# Patient Record
Sex: FEMALE | Race: WHITE | NOT HISPANIC OR LATINO | Employment: OTHER | URBAN - NONMETROPOLITAN AREA
[De-identification: names, ages, dates, MRNs, and addresses within clinical notes are randomized per-mention and may not be internally consistent; named-entity substitution may affect disease eponyms.]

---

## 2017-01-05 ENCOUNTER — OFFICE VISIT (OUTPATIENT)
Dept: FAMILY MEDICINE CLINIC | Facility: CLINIC | Age: 72
End: 2017-01-05

## 2017-01-05 VITALS
DIASTOLIC BLOOD PRESSURE: 72 MMHG | HEART RATE: 86 BPM | OXYGEN SATURATION: 97 % | BODY MASS INDEX: 32.02 KG/M2 | HEIGHT: 67 IN | WEIGHT: 204 LBS | SYSTOLIC BLOOD PRESSURE: 118 MMHG

## 2017-01-05 DIAGNOSIS — R11.0 NAUSEA: ICD-10-CM

## 2017-01-05 DIAGNOSIS — K21.00 GASTROESOPHAGEAL REFLUX DISEASE WITH ESOPHAGITIS: Primary | ICD-10-CM

## 2017-01-05 DIAGNOSIS — E11.9 DIABETES MELLITUS, STABLE (HCC): ICD-10-CM

## 2017-01-05 DIAGNOSIS — F41.8 MIXED ANXIETY DEPRESSIVE DISORDER: ICD-10-CM

## 2017-01-05 LAB — GLUCOSE BLDC GLUCOMTR-MCNC: 123 MG/DL (ref 70–130)

## 2017-01-05 PROCEDURE — 99214 OFFICE O/P EST MOD 30 MIN: CPT | Performed by: FAMILY MEDICINE

## 2017-01-05 PROCEDURE — 82962 GLUCOSE BLOOD TEST: CPT | Performed by: FAMILY MEDICINE

## 2017-01-05 RX ORDER — ALPRAZOLAM 0.25 MG/1
0.25 TABLET ORAL 2 TIMES DAILY PRN
Qty: 60 TABLET | Refills: 2 | Status: SHIPPED | OUTPATIENT
Start: 2017-01-05 | End: 2017-03-06 | Stop reason: SDUPTHER

## 2017-01-05 RX ORDER — PROCHLORPERAZINE MALEATE 5 MG/1
TABLET ORAL
Qty: 240 TABLET | Refills: 0 | Status: SHIPPED | OUTPATIENT
Start: 2017-01-05 | End: 2017-02-02

## 2017-01-05 NOTE — PROGRESS NOTES
"Subjective   Annie Mejia is a 71 y.o. female.     History of Present Illness   Ms. Mejia presents today with her  for recheck of chronic nausea and chronic anxiety DO. She has >1 yr h/o intermittent severe nausea with intermittent bouts of intractable vomiting. Assoc'd abd cramping but no chronic abd pain. She has had extensive w/u including EGD, colonoscopy, UGI and SBFT. SHe has seen Dr. Rowe as well as Dr. Martínez most recently. No clear diagnosis made by her understanding other than GERD and hiatal hernia. She has tried multiple medications including PPI, H2 blockers, carafate, Reglan, and antiemetics which have all helped to a varying degree. Last seen in clinic 1 month ago at which time she had been to ER several times. Medication adjustments were made including more regular use of Reglan, only prn use of Compazine.  She cont'd PPI as well. She reports that since her visit last month, she has only had 1 severe bout of n/v prompting her to consider going to ER \"but wait was too long\".  She has found the reglan does not seem to help. The compazine works well. She would like to \"take it more regularly\".  She is staying well hydrated. Down a few pounds over the holidays. No melena or hematochezia.    Her diabetes has been well controlled. No hypoglycemia. She is taking statin and Ace-inh as rx'd.     She has a long-standing h/o severe anxiety. She has been rx'd Lexapro but admits she quit it after 1 week \"as it didn't help\". She takes a low dose xanax at night and occ during day for panic. She assoc's her n/v spells with severe anxiety but states they can occur without her feeling anxious as well (such as middle of night).  She is please with some improvement on Zoloft but would like to \"try a higher dose\".       The following portions of the patient's history were reviewed and updated as appropriate: allergies, current medications, past family history, past medical history, past social history, past " surgical history and problem list.    Review of Systems   Constitutional: Positive for fatigue. Negative for appetite change, fever and unexpected weight change.   HENT: Negative for congestion, ear pain, hearing loss, nosebleeds, rhinorrhea, sneezing, sore throat, tinnitus and trouble swallowing.    Eyes: Negative for pain, discharge, redness and visual disturbance.   Respiratory: Positive for shortness of breath (when lying down). Negative for cough, chest tightness and wheezing.    Cardiovascular: Negative for chest pain, palpitations and leg swelling.   Gastrointestinal: Positive for abdominal pain, diarrhea, nausea and vomiting. Negative for blood in stool and constipation.        See HPI   Endocrine: Positive for cold intolerance. Negative for polydipsia and polyuria.        Hot flashes   Genitourinary: Negative for dysuria, flank pain, frequency, hematuria, pelvic pain, urgency and vaginal bleeding.   Musculoskeletal: Positive for arthralgias, back pain and myalgias (located in shins, chronic for years, even as child). Negative for joint swelling and neck pain.   Skin: Negative for rash and wound.   Neurological: Positive for weakness (generalized). Negative for dizziness, tremors, seizures, syncope, speech difficulty, numbness and headaches.   Hematological: Negative for adenopathy. Does not bruise/bleed easily.   Psychiatric/Behavioral: Positive for sleep disturbance. Negative for confusion, dysphoric mood and suicidal ideas. The patient is nervous/anxious.      Objective    Vitals:    01/05/17 0837   BP: 118/72   Pulse: 86   SpO2: 97%     Body mass index is 31.95 kg/(m^2).  Last 2 weights    01/05/17  0837   Weight: 204 lb (92.5 kg)     Physical Exam   Constitutional: She is oriented to person, place, and time. She appears well-developed and well-nourished. She is cooperative. She does not appear ill. No distress.   HENT:   Mouth/Throat: Mucous membranes are normal. Mucous membranes are not dry.   Eyes:  Conjunctivae are normal.   Pulmonary/Chest: Effort normal. No respiratory distress.   Neurological: She is alert and oriented to person, place, and time. Gait normal.   Skin: Skin is warm and dry.   Psychiatric: She has a normal mood and affect. Her speech is normal and behavior is normal. Judgment and thought content normal. Cognition and memory are normal.   Nursing note and vitals reviewed.    Recent Results (from the past 24 hour(s))   POC Glucose Fingerstick    Collection Time: 01/05/17  9:15 AM   Result Value Ref Range    Glucose 123 70 - 130 mg/dL     Assessment/Plan   Annie was seen today for nausea, follow-up and anxiety.    Diagnoses and all orders for this visit:    Gastroesophageal reflux disease with esophagitis  -     prochlorperazine (COMPAZINE) 5 MG tablet; 1-2 po q 6 hrs as needed for nausea/vomiting    Nausea  -     prochlorperazine (COMPAZINE) 5 MG tablet; 1-2 po q 6 hrs as needed for nausea/vomiting    Mixed anxiety depressive disorder  -     sertraline (ZOLOFT) 50 MG tablet; Take 1 tablet by mouth Daily.  -     ALPRAZolam (XANAX) 0.25 MG tablet; Take 1 tablet by mouth 2 (Two) Times a Day As Needed for anxiety or sleep. Only 1 rx/ 30 days.    Diabetes mellitus, stable  -     POC Glucose Fingerstick    Intractable nausea/vomiting improving. I have reviewed in detail with her and  the risks/benefits and potential side effects. We have particularly discussed increased risk of death in elderly dementia patients tx'd with compazine.  She voices understanding and wishes to proceed.  She is instructed to d/c Reglan, d/c phenergan.  Improving mixed anx/dep disorder. Good tolerance of Zoloft. Increase to 50 gm qd.  Continue xanax prn.  LILIAM report requested.  As part of patient's treatment plan I am prescribing a controlled substance.  The patient has been made aware of the appropriate use of such medications, including potential risk of somnolence, limited ability to drive and/or work safely, and  potential for dependence and/or overdose.  It has also been made clear that these medications are for use by this patient only, without concomitant use of alcohol or other substances, unless prescribed.  Recheck in 2 months, sooner as needed.  Continue diabetic appropriate diet.  Patient was encouraged to keep me informed of any acute changes, lack of improvement, or any new concerning symptoms.  Patient voiced understanding of all instructions and denied further questions.  The patient was counseled regarding diagnostic results, impressions, prognosis, instructions for management, risk factor reductions, education, and importance of treatment compliance.  Total time of encounter was 25 minutes and >15 was spent counseling.

## 2017-01-05 NOTE — MR AVS SNAPSHOT
Annie Mejia   1/5/2017 8:30 AM   Office Visit    Dept Phone:  [phone]   Encounter #:  [id number]    Provider:  Olga Pimentel MD   Department:  [facility] PRIMARY CARE                Your Full Care Plan              Today's Medication Changes          These changes are accurate as of: 1/5/17  9:12 AM.  If you have any questions, ask your nurse or doctor.               Medication(s)that have changed:     prochlorperazine 5 MG tablet   Commonly known as:  COMPAZINE   1-2 po q 6 hrs as needed for nausea/vomiting   What changed:  additional instructions   Changed by:  Olga Pimentel MD       sertraline 50 MG tablet   Commonly known as:  ZOLOFT   Take 1 tablet by mouth Daily.   What changed:    - medication strength  - how much to take   Changed by:  Olga Pimentel MD         Stop taking medication(s)listed here:     metoclopramide 5 MG tablet   Commonly known as:  REGLAN   Stopped by:  Olga Pimentel MD                Where to Get Your Medications      These medications were sent to [facility] [address] [place] [address] [phone] [place] [phone] [address] [address]     Phone:  [phone]     prochlorperazine 5 MG tablet    sertraline 50 MG tablet         You can get these medications from any pharmacy     Bring a paper prescription for each of these medications     ALPRAZolam 0.25 MG tablet                  Your Updated Medication List          This list is accurate as of: 1/5/17  9:12 AM.  Always use your most recent med list.                ALPRAZolam 0.25 MG tablet   Commonly known as:  XANAX   Take 1 tablet by mouth 2 (Two) Times a Day As Needed for anxiety or sleep. Only 1 rx/ 30 days.       aspirin  MG tablet       atorvastatin 40 MG tablet   Commonly known as:  LIPITOR   Take 1 tablet by mouth Every Night.       EPINEPHrine 0.3 MG/0.3ML solution auto-injector injection   Commonly known as:  EPIPEN          HYDROcodone-acetaminophen 7.5-325 MG per tablet   Commonly known as:  NORCO       linagliptin 5 MG tablet tablet   Commonly known as:  TRADJENTA   Take 1 tablet by mouth Daily.       lisinopril-hydrochlorothiazide 20-25 MG per tablet   Commonly known as:  PRINZIDE,ZESTORETIC   Take 1 tablet by mouth Daily.       meclizine 25 MG tablet   Commonly known as:  ANTIVERT       omeprazole 20 MG capsule   Commonly known as:  priLOSEC   Take 2 capsules by mouth Daily. Take 1 capsule twice daily for reflux and use with arthritis medication.       prochlorperazine 5 MG tablet   Commonly known as:  COMPAZINE   1-2 po q 6 hrs as needed for nausea/vomiting       promethazine 25 MG tablet   Commonly known as:  PHENERGAN   Take 1 tablet by mouth Every 6 (Six) Hours As Needed for nausea or vomiting.       sertraline 50 MG tablet   Commonly known as:  ZOLOFT   Take 1 tablet by mouth Daily.       sucralfate 1 G tablet   Commonly known as:  CARAFATE   Take 1 tablet by mouth 4 (Four) Times a Day.               You Were Diagnosed With        Codes Comments    Gastroesophageal reflux disease with esophagitis    -  Primary ICD-10-CM: K21.0  ICD-9-CM: 530.11     Nausea     ICD-10-CM: R11.0  ICD-9-CM: 787.02     Mixed anxiety depressive disorder     ICD-10-CM: F41.8  ICD-9-CM: 300.4       Instructions     None    Patient Instructions History      Upcoming Appointments     Visit Type Date Time Department    FOLLOW UP 1/5/2017  8:30 AM [facility]      MyChart Signup     Our records indicate that you have declined [employer] signup. If you would like to sign up for [employer], please email [email or web address] or call [phone] to obtain an activation code.             Other Info from Your Visit           Allergies     Morphine  Itching    Morphine And Related        Reason for Visit     Nausea compazine works better for her nausea and would like a refill      Vital Signs     Blood Pressure Pulse Height Weight Oxygen  "Saturation Body Mass Index    118/72 86 67\" (170.2 cm) 204 lb (92.5 kg) 97% 31.95 kg/m2    Smoking Status                   Former Smoker           Problems and Diagnoses Noted     Inflammation of the esophagus    Mixed anxiety depressive disorder    Nauseous        "

## 2017-01-16 ENCOUNTER — OFFICE VISIT (OUTPATIENT)
Dept: FAMILY MEDICINE CLINIC | Facility: CLINIC | Age: 72
End: 2017-01-16

## 2017-01-16 VITALS
DIASTOLIC BLOOD PRESSURE: 74 MMHG | SYSTOLIC BLOOD PRESSURE: 100 MMHG | WEIGHT: 206 LBS | OXYGEN SATURATION: 97 % | HEART RATE: 100 BPM | BODY MASS INDEX: 32.26 KG/M2

## 2017-01-16 DIAGNOSIS — R39.9 UTI SYMPTOMS: ICD-10-CM

## 2017-01-16 LAB
BILIRUB BLD-MCNC: NEGATIVE MG/DL
CLARITY, POC: CLEAR
COLOR UR: YELLOW
GLUCOSE UR STRIP-MCNC: NEGATIVE MG/DL
KETONES UR QL: NEGATIVE
LEUKOCYTE EST, POC: ABNORMAL
NITRITE UR-MCNC: NEGATIVE MG/ML
PH UR: 6 [PH] (ref 5–8)
PROT UR STRIP-MCNC: NEGATIVE MG/DL
RBC # UR STRIP: NEGATIVE /UL
SP GR UR: 1.02 (ref 1–1.03)
UROBILINOGEN UR QL: NORMAL

## 2017-01-16 PROCEDURE — 96372 THER/PROPH/DIAG INJ SC/IM: CPT | Performed by: NURSE PRACTITIONER

## 2017-01-16 PROCEDURE — 81003 URINALYSIS AUTO W/O SCOPE: CPT | Performed by: NURSE PRACTITIONER

## 2017-01-16 PROCEDURE — 99213 OFFICE O/P EST LOW 20 MIN: CPT | Performed by: NURSE PRACTITIONER

## 2017-01-16 PROCEDURE — 87086 URINE CULTURE/COLONY COUNT: CPT | Performed by: NURSE PRACTITIONER

## 2017-01-16 RX ORDER — CEFDINIR 300 MG/1
300 CAPSULE ORAL 2 TIMES DAILY
Qty: 14 CAPSULE | Refills: 0 | Status: SHIPPED | OUTPATIENT
Start: 2017-01-16 | End: 2017-01-23

## 2017-01-16 RX ORDER — CEFDINIR 300 MG/1
300 CAPSULE ORAL 2 TIMES DAILY
Qty: 14 CAPSULE | Refills: 0 | Status: SHIPPED | OUTPATIENT
Start: 2017-01-16 | End: 2017-01-16 | Stop reason: SDUPTHER

## 2017-01-16 RX ORDER — GLIPIZIDE 5 MG/1
5 TABLET ORAL
COMMUNITY
End: 2017-02-02 | Stop reason: SDUPTHER

## 2017-01-16 RX ORDER — CEFTRIAXONE 1 G/1
1 INJECTION, POWDER, FOR SOLUTION INTRAMUSCULAR; INTRAVENOUS ONCE
Status: COMPLETED | OUTPATIENT
Start: 2017-01-16 | End: 2017-01-16

## 2017-01-16 RX ADMIN — CEFTRIAXONE 1 G: 1 INJECTION, POWDER, FOR SOLUTION INTRAMUSCULAR; INTRAVENOUS at 17:12

## 2017-01-16 NOTE — MR AVS SNAPSHOT
Annie Mejia   1/16/2017 11:00 AM   Office Visit    Dept Phone:  [phone]   Encounter #:  [id number]    Provider:  ALEXIA Byrd   Department:  [facility] PRIMARY CARE                Your Full Care Plan              Today's Medication Changes          These changes are accurate as of: 1/16/17 11:57 AM.  If you have any questions, ask your nurse or doctor.               New Medication(s)Ordered:     cefdinir 300 MG capsule   Commonly known as:  OMNICEF   Take 1 capsule by mouth 2 (Two) Times a Day for 7 days.   Started by:  ALEXIA Byrd         Stop taking medication(s)listed here:     sucralfate 1 G tablet   Commonly known as:  CARAFATE   Stopped by:  ALEXIA Byrd                Where to Get Your Medications      You can get these medications from any pharmacy     Bring a paper prescription for each of these medications     cefdinir 300 MG capsule                  Your Updated Medication List          This list is accurate as of: 1/16/17 11:57 AM.  Always use your most recent med list.                ALPRAZolam 0.25 MG tablet   Commonly known as:  XANAX   Take 1 tablet by mouth 2 (Two) Times a Day As Needed for anxiety or sleep. Only 1 rx/ 30 days.       aspirin  MG tablet       atorvastatin 40 MG tablet   Commonly known as:  LIPITOR   Take 1 tablet by mouth Every Night.       cefdinir 300 MG capsule   Commonly known as:  OMNICEF   Take 1 capsule by mouth 2 (Two) Times a Day for 7 days.       EPINEPHrine 0.3 MG/0.3ML solution auto-injector injection   Commonly known as:  EPIPEN       glipiZIDE 5 MG tablet   Commonly known as:  GLUCOTROL       HYDROcodone-acetaminophen 7.5-325 MG per tablet   Commonly known as:  NORCO       linagliptin 5 MG tablet tablet   Commonly known as:  TRADJENTA   Take 1 tablet by mouth Daily.       lisinopril-hydrochlorothiazide 20-25 MG per tablet   Commonly known as:  PRINZIDE,ZESTORETIC      Take 1 tablet by mouth Daily.       meclizine 25 MG tablet   Commonly known as:  ANTIVERT       omeprazole 20 MG capsule   Commonly known as:  priLOSEC   Take 2 capsules by mouth Daily. Take 1 capsule twice daily for reflux and use with arthritis medication.       prochlorperazine 5 MG tablet   Commonly known as:  COMPAZINE   1-2 po q 6 hrs as needed for nausea/vomiting       sertraline 50 MG tablet   Commonly known as:  ZOLOFT   Take 1 tablet by mouth Daily.               You Were Diagnosed With        Codes Comments    UTI symptoms     ICD-10-CM: R39.9  ICD-9-CM: 788.99       Instructions     None    Patient Instructions History      Upcoming Appointments     Visit Type Date Time Department    SAME DAY 1/16/2017 11:00 AM [facility]    FOLLOW UP 3/6/2017  8:30 AM [facility]      MyChart Signup     Our records indicate that you have declined [facility] signup. If you would like to sign up for [employer], please email [email or web address] or call [phone] to obtain an activation code.             Other Info from Your Visit           Your Appointments     Mar 06, 2017  8:30 AM EST   Follow Up with Olga Pimentel MD   [facility] (--)    [address] [facility] [place] [id number] [phone]           Arrive 15 minutes prior to appointment.              Allergies     Morphine  Itching    Morphine And Related        Reason for Visit     Difficulty Urinating Pt c/o frequent urination, pain with urination and pressure x 2 days      Vital Signs     Blood Pressure Pulse Weight Oxygen Saturation Body Mass Index Smoking Status    100/74 (BP Location: Right arm, Patient Position: Sitting) 100 206 lb (93.4 kg) 97% 32.26 kg/m2 Former Smoker      Problems and Diagnoses Noted     UTI symptoms

## 2017-01-16 NOTE — PROGRESS NOTES
Subjective   Annie Mejia is a 71 y.o. female.     Difficulty Urinating    This is a new problem. The current episode started today. The problem occurs every urination. The problem has been gradually worsening. The quality of the pain is described as burning and aching. The pain is at a severity of 7/10. The pain is moderate. There has been no fever. She is not sexually active. There is no history of pyelonephritis. Associated symptoms include frequency and urgency. Pertinent negatives include no chills, discharge, flank pain, hematuria, nausea, sweats or vomiting. She has tried nothing for the symptoms.       The following portions of the patient's history were reviewed and updated as appropriate: allergies, current medications, past family history, past medical history, past social history, past surgical history and problem list.    Review of Systems   Constitutional: Negative for activity change, chills, fatigue and fever.   Respiratory: Negative for chest tightness and shortness of breath.    Cardiovascular: Negative for chest pain, palpitations and leg swelling.   Gastrointestinal: Negative for abdominal distention, abdominal pain, constipation, diarrhea, nausea and vomiting.   Genitourinary: Positive for dysuria, frequency, urgency and vaginal pain. Negative for difficulty urinating, flank pain, hematuria, pelvic pain and vaginal discharge.   Musculoskeletal: Negative.    Skin: Negative.    Neurological: Negative for dizziness, weakness and headaches.   Hematological: Negative for adenopathy.   Psychiatric/Behavioral: Negative for confusion.       Objective   Physical Exam   Constitutional: She is oriented to person, place, and time. She appears well-developed and well-nourished.   HENT:   Head: Normocephalic.   Right Ear: External ear normal.   Left Ear: External ear normal.   Nose: Nose normal.   Eyes: Conjunctivae are normal.   Neck: Normal range of motion.   Cardiovascular: Normal rate.     Pulmonary/Chest: Effort normal. No respiratory distress.   Abdominal: Soft. Bowel sounds are normal. She exhibits no distension. There is no tenderness.   Neurological: She is alert and oriented to person, place, and time.   Skin: Skin is warm and dry.   Psychiatric: She has a normal mood and affect. Her behavior is normal. Judgment and thought content normal.   Nursing note and vitals reviewed.      Assessment/Plan   Annie was seen today for difficulty urinating.    Diagnoses and all orders for this visit:    UTI symptoms  -     cefdinir (OMNICEF) 300 MG capsule; Take 1 capsule by mouth 2 (Two) Times a Day for 7 days.       UA was normal in clinic today, but urine sent for culture. Rocephin injection given in the office today, and patient given po prescription of Cefdinir and advised to wait until she hears from us in the next 2-3 days with the culture results, before she starts this. Patient advised to drink plenty of water and not drinks with caffeine.     Patient to RTC on Friday, if symptoms do not improve, or sooner if they worsen.

## 2017-01-18 LAB — BACTERIA SPEC AEROBE CULT: NORMAL

## 2017-02-02 ENCOUNTER — TELEPHONE (OUTPATIENT)
Dept: FAMILY MEDICINE CLINIC | Facility: CLINIC | Age: 72
End: 2017-02-02

## 2017-02-02 ENCOUNTER — OFFICE VISIT (OUTPATIENT)
Dept: FAMILY MEDICINE CLINIC | Facility: CLINIC | Age: 72
End: 2017-02-02

## 2017-02-02 VITALS
DIASTOLIC BLOOD PRESSURE: 80 MMHG | SYSTOLIC BLOOD PRESSURE: 122 MMHG | HEIGHT: 67 IN | OXYGEN SATURATION: 97 % | HEART RATE: 76 BPM | WEIGHT: 201 LBS | BODY MASS INDEX: 31.55 KG/M2

## 2017-02-02 DIAGNOSIS — F41.8 MIXED ANXIETY DEPRESSIVE DISORDER: ICD-10-CM

## 2017-02-02 DIAGNOSIS — K21.00 GASTROESOPHAGEAL REFLUX DISEASE WITH ESOPHAGITIS: Primary | ICD-10-CM

## 2017-02-02 DIAGNOSIS — E11.9 WELL CONTROLLED DIABETES MELLITUS (HCC): ICD-10-CM

## 2017-02-02 PROCEDURE — 99214 OFFICE O/P EST MOD 30 MIN: CPT | Performed by: FAMILY MEDICINE

## 2017-02-02 RX ORDER — PROMETHAZINE HYDROCHLORIDE 25 MG/1
25 TABLET ORAL EVERY 8 HOURS PRN
Qty: 90 TABLET | Refills: 0 | Status: SHIPPED | OUTPATIENT
Start: 2017-02-02 | End: 2017-03-06 | Stop reason: SDUPTHER

## 2017-02-02 RX ORDER — PROMETHAZINE HYDROCHLORIDE 25 MG/1
25 TABLET ORAL EVERY 6 HOURS PRN
COMMUNITY
End: 2017-02-02 | Stop reason: SDUPTHER

## 2017-02-02 RX ORDER — GLIPIZIDE 5 MG/1
TABLET ORAL
Qty: 30 TABLET | Refills: 5 | Status: SHIPPED | OUTPATIENT
Start: 2017-02-02 | End: 2017-03-06 | Stop reason: SINTOL

## 2017-02-03 NOTE — PROGRESS NOTES
"Subjective   Annie Mejia is a 71 y.o. female.     History of Present Illness   Ms. Mejia presents today with her  again complaining of n/v/reflux symptoms.  She has had another ER visit for the problem since her last office visit 1 month ago.    She is here for recheck of chronic nausea and chronic anxiety DO. She has >1 yr h/o intermittent severe nausea with intermittent bouts of intractable vomiting. Assoc'd abd cramping but no chronic abd pain. She has had extensive w/u including EGD, colonoscopy, UGI and SBFT. SHe has seen Dr. Rowe as well as Dr. Martínez most recently. No clear diagnosis made by her understanding other than GERD and hiatal hernia. She has tried multiple medications including PPI, H2 blockers, carafate, Reglan, and antiemetics which have all helped to a varying degree. Last seen in clinic 1 month ago. Medication adjustments were made including d/c reglan, only prn use of Compazine. She cont'd PPI as well. She now feels that \"phenergan worked better\" and she would like to go back to that. She is staying well hydrated. No melena or hematochezia.     Her diabetes has been well controlled. No hypoglycemia. She is taking statin and Ace-inh as rx'd.  She feels her diabetes medicine \"is making her sick\". She was not able to tolerate metformin due to GI side effects. Then she stopped her glipizide as she thought this caused nausea (even though she was able to take it for several years without a problem). She stopped glipizide approx 1 month ago. Now she feel her tradjenta is causing her nausea. Her last A1c was 7.8.      She has a long-standing h/o severe anxiety. Currently on zoloft which she feels is working well. She takes a low dose xanax at night and occ during day for panic. She assoc's her n/v spells with severe anxiety but states they can occur without her feeling anxious as well (such as middle of night).       The following portions of the patient's history were reviewed and " updated as appropriate: allergies, current medications, past family history, past medical history, past social history, past surgical history and problem list.    Review of Systems   Constitutional: Positive for fatigue. Negative for appetite change, fever and unexpected weight change.   HENT: Negative for congestion, ear pain, hearing loss, nosebleeds, rhinorrhea, sneezing, sore throat, tinnitus and trouble swallowing.    Eyes: Negative for pain, discharge, redness and visual disturbance.   Respiratory: Positive for shortness of breath (when lying down). Negative for cough, chest tightness and wheezing.    Cardiovascular: Negative for chest pain, palpitations and leg swelling.   Gastrointestinal: Positive for abdominal pain, diarrhea, nausea and vomiting. Negative for blood in stool and constipation.        See HPI   Endocrine: Positive for cold intolerance. Negative for polydipsia and polyuria.        Hot flashes   Genitourinary: Negative for dysuria, flank pain, frequency, hematuria, pelvic pain, urgency and vaginal bleeding.   Musculoskeletal: Positive for arthralgias, back pain and myalgias (located in shins, chronic for years, even as child). Negative for joint swelling and neck pain.   Skin: Negative for rash and wound.   Neurological: Positive for weakness (generalized). Negative for dizziness, tremors, seizures, syncope, speech difficulty, numbness and headaches.   Hematological: Negative for adenopathy. Does not bruise/bleed easily.   Psychiatric/Behavioral: Positive for sleep disturbance. Negative for confusion, dysphoric mood and suicidal ideas. The patient is nervous/anxious.      Objective    Vitals:    02/02/17 0841   BP: 122/80   Pulse: 76   SpO2: 97%     Body mass index is 31.48 kg/(m^2).  Last 2 weights    02/02/17  0841   Weight: 201 lb (91.2 kg)     Physical Exam   Constitutional: She is oriented to person, place, and time. She appears well-developed and well-nourished. She is cooperative. She  does not appear ill. No distress.   HENT:   Head: Normocephalic and atraumatic.   Mouth/Throat: Mucous membranes are normal. Mucous membranes are not dry.   Eyes: Conjunctivae and lids are normal.   Cardiovascular: Normal rate, regular rhythm and intact distal pulses.    Pulmonary/Chest: Effort normal and breath sounds normal.       Vascular Status -  Her exam exhibits no right foot edema. Her exam exhibits no left foot edema.  Neurological: She is alert and oriented to person, place, and time. She has normal strength. She displays no tremor. Gait normal.   Skin: Skin is warm and dry. No bruising and no rash noted.   Psychiatric: She has a normal mood and affect. Her behavior is normal. Cognition and memory are normal.   Nursing note and vitals reviewed.    Assessment/Plan   Annie was seen today for medication problem and med refill.    Diagnoses and all orders for this visit:    Gastroesophageal reflux disease with esophagitis  -     aluminum-magnesium hydroxide-simethicone 200-200-20 MG/5ML suspension 30 mL, lidocaine viscous 2 % solution 10 mL, PB-Hyoscy-Atropine-Scopolamine 16.2 MG/5ML elixir 10 mL; 10 ml daily as needed for stomach upset  -     promethazine (PHENERGAN) 25 MG tablet; Take 1 tablet by mouth Every 8 (Eight) Hours As Needed for nausea or vomiting.    Well controlled diabetes mellitus  -     glipiZIDE (GLUCOTROL) 5 MG tablet; 1/2 po bid with meals    Mixed anxiety depressive disorder    Stop compazine and restart phenergan as needed, along with continued PPI. Appropriate diet reviewed.  Moods stable.  As she tolerated glipizide alone for several years I have rec'd we restart that alone but at a lower dose. She is amenable to that.  If symptoms persist I advise she f/u with Dr. Martínez.  Risks, benefits, and potential side effects of new medications reviewed with patient.  Patient voiced understanding and wished to proceed with treatment.  F/U in 1 month, sooner as needed.  Patient was encouraged to  keep me informed of any acute changes, lack of improvement, or any new concerning symptoms.  Pt is aware of reasons to seek emergent care.  Patient voiced understanding of all instructions and denied further questions.

## 2017-02-06 NOTE — TELEPHONE ENCOUNTER
I spoke with pharmacist she is going to substitute it with 10 ml of benadryl and use 30 ml mylox, and 10 ml lidocaine. Patient to take 10 ml daily.

## 2017-02-14 ENCOUNTER — TELEPHONE (OUTPATIENT)
Dept: FAMILY MEDICINE CLINIC | Facility: CLINIC | Age: 72
End: 2017-02-14

## 2017-03-06 ENCOUNTER — OFFICE VISIT (OUTPATIENT)
Dept: FAMILY MEDICINE CLINIC | Facility: CLINIC | Age: 72
End: 2017-03-06

## 2017-03-06 VITALS
SYSTOLIC BLOOD PRESSURE: 114 MMHG | HEART RATE: 68 BPM | BODY MASS INDEX: 31.71 KG/M2 | DIASTOLIC BLOOD PRESSURE: 74 MMHG | OXYGEN SATURATION: 98 % | HEIGHT: 67 IN | WEIGHT: 202 LBS

## 2017-03-06 DIAGNOSIS — E78.2 MIXED HYPERLIPIDEMIA: ICD-10-CM

## 2017-03-06 DIAGNOSIS — N28.9 RENAL INSUFFICIENCY: ICD-10-CM

## 2017-03-06 DIAGNOSIS — F41.8 MIXED ANXIETY DEPRESSIVE DISORDER: ICD-10-CM

## 2017-03-06 DIAGNOSIS — E11.9 DIABETES MELLITUS, STABLE (HCC): Primary | ICD-10-CM

## 2017-03-06 DIAGNOSIS — I10 ESSENTIAL HYPERTENSION: ICD-10-CM

## 2017-03-06 DIAGNOSIS — R11.2 NON-INTRACTABLE VOMITING WITH NAUSEA, UNSPECIFIED VOMITING TYPE: ICD-10-CM

## 2017-03-06 DIAGNOSIS — K21.00 GASTROESOPHAGEAL REFLUX DISEASE WITH ESOPHAGITIS: ICD-10-CM

## 2017-03-06 DIAGNOSIS — M25.562 ACUTE PAIN OF LEFT KNEE: ICD-10-CM

## 2017-03-06 DIAGNOSIS — R74.8 ABNORMAL LIVER ENZYMES: ICD-10-CM

## 2017-03-06 LAB — GLUCOSE BLDC GLUCOMTR-MCNC: 137 MG/DL (ref 70–130)

## 2017-03-06 PROCEDURE — 99214 OFFICE O/P EST MOD 30 MIN: CPT | Performed by: FAMILY MEDICINE

## 2017-03-06 PROCEDURE — 82962 GLUCOSE BLOOD TEST: CPT | Performed by: FAMILY MEDICINE

## 2017-03-06 RX ORDER — LANCETS 30 GAUGE
EACH MISCELLANEOUS
Qty: 100 EACH | Refills: 5 | Status: SHIPPED | OUTPATIENT
Start: 2017-03-06 | End: 2018-05-09 | Stop reason: SDUPTHER

## 2017-03-06 RX ORDER — PROMETHAZINE HYDROCHLORIDE 25 MG/1
25 TABLET ORAL EVERY 8 HOURS PRN
Qty: 90 TABLET | Refills: 0 | Status: SHIPPED | OUTPATIENT
Start: 2017-03-06 | End: 2017-06-06 | Stop reason: SDUPTHER

## 2017-03-06 RX ORDER — ALPRAZOLAM 0.25 MG/1
0.25 TABLET ORAL 2 TIMES DAILY PRN
Qty: 60 TABLET | Refills: 2 | Status: SHIPPED | OUTPATIENT
Start: 2017-03-06 | End: 2017-03-24 | Stop reason: SDUPTHER

## 2017-03-06 RX ORDER — LINAGLIPTIN 5 MG/1
TABLET, FILM COATED ORAL
COMMUNITY
Start: 2017-02-06 | End: 2017-03-06 | Stop reason: SINTOL

## 2017-03-06 RX ORDER — BLOOD-GLUCOSE METER
KIT MISCELLANEOUS
Qty: 1 EACH | Refills: 0 | Status: SHIPPED | OUTPATIENT
Start: 2017-03-06 | End: 2017-11-21

## 2017-03-06 RX ORDER — INSULIN GLARGINE 100 [IU]/ML
10 INJECTION, SOLUTION SUBCUTANEOUS NIGHTLY
Qty: 10 ML | Refills: 5 | Status: SHIPPED | OUTPATIENT
Start: 2017-03-06 | End: 2017-08-17 | Stop reason: SDUPTHER

## 2017-03-06 NOTE — PROGRESS NOTES
Subjective   Annie Mejia is a 71 y.o. female.     Mrs. Mejia presents today for f/u on several chronic issues. Also having knee pain.  Knee Pain    The incident occurred more than 1 week ago. The incident occurred at home. The injury mechanism was a twisting injury (turned on planted foot). The pain is present in the left knee. The pain is moderate. The pain has been improving (slowly) since onset. Pertinent negatives include no inability to bear weight, loss of motion, loss of sensation, muscle weakness or numbness. Exacerbated by: increased activity. She has tried rest and elevation for the symptoms. The treatment provided mild relief.     Ms. Mejia presents today with her  for f/u of n/v/reflux symptoms. She has had no ER visits since last apt. She has >1 yr h/o intermittent severe nausea with intermittent bouts of intractable vomiting. Assoc'd abd cramping but no chronic abd pain. She has had extensive w/u including EGD, colonoscopy, UGI and SBFT. SHe has seen Dr. Rowe as well as Dr. Martínez. No clear diagnosis made by her understanding other than GERD and hiatal hernia. She has tried multiple medications including PPI, H2 blockers, carafate, Reglan, and antiemetics which have all helped to a varying degree. Multiple medication adjustments have been made including d/c reglan, only prn use of Compazine. Switching to back to phenergan. She has cont'd PPI as well. She is staying well hydrated. No melena or hematochezia.  She has scheduled apt with Dr. Martínez as previously recommended (later this month).      She feels her diabetic meds are contributing to nausea. She has tried one at a time, taking 1/2 only, etc and feels they both increase nausea and diarrhea. She does not check her blood glucose at home. No hypoglycemia sympotms. She is taking statin and Ace-inh as rx'd. Her last A1c was 7.8. She is due for recheck A1c. She has comorbid HTN and HLP. BP has been well controlled. Currently on statin.  Tolerating well.      She has a long-standing h/o severe anxiety. Currently on zoloft which she feels is working well. She takes a low dose xanax at night and occ during day for panic. She assoc's her n/v spells with severe anxiety but states they can occur without her feeling anxious as well (such as middle of night).      The following portions of the patient's history were reviewed and updated as appropriate: allergies, current medications, past family history, past medical history, past social history, past surgical history and problem list.    Review of Systems   Constitutional: Positive for fatigue. Negative for appetite change, fever and unexpected weight change.   HENT: Negative for congestion, ear pain, hearing loss, nosebleeds, rhinorrhea, sneezing, sore throat, tinnitus and trouble swallowing.    Eyes: Negative for pain, discharge, redness and visual disturbance.   Respiratory: Positive for shortness of breath (when lying down). Negative for cough, chest tightness and wheezing.    Cardiovascular: Negative for chest pain, palpitations and leg swelling.   Gastrointestinal: Positive for abdominal pain, diarrhea, nausea and vomiting. Negative for blood in stool and constipation.        See HPI   Endocrine: Positive for cold intolerance. Negative for polydipsia and polyuria.        Hot flashes   Genitourinary: Negative for dysuria, flank pain, frequency, hematuria, pelvic pain, urgency and vaginal bleeding.   Musculoskeletal: Positive for arthralgias, back pain and myalgias (located in shins, chronic for years, even as child). Negative for joint swelling and neck pain.   Skin: Negative for rash and wound.   Neurological: Positive for weakness (generalized). Negative for dizziness, tremors, seizures, syncope, speech difficulty, numbness and headaches.   Hematological: Negative for adenopathy. Does not bruise/bleed easily.   Psychiatric/Behavioral: Positive for sleep disturbance. Negative for confusion,  dysphoric mood and suicidal ideas. The patient is nervous/anxious.      Objective    Vitals:    03/06/17 0819   BP: 114/74   Pulse: 68   SpO2: 98%     Body mass index is 31.64 kg/(m^2).  Last 2 weights    03/06/17 0819   Weight: 202 lb (91.6 kg)     Physical Exam   Constitutional: She is oriented to person, place, and time. She appears well-developed and well-nourished. She is cooperative. She does not appear ill. No distress.   HENT:   Head: Normocephalic and atraumatic.   Mouth/Throat: Mucous membranes are normal. Mucous membranes are not dry.   Eyes: Conjunctivae and lids are normal.   Neck: Phonation normal. Neck supple. Normal carotid pulses present. No thyroid mass and no thyromegaly present.   Cardiovascular: Normal rate, regular rhythm and intact distal pulses.    Pulmonary/Chest: Effort normal and breath sounds normal.   Abdominal: Soft. She exhibits no distension and no mass. Bowel sounds are decreased. There is generalized tenderness (mild). There is no rigidity, no rebound and no guarding.   Musculoskeletal:        Left knee: She exhibits abnormal patellar mobility. She exhibits normal range of motion, no swelling, no deformity and no erythema. Tenderness found. Medial joint line tenderness noted.       Vascular Status -  Her exam exhibits no right foot edema. Her exam exhibits no left foot edema.  Lymphadenopathy:     She has no cervical adenopathy.   Neurological: She is alert and oriented to person, place, and time. She has normal strength. She displays no tremor. Gait normal.   Skin: Skin is warm and dry. No bruising and no rash noted.   Psychiatric: She has a normal mood and affect. Her behavior is normal. Cognition and memory are normal.   Nursing note and vitals reviewed.    Assessment/Plan   Annie was seen today for med refill, diabetes and knee pain.    Diagnoses and all orders for this visit:    Diabetes mellitus, stable  -     POC Glucose Fingerstick  -     CBC (No Diff)  -     Comprehensive  Metabolic Panel  -     TSH  -     Hemoglobin A1c  -     insulin glargine (LANTUS) 100 UNIT/ML injection; Inject 10 Units under the skin Every Night.  -     glucose monitor monitoring kit; Check BG fasting in AM and record  -     Lancets misc; Check blood glucose once daily.  -     Ambulatory Referral to Diabetic Education    Mixed hyperlipidemia  -     Comprehensive Metabolic Panel  -     Lipid Panel  -     TSH    Essential hypertension  -     CBC (No Diff)  -     Comprehensive Metabolic Panel  -     TSH    Gastroesophageal reflux disease with esophagitis  -     CBC (No Diff)  -     promethazine (PHENERGAN) 25 MG tablet; Take 1 tablet by mouth Every 8 (Eight) Hours As Needed for nausea or vomiting.    Renal insufficiency  -     CBC (No Diff)  -     Comprehensive Metabolic Panel    Abnormal liver enzymes  -     CBC (No Diff)  -     Comprehensive Metabolic Panel    Mixed anxiety depressive disorder  -     TSH  -     ALPRAZolam (XANAX) 0.25 MG tablet; Take 1 tablet by mouth 2 (Two) Times a Day As Needed for anxiety or sleep. Only 1 rx/ 30 days.    Non-intractable vomiting with nausea, unspecified vomiting type  -     Lipase    Acute pain of left knee    Chronic nausea with h/o GERD and HH. Possible contribution by gastroparesis. She feels symptoms are aggravated by oral diabetic meds. I have reviewed risks/benefits and potential side effects insulin therapy. She voices understanding and wishes to proceed. She is instructed to check blood glucose at least once fasting in AM and record. Start with lantus 10 units qhs. Refer to diabetic ed.   She is encouraged to keep f/u apt with Dr. Martínez as scheduled.  BP controlled.  Anxiety stable.  Suspect knee sprain with possible meniscal injury. Slowly improving. Advised rest, elevation, ice application, knee sleeve. May need PT if minimal improvement.  I will contact patient regarding test results and provide instructions regarding any necessary changes in plan of care.  F/U in  3 months, sooner as needed/instructed.  Patient was encouraged to keep me informed of any acute changes, lack of improvement, or any new concerning symptoms.  She is encouraged to schedule Medicare Wellness Visit.  LILIAM report reviewed and scanned into chart.  Last LILIAM date 3/6/17.  As part of patient's treatment plan I am prescribing a controlled substance.  The patient has been made aware of the appropriate use of such medications, including potential risk of somnolence, limited ability to drive and/or work safely, and potential for dependence and/or overdose.  It has also been made clear that these medications are for use by this patient only, without concomitant use of alcohol or other substances, unless prescribed.  Patient voiced understanding of all instructions and denied further questions.

## 2017-03-07 DIAGNOSIS — E11.9 DIABETES MELLITUS, STABLE (HCC): Primary | ICD-10-CM

## 2017-03-07 LAB
ALBUMIN SERPL-MCNC: 4.1 G/DL (ref 3.5–5)
ALBUMIN/GLOB SERPL: 1.4 G/DL (ref 1–2)
ALP SERPL-CCNC: 92 U/L (ref 38–126)
ALT SERPL-CCNC: 30 U/L (ref 13–69)
AST SERPL-CCNC: 31 U/L (ref 15–46)
BILIRUB SERPL-MCNC: 0.5 MG/DL (ref 0.2–1.3)
BUN SERPL-MCNC: 16 MG/DL (ref 7–20)
BUN/CREAT SERPL: 13.3 (ref 7.1–23.5)
CALCIUM SERPL-MCNC: 10.1 MG/DL (ref 8.4–10.2)
CHLORIDE SERPL-SCNC: 100 MMOL/L (ref 98–107)
CHOLEST SERPL-MCNC: 179 MG/DL (ref 0–199)
CO2 SERPL-SCNC: 29 MMOL/L (ref 26–30)
CREAT SERPL-MCNC: 1.2 MG/DL (ref 0.6–1.3)
ERYTHROCYTE [DISTWIDTH] IN BLOOD BY AUTOMATED COUNT: 15.8 % (ref 11.5–14.5)
GLOBULIN SER CALC-MCNC: 3 GM/DL
GLUCOSE SERPL-MCNC: 132 MG/DL (ref 74–98)
HBA1C MFR BLD: 6.9 %
HCT VFR BLD AUTO: 41.5 % (ref 37–47)
HDLC SERPL-MCNC: 60 MG/DL (ref 40–60)
HGB BLD-MCNC: 13.1 G/DL (ref 12–16)
LDLC SERPL CALC-MCNC: 78 MG/DL (ref 0–99)
LIPASE SERPL-CCNC: 232 U/L (ref 23–300)
MCH RBC QN AUTO: 29.1 PG (ref 27–31)
MCHC RBC AUTO-ENTMCNC: 31.6 G/DL (ref 30–37)
MCV RBC AUTO: 92.2 FL (ref 81–99)
PLATELET # BLD AUTO: 197 10*3/MM3 (ref 130–400)
POTASSIUM SERPL-SCNC: 5 MMOL/L (ref 3.5–5.1)
PROT SERPL-MCNC: 7.1 G/DL (ref 6.3–8.2)
RBC # BLD AUTO: 4.5 10*6/MM3 (ref 4.2–5.4)
SODIUM SERPL-SCNC: 139 MMOL/L (ref 137–145)
TRIGL SERPL-MCNC: 207 MG/DL
TSH SERPL DL<=0.005 MIU/L-ACNC: 2.21 MIU/ML (ref 0.47–4.68)
VLDLC SERPL CALC-MCNC: 41.4 MG/DL
WBC # BLD AUTO: 5.44 10*3/MM3 (ref 4.8–10.8)

## 2017-03-13 ENCOUNTER — OFFICE VISIT (OUTPATIENT)
Dept: FAMILY MEDICINE CLINIC | Facility: CLINIC | Age: 72
End: 2017-03-13

## 2017-03-13 VITALS
HEART RATE: 70 BPM | WEIGHT: 203 LBS | OXYGEN SATURATION: 98 % | SYSTOLIC BLOOD PRESSURE: 104 MMHG | DIASTOLIC BLOOD PRESSURE: 68 MMHG | BODY MASS INDEX: 31.86 KG/M2 | HEIGHT: 67 IN

## 2017-03-13 DIAGNOSIS — M25.462 SWELLING OF LEFT KNEE JOINT: ICD-10-CM

## 2017-03-13 DIAGNOSIS — M25.562 ACUTE PAIN OF LEFT KNEE: Primary | ICD-10-CM

## 2017-03-13 PROCEDURE — 99213 OFFICE O/P EST LOW 20 MIN: CPT | Performed by: NURSE PRACTITIONER

## 2017-03-14 DIAGNOSIS — M25.462 PAIN AND SWELLING OF LEFT KNEE: ICD-10-CM

## 2017-03-14 DIAGNOSIS — M25.562 LEFT ANTERIOR KNEE PAIN: Primary | ICD-10-CM

## 2017-03-14 DIAGNOSIS — M25.562 PAIN AND SWELLING OF LEFT KNEE: ICD-10-CM

## 2017-03-14 DIAGNOSIS — Z96.652 HISTORY OF KNEE REPLACEMENT PROCEDURE OF LEFT KNEE: ICD-10-CM

## 2017-03-17 ENCOUNTER — TELEPHONE (OUTPATIENT)
Dept: FAMILY MEDICINE CLINIC | Facility: CLINIC | Age: 72
End: 2017-03-17

## 2017-03-17 NOTE — TELEPHONE ENCOUNTER
----- Message from Olga Pimentel MD sent at 3/8/2017  9:27 AM EST -----  Please inform pt her recent labs show:  1) stable kidney function  2) no anemia  3) lipase normal  4) diabetes well controlled with A1c decreased to 6.9. She can start Lantus at 5 units per night if she wishes

## 2017-03-22 DIAGNOSIS — M25.562 LEFT KNEE PAIN, UNSPECIFIED CHRONICITY: Primary | ICD-10-CM

## 2017-03-23 ENCOUNTER — OFFICE VISIT (OUTPATIENT)
Dept: ORTHOPEDIC SURGERY | Facility: CLINIC | Age: 72
End: 2017-03-23

## 2017-03-23 VITALS — BODY MASS INDEX: 32.3 KG/M2 | HEIGHT: 66 IN | WEIGHT: 201 LBS | RESPIRATION RATE: 19 BRPM

## 2017-03-23 DIAGNOSIS — M17.12 OSTEOARTHROSIS, LOCALIZED, PRIMARY, KNEE, LEFT: Primary | ICD-10-CM

## 2017-03-23 DIAGNOSIS — M76.892 TENDONITIS OF KNEE, LEFT: ICD-10-CM

## 2017-03-23 PROCEDURE — 20610 DRAIN/INJ JOINT/BURSA W/O US: CPT | Performed by: ORTHOPAEDIC SURGERY

## 2017-03-23 PROCEDURE — 99203 OFFICE O/P NEW LOW 30 MIN: CPT | Performed by: ORTHOPAEDIC SURGERY

## 2017-03-23 RX ORDER — METHYLPREDNISOLONE ACETATE 40 MG/ML
40 INJECTION, SUSPENSION INTRA-ARTICULAR; INTRALESIONAL; INTRAMUSCULAR; SOFT TISSUE
Status: COMPLETED | OUTPATIENT
Start: 2017-03-23 | End: 2017-03-23

## 2017-03-23 RX ORDER — LIDOCAINE HYDROCHLORIDE 10 MG/ML
2 INJECTION, SOLUTION INFILTRATION; PERINEURAL
Status: COMPLETED | OUTPATIENT
Start: 2017-03-23 | End: 2017-03-23

## 2017-03-23 RX ORDER — HYDROCHLOROTHIAZIDE 25 MG/1
12.5 TABLET ORAL
COMMUNITY
End: 2017-03-24

## 2017-03-23 RX ORDER — GLIPIZIDE 5 MG/1
5 TABLET ORAL
COMMUNITY
End: 2017-03-24

## 2017-03-23 RX ORDER — ATORVASTATIN CALCIUM 10 MG/1
10 TABLET, FILM COATED ORAL
COMMUNITY
End: 2017-03-26

## 2017-03-23 RX ADMIN — METHYLPREDNISOLONE ACETATE 40 MG: 40 INJECTION, SUSPENSION INTRA-ARTICULAR; INTRALESIONAL; INTRAMUSCULAR; SOFT TISSUE at 10:50

## 2017-03-23 RX ADMIN — LIDOCAINE HYDROCHLORIDE 2 ML: 10 INJECTION, SOLUTION INFILTRATION; PERINEURAL at 10:50
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
## 2017-03-24 ENCOUNTER — OFFICE VISIT (OUTPATIENT)
Dept: FAMILY MEDICINE CLINIC | Facility: CLINIC | Age: 72
End: 2017-03-24

## 2017-03-24 VITALS
OXYGEN SATURATION: 98 % | DIASTOLIC BLOOD PRESSURE: 70 MMHG | HEIGHT: 65 IN | HEART RATE: 86 BPM | WEIGHT: 205 LBS | BODY MASS INDEX: 34.16 KG/M2 | SYSTOLIC BLOOD PRESSURE: 108 MMHG

## 2017-03-24 DIAGNOSIS — E11.9 WELL CONTROLLED DIABETES MELLITUS (HCC): ICD-10-CM

## 2017-03-24 DIAGNOSIS — Z00.00 INITIAL MEDICARE ANNUAL WELLNESS VISIT: Primary | ICD-10-CM

## 2017-03-24 DIAGNOSIS — F41.8 MIXED ANXIETY DEPRESSIVE DISORDER: ICD-10-CM

## 2017-03-24 DIAGNOSIS — K21.00 GASTROESOPHAGEAL REFLUX DISEASE WITH ESOPHAGITIS: ICD-10-CM

## 2017-03-24 DIAGNOSIS — Z13.820 SCREENING FOR OSTEOPOROSIS: ICD-10-CM

## 2017-03-24 PROCEDURE — G0438 PPPS, INITIAL VISIT: HCPCS | Performed by: FAMILY MEDICINE

## 2017-03-24 PROCEDURE — 99213 OFFICE O/P EST LOW 20 MIN: CPT | Performed by: FAMILY MEDICINE

## 2017-03-24 RX ORDER — DEXLANSOPRAZOLE 60 MG/1
60 CAPSULE, DELAYED RELEASE ORAL DAILY
COMMUNITY
End: 2018-02-02

## 2017-03-24 RX ORDER — ALPRAZOLAM 0.5 MG/1
0.5 TABLET ORAL 2 TIMES DAILY PRN
Qty: 60 TABLET | Refills: 0 | Status: SHIPPED | OUTPATIENT
Start: 2017-03-24 | End: 2017-05-08

## 2017-03-24 RX ORDER — SERTRALINE HYDROCHLORIDE 100 MG/1
100 TABLET, FILM COATED ORAL DAILY
Qty: 30 TABLET | Refills: 5 | Status: SHIPPED | OUTPATIENT
Start: 2017-03-24 | End: 2017-11-21

## 2017-03-24 NOTE — PROGRESS NOTES
QUICK REFERENCE INFORMATION:  The ABCs of the Annual Wellness Visit    Initial Medicare Wellness Visit    HEALTH RISK ASSESSMENT    1945    Recent Hospitalizations:  No recent hospitalization(s)..        Current Medical Providers:  Patient Care Team:  Olga Pimentel MD as PCP - General (Family Medicine)  Olga Pimentel MD as PCP - Family Medicine  Tj Rowe MD as Consulting Physician (General Surgery)  Derrick Martínez MD as Consulting Physician (Gastroenterology)  Maxx Dior MD as Consulting Physician (Orthopedic Surgery)  Lavelle Méndez OD (Optometry)  Shaheen Ibrahim MD as Surgeon (Orthopedic Surgery)  Glendy Hubbard MD as Consulting Physician (Obstetrics and Gynecology)        Smoking Status:  History   Smoking Status   • Former Smoker   • Quit date: 4/1/2012   Smokeless Tobacco   • Never Used       Alcohol Consumption:  History   Alcohol Use No       Depression Screen:   PHQ-9 Depression Screening 3/24/2017   Little interest or pleasure in doing things 1   Feeling down, depressed, or hopeless 1   Trouble falling or staying asleep, or sleeping too much 2   Feeling tired or having little energy 2   Poor appetite or overeating 1   Feeling bad about yourself - or that you are a failure or have let yourself or your family down 1   Trouble concentrating on things, such as reading the newspaper or watching television 0   Moving or speaking so slowly that other people could have noticed. Or the opposite - being so fidgety or restless that you have been moving around a lot more than usual 0   Thoughts that you would be better off dead, or of hurting yourself in some way 0   PHQ-9 Total Score 8   If you checked off any problems, how difficult have these problems made it for you to do your work, take care of things at home, or get along with other people? Not difficult at all       Health Habits and Functional and Cognitive Screening:  Functional & Cognitive Status 3/24/2017   Do  you have difficulty preparing food and eating? No   Do you have difficulty bathing yourself? No   Do you have difficulty getting dressed? No   Do you have difficulty using the toilet? No   Do you have difficulty moving around from place to place? No   In the past year have you fallen or experienced a near fall? Yes   Do you need help using the phone?  No   Are you deaf or do you have serious difficulty hearing?  No   Do you need help with transportation? No   Do you need help shopping? No   Do you need help preparing meals?  No   Do you need help with housework?  Yes   Do you need help with laundry? No   Do you need help taking your medications? No   Do you need help managing money? No   Do you have difficulty concentrating, remembering or making decisions? No       Health Habits  Current Diet: Well Balanced Diet  Dental Exam: Up to date  Eye Exam: Up to date  Exercise (times per week): 0 times per week  Current Exercise Activities Include: None          Does the patient have evidence of cognitive impairment? No    Asprin use counseling:yes      Recent Lab Results: reviewed on chart    Visual Acuity:  No exam data present.  Pt declines. Eye exam up to date.    Age-appropriate Screening Schedule:  Refer to the list below for future screening recommendations based on patient's age, sex and/or medical conditions. Orders for these recommended tests are listed in the plan section. The patient has been provided with a written plan.    Health Maintenance   Topic Date Due   • DXA SCAN  08/08/2016   • PNEUMOCOCCAL VACCINES (65+ LOW/MEDIUM RISK) (2 of 2 - PPSV23) 12/03/2016   • URINE MICROALBUMIN  05/09/2017   • DIABETIC FOOT EXAM  08/08/2017   • DIABETIC EYE EXAM  01/10/2018   • LIPID PANEL  03/06/2018   • HEMOGLOBIN A1C  03/06/2018   • MAMMOGRAM  11/10/2018   • TDAP/TD VACCINES (2 - Td) 11/01/2022   • COLONOSCOPY  01/18/2026   • INFLUENZA VACCINE  Completed   • ZOSTER VACCINE  Addressed        Subjective   History of  Present Illness    Annie Mejia is a 71 y.o. female who presents for an Annual Wellness Visit. Her risk assessment form is reviewed and scanned to chart.  Other issues reviewed today incude:  1) depression with anxiety- she feels the zoloft has improved her symptoms but would like to try an increased dose. She has been using very low dose Xanax for panic symptoms which are assoc'd with GI upset often leading to ER visit. Since her last apt she has had only one episode and no ER visits. She would like to try higher dose of Xanax as she generally has to take 2 of the 0.25 for efficacy. She denies side effects from either medication. In addition she has had f/u with Dr. lemus since her last apt. He changed her PPI to Dexilant and she feels this has helped signifciantly with GERD.  2) She has h/o well controlled DM. At last visit was switched to basal insulin due to GI side effects from all orals tried. She has brought in BG log. No lows less than 80 and very rare number >180. She denies side effects. Is taking 5 units per day. She did not go to diabetic ed as referred but has family member well educated in DM who is helping her.     The following portions of the patient's history were reviewed and updated as appropriate: allergies, current medications, past family history, past medical history, past social history, past surgical history and problem list.    Outpatient Medications Prior to Visit   Medication Sig Dispense Refill   • aspirin  MG EC tablet Take  by mouth.     • atorvastatin (LIPITOR) 40 MG tablet Take 1 tablet by mouth Every Night. 90 tablet 1   • EPINEPHrine (EPIPEN) 0.3 MG/0.3ML solution auto-injector injection EpiPen 2-Lev 0.3 MG/0.3ML Injection Solution Auto-injector; Patient Sig: EpiPen 2-Lev 0.3 MG/0.3ML Injection Solution Auto-injector INJECT 0.3ML INTRAMUSCULARLY AS DIRECTED.; 1; 2; 06-May-2015; Active     • glucose blood test strip Check BS Fasting in the AM and record. 50 each 5   •  "glucose monitor monitoring kit Check BG fasting in AM and record 1 each 0   • HYDROcodone-acetaminophen (NORCO) 7.5-325 MG per tablet Take 1 tablet by mouth 3 (three) times a day as needed.     • insulin glargine (LANTUS) 100 UNIT/ML injection Inject 10 Units under the skin Every Night. 10 mL 5   • Insulin Syringe-Needle U-100 30G X 5/16\" 1 ML misc 10 Units Every Night. 50 each 5   • Lancets misc Check blood glucose once daily. 100 each 5   • lisinopril-hydrochlorothiazide (PRINZIDE,ZESTORETIC) 20-25 MG per tablet Take 1 tablet by mouth Daily. 90 tablet 1   • meclizine (ANTIVERT) 25 MG tablet Take 1-2 tablets by mouth every 6 (six) to 8 (eight) hours as needed for dizziness.     • Needle, Disp, 30G X 5/16\" misc 10 Units Every Night. 50 each 5   • promethazine (PHENERGAN) 25 MG tablet Take 1 tablet by mouth Every 8 (Eight) Hours As Needed for nausea or vomiting. 90 tablet 0   • ALPRAZolam (XANAX) 0.25 MG tablet Take 1 tablet by mouth 2 (Two) Times a Day As Needed for anxiety or sleep. Only 1 rx/ 30 days. 60 tablet 2   • atorvastatin (LIPITOR) 10 MG tablet Take 10 mg by mouth.     • glipiZIDE (GLUCOTROL) 5 MG tablet Take 5 mg by mouth.     • omeprazole (PriLOSEC) 20 MG capsule Take 2 capsules by mouth Daily. Take 1 capsule twice daily for reflux and use with arthritis medication. 180 capsule 1   • sertraline (ZOLOFT) 50 MG tablet Take 1 tablet by mouth Daily. 30 tablet 5   • hydrochlorothiazide (HYDRODIURIL) 25 MG tablet Take 12.5 mg by mouth.       No facility-administered medications prior to visit.        Patient Active Problem List   Diagnosis   • Abnormal liver enzymes   • Arthritis   • Nausea   • Non-specific colitis   • Well controlled diabetes mellitus   • Mixed anxiety depressive disorder   • Gastroesophageal reflux disease with esophagitis   • First degree atrioventricular block   • Hyperlipidemia   • Hypertension   • Insomnia   • Cyst of ovary   • Adiposity   • Osteoporosis   • Prolonged QT interval   • " Peripheral vascular disease   • Renal insufficiency   • Hiatal hernia       Advanced Care Planning:  has NO advanced directive - information provided to the patient today    Identification of Risk Factors:  Risk factors include: weight , cardiovascular risk, depression and polypharmacy.    Review of Systems   Constitutional: Positive for fatigue. Negative for appetite change, fever and unexpected weight change.   HENT: Negative for congestion, ear pain, hearing loss, nosebleeds, rhinorrhea, sneezing, sore throat, tinnitus and trouble swallowing.    Eyes: Negative for pain, discharge, redness and visual disturbance.   Respiratory: Positive for shortness of breath (when lying down). Negative for cough, chest tightness and wheezing.    Cardiovascular: Negative for chest pain, palpitations and leg swelling.   Gastrointestinal: Positive for abdominal pain, diarrhea, nausea and vomiting. Negative for blood in stool and constipation.        See HPI   Endocrine: Positive for cold intolerance. Negative for polydipsia and polyuria.        Hot flashes   Genitourinary: Negative for dysuria, flank pain, frequency, hematuria, pelvic pain, urgency and vaginal bleeding.   Musculoskeletal: Positive for arthralgias, back pain and myalgias (located in shins, chronic for years, even as child). Negative for joint swelling and neck pain.   Skin: Negative for rash and wound.   Neurological: Positive for weakness (generalized). Negative for dizziness, tremors, seizures, syncope, speech difficulty, numbness and headaches.   Hematological: Negative for adenopathy. Does not bruise/bleed easily.   Psychiatric/Behavioral: Positive for sleep disturbance. Negative for confusion, dysphoric mood and suicidal ideas. The patient is nervous/anxious.        Compared to one year ago, the patient feels her physical health is worse.  Compared to one year ago, the patient feels her mental health is the same.    Objective     Physical Exam   Constitutional:  "She is oriented to person, place, and time. She appears well-developed and well-nourished. She is cooperative. She does not appear ill. No distress.   HENT:   Head: Normocephalic and atraumatic.   Mouth/Throat: Mucous membranes are normal. Mucous membranes are not dry.   Eyes: Conjunctivae and lids are normal.   Neck: Phonation normal. Neck supple. Normal carotid pulses present. No thyroid mass and no thyromegaly present.   Cardiovascular: Normal rate, regular rhythm and intact distal pulses.    Pulmonary/Chest: Effort normal and breath sounds normal.   Abdominal: Soft. She exhibits no distension and no mass. Bowel sounds are decreased. There is generalized tenderness (mild). There is no rigidity, no rebound and no guarding.       Vascular Status -  Her exam exhibits no right foot edema. Her exam exhibits no left foot edema.  Lymphadenopathy:     She has no cervical adenopathy.   Neurological: She is alert and oriented to person, place, and time. She has normal strength. She displays no tremor. Gait normal.   Skin: Skin is warm and dry. No bruising and no rash noted.   Psychiatric: She has a normal mood and affect. Her behavior is normal. Cognition and memory are normal.   Nursing note and vitals reviewed.  Pt declines breast exam.    Vitals:    03/24/17 0833   BP: 108/70   Pulse: 86   SpO2: 98%   Weight: 205 lb (93 kg)   Height: 64.5\" (163.8 cm)       Body mass index is 34.64 kg/(m^2).  Discussed the patient's BMI with her. The BMI is above average; BMI management plan is completed.    Assessment/Plan   Patient Self-Management and Personalized Health Advice  The patient has been provided with information about: diet, exercise, weight management, prevention of cardiac or vascular disease, the relationship between weight and GERD, fall prevention, designing advance directives and mental health concerns and preventive services including:   · Advanced directives: has NO advanced directive - information provided to the " "patient today, Bone densitometry screening, Exercise counseling provided, Fall Risk assessment done, Fall Risk plan of care done, Nutrition counseling provided, Pneumococcal vaccine .    Visit Diagnoses:    ICD-10-CM ICD-9-CM   1. Initial Medicare annual wellness visit Z00.00 V70.0   2. Mixed anxiety depressive disorder F41.8 300.4   3. Screening for osteoporosis Z13.820 V82.81   4. Gastroesophageal reflux disease with esophagitis K21.0 530.11   5. Well controlled diabetes mellitus E11.9 250.00       Orders Placed This Encounter   Procedures   • DEXA Bone Density Axial     Standing Status:   Future     Standing Expiration Date:   3/24/2018     Order Specific Question:   Reason for Exam:     Answer:   screening for osteoporosis       Outpatient Encounter Prescriptions as of 3/24/2017   Medication Sig Dispense Refill   • ALPRAZolam (XANAX) 0.5 MG tablet Take 1 tablet by mouth 2 (Two) Times a Day As Needed for Anxiety or Sleep. 60 tablet 0   • aspirin  MG EC tablet Take  by mouth.     • atorvastatin (LIPITOR) 40 MG tablet Take 1 tablet by mouth Every Night. 90 tablet 1   • dexlansoprazole (DEXILANT) 60 MG capsule Take 60 mg by mouth Daily.     • EPINEPHrine (EPIPEN) 0.3 MG/0.3ML solution auto-injector injection EpiPen 2-Lev 0.3 MG/0.3ML Injection Solution Auto-injector; Patient Sig: EpiPen 2-Lev 0.3 MG/0.3ML Injection Solution Auto-injector INJECT 0.3ML INTRAMUSCULARLY AS DIRECTED.; 1; 2; 06-May-2015; Active     • glucose blood test strip Check BS Fasting in the AM and record. 50 each 5   • glucose monitor monitoring kit Check BG fasting in AM and record 1 each 0   • HYDROcodone-acetaminophen (NORCO) 7.5-325 MG per tablet Take 1 tablet by mouth 3 (three) times a day as needed.     • insulin glargine (LANTUS) 100 UNIT/ML injection Inject 10 Units under the skin Every Night. 10 mL 5   • Insulin Syringe-Needle U-100 30G X 5/16\" 1 ML misc 10 Units Every Night. 50 each 5   • Lancets misc Check blood glucose once " "daily. 100 each 5   • lisinopril-hydrochlorothiazide (PRINZIDE,ZESTORETIC) 20-25 MG per tablet Take 1 tablet by mouth Daily. 90 tablet 1   • meclizine (ANTIVERT) 25 MG tablet Take 1-2 tablets by mouth every 6 (six) to 8 (eight) hours as needed for dizziness.     • Needle, Disp, 30G X 5/16\" misc 10 Units Every Night. 50 each 5   • promethazine (PHENERGAN) 25 MG tablet Take 1 tablet by mouth Every 8 (Eight) Hours As Needed for nausea or vomiting. 90 tablet 0   • sertraline (ZOLOFT) 100 MG tablet Take 1 tablet by mouth Daily. 30 tablet 5   • [DISCONTINUED] ALPRAZolam (XANAX) 0.25 MG tablet Take 1 tablet by mouth 2 (Two) Times a Day As Needed for anxiety or sleep. Only 1 rx/ 30 days. 60 tablet 2   • [DISCONTINUED] atorvastatin (LIPITOR) 10 MG tablet Take 10 mg by mouth.     • [DISCONTINUED] glipiZIDE (GLUCOTROL) 5 MG tablet Take 5 mg by mouth.     • [DISCONTINUED] omeprazole (PriLOSEC) 20 MG capsule Take 2 capsules by mouth Daily. Take 1 capsule twice daily for reflux and use with arthritis medication. 180 capsule 1   • [DISCONTINUED] sertraline (ZOLOFT) 50 MG tablet Take 1 tablet by mouth Daily. 30 tablet 5   • [DISCONTINUED] hydrochlorothiazide (HYDRODIURIL) 25 MG tablet Take 12.5 mg by mouth.     • [] lidocaine (XYLOCAINE) 1 % injection 2 mL      • [] methylPREDNISolone acetate (DEPO-medrol) injection 40 mg        No facility-administered encounter medications on file as of 3/24/2017.        Reviewed use of high risk medication in the elderly: yes  Reviewed for potential of harmful drug interactions in the elderly: yes    Follow Up:  Return for Next scheduled follow up.   Continue Dexilant as rx'd per Dr. Martínez.  Zoloft dose increased to 100 mg with xanax at 0.5 mg as needed. She understands xanax is for panic symptoms only.   LILIAM report reviewed and scanned into chart.  Last LILIAM date 3/9/17.  As part of patient's treatment plan I am prescribing a controlled substance.  The patient has been made " aware of the appropriate use of such medications, including potential risk of somnolence, limited ability to drive and/or work safely, and potential for dependence and/or overdose.  It has also been made clear that these medications are for use by this patient only, without concomitant use of alcohol or other substances, unless prescribed.  Patient was encouraged to keep me informed of any acute changes, lack of improvement, or any new concerning symptoms.  Pt is aware of reasons to seek emergent care.  She believes she has had both Prevnar 13 and Pneumovax. We will check with her pharmacy.  Patient voiced understanding of all instructions and denied further questions.    An After Visit Summary and PPPS with all of these plans were given to the patient.

## 2017-03-24 NOTE — PATIENT INSTRUCTIONS
Health Maintenance, Female  Adopting a healthy lifestyle and getting preventive care can go a long way to promote health and wellness. Talk with your health care provider about what schedule of regular examinations is right for you. This is a good chance for you to check in with your provider about disease prevention and staying healthy.  In between checkups, there are plenty of things you can do on your own. Experts have done a lot of research about which lifestyle changes and preventive measures are most likely to keep you healthy. Ask your health care provider for more information.  WEIGHT AND DIET   Eat a healthy diet  · Be sure to include plenty of vegetables, fruits, low-fat dairy products, and lean protein.  · Do not eat a lot of foods high in solid fats, added sugars, or salt.  · Get regular exercise. This is one of the most important things you can do for your health.  ¨ Most adults should exercise for at least 150 minutes each week. The exercise should increase your heart rate and make you sweat (moderate-intensity exercise).  ¨ Most adults should also do strengthening exercises at least twice a week. This is in addition to the moderate-intensity exercise.    Maintain a healthy weight  · Body mass index (BMI) is a measurement that can be used to identify possible weight problems. It estimates body fat based on height and weight. Your health care provider can help determine your BMI and help you achieve or maintain a healthy weight.  · For females 20 years of age and older:      A BMI below 18.5 is considered underweight.    A BMI of 18.5 to 24.9 is normal.    A BMI of 25 to 29.9 is considered overweight.    A BMI of 30 and above is considered obese.    Watch levels of cholesterol and blood lipids  · You should start having your blood tested for lipids and cholesterol at 20 years of age, then have this test every 5 years.  · You may need to have your cholesterol levels checked more often if:  ¨ Your lipid  or cholesterol levels are high.  ¨ You are older than 50 years of age.  ¨ You are at high risk for heart disease.    CANCER SCREENING      Lung Cancer  · Lung cancer screening is recommended for adults 55-80 years old who are at high risk for lung cancer because of a history of smoking.  · A yearly low-dose CT scan of the lungs is recommended for people who:  ¨ Currently smoke.  ¨ Have quit within the past 15 years.  ¨ Have at least a 30-pack-year history of smoking. A pack year is smoking an average of one pack of cigarettes a day for 1 year.  · Yearly screening should continue until it has been 15 years since you quit.  · Yearly screening should stop if you develop a health problem that would prevent you from having lung cancer treatment.    Breast Cancer  · Practice breast self-awareness. This means understanding how your breasts normally appear and feel.  · It also means doing regular breast self-exams. Let your health care provider know about any changes, no matter how small.  · If you are in your 20s or 30s, you should have a clinical breast exam (CBE) by a health care provider every 1-3 years as part of a regular health exam.  · If you are 40 or older, have a CBE every year. Also consider having a breast X-ray (mammogram) every year.  · If you have a family history of breast cancer, talk to your health care provider about genetic screening.  · If you are at high risk for breast cancer, talk to your health care provider about having an MRI and a mammogram every year.  · Breast cancer gene (BRCA) assessment is recommended for women who have family members with BRCA-related cancers. BRCA-related cancers include:  ¨ Breast.  ¨ Ovarian.  ¨ Tubal.  ¨ Peritoneal cancers.  · Results of the assessment will determine the need for genetic counseling and BRCA1 and BRCA2 testing.  Cervical Cancer  Your health care provider may recommend that you be screened regularly for cancer of the pelvic organs (ovaries, uterus, and  vagina). This screening involves a pelvic examination, including checking for microscopic changes to the surface of your cervix (Pap test). You may be encouraged to have this screening done every 3 years, beginning at age 21.  · For women ages 30-65, health care providers may recommend pelvic exams and Pap testing every 3 years, or they may recommend the Pap and pelvic exam, combined with testing for human papilloma virus (HPV), every 5 years. Some types of HPV increase your risk of cervical cancer. Testing for HPV may also be done on women of any age with unclear Pap test results.  · Other health care providers may not recommend any screening for nonpregnant women who are considered low risk for pelvic cancer and who do not have symptoms. Ask your health care provider if a screening pelvic exam is right for you.  · If you have had past treatment for cervical cancer or a condition that could lead to cancer, you need Pap tests and screening for cancer for at least 20 years after your treatment. If Pap tests have been discontinued, your risk factors (such as having a new sexual partner) need to be reassessed to determine if screening should resume. Some women have medical problems that increase the chance of getting cervical cancer. In these cases, your health care provider may recommend more frequent screening and Pap tests.  Colorectal Cancer  · This type of cancer can be detected and often prevented.  · Routine colorectal cancer screening usually begins at 50 years of age and continues through 75 years of age.  · Your health care provider may recommend screening at an earlier age if you have risk factors for colon cancer.  · Your health care provider may also recommend using home test kits to check for hidden blood in the stool.  · A small camera at the end of a tube can be used to examine your colon directly (sigmoidoscopy or colonoscopy). This is done to check for the earliest forms of colorectal  cancer.  · Routine screening usually begins at age 50.  · Direct examination of the colon should be repeated every 5-10 years through 75 years of age. However, you may need to be screened more often if early forms of precancerous polyps or small growths are found.  Skin Cancer  · Check your skin from head to toe regularly.  · Tell your health care provider about any new moles or changes in moles, especially if there is a change in a mole's shape or color.  · Also tell your health care provider if you have a mole that is larger than the size of a pencil eraser.  · Always use sunscreen. Apply sunscreen liberally and repeatedly throughout the day.  · Protect yourself by wearing long sleeves, pants, a wide-brimmed hat, and sunglasses whenever you are outside.  HEART DISEASE, DIABETES, AND HIGH BLOOD PRESSURE   · High blood pressure causes heart disease and increases the risk of stroke. High blood pressure is more likely to develop in:    People who have blood pressure in the high end of the normal range (130-139/85-89 mm Hg).    People who are overweight or obese.    People who are .  · If you are 18-39 years of age, have your blood pressure checked every 3-5 years. If you are 40 years of age or older, have your blood pressure checked every year. You should have your blood pressure measured twice--once when you are at a hospital or clinic, and once when you are not at a hospital or clinic. Record the average of the two measurements. To check your blood pressure when you are not at a hospital or clinic, you can use:    An automated blood pressure machine at a pharmacy.    A home blood pressure monitor.  · If you are between 55 years and 79 years old, ask your health care provider if you should take aspirin to prevent strokes.  · Have regular diabetes screenings. This involves taking a blood sample to check your fasting blood sugar level.    If you are at a normal weight and have a low risk for diabetes,  have this test once every three years after 45 years of age.    If you are overweight and have a high risk for diabetes, consider being tested at a younger age or more often.  PREVENTING INFECTION   Hepatitis B  · If you have a higher risk for hepatitis B, you should be screened for this virus. You are considered at high risk for hepatitis B if:    You were born in a country where hepatitis B is common. Ask your health care provider which countries are considered high risk.    Your parents were born in a high-risk country, and you have not been immunized against hepatitis B (hepatitis B vaccine).    You have HIV or AIDS.    You use needles to inject street drugs.    You live with someone who has hepatitis B.    You have had sex with someone who has hepatitis B.    You get hemodialysis treatment.    You take certain medicines for conditions, including cancer, organ transplantation, and autoimmune conditions.  Hepatitis C  · Blood testing is recommended for:  ¨ Everyone born from 1945 through 1965.  ¨ Anyone with known risk factors for hepatitis C.  Sexually transmitted infections (STIs)  · You should be screened for sexually transmitted infections (STIs) including gonorrhea and chlamydia if:  ¨ You are sexually active and are younger than 24 years of age.  ¨ You are older than 24 years of age and your health care provider tells you that you are at risk for this type of infection.  ¨ Your sexual activity has changed since you were last screened and you are at an increased risk for chlamydia or gonorrhea. Ask your health care provider if you are at risk.    If you do not have HIV, but are at risk, it may be recommended that you take a prescription medicine daily to prevent HIV infection. This is called pre-exposure prophylaxis (PrEP). You are considered at risk if:    You are sexually active and do not regularly use condoms or know the HIV status of your partner(s).    You take drugs by injection.    You are sexually  active with a partner who has HIV.  Talk with your health care provider about whether you are at high risk of being infected with HIV. If you choose to begin PrEP, you should first be tested for HIV. You should then be tested every 3 months for as long as you are taking PrEP.   PREGNANCY   · If you are premenopausal and you may become pregnant, ask your health care provider about preconception counseling.  · If you may become pregnant, take 400 to 800 micrograms (mcg) of folic acid every day.  · If you want to prevent pregnancy, talk to your health care provider about birth control (contraception).  OSTEOPOROSIS AND MENOPAUSE   · Osteoporosis is a disease in which the bones lose minerals and strength with aging. This can result in serious bone fractures. Your risk for osteoporosis can be identified using a bone density scan.  · If you are 65 years of age or older, or if you are at risk for osteoporosis and fractures, ask your health care provider if you should be screened.  · Ask your health care provider whether you should take a calcium or vitamin D supplement to lower your risk for osteoporosis.  · Menopause may have certain physical symptoms and risks.  · Hormone replacement therapy may reduce some of these symptoms and risks.  Talk to your health care provider about whether hormone replacement therapy is right for you.   HOME CARE INSTRUCTIONS   · Schedule regular health, dental, and eye exams.  · Stay current with your immunizations.    · Do not use any tobacco products including cigarettes, chewing tobacco, or electronic cigarettes.  · If you are pregnant, do not drink alcohol.  · If you are breastfeeding, limit how much and how often you drink alcohol.  · Limit alcohol intake to no more than 1 drink per day for nonpregnant women. One drink equals 12 ounces of beer, 5 ounces of wine, or 1½ ounces of hard liquor.  · Do not use street drugs.  · Do not share needles.  · Ask your health care provider for help if  you need support or information about quitting drugs.  · Tell your health care provider if you often feel depressed.  · Tell your health care provider if you have ever been abused or do not feel safe at home.     This information is not intended to replace advice given to you by your health care provider. Make sure you discuss any questions you have with your health care provider.     Document Released: 2012 Document Revised: 2016 Document Reviewed: 2014  [employer] Interactive Patient Education © [employer]        Medicare Wellness  Personal Prevention Plan of Service     Date of Office Visit:  2017  Encounter Provider:  Olga Pimentel MD  Place of Service:  [facility]  Patient Name: Annie Mejia  :  1945    As part of the Medicare Wellness portion of your visit today, we are providing you with this personalized preventive plan of services (PPPS). This plan is based upon recommendations of the [place] Preventive Services Task Force (USPSTF) and the Advisory Committee on Immunization Practices (ACIP).    This lists the preventive care services that should be considered, and provides dates of when you are due. Items listed as completed are up-to-date and do not require any further intervention.    Health Maintenance   Topic Date Due   • DXA SCAN  2016   • PNEUMOCOCCAL VACCINES (65+ LOW/MEDIUM RISK) (2 of 2 - PPSV23) 2016   • URINE MICROALBUMIN  2017   • DIABETIC FOOT EXAM  2017   • DIABETIC EYE EXAM  01/10/2018   • LIPID PANEL  2018   • HEMOGLOBIN A1C  2018   • MEDICARE ANNUAL WELLNESS  2018   • MAMMOGRAM  11/10/2018   • TDAP/TD VACCINES (2 - Td) 2022   • COLONOSCOPY  2026   • INFLUENZA VACCINE  Completed   • ZOSTER VACCINE  Addressed          Patient Self-Management and Personalized Health Advice  The patient has been provided with information about: diet, exercise, weight management,  prevention of cardiac or vascular disease, the relationship between weight and GERD, fall prevention, designing advance directives and mental health concerns and preventive services including:   · Advanced directives: has NO advanced directive - information provided to the patient today, Bone densitometry screening, Exercise counseling provided, Fall Risk assessment done, Fall Risk plan of care done, Nutrition counseling provided, Pneumococcal vaccine .    Visit Diagnoses:    ICD-10-CM ICD-9-CM   1. Initial Medicare annual wellness visit Z00.00 V70.0   2. Mixed anxiety depressive disorder F41.8 300.4   3. Screening for osteoporosis Z13.820 V82.81   4. Gastroesophageal reflux disease with esophagitis K21.0 530.11   5. Well controlled diabetes mellitus E11.9 250.00       Orders Placed This Encounter   Procedures   • DEXA Bone Density Axial     Standing Status:   Future     Standing Expiration Date:   3/24/2018     Order Specific Question:   Reason for Exam:     Answer:   screening for osteoporosis       Outpatient Encounter Prescriptions as of 3/24/2017   Medication Sig Dispense Refill   • ALPRAZolam (XANAX) 0.5 MG tablet Take 1 tablet by mouth 2 (Two) Times a Day As Needed for Anxiety or Sleep. 60 tablet 0   • aspirin  MG EC tablet Take  by mouth.     • atorvastatin (LIPITOR) 40 MG tablet Take 1 tablet by mouth Every Night. 90 tablet 1   • dexlansoprazole (DEXILANT) 60 MG capsule Take 60 mg by mouth Daily.     • EPINEPHrine (EPIPEN) 0.3 MG/0.3ML solution auto-injector injection EpiPen 2-Lev 0.3 MG/0.3ML Injection Solution Auto-injector; Patient Sig: EpiPen 2-Lev 0.3 MG/0.3ML Injection Solution Auto-injector INJECT 0.3ML INTRAMUSCULARLY AS DIRECTED.; 1; 2; 06-May-2015; Active     • glucose blood test strip Check BS Fasting in the AM and record. 50 each 5   • glucose monitor monitoring kit Check BG fasting in AM and record 1 each 0   • HYDROcodone-acetaminophen (NORCO) 7.5-325 MG per tablet Take 1 tablet by mouth  "3 (three) times a day as needed.     • insulin glargine (LANTUS) 100 UNIT/ML injection Inject 10 Units under the skin Every Night. 10 mL 5   • Insulin Syringe-Needle U-100 30G X 5/16\" 1 ML misc 10 Units Every Night. 50 each 5   • Lancets misc Check blood glucose once daily. 100 each 5   • lisinopril-hydrochlorothiazide (PRINZIDE,ZESTORETIC) 20-25 MG per tablet Take 1 tablet by mouth Daily. 90 tablet 1   • meclizine (ANTIVERT) 25 MG tablet Take 1-2 tablets by mouth every 6 (six) to 8 (eight) hours as needed for dizziness.     • Needle, Disp, 30G X 5/16\" misc 10 Units Every Night. 50 each 5   • promethazine (PHENERGAN) 25 MG tablet Take 1 tablet by mouth Every 8 (Eight) Hours As Needed for nausea or vomiting. 90 tablet 0   • sertraline (ZOLOFT) 100 MG tablet Take 1 tablet by mouth Daily. 30 tablet 5   • [DISCONTINUED] ALPRAZolam (XANAX) 0.25 MG tablet Take 1 tablet by mouth 2 (Two) Times a Day As Needed for anxiety or sleep. Only 1 rx/ 30 days. 60 tablet 2   • [DISCONTINUED] atorvastatin (LIPITOR) 10 MG tablet Take 10 mg by mouth.     • [DISCONTINUED] glipiZIDE (GLUCOTROL) 5 MG tablet Take 5 mg by mouth.     • [DISCONTINUED] omeprazole (PriLOSEC) 20 MG capsule Take 2 capsules by mouth Daily. Take 1 capsule twice daily for reflux and use with arthritis medication. 180 capsule 1   • [DISCONTINUED] sertraline (ZOLOFT) 50 MG tablet Take 1 tablet by mouth Daily. 30 tablet 5   • [DISCONTINUED] hydrochlorothiazide (HYDRODIURIL) 25 MG tablet Take 12.5 mg by mouth.     • [] lidocaine (XYLOCAINE) 1 % injection 2 mL      • [] methylPREDNISolone acetate (DEPO-medrol) injection 40 mg        No facility-administered encounter medications on file as of 3/24/2017.        Reviewed use of high risk medication in the elderly: yes  Reviewed for potential of harmful drug interactions in the elderly: yes    Follow Up:  Return for Next scheduled follow up.   Continue Dexilant as rx'd per Dr. Martínez.  Zoloft dose increased to " 100 mg with xanax at 0.5 mg as needed. She understands xanax is for panic symptoms only.   LILIAM report reviewed and scanned into chart.  Last LILIAM date 3/9/17.  As part of patient's treatment plan I am prescribing a controlled substance.  The patient has been made aware of the appropriate use of such medications, including potential risk of somnolence, limited ability to drive and/or work safely, and potential for dependence and/or overdose.  It has also been made clear that these medications are for use by this patient only, without concomitant use of alcohol or other substances, unless prescribed.  Patient was encouraged to keep me informed of any acute changes, lack of improvement, or any new concerning symptoms.  Pt is aware of reasons to seek emergent care.  She believes she has had both Prevnar 13 and Pneumovax. We will check with her pharmacy.  Patient voiced understanding of all instructions and denied further questions.    An After Visit Summary and PPPS with all of these plans were given to the patient.

## 2017-03-28 DIAGNOSIS — Z78.0 POSTMENOPAUSAL: Primary | ICD-10-CM

## 2017-03-28 DIAGNOSIS — Z13.820 SCREENING FOR OSTEOPOROSIS: ICD-10-CM

## 2017-03-28 DIAGNOSIS — M81.0 OSTEOPOROSIS: ICD-10-CM

## 2017-03-30 ENCOUNTER — TREATMENT (OUTPATIENT)
Dept: PHYSICAL THERAPY | Facility: CLINIC | Age: 72
End: 2017-03-30

## 2017-03-30 DIAGNOSIS — M17.12 OSTEOARTHRITIS OF LEFT KNEE, UNSPECIFIED OSTEOARTHRITIS TYPE: Primary | ICD-10-CM

## 2017-03-30 PROCEDURE — G8979 MOBILITY GOAL STATUS: HCPCS | Performed by: PHYSICAL THERAPIST

## 2017-03-30 PROCEDURE — 97530 THERAPEUTIC ACTIVITIES: CPT | Performed by: PHYSICAL THERAPIST

## 2017-03-30 PROCEDURE — G8978 MOBILITY CURRENT STATUS: HCPCS | Performed by: PHYSICAL THERAPIST

## 2017-03-30 PROCEDURE — 97161 PT EVAL LOW COMPLEX 20 MIN: CPT | Performed by: PHYSICAL THERAPIST
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
## 2017-04-03 ENCOUNTER — APPOINTMENT (OUTPATIENT)
Dept: BONE DENSITY | Facility: HOSPITAL | Age: 72
End: 2017-04-03

## 2017-04-07 ENCOUNTER — TELEPHONE (OUTPATIENT)
Dept: FAMILY MEDICINE CLINIC | Facility: CLINIC | Age: 72
End: 2017-04-07

## 2017-04-07 ENCOUNTER — TREATMENT (OUTPATIENT)
Dept: PHYSICAL THERAPY | Facility: CLINIC | Age: 72
End: 2017-04-07

## 2017-04-07 DIAGNOSIS — M17.12 OSTEOARTHRITIS OF LEFT KNEE, UNSPECIFIED OSTEOARTHRITIS TYPE: Primary | ICD-10-CM

## 2017-04-07 DIAGNOSIS — E11.9 DIABETES MELLITUS, STABLE (HCC): ICD-10-CM

## 2017-04-07 PROCEDURE — 97110 THERAPEUTIC EXERCISES: CPT | Performed by: PHYSICAL THERAPIST

## 2017-04-07 PROCEDURE — 97140 MANUAL THERAPY 1/> REGIONS: CPT | Performed by: PHYSICAL THERAPIST

## 2017-04-07 RX ORDER — BLOOD SUGAR DIAGNOSTIC
STRIP MISCELLANEOUS
Qty: 100 EACH | Refills: 12 | Status: SHIPPED | OUTPATIENT
Start: 2017-04-07 | End: 2018-05-09 | Stop reason: SDUPTHER

## 2017-04-07 NOTE — PROGRESS NOTES
Physical Therapy Daily Progress Note      Visit # : 2    Annie Mejia reports 0/10 pain today at rest.  Pt reports no pain for 6 days.          Objective Pt present to PT today with no distress or noted pain in the knee at rest.     No antalgia noted from L Knee.  Lumbar spine is slightly limiting ambulation      See Exercise, Manual, and Modality Logs for complete treatment.     Assessment/Plan  Pt with L knee pain free today.  She was given a HEP to start strengthening and caused some LBP.        Progress per Plan of Care  Check response to new exercises and treatment.            Manual Therapy:    10     mins  79200;  Therapeutic Exercise:    29     mins  23897;     Neuromuscular Jesus:        mins  62737;    Therapeutic Activity:          mins  70008;     Gait Training:        ___  mins  77301;     Ultrasound:          mins  55438;    Electrical Stimulation:         mins  14767 ( );  Dry Needling          mins self-pay    Timed Treatment:   39   mins   Total Treatment:     40   mins    Anjel Marsh, PT  Physical Therapist

## 2017-04-07 NOTE — TELEPHONE ENCOUNTER
----- Message from Pippa James sent at 4/7/2017 11:31 AM EDT -----  Regarding: REFILL REQUEST   Pt called requesting refills on her test strips. States that she needs her rx changed to twice a day testing rather than the once that she's been doing.      [facility]

## 2017-04-11 ENCOUNTER — APPOINTMENT (OUTPATIENT)
Dept: BONE DENSITY | Facility: HOSPITAL | Age: 72
End: 2017-04-11

## 2017-04-11 DIAGNOSIS — M81.0 OSTEOPOROSIS: ICD-10-CM

## 2017-04-11 DIAGNOSIS — Z78.0 POSTMENOPAUSAL: ICD-10-CM

## 2017-04-11 PROCEDURE — 77080 DXA BONE DENSITY AXIAL: CPT

## 2017-04-12 ENCOUNTER — TREATMENT (OUTPATIENT)
Dept: PHYSICAL THERAPY | Facility: CLINIC | Age: 72
End: 2017-04-12

## 2017-04-12 DIAGNOSIS — M17.12 OSTEOARTHRITIS OF LEFT KNEE, UNSPECIFIED OSTEOARTHRITIS TYPE: Primary | ICD-10-CM

## 2017-04-12 PROCEDURE — 97110 THERAPEUTIC EXERCISES: CPT | Performed by: PHYSICAL THERAPIST

## 2017-04-12 PROCEDURE — 97140 MANUAL THERAPY 1/> REGIONS: CPT | Performed by: PHYSICAL THERAPIST

## 2017-04-12 PROCEDURE — 97530 THERAPEUTIC ACTIVITIES: CPT | Performed by: PHYSICAL THERAPIST

## 2017-04-12 NOTE — PROGRESS NOTES
Physical Therapy Daily Progress Note      Visit # : 3    Annie Mejia reports 0/10 pain today at rest in the knee.  L thigh painful when she tries to do the SLR.          Objective Pt present to PT today with no distress at rest.  Ambulation is limited more by her spine issues than her knee.     DKTC + relief of lumbar spine and thigh pain.  LTR pain free.    Active SLR was painful in the anterior thigh and adductor MM.       See Exercise, Manual, and Modality Logs for complete treatment.     Assessment/Plan  Pt having pain with SLR exercises.  Pt getting relief with DKTC exercises.       Progress per Plan of Care  Check response to DKTC exercises.           Manual Therapy:    12     mins  39445;  Therapeutic Exercise:    15     mins  05344;     Neuromuscular Jesus:    13    mins  92406;    Therapeutic Activity:          mins  73148;     Gait Training:        ___  mins  42079;     Ultrasound:          mins  51171;    Electrical Stimulation:         mins  88323 ( );  Dry Needling          mins self-pay    Timed Treatment:   40   mins   Total Treatment:     43   mins    Anjel Marsh, PT  Physical Therapist

## 2017-04-18 ENCOUNTER — TREATMENT (OUTPATIENT)
Dept: PHYSICAL THERAPY | Facility: CLINIC | Age: 72
End: 2017-04-18

## 2017-04-18 DIAGNOSIS — M17.12 OSTEOARTHRITIS OF LEFT KNEE, UNSPECIFIED OSTEOARTHRITIS TYPE: Primary | ICD-10-CM

## 2017-04-18 NOTE — PROGRESS NOTES
Physical Therapy Daily Progress Note      Visit # : 4    Annie Mejia reports 0/10 pain today at rest in the knee.  She reports being pain free in the knee for several weeks now.  Pt reports her spine is the major issue now.         Objective Pt present to PT today with no distress from the knee.  She is noted to have some guarding and antalgia from her chronic spine issue and her hips.    Pt has full knee ROM in the L LE. Pt has no pain with resisted motion in the L knee.       See Exercise, Manual, and Modality Logs for complete treatment.     Assessment/Plan  Pt has met all goals for the L knee.  She is ready for D/C to HEP.       Progress per Plan of Care             Manual Therapy:         mins  42329;  Therapeutic Exercise:    12     mins  80437;     Neuromuscular Jesus:        mins  13214;    Therapeutic Activity:     13     mins  49592;     Gait Training:        ___  mins  97406;     Ultrasound:          mins  12191;    Electrical Stimulation:         mins  78385 ( );  Dry Needling          mins self-pay    Timed Treatment:   25   mins   Total Treatment:     28   mins    Anjel Marsh, PT  Physical Therapist

## 2017-04-24 ENCOUNTER — OFFICE VISIT (OUTPATIENT)
Dept: ORTHOPEDIC SURGERY | Facility: CLINIC | Age: 72
End: 2017-04-24

## 2017-04-24 VITALS — WEIGHT: 205.5 LBS | HEIGHT: 65 IN | BODY MASS INDEX: 34.24 KG/M2 | RESPIRATION RATE: 18 BRPM

## 2017-04-24 DIAGNOSIS — M17.12 OSTEOARTHROSIS, LOCALIZED, PRIMARY, KNEE, LEFT: Primary | ICD-10-CM

## 2017-04-24 PROCEDURE — 99213 OFFICE O/P EST LOW 20 MIN: CPT | Performed by: ORTHOPAEDIC SURGERY

## 2017-04-24 RX ORDER — ALPRAZOLAM 0.25 MG/1
TABLET ORAL
COMMUNITY
Start: 2017-04-20 | End: 2017-06-06 | Stop reason: SDUPTHER
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
## 2017-05-08 ENCOUNTER — PROCEDURE VISIT (OUTPATIENT)
Dept: FAMILY MEDICINE CLINIC | Facility: CLINIC | Age: 72
End: 2017-05-08

## 2017-05-08 VITALS
HEIGHT: 65 IN | OXYGEN SATURATION: 97 % | DIASTOLIC BLOOD PRESSURE: 80 MMHG | BODY MASS INDEX: 33.99 KG/M2 | HEART RATE: 86 BPM | SYSTOLIC BLOOD PRESSURE: 110 MMHG | WEIGHT: 204 LBS

## 2017-05-08 DIAGNOSIS — F41.8 MIXED ANXIETY DEPRESSIVE DISORDER: ICD-10-CM

## 2017-05-08 DIAGNOSIS — L91.8 SKIN TAGS, MULTIPLE ACQUIRED: ICD-10-CM

## 2017-05-08 DIAGNOSIS — L98.9 SKIN LESIONS: Primary | ICD-10-CM

## 2017-05-08 DIAGNOSIS — D23.61 OTHER BENIGN NEOPLASM OF SKIN OF RIGHT UPPER LIMB, INCLUDING SHOULDER: ICD-10-CM

## 2017-05-08 DIAGNOSIS — L81.9 ATYPICAL PIGMENTED SKIN LESION: ICD-10-CM

## 2017-05-08 PROCEDURE — 99213 OFFICE O/P EST LOW 20 MIN: CPT | Performed by: FAMILY MEDICINE

## 2017-05-08 PROCEDURE — 11200 RMVL SKIN TAGS UP TO&INC 15: CPT | Performed by: FAMILY MEDICINE

## 2017-05-08 PROCEDURE — 11100 PR BIOPSY OF SKIN LESION: CPT | Performed by: FAMILY MEDICINE

## 2017-05-08 PROCEDURE — 17110 DESTRUCTION B9 LES UP TO 14: CPT | Performed by: FAMILY MEDICINE

## 2017-05-11 ENCOUNTER — TELEPHONE (OUTPATIENT)
Dept: FAMILY MEDICINE CLINIC | Facility: CLINIC | Age: 72
End: 2017-05-11

## 2017-05-17 ENCOUNTER — OFFICE VISIT (OUTPATIENT)
Dept: FAMILY MEDICINE CLINIC | Facility: CLINIC | Age: 72
End: 2017-05-17

## 2017-05-17 VITALS
OXYGEN SATURATION: 95 % | TEMPERATURE: 98.2 F | DIASTOLIC BLOOD PRESSURE: 70 MMHG | HEIGHT: 64 IN | BODY MASS INDEX: 34.66 KG/M2 | WEIGHT: 203 LBS | HEART RATE: 102 BPM | SYSTOLIC BLOOD PRESSURE: 112 MMHG

## 2017-05-17 DIAGNOSIS — R05.9 COUGH: ICD-10-CM

## 2017-05-17 DIAGNOSIS — R06.02 SOB (SHORTNESS OF BREATH): Primary | ICD-10-CM

## 2017-05-17 DIAGNOSIS — R09.02 HYPOXIA: ICD-10-CM

## 2017-05-17 DIAGNOSIS — R00.2 HEART PALPITATIONS: ICD-10-CM

## 2017-05-17 DIAGNOSIS — J06.9 ACUTE URI: ICD-10-CM

## 2017-05-17 PROCEDURE — 99214 OFFICE O/P EST MOD 30 MIN: CPT | Performed by: FAMILY MEDICINE

## 2017-05-17 PROCEDURE — 94640 AIRWAY INHALATION TREATMENT: CPT | Performed by: FAMILY MEDICINE

## 2017-05-17 PROCEDURE — 93000 ELECTROCARDIOGRAM COMPLETE: CPT | Performed by: FAMILY MEDICINE

## 2017-05-17 RX ORDER — PREDNISONE 20 MG/1
20 TABLET ORAL DAILY
Qty: 5 TABLET | Refills: 0 | Status: SHIPPED | OUTPATIENT
Start: 2017-05-17 | End: 2017-06-06

## 2017-05-17 RX ORDER — DOXYCYCLINE 100 MG/1
100 TABLET ORAL 2 TIMES DAILY
Qty: 20 TABLET | Refills: 0 | Status: SHIPPED | OUTPATIENT
Start: 2017-05-17 | End: 2017-05-27

## 2017-05-17 RX ADMIN — ALBUTEROL SULFATE 2.5 MG: 2.5 SOLUTION RESPIRATORY (INHALATION) at 08:05

## 2017-05-18 ENCOUNTER — TELEPHONE (OUTPATIENT)
Dept: FAMILY MEDICINE CLINIC | Facility: CLINIC | Age: 72
End: 2017-05-18

## 2017-05-18 DIAGNOSIS — J20.9 BRONCHOSPASM WITH BRONCHITIS, ACUTE: Primary | ICD-10-CM

## 2017-05-18 RX ORDER — ALBUTEROL SULFATE 90 UG/1
2 AEROSOL, METERED RESPIRATORY (INHALATION) EVERY 4 HOURS PRN
Qty: 18 G | Refills: 1 | Status: SHIPPED | OUTPATIENT
Start: 2017-05-18 | End: 2017-07-06

## 2017-05-22 RX ORDER — ALBUTEROL SULFATE 2.5 MG/3ML
2.5 SOLUTION RESPIRATORY (INHALATION) ONCE
Status: COMPLETED | OUTPATIENT
Start: 2017-05-22 | End: 2017-05-17

## 2017-06-06 ENCOUNTER — OFFICE VISIT (OUTPATIENT)
Dept: FAMILY MEDICINE CLINIC | Facility: CLINIC | Age: 72
End: 2017-06-06

## 2017-06-06 VITALS
SYSTOLIC BLOOD PRESSURE: 112 MMHG | BODY MASS INDEX: 35.34 KG/M2 | HEART RATE: 88 BPM | WEIGHT: 207 LBS | OXYGEN SATURATION: 97 % | HEIGHT: 64 IN | DIASTOLIC BLOOD PRESSURE: 74 MMHG

## 2017-06-06 DIAGNOSIS — I10 ESSENTIAL HYPERTENSION: ICD-10-CM

## 2017-06-06 DIAGNOSIS — K21.00 GASTROESOPHAGEAL REFLUX DISEASE WITH ESOPHAGITIS: ICD-10-CM

## 2017-06-06 DIAGNOSIS — E78.2 MIXED HYPERLIPIDEMIA: ICD-10-CM

## 2017-06-06 DIAGNOSIS — N28.9 RENAL INSUFFICIENCY: ICD-10-CM

## 2017-06-06 DIAGNOSIS — E11.9 WELL CONTROLLED DIABETES MELLITUS (HCC): Primary | ICD-10-CM

## 2017-06-06 LAB — GLUCOSE BLDC GLUCOMTR-MCNC: 146 MG/DL (ref 70–130)

## 2017-06-06 PROCEDURE — 82962 GLUCOSE BLOOD TEST: CPT | Performed by: FAMILY MEDICINE

## 2017-06-06 PROCEDURE — 99214 OFFICE O/P EST MOD 30 MIN: CPT | Performed by: FAMILY MEDICINE

## 2017-06-06 RX ORDER — ATORVASTATIN CALCIUM 40 MG/1
40 TABLET, FILM COATED ORAL NIGHTLY
Qty: 90 TABLET | Refills: 1 | Status: SHIPPED | OUTPATIENT
Start: 2017-06-06 | End: 2017-09-06 | Stop reason: SDUPTHER

## 2017-06-06 RX ORDER — RANITIDINE 150 MG/1
TABLET ORAL
Qty: 180 TABLET | Refills: 0 | Status: SHIPPED | OUTPATIENT
Start: 2017-06-06 | End: 2017-08-09 | Stop reason: SDUPTHER

## 2017-06-06 RX ORDER — ALPRAZOLAM 0.25 MG/1
0.25 TABLET ORAL CONTINUOUS PRN
Qty: 30 TABLET | Refills: 2 | Status: SHIPPED | OUTPATIENT
Start: 2017-06-06 | End: 2017-09-06 | Stop reason: SDUPTHER

## 2017-06-06 RX ORDER — LISINOPRIL AND HYDROCHLOROTHIAZIDE 25; 20 MG/1; MG/1
1 TABLET ORAL
Qty: 90 TABLET | Refills: 1 | Status: SHIPPED | OUTPATIENT
Start: 2017-06-06 | End: 2017-09-06 | Stop reason: SDUPTHER

## 2017-06-06 RX ORDER — PROMETHAZINE HYDROCHLORIDE 25 MG/1
25 TABLET ORAL EVERY 8 HOURS PRN
Qty: 90 TABLET | Refills: 0 | Status: SHIPPED | OUTPATIENT
Start: 2017-06-06 | End: 2017-09-06 | Stop reason: SDUPTHER

## 2017-06-06 NOTE — PROGRESS NOTES
"Subjective   Annie Mejia is a 71 y.o. female.     History of Present Illness  Ms. Mejia presents today with her  for f/u on several chronic medical problems.     1) She has h/o severe n/v/reflux symptoms. She has had no ER visits since last apt. She has >1 yr h/o intermittent severe nausea with intermittent bouts of intractable vomiting. Assoc'd abd cramping but no chronic abd pain. She has had extensive w/u including EGD, colonoscopy, UGI and SBFT. SHe has seen Dr. Rowe as well as Dr. Martínez. No clear diagnosis made by her understanding other than GERD and hiatal hernia. She has tried multiple medications including PPI, H2 blockers, carafate, Reglan, and antiemetics which have all helped to a varying degree. Multiple medication adjustments have been made including d/c reglan, only prn use of Compazine. Switching to back to phenergan. She has cont'd PPI as well. She is staying well hydrated. No melena or hematochezia. Since last visit she has had f/u with Dr. Martínez who changed her PPI to Dexilant. She wants to know what she can take for \"breakthrough\" symptoms. She generally does well taking phenergan and low dose xanax for acute, severe symptoms.     2) She has had generally well controlled DM. At last visit she was taken off all her oral meds and switched to basal insulin only. She is happy to report her nausea is improved. Blood glucose is well controlled (log reviewed). She denies hypoglycemic spells. She is taking statin and Ace-inh as rx'd. She is due for recheck A1c. She has comorbid HTN, CRI, PVD and HLP. BP has been well controlled.     3) She has a long-standing h/o severe anxiety. Currently on zoloft which she feels is working well. She takes a low dose xanax at night and occ during day for panic. She assoc's her n/v spells with severe anxiety as above.        The following portions of the patient's history were reviewed and updated as appropriate: allergies, current medications, past family " history, past medical history, past social history, past surgical history and problem list.    Review of Systems   Constitutional: Positive for fatigue. Negative for appetite change, fever and unexpected weight change.   HENT: Negative for congestion, ear pain, hearing loss, nosebleeds, rhinorrhea, sneezing, sore throat, tinnitus and trouble swallowing.    Eyes: Negative for pain, discharge, redness and visual disturbance.   Respiratory: Negative for cough, chest tightness, shortness of breath and wheezing.    Cardiovascular: Negative for chest pain, palpitations and leg swelling.   Gastrointestinal: Positive for abdominal pain, diarrhea, nausea and vomiting. Negative for blood in stool and constipation.        See HPI, overall feels improved   Endocrine: Negative for polydipsia and polyuria.        Hot flashes   Genitourinary: Negative for dysuria, flank pain, frequency, hematuria, pelvic pain, urgency and vaginal bleeding.   Musculoskeletal: Positive for arthralgias, back pain and myalgias (located in shins, chronic for years, even as child). Negative for joint swelling and neck pain.   Skin: Negative for rash and wound.   Neurological: Positive for weakness (generalized). Negative for dizziness, tremors, seizures, syncope, speech difficulty, numbness and headaches.   Hematological: Negative for adenopathy. Does not bruise/bleed easily.   Psychiatric/Behavioral: Positive for sleep disturbance. Negative for confusion, dysphoric mood and suicidal ideas. The patient is nervous/anxious.         As per HPI       Objective    Vitals:    06/06/17 0803   BP: 112/74   Pulse: 88   SpO2: 97%     Body mass index is 35.53 kg/(m^2).  Last 2 weights    06/06/17  0803   Weight: 207 lb (93.9 kg)       Physical Exam   Constitutional: She is oriented to person, place, and time. She appears well-developed and well-nourished. She is cooperative. She does not appear ill. No distress.   HENT:   Head: Normocephalic and atraumatic.    Mouth/Throat: Mucous membranes are normal. Mucous membranes are not dry.   Eyes: Conjunctivae and lids are normal.   Neck: Neck supple. Normal carotid pulses present. No thyromegaly present.   Cardiovascular: Normal rate, regular rhythm and intact distal pulses.    Pulmonary/Chest: Effort normal and breath sounds normal.   Abdominal: Soft. Bowel sounds are normal. She exhibits no distension and no mass. There is no tenderness.       Vascular Status -  Her exam exhibits no right foot edema. Her exam exhibits no left foot edema.  Lymphadenopathy:     She has no cervical adenopathy.   Neurological: She is alert and oriented to person, place, and time. She has normal strength. She displays no tremor. No cranial nerve deficit. Gait normal.   Skin: Skin is warm and dry. No bruising and no rash noted.   Psychiatric: She has a normal mood and affect. Her behavior is normal. Cognition and memory are normal.   Nursing note and vitals reviewed.    Recent Results (from the past 24 hour(s))   POC Glucose Fingerstick    Collection Time: 06/06/17  9:37 AM   Result Value Ref Range    Glucose 146 (A) 70 - 130 mg/dL     Assessment/Plan   Annie was seen today for med refill, hypertension, hyperlipidemia, diabetes and heartburn.    Diagnoses and all orders for this visit:    Well controlled diabetes mellitus  -     Hemoglobin A1c  -     Basic Metabolic Panel  -     POC Glucose Fingerstick    Essential hypertension  -     lisinopril-hydrochlorothiazide (PRINZIDE,ZESTORETIC) 20-25 MG per tablet; Take 1 tablet by mouth Daily.  -     Basic Metabolic Panel    Gastroesophageal reflux disease with esophagitis  -     promethazine (PHENERGAN) 25 MG tablet; Take 1 tablet by mouth Every 8 (Eight) Hours As Needed for Nausea or Vomiting.  -     raNITIdine (ZANTAC) 150 MG tablet; 1 po bid as needed for breakthrough heartburn    Mixed hyperlipidemia    Renal insufficiency  -     Basic Metabolic Panel    Other orders  -     atorvastatin (LIPITOR) 40  "MG tablet; Take 1 tablet by mouth Every Night.  -     ALPRAZolam (XANAX) 0.25 MG tablet; Take 1 tablet by mouth Continuous As Needed for Anxiety.    BP controlled.  Tolerating statin well.  Good control of GERD symptoms overall. Add zantac prn for \"break-through\".  Anxiety stable.  I will contact patient regarding test results and provide instructions regarding any necessary changes in plan of care.  Pt advised to eat a heart healthy diet and get regular aerobic exercise.  Continue current meds.  F/U with Dr. Martínez as scheduled.  Routine f/u with me in 3 months, sooner as needed/instructed.  Patient was encouraged to keep me informed of any acute changes, or any new concerning symptoms.  Pt is aware of reasons to seek emergent care.  Patient voiced understanding of all instructions and denied further questions.                 "

## 2017-06-07 LAB
BUN SERPL-MCNC: 22 MG/DL (ref 7–20)
BUN/CREAT SERPL: 20 (ref 7.1–23.5)
CALCIUM SERPL-MCNC: 9.7 MG/DL (ref 8.4–10.2)
CHLORIDE SERPL-SCNC: 101 MMOL/L (ref 98–107)
CO2 SERPL-SCNC: 29 MMOL/L (ref 26–30)
CREAT SERPL-MCNC: 1.1 MG/DL (ref 0.6–1.3)
GLUCOSE SERPL-MCNC: 135 MG/DL (ref 74–98)
HBA1C MFR BLD: 7.3 %
POTASSIUM SERPL-SCNC: 4.6 MMOL/L (ref 3.5–5.1)
SODIUM SERPL-SCNC: 140 MMOL/L (ref 137–145)

## 2017-06-26 ENCOUNTER — OFFICE VISIT (OUTPATIENT)
Dept: FAMILY MEDICINE CLINIC | Facility: CLINIC | Age: 72
End: 2017-06-26

## 2017-06-26 ENCOUNTER — TELEPHONE (OUTPATIENT)
Dept: FAMILY MEDICINE CLINIC | Facility: CLINIC | Age: 72
End: 2017-06-26

## 2017-06-26 VITALS
HEART RATE: 90 BPM | WEIGHT: 205 LBS | BODY MASS INDEX: 35 KG/M2 | HEIGHT: 64 IN | SYSTOLIC BLOOD PRESSURE: 118 MMHG | OXYGEN SATURATION: 98 % | DIASTOLIC BLOOD PRESSURE: 78 MMHG | TEMPERATURE: 98.9 F

## 2017-06-26 DIAGNOSIS — R30.0 DYSURIA: ICD-10-CM

## 2017-06-26 DIAGNOSIS — R31.9 HEMATURIA: ICD-10-CM

## 2017-06-26 DIAGNOSIS — R35.0 URINARY FREQUENCY: ICD-10-CM

## 2017-06-26 DIAGNOSIS — N30.01 ACUTE CYSTITIS WITH HEMATURIA: ICD-10-CM

## 2017-06-26 DIAGNOSIS — N83.202 CYST OF LEFT OVARY: ICD-10-CM

## 2017-06-26 LAB
BILIRUB BLD-MCNC: NEGATIVE MG/DL
CLARITY, POC: ABNORMAL
COLOR UR: YELLOW
GLUCOSE UR STRIP-MCNC: NEGATIVE MG/DL
KETONES UR QL: NEGATIVE
LEUKOCYTE EST, POC: ABNORMAL
NITRITE UR-MCNC: NEGATIVE MG/ML
PH UR: 7 [PH] (ref 5–8)
PROT UR STRIP-MCNC: NEGATIVE MG/DL
RBC # UR STRIP: ABNORMAL /UL
SP GR UR: 1.01 (ref 1–1.03)
UROBILINOGEN UR QL: NORMAL

## 2017-06-26 PROCEDURE — 99213 OFFICE O/P EST LOW 20 MIN: CPT | Performed by: NURSE PRACTITIONER

## 2017-06-26 PROCEDURE — 81003 URINALYSIS AUTO W/O SCOPE: CPT | Performed by: NURSE PRACTITIONER

## 2017-06-26 RX ORDER — SYRINGE AND NEEDLE,INSULIN,1ML 31 GX5/16"
SYRINGE, EMPTY DISPOSABLE MISCELLANEOUS
COMMUNITY
Start: 2017-06-18 | End: 2018-05-25

## 2017-06-26 RX ORDER — PHENAZOPYRIDINE HYDROCHLORIDE 200 MG/1
200 TABLET, FILM COATED ORAL 3 TIMES DAILY PRN
Qty: 6 TABLET | Refills: 0 | Status: SHIPPED | OUTPATIENT
Start: 2017-06-26 | End: 2017-06-28 | Stop reason: SDUPTHER

## 2017-06-26 RX ORDER — AMOXICILLIN AND CLAVULANATE POTASSIUM 875; 125 MG/1; MG/1
1 TABLET, FILM COATED ORAL 2 TIMES DAILY
Qty: 14 TABLET | Refills: 0 | Status: SHIPPED | OUTPATIENT
Start: 2017-06-26 | End: 2017-07-03

## 2017-06-26 NOTE — PROGRESS NOTES
Subjective   Annie Mejia is a 71 y.o. female.     HPI Comments: Patient is a 71 year old female here today for complaints of  frequency and burning with urination, for the past 2 days. Patient also stated she is experiencing a lot of bladder pressure. She states she had UTI several years ago, and this is what it felt like.     Patient is also here today, requesting a trans-vaginal  US. She has a history of a left ovarian cyst and was instructed to have an US, and follow up with her gynecologist annually. It has been almost a year since her last visit.       The following portions of the patient's history were reviewed and updated as appropriate: allergies, current medications, past family history, past medical history, past social history, past surgical history and problem list.    Review of Systems   Constitutional: Negative.    HENT: Negative.    Eyes: Negative.    Respiratory: Negative.    Cardiovascular: Negative.  Negative for chest pain, palpitations and leg swelling.   Gastrointestinal: Negative.    Endocrine: Negative.    Genitourinary: Positive for dysuria, frequency and pelvic pain. Negative for decreased urine volume, difficulty urinating, enuresis, flank pain, genital sores, hematuria, menstrual problem, urgency, vaginal bleeding, vaginal discharge and vaginal pain.   Musculoskeletal: Negative.    Skin: Negative.    Allergic/Immunologic: Negative.    Neurological: Negative.    Hematological: Negative.    Psychiatric/Behavioral: Negative.        Objective    Vitals:    06/26/17 1027   BP: 118/78   Pulse: 90   Temp: 98.9 °F (37.2 °C)   SpO2: 98%     Physical Exam   Constitutional: She is oriented to person, place, and time. Vital signs are normal. She appears well-developed and well-nourished.   HENT:   Head: Normocephalic.   Eyes: Conjunctivae are normal.   Neck: Normal range of motion and full passive range of motion without pain.   Cardiovascular: Normal rate.    Pulmonary/Chest: Effort normal and  breath sounds normal. No respiratory distress. She has no wheezes. She has no rales. She exhibits no tenderness.   Abdominal: Normal appearance. She exhibits no distension. There is no hepatosplenomegaly, splenomegaly or hepatomegaly. There is tenderness in the suprapubic area. There is no rigidity, no rebound, no guarding and no CVA tenderness.   Musculoskeletal: Normal range of motion.   ROM WNL in all major joints    Neurological: She is alert and oriented to person, place, and time.   Skin: Skin is warm and dry.   Psychiatric: She has a normal mood and affect. Her speech is normal and behavior is normal. Judgment and thought content normal. Cognition and memory are normal.   Nursing note and vitals reviewed.      Assessment/Plan   Annie was seen today for urinary frequency.    Diagnoses and all orders for this visit:    Acute cystitis with hematuria  -     amoxicillin-clavulanate (AUGMENTIN) 875-125 MG per tablet; Take 1 tablet by mouth 2 (Two) Times a Day for 7 days.  -     Urinalysis With / Culture If Indicated    Dysuria  -     phenazopyridine (PYRIDIUM) 200 MG tablet; Take 1 tablet by mouth 3 (Three) Times a Day As Needed for bladder spasms for up to 2 days.  -     Urinalysis With / Culture If Indicated    Urinary frequency    Hematuria  -     Urinalysis With / Culture If Indicated    Cyst of left ovary  -     US Non-ob Transvaginal; Future       UA revealed hematuria and leukocytes in the clinic today. Patient prescribed Augmentin and Pyridium. Urine sent for culture. Patient also advised to increase her water intake.     Patient was schedule a transvaginal ultrasound for history of left ovarian cyst. Patient to make an appointment with Dr Hubbard to discuss the results of this.     Patient was encouraged to keep me informed of any acute changes, lack of improvement, or any new concerning symptoms. Patient voiced understanding of all instructions and denied further questions. I will contact patient  regarding test results and provide instructions regarding any necessary changes in plan of care.    Patient will RTC in 10 days for a follow up

## 2017-06-28 ENCOUNTER — HOSPITAL ENCOUNTER (OUTPATIENT)
Dept: ULTRASOUND IMAGING | Facility: HOSPITAL | Age: 72
Discharge: HOME | End: 2017-06-28
Admitting: NURSE PRACTITIONER

## 2017-06-28 ENCOUNTER — TELEPHONE (OUTPATIENT)
Dept: FAMILY MEDICINE CLINIC | Facility: CLINIC | Age: 72
End: 2017-06-28

## 2017-06-28 DIAGNOSIS — R30.0 DYSURIA: ICD-10-CM

## 2017-06-28 DIAGNOSIS — N83.202 CYST OF LEFT OVARY: ICD-10-CM

## 2017-06-28 PROCEDURE — 76830 TRANSVAGINAL US NON-OB: CPT

## 2017-06-28 RX ORDER — PHENAZOPYRIDINE HYDROCHLORIDE 200 MG/1
200 TABLET, FILM COATED ORAL 3 TIMES DAILY PRN
Qty: 6 TABLET | Refills: 0 | Status: SHIPPED | OUTPATIENT
Start: 2017-06-28 | End: 2017-06-30

## 2017-06-29 ENCOUNTER — TELEPHONE (OUTPATIENT)
Dept: FAMILY MEDICINE CLINIC | Facility: CLINIC | Age: 72
End: 2017-06-29

## 2017-06-29 LAB
APPEARANCE UR: CLEAR
BACTERIA #/AREA URNS HPF: ABNORMAL /HPF
BACTERIA UR CULT: ABNORMAL
BACTERIA UR CULT: ABNORMAL
BILIRUB UR QL STRIP: NEGATIVE
CASTS URNS MICRO: ABNORMAL
CASTS URNS QL MICRO: PRESENT /LPF
COLOR UR: YELLOW
EPI CELLS #/AREA URNS HPF: ABNORMAL /HPF
GLUCOSE UR QL: NEGATIVE
HGB UR QL STRIP: ABNORMAL
KETONES UR QL STRIP: NEGATIVE
LEUKOCYTE ESTERASE UR QL STRIP: ABNORMAL
MICRO URNS: ABNORMAL
MUCOUS THREADS URNS QL MICRO: PRESENT /HPF
NITRITE UR QL STRIP: POSITIVE
OTHER ANTIBIOTIC SUSC ISLT: ABNORMAL
PH UR STRIP: 7 [PH] (ref 5–7.5)
PROT UR QL STRIP: ABNORMAL
RBC #/AREA URNS HPF: ABNORMAL /HPF
SP GR UR: 1.01 (ref 1–1.03)
URINALYSIS REFLEX: ABNORMAL
UROBILINOGEN UR STRIP-MCNC: 0.2 MG/DL (ref 0.2–1)
WBC #/AREA URNS HPF: >30 /HPF

## 2017-06-29 NOTE — TELEPHONE ENCOUNTER
----- Message from Loida Bishop MA sent at 6/29/2017  3:08 PM EDT -----  Pt notified of results    ----- Message -----     From: ALEXIA Blackwood     Sent: 6/29/2017   1:21 PM       To: Loida Bishop MA    Let her know her US shows the cyst on her ovary is a little larger than the last time, so make sure she follows up with her GYN.

## 2017-06-29 NOTE — TELEPHONE ENCOUNTER
----- Message from oLida Bishop MA sent at 6/29/2017  3:07 PM EDT -----  Pt notified of results    ----- Message -----     From: ALEXIA Blackwood     Sent: 6/29/2017   1:17 PM       To: Loida Bishop MA    Let her know her urine did culture bacteria, but it is susceptible to Augmentin, so it should take care of it.

## 2017-07-06 ENCOUNTER — OFFICE VISIT (OUTPATIENT)
Dept: FAMILY MEDICINE CLINIC | Facility: CLINIC | Age: 72
End: 2017-07-06

## 2017-07-06 VITALS
OXYGEN SATURATION: 98 % | HEART RATE: 68 BPM | BODY MASS INDEX: 35 KG/M2 | WEIGHT: 205 LBS | SYSTOLIC BLOOD PRESSURE: 122 MMHG | HEIGHT: 64 IN | DIASTOLIC BLOOD PRESSURE: 80 MMHG

## 2017-07-06 DIAGNOSIS — F41.8 MIXED ANXIETY DEPRESSIVE DISORDER: Primary | ICD-10-CM

## 2017-07-06 DIAGNOSIS — M19.90 ARTHRITIS: ICD-10-CM

## 2017-07-06 DIAGNOSIS — R25.1 TREMOR: ICD-10-CM

## 2017-07-06 DIAGNOSIS — I10 ESSENTIAL HYPERTENSION: ICD-10-CM

## 2017-07-06 DIAGNOSIS — R11.0 NAUSEA: ICD-10-CM

## 2017-07-06 DIAGNOSIS — G47.09 OTHER INSOMNIA: ICD-10-CM

## 2017-07-06 DIAGNOSIS — R00.2 PALPITATIONS: ICD-10-CM

## 2017-07-06 PROCEDURE — 99214 OFFICE O/P EST MOD 30 MIN: CPT | Performed by: FAMILY MEDICINE

## 2017-07-06 RX ORDER — HYDROCODONE BITARTRATE AND ACETAMINOPHEN 7.5; 325 MG/1; MG/1
1 TABLET ORAL 3 TIMES DAILY PRN
Qty: 30 TABLET | Refills: 0 | Status: SHIPPED | OUTPATIENT
Start: 2017-07-06 | End: 2017-12-06 | Stop reason: SDUPTHER

## 2017-07-06 NOTE — PROGRESS NOTES
"Subjective   Annie Mejia is a 71 y.o. female.     History of Present Illness  Ms. Mejia presents today for recheck after recent tx for UTI per Kristan. She reports complete resolution of dysuria, frequency, lower abd discomfort. No fever or hematuria. She tolerated abx well.    In addition she would like to be tested for \"carcinoid tumor\" as she feels this might explain many of her symptoms (nausea, tremor, palpitations, etc). She continues to have intermittent spells of severe nausea sometimes with vomiting. Generally does well with PPI, phenergan and low dose xanax for GERD and esophageal spasm.    Since last visit she has had transvaginal US and has f/u with Dr. Hubbard scheduled due to persistent ovarian cyst.    She would like refill of [place] which she has used very sparingly in the past for severe arthritis pain. Denies side effects from medication.    The following portions of the patient's history were reviewed and updated as appropriate: allergies, current medications, past family history, past medical history, past social history, past surgical history and problem list.    Review of Systems   Constitutional: Positive for fatigue. Negative for appetite change, fever and unexpected weight change.   HENT: Negative for congestion, ear pain, hearing loss, nosebleeds, rhinorrhea, sneezing, sore throat, tinnitus and trouble swallowing.    Eyes: Negative for pain, discharge, redness and visual disturbance.   Respiratory: Negative for cough, chest tightness, shortness of breath and wheezing.    Cardiovascular: Positive for palpitations. Negative for chest pain and leg swelling.   Gastrointestinal: Positive for abdominal pain, diarrhea, nausea and vomiting. Negative for blood in stool and constipation.   Endocrine: Positive for heat intolerance. Negative for polydipsia and polyuria.        Hot flashes   Genitourinary: Negative for dysuria, flank pain, frequency, hematuria, pelvic pain, urgency and vaginal " bleeding.   Musculoskeletal: Positive for arthralgias, back pain and myalgias (located in shins, chronic for years, even as child). Negative for joint swelling and neck pain.   Skin: Negative for rash and wound.   Neurological: Positive for tremors and weakness (generalized). Negative for dizziness, seizures, syncope, speech difficulty, numbness and headaches.   Hematological: Negative for adenopathy. Does not bruise/bleed easily.   Psychiatric/Behavioral: Positive for sleep disturbance. Negative for confusion, dysphoric mood and suicidal ideas. The patient is nervous/anxious.        Objective    Vitals:    07/06/17 0815   BP: 122/80   Pulse: 68   SpO2: 98%     Body mass index is 35.19 kg/(m^2).  Last 2 weights    07/06/17 0815   Weight: 205 lb (93 kg)       Physical Exam   Constitutional: She is oriented to person, place, and time. She appears well-developed and well-nourished. She is cooperative. She does not appear ill. No distress.   HENT:   Head: Normocephalic and atraumatic.   Mouth/Throat: Mucous membranes are normal. Mucous membranes are not dry.   Eyes: Conjunctivae and lids are normal.   Neck: Neck supple. Normal carotid pulses present. No thyromegaly present.   Cardiovascular: Normal rate, regular rhythm and intact distal pulses.    Pulmonary/Chest: Effort normal and breath sounds normal.   Abdominal: Soft. Bowel sounds are normal. She exhibits no distension and no mass. There is no tenderness.       Vascular Status -  Her exam exhibits no right foot edema. Her exam exhibits no left foot edema.  Lymphadenopathy:     She has no cervical adenopathy.   Neurological: She is alert and oriented to person, place, and time. She has normal strength. She displays tremor (mild, bilateral hands). No cranial nerve deficit. Gait normal.   Skin: Skin is warm and dry. No bruising and no rash noted.   Psychiatric: She has a normal mood and affect. Her behavior is normal. Cognition and memory are normal.   Nursing note  and vitals reviewed.      Assessment/Plan   Annie was seen today for med refill and follow-up.    Diagnoses and all orders for this visit:    Mixed anxiety depressive disorder    Essential hypertension  -     5 HIAA, Urine, Quantitative, Random  -     Creatinine, Urine, Random    Nausea  -     5 HIAA, Urine, Quantitative, Random  -     Creatinine, Urine, Random    Other insomnia  -     5 HIAA, Urine, Quantitative, Random  -     Creatinine, Urine, Random    Palpitations  -     5 HIAA, Urine, Quantitative, Random  -     Creatinine, Urine, Random    Tremor  -     5 HIAA, Urine, Quantitative, Random  -     Creatinine, Urine, Random    Arthritis  -     HYDROcodone-acetaminophen (NORCO) 7.5-325 MG per tablet; Take 1 tablet by mouth 3 (Three) Times a Day As Needed for Severe Pain (7-10).    UTI resolved.  BP controlled.  Anxiety appears stable.  I will contact patient regarding test results and provide instructions regarding any necessary changes in plan of care.  Patient was encouraged to keep me informed of any acute changes, lack of improvement, or any new concerning symptoms.  Pt is aware of reasons to seek emergent care.  Otherwise continue current medications.  F/U with Dr. Hubbard as scheduled.  As part of patient's treatment plan I am prescribing a controlled substance.  The patient has been made aware of the appropriate use of such medications, including potential risk of somnolence, limited ability to drive and/or work safely, and potential for dependence and/or overdose.  It has also been made clear that these medications are for use by this patient only, without concomitant use of alcohol or other substances, unless prescribed.  LILIAM report reviewed and scanned into chart.  Last LILIAM date 7/5/17.  Patient voiced understanding of all instructions and denied further questions.

## 2017-07-10 LAB
5OH-INDOLEACETATE UR-MCNC: 2.7 MG/L
5OH-INDOLEACETATE/CREAT UR: 4.4 MG/G CREAT (ref 0–6.9)
CREAT UR-MCNC: 60.2 MG/DL
CREAT UR-MCNC: 61.3 MG/DL

## 2017-07-11 ENCOUNTER — TELEPHONE (OUTPATIENT)
Dept: FAMILY MEDICINE CLINIC | Facility: CLINIC | Age: 72
End: 2017-07-11

## 2017-07-11 NOTE — TELEPHONE ENCOUNTER
----- Message from Loida Mendoza sent at 7/11/2017  4:09 PM EDT -----  Regarding: Dr Hubbard OV today  Pt called to let you know that she saw Dr Hubbard today and she told her that she felt comfortable with the size of the cyst, and that she did not feel that it needed any tx at this time.  She told her that if she had any additional issues, she could come back - but otherwise did not want to see her for a year.

## 2017-07-14 DIAGNOSIS — M25.551 RIGHT HIP PAIN: Primary | ICD-10-CM

## 2017-08-09 DIAGNOSIS — K21.00 GASTROESOPHAGEAL REFLUX DISEASE WITH ESOPHAGITIS: ICD-10-CM

## 2017-08-09 RX ORDER — RANITIDINE 150 MG/1
TABLET ORAL
Qty: 180 TABLET | Refills: 0 | Status: SHIPPED | OUTPATIENT
Start: 2017-08-09 | End: 2017-11-21

## 2017-08-17 DIAGNOSIS — E11.9 DIABETES MELLITUS, STABLE (HCC): ICD-10-CM

## 2017-08-17 RX ORDER — INSULIN GLARGINE 100 [IU]/ML
INJECTION, SOLUTION SUBCUTANEOUS
Qty: 1 EACH | Refills: 5 | Status: SHIPPED | OUTPATIENT
Start: 2017-08-17 | End: 2018-01-31 | Stop reason: SDUPTHER

## 2017-09-06 ENCOUNTER — OFFICE VISIT (OUTPATIENT)
Dept: FAMILY MEDICINE CLINIC | Facility: CLINIC | Age: 72
End: 2017-09-06

## 2017-09-06 VITALS
BODY MASS INDEX: 35.32 KG/M2 | SYSTOLIC BLOOD PRESSURE: 118 MMHG | HEIGHT: 65 IN | OXYGEN SATURATION: 98 % | HEART RATE: 74 BPM | DIASTOLIC BLOOD PRESSURE: 76 MMHG | WEIGHT: 212 LBS

## 2017-09-06 DIAGNOSIS — N28.9 RENAL INSUFFICIENCY: ICD-10-CM

## 2017-09-06 DIAGNOSIS — I10 ESSENTIAL HYPERTENSION: ICD-10-CM

## 2017-09-06 DIAGNOSIS — K21.00 GASTROESOPHAGEAL REFLUX DISEASE WITH ESOPHAGITIS: ICD-10-CM

## 2017-09-06 DIAGNOSIS — F41.8 MIXED ANXIETY DEPRESSIVE DISORDER: ICD-10-CM

## 2017-09-06 DIAGNOSIS — Z23 NEED FOR INFLUENZA VACCINATION: ICD-10-CM

## 2017-09-06 DIAGNOSIS — R74.8 ABNORMAL LIVER ENZYMES: ICD-10-CM

## 2017-09-06 DIAGNOSIS — E11.9 WELL CONTROLLED DIABETES MELLITUS (HCC): Primary | ICD-10-CM

## 2017-09-06 DIAGNOSIS — E78.2 MIXED HYPERLIPIDEMIA: ICD-10-CM

## 2017-09-06 LAB — GLUCOSE BLDC GLUCOMTR-MCNC: 145 MG/DL (ref 70–130)

## 2017-09-06 PROCEDURE — G0008 ADMIN INFLUENZA VIRUS VAC: HCPCS | Performed by: FAMILY MEDICINE

## 2017-09-06 PROCEDURE — 99214 OFFICE O/P EST MOD 30 MIN: CPT | Performed by: FAMILY MEDICINE

## 2017-09-06 PROCEDURE — 90662 IIV NO PRSV INCREASED AG IM: CPT | Performed by: FAMILY MEDICINE

## 2017-09-06 PROCEDURE — 82962 GLUCOSE BLOOD TEST: CPT | Performed by: FAMILY MEDICINE

## 2017-09-06 RX ORDER — LISINOPRIL AND HYDROCHLOROTHIAZIDE 25; 20 MG/1; MG/1
1 TABLET ORAL
Qty: 90 TABLET | Refills: 1 | Status: SHIPPED | OUTPATIENT
Start: 2017-09-06 | End: 2018-03-03 | Stop reason: SDUPTHER

## 2017-09-06 RX ORDER — ALPRAZOLAM 0.25 MG/1
0.25 TABLET ORAL CONTINUOUS PRN
Qty: 30 TABLET | Refills: 2 | Status: SHIPPED | OUTPATIENT
Start: 2017-09-06 | End: 2017-12-06 | Stop reason: SDUPTHER

## 2017-09-06 RX ORDER — PROMETHAZINE HYDROCHLORIDE 25 MG/1
25 TABLET ORAL EVERY 8 HOURS PRN
Qty: 90 TABLET | Refills: 0 | Status: SHIPPED | OUTPATIENT
Start: 2017-09-06 | End: 2017-12-06 | Stop reason: SDUPTHER

## 2017-09-06 RX ORDER — ATORVASTATIN CALCIUM 40 MG/1
40 TABLET, FILM COATED ORAL NIGHTLY
Qty: 90 TABLET | Refills: 1 | Status: SHIPPED | OUTPATIENT
Start: 2017-09-06 | End: 2018-04-04 | Stop reason: SDUPTHER

## 2017-09-06 NOTE — PROGRESS NOTES
"Subjective   nAnie Mejia is a 71 y.o. female.     History of Present Illness  Ms. Mejia presents today with her  for f/u on several chronic medical problems.   1) She has h/o severe n/v/reflux symptoms. She has had no ER visits since last apt. She has >1 yr h/o intermittent severe nausea with intermittent bouts of intractable vomiting. Assoc'd abd cramping but no chronic abd pain. She has had extensive w/u including EGD, colonoscopy, UGI and SBFT. SHe has seen Dr. Rowe as well as Dr. Martínez. No clear diagnosis made by her understanding other than GERD and hiatal hernia. She has tried multiple medications including PPI, H2 blockers, carafate, Reglan, and antiemetics which have all helped to a varying degree. Multiple medication adjustments have been made including d/c reglan, only prn use of Compazine. Switching to back to phenergan. She has cont'd PPI as well. She is staying well hydrated. No melena or hematochezia. She generally does well taking phenergan and low dose xanax for acute, severe symptoms.      2) She has had generally well controlled DM. She was taken off all her oral meds and switched to basal insulin only due to persisent GI symptoms on roal meds.. She is happy to report her nausea is improved. Blood glucose is well controlled (log reviewed). She denies hypoglycemic spells. She is taking statin and Ace-inh as rx'd. She is due for recheck A1c. She has comorbid HTN, CRI, PVD and HLP. BP has been well controlled. Lantus has recently become quite expensive. She wants to know if she can change to \"Novolog or Humalog\". She was not aware these would require multiple injections per day. She has somewhat irregular eating habits. Her fasting BG averages <130.     3) She has a long-standing h/o severe anxiety. Currently on zoloft which she feels is working well. She takes a low dose xanax at night and occ during day for panic. She assoc's her n/v spells with severe anxiety as above.       4) She " is followed by pain mgnt for L-DDD/DJD and has received several injections. She will also be seeing Dr. Hopper later this month for her bilateral hip pain. She has already had prelim xrays.    The following portions of the patient's history were reviewed and updated as appropriate: allergies, current medications, past family history, past medical history, past social history, past surgical history and problem list.    Review of Systems   Constitutional: Positive for fatigue. Negative for appetite change, fever and unexpected weight change.   HENT: Negative for congestion, ear pain, hearing loss, nosebleeds, rhinorrhea, sneezing, sore throat, tinnitus and trouble swallowing.    Eyes: Negative for pain, discharge, redness and visual disturbance.   Respiratory: Negative for cough, chest tightness, shortness of breath and wheezing.    Cardiovascular: Positive for palpitations. Negative for chest pain and leg swelling.   Gastrointestinal: Positive for diarrhea and nausea. Negative for abdominal pain, blood in stool, constipation and vomiting.   Endocrine: Positive for heat intolerance. Negative for polydipsia and polyuria.        Hot flashes   Genitourinary: Negative for dysuria, flank pain, frequency, hematuria, pelvic pain, urgency and vaginal bleeding.   Musculoskeletal: Positive for arthralgias, back pain and myalgias (located in shins, chronic for years, even as child). Negative for joint swelling and neck pain.   Skin: Negative for rash and wound.   Neurological: Positive for tremors and weakness (generalized). Negative for dizziness, seizures, syncope, speech difficulty, numbness and headaches.   Hematological: Negative for adenopathy. Does not bruise/bleed easily.   Psychiatric/Behavioral: Positive for sleep disturbance. Negative for confusion, dysphoric mood and suicidal ideas. The patient is nervous/anxious.        Objective    Vitals:    09/06/17 0850   BP: 118/76   Pulse: 74   SpO2: 98%     Body mass index  is 35.28 kg/(m^2).  Last 2 weights    09/06/17  0850   Weight: 212 lb (96.2 kg)       Physical Exam   Constitutional: She is oriented to person, place, and time. She appears well-developed and well-nourished. She is cooperative. She does not appear ill. No distress.   HENT:   Head: Normocephalic and atraumatic.   Mouth/Throat: Mucous membranes are normal. Mucous membranes are not dry.   Eyes: Conjunctivae and lids are normal.   Neck: Neck supple. Normal carotid pulses present. No thyromegaly present.   Cardiovascular: Normal rate, regular rhythm and intact distal pulses.    Pulmonary/Chest: Effort normal and breath sounds normal.   Abdominal: Soft. Bowel sounds are normal. She exhibits no distension and no mass. There is no tenderness.       Vascular Status -  Her exam exhibits no right foot edema. Her exam exhibits no left foot edema.  Lymphadenopathy:     She has no cervical adenopathy.   Neurological: She is alert and oriented to person, place, and time. She has normal strength. She displays tremor (mild, bilateral hands). No cranial nerve deficit. Gait normal.   Skin: Skin is warm and dry. No bruising and no rash noted.   Psychiatric: She has a normal mood and affect. Her behavior is normal. Cognition and memory are normal.   Nursing note and vitals reviewed.      Assessment/Plan   Annie was seen today for med refill, hypertension, anxiety and gi problem.    Diagnoses and all orders for this visit:    Well controlled diabetes mellitus  -     Comprehensive Metabolic Panel  -     Hemoglobin A1c  -     Microalbumin / Creatinine Urine Ratio  -     POC Glucose Fingerstick    Essential hypertension  -     lisinopril-hydrochlorothiazide (PRINZIDE,ZESTORETIC) 20-25 MG per tablet; Take 1 tablet by mouth Daily.  -     CBC (No Diff)  -     Comprehensive Metabolic Panel  -     Microalbumin / Creatinine Urine Ratio    Gastroesophageal reflux disease with esophagitis  -     promethazine (PHENERGAN) 25 MG tablet; Take 1 tablet  by mouth Every 8 (Eight) Hours As Needed for Nausea or Vomiting.  -     CBC (No Diff)    Mixed hyperlipidemia  -     Comprehensive Metabolic Panel  -     Lipid Panel    Renal insufficiency  -     CBC (No Diff)  -     Comprehensive Metabolic Panel    Abnormal liver enzymes  -     Comprehensive Metabolic Panel    Mixed anxiety depressive disorder    Other orders  -     atorvastatin (LIPITOR) 40 MG tablet; Take 1 tablet by mouth Every Night.  -     ALPRAZolam (XANAX) 0.25 MG tablet; Take 1 tablet by mouth Continuous As Needed for Anxiety.    DM with decent control. Recheck A1c, adjust tx as indicated. SHe will check on prices of various insulins and let me know if she wants to change.  BP controlled.  GERD controlled.  Anxiety stable.  Reassess CRI; adjust tx as indicated.  I will contact patient regarding test results and provide instructions regarding any necessary changes in plan of care.  Routine f/u in 3 months, sooner as needed/instructed.  Patient was encouraged to keep me informed of any acute changes, or any new concerning symptoms.  Pt advised to eat a heart healthy diet and get regular aerobic exercise.  Pt is aware of reasons to seek emergent care.  Patient voiced understanding of all instructions and denied further questions.  As part of patient's treatment plan I am prescribing a controlled substance.  The patient has been made aware of the appropriate use of such medications, including potential risk of somnolence, limited ability to drive and/or work safely, and potential for dependence and/or overdose.  It has also been made clear that these medications are for use by this patient only, without concomitant use of alcohol or other substances, unless prescribed.  LILIAM report reviewed and scanned into chart.  Last LILIAM date 7/12/17.

## 2017-09-07 LAB
ALBUMIN SERPL-MCNC: 4.2 G/DL (ref 3.5–5)
ALBUMIN/CREAT UR: <3.7 MG/G CREAT (ref 0–30)
ALBUMIN/GLOB SERPL: 1.6 G/DL (ref 1–2)
ALP SERPL-CCNC: 85 U/L (ref 38–126)
ALT SERPL-CCNC: 33 U/L (ref 13–69)
AST SERPL-CCNC: 25 U/L (ref 15–46)
BILIRUB SERPL-MCNC: 0.4 MG/DL (ref 0.2–1.3)
BUN SERPL-MCNC: 18 MG/DL (ref 7–20)
BUN/CREAT SERPL: 15 (ref 7.1–23.5)
CALCIUM SERPL-MCNC: 10.1 MG/DL (ref 8.4–10.2)
CHLORIDE SERPL-SCNC: 101 MMOL/L (ref 98–107)
CHOLEST SERPL-MCNC: 145 MG/DL (ref 0–199)
CO2 SERPL-SCNC: 29 MMOL/L (ref 26–30)
CREAT SERPL-MCNC: 1.2 MG/DL (ref 0.6–1.3)
CREAT UR-MCNC: 80.3 MG/DL
ERYTHROCYTE [DISTWIDTH] IN BLOOD BY AUTOMATED COUNT: 15.4 % (ref 11.5–14.5)
GLOBULIN SER CALC-MCNC: 2.7 GM/DL
GLUCOSE SERPL-MCNC: 133 MG/DL (ref 74–98)
HBA1C MFR BLD: 7.5 %
HCT VFR BLD AUTO: 42 % (ref 37–47)
HDLC SERPL-MCNC: 48 MG/DL (ref 40–60)
HGB BLD-MCNC: 12.6 G/DL (ref 12–16)
LDLC SERPL CALC-MCNC: 63 MG/DL (ref 0–99)
MCH RBC QN AUTO: 28.1 PG (ref 27–31)
MCHC RBC AUTO-ENTMCNC: 30 G/DL (ref 30–37)
MCV RBC AUTO: 93.8 FL (ref 81–99)
MICROALBUMIN UR-MCNC: <3 UG/ML
PLATELET # BLD AUTO: 229 10*3/MM3 (ref 130–400)
POTASSIUM SERPL-SCNC: 4.6 MMOL/L (ref 3.5–5.1)
PROT SERPL-MCNC: 6.9 G/DL (ref 6.3–8.2)
RBC # BLD AUTO: 4.48 10*6/MM3 (ref 4.2–5.4)
SODIUM SERPL-SCNC: 139 MMOL/L (ref 137–145)
TRIGL SERPL-MCNC: 169 MG/DL
VLDLC SERPL CALC-MCNC: 33.8 MG/DL
WBC # BLD AUTO: 4.67 10*3/MM3 (ref 4.8–10.8)

## 2017-09-20 ENCOUNTER — OFFICE VISIT (OUTPATIENT)
Dept: ORTHOPEDIC SURGERY | Facility: CLINIC | Age: 72
End: 2017-09-20

## 2017-09-20 VITALS — HEIGHT: 65 IN | RESPIRATION RATE: 16 BRPM | BODY MASS INDEX: 35.32 KG/M2 | WEIGHT: 212 LBS

## 2017-09-20 DIAGNOSIS — M53.3 PAIN OF RIGHT SACROILIAC JOINT: Primary | ICD-10-CM

## 2017-09-20 DIAGNOSIS — M47.818 ARTHRITIS OF SACROILIAC JOINT: ICD-10-CM

## 2017-09-20 PROCEDURE — 99213 OFFICE O/P EST LOW 20 MIN: CPT | Performed by: ORTHOPAEDIC SURGERY
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
## 2017-10-18 ENCOUNTER — OFFICE VISIT (OUTPATIENT)
Dept: FAMILY MEDICINE CLINIC | Facility: CLINIC | Age: 72
End: 2017-10-18

## 2017-10-18 VITALS
BODY MASS INDEX: 35.99 KG/M2 | WEIGHT: 216 LBS | HEIGHT: 65 IN | SYSTOLIC BLOOD PRESSURE: 115 MMHG | HEART RATE: 104 BPM | OXYGEN SATURATION: 96 % | DIASTOLIC BLOOD PRESSURE: 70 MMHG

## 2017-10-18 DIAGNOSIS — N30.00 ACUTE CYSTITIS WITHOUT HEMATURIA: Primary | ICD-10-CM

## 2017-10-18 PROCEDURE — 99213 OFFICE O/P EST LOW 20 MIN: CPT | Performed by: FAMILY MEDICINE

## 2017-10-18 PROCEDURE — 81003 URINALYSIS AUTO W/O SCOPE: CPT | Performed by: FAMILY MEDICINE

## 2017-10-18 RX ORDER — CEPHALEXIN 500 MG/1
500 CAPSULE ORAL 2 TIMES DAILY
Qty: 14 CAPSULE | Refills: 0 | Status: SHIPPED | OUTPATIENT
Start: 2017-10-18 | End: 2017-11-21

## 2017-10-18 RX ORDER — PHENAZOPYRIDINE HYDROCHLORIDE 200 MG/1
200 TABLET, FILM COATED ORAL 3 TIMES DAILY PRN
Qty: 6 TABLET | Refills: 0 | Status: SHIPPED | OUTPATIENT
Start: 2017-10-18 | End: 2017-10-20 | Stop reason: SDUPTHER

## 2017-10-18 NOTE — PROGRESS NOTES
Subjective   Annie Mejia is a 71 y.o. female.     Urinary Tract Infection    This is a new problem. The current episode started in the past 7 days. The problem occurs every urination. The problem has been rapidly worsening. The quality of the pain is described as burning. The pain is moderate. There has been no fever. Associated symptoms include frequency, hesitancy, nausea and urgency. Pertinent negatives include no chills, discharge, flank pain, hematuria, possible pregnancy, sweats or vomiting. She has tried increased fluids (pyridium) for the symptoms. The treatment provided mild relief. Her past medical history is significant for recurrent UTIs.     The following portions of the patient's history were reviewed and updated as appropriate: allergies, current medications, past family history, past medical history, past social history, past surgical history and problem list.    Review of Systems   Constitutional: Positive for fatigue and unexpected weight change. Negative for chills and fever.   HENT: Negative.    Eyes: Negative.    Respiratory: Positive for shortness of breath. Negative for cough and wheezing.    Cardiovascular: Negative for chest pain, palpitations and leg swelling.   Gastrointestinal: Positive for abdominal pain and nausea. Negative for blood in stool and vomiting.   Genitourinary: Positive for dysuria, frequency, hesitancy and urgency. Negative for flank pain, hematuria, vaginal bleeding and vaginal discharge.   Skin: Negative for rash.   Psychiatric/Behavioral: Positive for sleep disturbance. Negative for confusion.       Objective    Vitals:    10/18/17 1609   BP: 115/70   Pulse: 104   SpO2: 96%     Body mass index is 35.94 kg/(m^2).  Last 2 weights    10/18/17  1609   Weight: 216 lb (98 kg)       Physical Exam   Constitutional: She is oriented to person, place, and time. She appears well-developed and well-nourished. She is cooperative. She appears ill (mildly). No distress.   HENT:    Mouth/Throat: Mucous membranes are normal. Mucous membranes are not dry.   Cardiovascular: Normal rate, regular rhythm and intact distal pulses.    Pulmonary/Chest: Effort normal and breath sounds normal.   Abdominal: Soft. Bowel sounds are normal. She exhibits no distension and no mass. There is tenderness (mild) in the suprapubic area. There is no rigidity, no rebound, no guarding and no CVA tenderness.       Vascular Status -  Her exam exhibits no right foot edema. Her exam exhibits no left foot edema.  Neurological: She is alert and oriented to person, place, and time.   Skin: Skin is warm and dry. No rash noted.   Psychiatric: Her behavior is normal. Her mood appears anxious. Cognition and memory are normal.   Nursing note and vitals reviewed.    U/A performed but unreliable due to use of pyridium.    Assessment/Plan   Annie was seen today for urinary frequency.    Diagnoses and all orders for this visit:    Acute cystitis without hematuria  -     Urine Culture - Urine, Urine, Clean Catch    Other orders  -     cephalexin (KEFLEX) 500 MG capsule; Take 1 capsule by mouth 2 (Two) Times a Day.  -     phenazopyridine (PYRIDIUM) 200 MG tablet; Take 1 tablet by mouth 3 (Three) Times a Day As Needed for bladder spasms.    I will contact patient regarding test results and provide instructions regarding any necessary changes in plan of care.  Patient was encouraged to keep me informed of any acute changes, lack of improvement, or any new concerning symptoms.  Pt is aware of reasons to seek emergent care.  Keep routine f/u as schedule, f/u sooner as needed.  Patient voiced understanding of all instructions and denied further questions.

## 2017-10-19 LAB
BILIRUB BLD-MCNC: ABNORMAL MG/DL
CLARITY, POC: ABNORMAL
COLOR UR: ABNORMAL
GLUCOSE UR STRIP-MCNC: NEGATIVE MG/DL
KETONES UR QL: NEGATIVE
LEUKOCYTE EST, POC: ABNORMAL
NITRITE UR-MCNC: POSITIVE MG/ML
PH UR: 5 [PH] (ref 5–8)
PROT UR STRIP-MCNC: ABNORMAL MG/DL
RBC # UR STRIP: ABNORMAL /UL
SP GR UR: 1.01 (ref 1–1.03)
UROBILINOGEN UR QL: ABNORMAL

## 2017-10-20 ENCOUNTER — TELEPHONE (OUTPATIENT)
Dept: FAMILY MEDICINE CLINIC | Facility: CLINIC | Age: 72
End: 2017-10-20

## 2017-10-20 LAB
BACTERIA UR CULT: ABNORMAL
BACTERIA UR CULT: ABNORMAL
OTHER ANTIBIOTIC SUSC ISLT: ABNORMAL

## 2017-10-20 RX ORDER — PHENAZOPYRIDINE HYDROCHLORIDE 100 MG/1
TABLET, FILM COATED ORAL
Qty: 6 TABLET | Refills: 0 | Status: SHIPPED | OUTPATIENT
Start: 2017-10-20 | End: 2017-11-21

## 2017-10-20 NOTE — TELEPHONE ENCOUNTER
Patient called and stated she is still having a little bit of pain and needs a refill on the pyridium

## 2017-11-21 ENCOUNTER — OFFICE VISIT (OUTPATIENT)
Dept: FAMILY MEDICINE CLINIC | Facility: CLINIC | Age: 72
End: 2017-11-21

## 2017-11-21 VITALS
HEART RATE: 100 BPM | SYSTOLIC BLOOD PRESSURE: 104 MMHG | TEMPERATURE: 97.9 F | HEIGHT: 65 IN | BODY MASS INDEX: 35.65 KG/M2 | WEIGHT: 214 LBS | DIASTOLIC BLOOD PRESSURE: 74 MMHG | OXYGEN SATURATION: 98 %

## 2017-11-21 DIAGNOSIS — R35.0 URINARY FREQUENCY: ICD-10-CM

## 2017-11-21 DIAGNOSIS — R30.0 DYSURIA: ICD-10-CM

## 2017-11-21 LAB
BILIRUB BLD-MCNC: NEGATIVE MG/DL
CLARITY, POC: CLEAR
COLOR UR: YELLOW
GLUCOSE UR STRIP-MCNC: ABNORMAL MG/DL
KETONES UR QL: NEGATIVE
LEUKOCYTE EST, POC: NEGATIVE
NITRITE UR-MCNC: NEGATIVE MG/ML
PH UR: 7 [PH] (ref 5–8)
PROT UR STRIP-MCNC: NEGATIVE MG/DL
RBC # UR STRIP: NEGATIVE /UL
SP GR UR: 1.01 (ref 1–1.03)
UROBILINOGEN UR QL: NORMAL

## 2017-11-21 PROCEDURE — 81003 URINALYSIS AUTO W/O SCOPE: CPT | Performed by: NURSE PRACTITIONER

## 2017-11-21 PROCEDURE — 99213 OFFICE O/P EST LOW 20 MIN: CPT | Performed by: NURSE PRACTITIONER

## 2017-11-21 RX ORDER — CEPHALEXIN 500 MG/1
500 CAPSULE ORAL 2 TIMES DAILY
Qty: 14 CAPSULE | Refills: 0 | Status: SHIPPED | OUTPATIENT
Start: 2017-11-21 | End: 2017-11-28

## 2017-11-21 NOTE — PROGRESS NOTES
Subjective   Annie Mejia is a 71 y.o. female.     Difficulty Urinating   This is a new problem. The current episode started 1 to 4 weeks ago (2 weeks). The problem occurs constantly. The problem has been gradually worsening. Associated symptoms include chills, fatigue, nausea and urinary symptoms (urinary frequency  and dysuia since this am). Pertinent negatives include no abdominal pain, fever or vomiting. Exacerbated by: urinating. She has tried nothing for the symptoms.       The following portions of the patient's history were reviewed and updated as appropriate: allergies, current medications, past family history, past medical history, past social history, past surgical history and problem list.    Review of Systems   Constitutional: Positive for chills and fatigue. Negative for fever.   Respiratory: Negative.    Cardiovascular: Negative.    Gastrointestinal: Positive for nausea. Negative for abdominal distention, abdominal pain, constipation, diarrhea and vomiting.   Genitourinary: Positive for decreased urine volume, difficulty urinating, dysuria, frequency and urgency. Negative for hematuria.   Musculoskeletal: Negative.    Skin: Negative.    Allergic/Immunologic: Negative for immunocompromised state.   Neurological: Negative.    Hematological: Negative.        Objective   Physical Exam   Constitutional: She is oriented to person, place, and time. She appears well-developed and well-nourished. No distress.   HENT:   Head: Normocephalic.   Right Ear: External ear normal.   Left Ear: External ear normal.   Nose: Nose normal.   Eyes: Conjunctivae are normal.   Neck: Normal range of motion. Neck supple. No tracheal deviation present. No thyromegaly present.   Cardiovascular: Normal rate, regular rhythm and normal heart sounds.    No murmur heard.  Pulmonary/Chest: Effort normal and breath sounds normal. No respiratory distress. She has no wheezes. She has no rales. She exhibits no tenderness.   Abdominal:  "Soft. Bowel sounds are normal. She exhibits no distension and no mass. There is no hepatosplenomegaly or splenomegaly. There is no tenderness. There is no rebound, no guarding and no CVA tenderness. No hernia.   Musculoskeletal: Normal range of motion. She exhibits no edema or tenderness.   Neurological: She is alert and oriented to person, place, and time. Coordination and gait normal.   Skin: Skin is warm and dry. No rash noted.   Psychiatric: She has a normal mood and affect. Her behavior is normal. Judgment and thought content normal.       Assessment/Plan   Annie was seen today for difficulty urinating and urinary frequency.    Diagnoses and all orders for this visit:    Dysuria  -     cephalexin (KEFLEX) 500 MG capsule; Take 1 capsule by mouth 2 (Two) Times a Day for 7 days.    Urinary frequency  -     cephalexin (KEFLEX) 500 MG capsule; Take 1 capsule by mouth 2 (Two) Times a Day for 7 days.         UA normal in the clinic today, but treated due to her symptoms and history of UTI's. Keflex started today for treatment of her UTI symptoms and patient advised to get AZO OTC for her dysuria, since she is in the \"doughnut hole\", with her insurance and can not afford prescriptions. Patient did not want a Bicillin injection in the clinic today but will RTC tomorrow if symptoms worsen. Patient advised to increase her water intake.     Patient was encouraged to keep me informed of any acute changes, lack of improvement, or any new concerning symptoms.Patient voiced understanding of all instructions and denied further questions.    Patient to RTC Monday if symptoms not improved.            "

## 2017-12-06 ENCOUNTER — OFFICE VISIT (OUTPATIENT)
Dept: FAMILY MEDICINE CLINIC | Facility: CLINIC | Age: 72
End: 2017-12-06

## 2017-12-06 VITALS
DIASTOLIC BLOOD PRESSURE: 82 MMHG | SYSTOLIC BLOOD PRESSURE: 128 MMHG | BODY MASS INDEX: 35.82 KG/M2 | HEART RATE: 90 BPM | OXYGEN SATURATION: 97 % | WEIGHT: 215 LBS | HEIGHT: 65 IN

## 2017-12-06 DIAGNOSIS — E11.9 WELL CONTROLLED DIABETES MELLITUS (HCC): Primary | ICD-10-CM

## 2017-12-06 DIAGNOSIS — K21.00 GASTROESOPHAGEAL REFLUX DISEASE WITH ESOPHAGITIS: ICD-10-CM

## 2017-12-06 DIAGNOSIS — N28.9 RENAL INSUFFICIENCY: ICD-10-CM

## 2017-12-06 DIAGNOSIS — M19.90 ARTHRITIS: ICD-10-CM

## 2017-12-06 DIAGNOSIS — F41.8 MIXED ANXIETY DEPRESSIVE DISORDER: ICD-10-CM

## 2017-12-06 DIAGNOSIS — G47.09 OTHER INSOMNIA: ICD-10-CM

## 2017-12-06 DIAGNOSIS — R42 VERTIGO: ICD-10-CM

## 2017-12-06 PROCEDURE — 99214 OFFICE O/P EST MOD 30 MIN: CPT | Performed by: FAMILY MEDICINE

## 2017-12-06 RX ORDER — MECLIZINE HYDROCHLORIDE 25 MG/1
TABLET ORAL
Qty: 30 TABLET | Refills: 0 | Status: SHIPPED | OUTPATIENT
Start: 2017-12-06 | End: 2019-05-20 | Stop reason: SDUPTHER

## 2017-12-06 RX ORDER — ALPRAZOLAM 0.25 MG/1
TABLET ORAL
Qty: 90 TABLET | Refills: 1 | Status: SHIPPED | OUTPATIENT
Start: 2017-12-06 | End: 2018-03-02 | Stop reason: SDUPTHER

## 2017-12-06 RX ORDER — HYDROCODONE BITARTRATE AND ACETAMINOPHEN 7.5; 325 MG/1; MG/1
1 TABLET ORAL 3 TIMES DAILY PRN
Qty: 30 TABLET | Refills: 0 | Status: SHIPPED | OUTPATIENT
Start: 2017-12-06 | End: 2018-01-19 | Stop reason: SDUPTHER

## 2017-12-06 RX ORDER — PROMETHAZINE HYDROCHLORIDE 25 MG/1
25 TABLET ORAL EVERY 8 HOURS PRN
Qty: 90 TABLET | Refills: 0 | Status: SHIPPED | OUTPATIENT
Start: 2017-12-06 | End: 2018-05-17 | Stop reason: SDUPTHER

## 2017-12-06 RX ORDER — MECLIZINE HYDROCHLORIDE 25 MG/1
TABLET ORAL
Qty: 60 TABLET | Refills: 0 | Status: SHIPPED | OUTPATIENT
Start: 2017-12-06 | End: 2017-12-06 | Stop reason: SDUPTHER

## 2017-12-06 NOTE — PROGRESS NOTES
"Subjective   Annie Mejia is a 72 y.o. female.     History of Present Illness  Ms. Mejia presents today with her  for f/u on several chronic medical problems.   1) She has h/o severe n/v/reflux symptoms prompting multiple ER visits. She has had no ER visits since last apt. She has >1 yr h/o intermittent severe nausea with intermittent bouts of intractable vomiting. Assoc'd abd cramping but no chronic abd pain. She has had extensive w/u including EGD, colonoscopy, UGI and SBFT. SHe has seen Dr. Rowe as well as Dr. Martínez. No clear diagnosis made by her understanding other than GERD and hiatal hernia. She has tried multiple medications including PPI, H2 blockers, carafate, Reglan, and antiemetics which have all helped to a varying degree. Multiple medication adjustments have been made including d/c reglan, only prn use of Compazine. Switching to back to phenergan. She has cont'd PPI as well. She is staying well hydrated. No melena or hematochezia. She generally does well taking phenergan and low dose xanax for acute, severe symptoms which seems to resolve symptoms in approx 30 mins. Weight stable. No blood in stool. No dysphagia.      2) She has had generally well controlled DM. She was taken off all her oral meds and switched to basal insulin only due to persisent GI symptoms on oral meds. Blood glucose is well controlled (log reviewed). Fasting blood glucose average 100-110. Rare PP reading over 200. She denies hypoglycemic spells. She is taking statin and Ace-inh as rx'd. Last A1c 7.5 which is at goal for her. She has comorbid HTN, CRI, PVD and HLP. BP has been well controlled.       3) She has a long-standing h/o severe anxiety. She was most recently on zoloft. She reports d/c of med few weeks ago due to \"not feeling it was working\". She has continued to take low dose xanax for symptoms as above. PRN use qhs and occ during day for panic. She assoc's her n/v spells with severe anxiety as above. She " generally only requires 0.25 mg dose. She has had breakthrough symptoms in which that dose was not effective. She has never tried taking 2 at one time.     4) She is followed by pain nt for L-DDD/DJD and has received several injections. She also be seen Dr. Hopper for her bilateral hip/knee pain. No replacement necessary at this time. She requests a refill of her hydrocodone/Acet. She uses this very intermittently for more severe lbp, hip and knee pain due to arthritis. Last rx provided approx 5 months ago. Denies side effect from medication.    5) she requests refill of meclizine which she takes very rarely for vertigo but her current rx has ; no new focal neuro symptoms.    The following portions of the patient's history were reviewed and updated as appropriate: allergies, current medications, past family history, past medical history, past social history, past surgical history and problem list.    Review of Systems   Constitutional: Positive for fatigue. Negative for appetite change, fever and unexpected weight change.   HENT: Negative for congestion, ear pain, hearing loss, nosebleeds, rhinorrhea, sneezing, sore throat, tinnitus and trouble swallowing.    Eyes: Negative for pain, discharge, redness and visual disturbance.   Respiratory: Negative for cough, chest tightness, shortness of breath and wheezing.    Cardiovascular: Positive for palpitations. Negative for chest pain and leg swelling.   Gastrointestinal: Positive for diarrhea and nausea. Negative for abdominal pain, blood in stool, constipation and vomiting.   Endocrine: Positive for heat intolerance. Negative for polydipsia and polyuria.        Hot flashes   Genitourinary: Negative for dysuria, flank pain, frequency, hematuria, pelvic pain, urgency and vaginal bleeding.   Musculoskeletal: Positive for arthralgias, back pain and myalgias (located in shins, chronic for years, even as child). Negative for joint swelling and neck pain.   Skin:  Negative for rash and wound.   Neurological: Positive for dizziness (occ), tremors and weakness (generalized). Negative for seizures, syncope, speech difficulty, numbness and headaches.   Hematological: Negative for adenopathy. Does not bruise/bleed easily.   Psychiatric/Behavioral: Positive for sleep disturbance. Negative for confusion, dysphoric mood and suicidal ideas. The patient is nervous/anxious.        Objective    Vitals:    12/06/17 1512   BP: 128/82   Pulse: 90   SpO2: 97%     Body mass index is 35.78 kg/(m^2).  Last 2 weights    12/06/17  1512   Weight: 97.5 kg (215 lb)       Physical Exam   Constitutional: She is oriented to person, place, and time. She appears well-developed and well-nourished. She is cooperative. She does not appear ill. No distress.   obese   HENT:   Head: Normocephalic and atraumatic.   Mouth/Throat: Mucous membranes are normal. Mucous membranes are not dry.   Eyes: Conjunctivae and lids are normal.   Neck: Neck supple. Normal carotid pulses present. No thyromegaly present.   Cardiovascular: Normal rate, regular rhythm, normal heart sounds and intact distal pulses.    Pulmonary/Chest: Effort normal and breath sounds normal.   Abdominal: Soft. Bowel sounds are normal. She exhibits no distension and no mass. There is no tenderness.       Vascular Status -  Her exam exhibits no right foot edema. Her exam exhibits no left foot edema.  Lymphadenopathy:     She has no cervical adenopathy.   Neurological: She is alert and oriented to person, place, and time. She has normal strength. She displays tremor (mild, bilateral hands). No cranial nerve deficit. Gait (antalgic) abnormal.   Skin: Skin is warm and dry. No bruising and no rash noted.   Psychiatric: She has a normal mood and affect. Her behavior is normal. Cognition and memory are normal.   Nursing note and vitals reviewed.      Assessment/Plan   Annie was seen today for med refill, anxiety and diabetes.    Diagnoses and all orders for  this visit:    Well controlled diabetes mellitus    Gastroesophageal reflux disease with esophagitis  -     promethazine (PHENERGAN) 25 MG tablet; Take 1 tablet by mouth Every 8 (Eight) Hours As Needed for Nausea or Vomiting.    Arthritis  -     HYDROcodone-acetaminophen (NORCO) 7.5-325 MG per tablet; Take 1 tablet by mouth 3 (Three) Times a Day As Needed for Severe Pain .    Other insomnia    Mixed anxiety depressive disorder  -     ALPRAZolam (XANAX) 0.25 MG tablet; 1-2 po up to bid as needed for panic/anxiety    Renal insufficiency    Vertigo  -     meclizine (ANTIVERT) 25 MG tablet; Take 1-2 tablets by mouth every 6 (six) to 8 (eight) hours as needed for dizziness.    Other orders  -     Discontinue: meclizine (ANTIVERT) 25 MG tablet; Take 1-2 tablets by mouth every 6 (six) to 8 (eight) hours as needed for dizziness.    DM with decent control. She is at her goal A1c. Continue current tx plan. Encouraged to follow diabetic appropriate diet.   BP controlled.  GERD controlled.  Anxiety stable. She has dc'd zoloft due to side effects and ineffectiveness. Doing well with prn use of low dose xanax. For more severe symptoms (breakthrough during panic attack) she is instructed to use 2 0.25 mg tablets.  CRI stable.  Routine f/u in 3 months, sooner as needed. Will have f/u labs at that time.   Patient was encouraged to keep me informed of any acute changes, or any new concerning symptoms.  Pt advised to eat a heart healthy diet and get regular aerobic exercise.  Pt is aware of reasons to seek emergent care.  Patient voiced understanding of all instructions and denied further questions.  As part of patient's treatment plan I am prescribing a controlled substance.  The patient has been made aware of the appropriate use of such medications, including potential risk of somnolence, limited ability to drive and/or work safely, and potential for dependence and/or overdose.  It has also been made clear that these medications are  for use by this patient only, without concomitant use of alcohol or other substances, unless prescribed.  Updated LILIAM report requested.

## 2017-12-28 ENCOUNTER — OFFICE VISIT (OUTPATIENT)
Dept: ORTHOPEDIC SURGERY | Facility: CLINIC | Age: 72
End: 2017-12-28

## 2017-12-28 VITALS — HEIGHT: 65 IN | BODY MASS INDEX: 35.82 KG/M2 | RESPIRATION RATE: 16 BRPM | WEIGHT: 215 LBS

## 2017-12-28 DIAGNOSIS — M17.12 PRIMARY LOCALIZED OSTEOARTHROSIS OF LEFT LOWER LEG: Primary | ICD-10-CM

## 2017-12-28 PROCEDURE — 99213 OFFICE O/P EST LOW 20 MIN: CPT | Performed by: ORTHOPAEDIC SURGERY

## 2017-12-28 PROCEDURE — 20610 DRAIN/INJ JOINT/BURSA W/O US: CPT | Performed by: ORTHOPAEDIC SURGERY

## 2017-12-28 RX ORDER — LIDOCAINE HYDROCHLORIDE 10 MG/ML
2 INJECTION, SOLUTION INFILTRATION; PERINEURAL
Status: COMPLETED | OUTPATIENT
Start: 2017-12-28 | End: 2017-12-28

## 2017-12-28 RX ORDER — TRIAMCINOLONE ACETONIDE 40 MG/ML
40 INJECTION, SUSPENSION INTRA-ARTICULAR; INTRAMUSCULAR
Status: COMPLETED | OUTPATIENT
Start: 2017-12-28 | End: 2017-12-28

## 2017-12-28 RX ADMIN — LIDOCAINE HYDROCHLORIDE 2 ML: 10 INJECTION, SOLUTION INFILTRATION; PERINEURAL at 15:55

## 2017-12-28 RX ADMIN — TRIAMCINOLONE ACETONIDE 40 MG: 40 INJECTION, SUSPENSION INTRA-ARTICULAR; INTRAMUSCULAR at 15:55
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
## 2018-01-10 ENCOUNTER — TRANSCRIBE ORDERS (OUTPATIENT)
Dept: PHYSICAL THERAPY | Facility: CLINIC | Age: 73
End: 2018-01-10

## 2018-01-10 DIAGNOSIS — M46.1 OSTEOARTHRITIS OF SACROILIAC JOINT (HCC): Primary | ICD-10-CM

## 2018-01-10 DIAGNOSIS — Z47.89 ORTHOPEDIC AFTERCARE: ICD-10-CM

## 2018-01-15 ENCOUNTER — TREATMENT (OUTPATIENT)
Dept: PHYSICAL THERAPY | Facility: CLINIC | Age: 73
End: 2018-01-15

## 2018-01-15 DIAGNOSIS — M25.551 PAIN OF RIGHT HIP JOINT: Primary | ICD-10-CM

## 2018-01-15 PROCEDURE — G8979 MOBILITY GOAL STATUS: HCPCS | Performed by: PHYSICAL THERAPIST

## 2018-01-15 PROCEDURE — 97110 THERAPEUTIC EXERCISES: CPT | Performed by: PHYSICAL THERAPIST

## 2018-01-15 PROCEDURE — 97161 PT EVAL LOW COMPLEX 20 MIN: CPT | Performed by: PHYSICAL THERAPIST

## 2018-01-15 PROCEDURE — G8978 MOBILITY CURRENT STATUS: HCPCS | Performed by: PHYSICAL THERAPIST

## 2018-01-15 NOTE — PROGRESS NOTES
Physical Therapy Initial Evaluation and Plan of Care      Patient: Annie Mejia   : 1945  Diagnosis/ICD-10 Code:  No primary diagnosis found.  Referring practitioner: Shaheen Ibrahim MD    Subjective Evaluation    History of Present Illness  Mechanism of injury: Years ago she had insidious onset of R hip pain.  Now the R side greater than the L.  2017 pain began to elevate.     She has had SI joint injections and has had some relief with them.  Now the R hip and Leg is hurting like a toothache.  Sharp pains that limit mobility.     Pain  Current pain ratin  At best pain ratin  Location: R gluteal region is the main area then it radiates down the R LE.   Aggravating factors: ambulation, prolonged positioning and repetitive movement    Treatments  Previous treatment: injection treatment and medication           Objective       Postural Observations  Seated posture: fair  Standing posture: fair  Correction of posture: has no consistent effect    Additional Postural Observation Details  Standing with L glut hypertonic and R glut slightly underdeveloped compared to the R.   L pelvic rotation noted in resting position.     Palpation     Right Tenderness of the gluteus delaney, gluteus medius and piriformis.     Passive Range of Motion     Right Hip   Flexion: WFL  Abduction: WFL  External rotation (90/90): WFL  External rotation (prone): WFL  Internal rotation (90/90): WFL  Internal rotation (prone): WFL    Additional Passive Range of Motion Details  Pt with MM tightness in the R hip with testing but no hip joint tightness noted.     Strength/Myotome Testing     Left Hip   Planes of Motion   Flexion: 4-  Extension: 4-  Abduction: 3-  External rotation: 3+    Right Hip   Planes of Motion   Flexion: 4-  Extension: 3-  Abduction: 2+  External rotation: 3-    Tests     Lumbar     Left   Negative passive SLR.     Right   Negative passive SLR.     Left Pelvic Girdle/Sacrum   Negative: sacrum  compression and sacral spring.     Right Pelvic Girdle/Sacrum   Negative: sacrum compression and sacral spring.     Additional Tests Details  DKTC no pain elevated or reduced significantly.     Pt with endurance issues noted with exercises with PT.          Assessment & Plan     Assessment  Impairments: abnormal muscle tone, activity intolerance, impaired physical strength, lacks appropriate home exercise program, pain with function and weight-bearing intolerance  Assessment details: Patient is a 72 year old female who comes to physical therapy with R hip pain. Signs and symptoms are consistent with R hip weakness and referred pain in the R hip and LE.  The patient currently has pain, decreased ROM, decreased strength, and inability to perform all essential functional activities. Pt will benefit from skilled PT services to address the above issues.     Prognosis: fair  Prognosis details: SHORT TERM GOALS:  2 weeks       1. Pt independent with HEP  2. Pt to demonstrate chacha hip strength 3+/5 or greater to improve stability with ambulation  3. Pt to report being able to walk for 10 minutes without increasing pain in the right hip    LONG TERM GOALS:   6 weeks  1. Pt to demonstrate ability to perform full functional squat with good form and control of the hips and without increasing pain  2. Pt to demonstrate chacha hip strength to 4+/5 or greater to improve safety with ambulation on uneven surfaces  3. Pt to return to work full duty without increased pain in the right hip(s)   4. Pt to demonstrate ability to perform step up/down 8 inch step x10 safely and without pain in the right hip(s)     Functional Limitations: carrying objects, lifting, pulling, pushing, uncomfortable because of pain, sitting, standing, stooping and unable to perform repetitive tasks  Goals  Plan Goals:       Plan  Therapy options: will be seen for skilled physical therapy services  Planned modality interventions: cryotherapy, thermotherapy  (hydrocollator packs) and electrical stimulation/Russian stimulation  Planned therapy interventions: abdominal trunk stabilization, manual therapy, spinal/joint mobilization, soft tissue mobilization, strengthening, stretching, therapeutic activities, functional ROM exercises, flexibility, body mechanics training, neuromuscular re-education and postural training  Treatment plan discussed with: patient  Plan details: Pt will be seen 2 x / week.  Assess Pt response to PREP, posture and body mechanics.         Manual Therapy:         mins  26425;  Therapeutic Exercise:    24     mins  94031;     Neuromuscular Jesus:        mins  02758;    Therapeutic Activity:          mins  14580;     Gait Training:           mins  85946;     Ultrasound:          mins  68506;    Electrical Stimulation:         mins  72267 ( );  Dry Needling          mins self-pay    Timed Treatment:   24   mins   Total Treatment:     52   mins    PT SIGNATURE: Anjel Marsh, PT   DATE TREATMENT INITIATED: 1/15/2018    Medicare Initial Certification  Certification Period: 4/15/2018  I certify that the therapy services are furnished while this patient is under my care.  The services outlined above are required by this patient, and will be reviewed every 90 days.     PHYSICIAN: Shaheen Ibrahim MD      DATE:     Please sign and return via fax to  .. Thank you, [facility].

## 2018-01-18 ENCOUNTER — TELEPHONE (OUTPATIENT)
Dept: FAMILY MEDICINE CLINIC | Facility: CLINIC | Age: 73
End: 2018-01-18

## 2018-01-18 DIAGNOSIS — M19.90 ARTHRITIS: ICD-10-CM

## 2018-01-18 NOTE — TELEPHONE ENCOUNTER
Patient called to see if you would refill her pain medication because she is having a lot of SI joint pain,  She has apt. Tomorrow to do a scan.

## 2018-01-19 RX ORDER — HYDROCODONE BITARTRATE AND ACETAMINOPHEN 7.5; 325 MG/1; MG/1
1 TABLET ORAL 3 TIMES DAILY PRN
Qty: 30 TABLET | Refills: 0 | Status: SHIPPED | OUTPATIENT
Start: 2018-01-19 | End: 2018-02-02 | Stop reason: SDUPTHER

## 2018-01-22 ENCOUNTER — TREATMENT (OUTPATIENT)
Dept: PHYSICAL THERAPY | Facility: CLINIC | Age: 73
End: 2018-01-22

## 2018-01-22 DIAGNOSIS — M25.551 PAIN OF RIGHT HIP JOINT: Primary | ICD-10-CM

## 2018-01-22 PROCEDURE — 97140 MANUAL THERAPY 1/> REGIONS: CPT | Performed by: PHYSICAL THERAPIST

## 2018-01-22 PROCEDURE — 97110 THERAPEUTIC EXERCISES: CPT | Performed by: PHYSICAL THERAPIST

## 2018-01-22 PROCEDURE — 97530 THERAPEUTIC ACTIVITIES: CPT | Performed by: PHYSICAL THERAPIST

## 2018-01-22 NOTE — PROGRESS NOTES
Physical Therapy Daily Progress Note      Visit # : 2    Annie Mejia reports 10/10 pain today at rest.  Pt reports her L hip is hurting today not the R Hip.  Pt with pain changing the the L hip the very next day after PT.       Objective Pt present to PT today with moderate distress noted with ambulation and prolonged sitting.     R trunk shift noted in stance    Supine hooklying + relief of pain 0/10 pain.   DKTC + for relief.     Lumbar distraction + slight relief of sxs.       See Exercise, Manual, and Modality Logs for complete treatment.     Assessment/Plan  Pt with relief with lumbar unloaded flexion exericses.  Pain relief is short lived but hopefully more frequent PREP will help reduce pain.       Progress per Plan of Care  DKTC and SKTC PREP.            Manual Therapy:    15     mins  51820;  Therapeutic Exercise:    12     mins  22324;     Neuromuscular Jesus:        mins  23794;    Therapeutic Activity:     13     mins  02924;     Gait Training:        ___  mins  15609;     Ultrasound:          mins  45164;    Electrical Stimulation:         mins  81119 ( );  Dry Needling          mins self-pay    Timed Treatment:   40   mins   Total Treatment:     50   mins    Anjel Marsh, PT  Physical Therapist

## 2018-01-25 ENCOUNTER — TREATMENT (OUTPATIENT)
Dept: PHYSICAL THERAPY | Facility: CLINIC | Age: 73
End: 2018-01-25

## 2018-01-25 DIAGNOSIS — M25.551 PAIN OF RIGHT HIP JOINT: Primary | ICD-10-CM

## 2018-01-25 PROCEDURE — 97140 MANUAL THERAPY 1/> REGIONS: CPT | Performed by: PHYSICAL THERAPIST

## 2018-01-25 PROCEDURE — 97530 THERAPEUTIC ACTIVITIES: CPT | Performed by: PHYSICAL THERAPIST

## 2018-01-25 PROCEDURE — 97110 THERAPEUTIC EXERCISES: CPT | Performed by: PHYSICAL THERAPIST

## 2018-01-25 NOTE — PROGRESS NOTES
Physical Therapy Daily Progress Note      Visit # : 3    Annie Mejia reports 9/10 pain today at rest.  Pt with severe pain elevation over the past 2 days.         Objective Pt present to PT today with severe distress noted ambulating into PT area.     Pt with moderate distress noted with transfers and ROM activity.      Palpation.  Primary area of pain is noted to be in the sacral region.     Pt does get some temporary relief with distraction and trunk flexion (DKTC).        See Exercise, Manual, and Modality Logs for complete treatment.     Assessment/Plan  Pt with pain elevated over the past week.  Pt with pain so intense that she may need to hold PT until MD visit.       Hold PT until MD visit.             Manual Therapy:    14     mins  48974;  Therapeutic Exercise:    12     mins  78729;     Neuromuscular Jesus:        mins  93646;    Therapeutic Activity:     12     mins  18794;     Gait Training:        ___  mins  88792;     Ultrasound:          mins  22634;    Electrical Stimulation:         mins  49834 ( );  Dry Needling          mins self-pay    Timed Treatment:   38   mins   Total Treatment:     40   mins    Anjel Marsh, PT  Physical Therapist

## 2018-01-31 DIAGNOSIS — E11.9 DIABETES MELLITUS, STABLE (HCC): ICD-10-CM

## 2018-01-31 RX ORDER — INSULIN GLARGINE 100 [IU]/ML
INJECTION, SOLUTION SUBCUTANEOUS
Qty: 1 EACH | Refills: 5 | Status: SHIPPED | OUTPATIENT
Start: 2018-01-31 | End: 2018-05-25

## 2018-01-31 NOTE — TELEPHONE ENCOUNTER
Pt called and stated that she needs refill on her Lantus to [facility] in [place].  I tried to sharmila it as reorder, but it said there was already orders pending for Lantus.  Pt is requesting refill today if at all possible.  Sts she will be out of meds. Also asked if you would call her to let her know when the refill has been sent in so she will know when to go pick it up.

## 2018-02-02 ENCOUNTER — OFFICE VISIT (OUTPATIENT)
Dept: FAMILY MEDICINE CLINIC | Facility: CLINIC | Age: 73
End: 2018-02-02

## 2018-02-02 VITALS
OXYGEN SATURATION: 97 % | WEIGHT: 218 LBS | DIASTOLIC BLOOD PRESSURE: 80 MMHG | BODY MASS INDEX: 36.32 KG/M2 | SYSTOLIC BLOOD PRESSURE: 118 MMHG | HEART RATE: 104 BPM | TEMPERATURE: 98.4 F | HEIGHT: 65 IN

## 2018-02-02 DIAGNOSIS — R30.0 DYSURIA: ICD-10-CM

## 2018-02-02 DIAGNOSIS — K31.89: ICD-10-CM

## 2018-02-02 DIAGNOSIS — R35.0 URINARY FREQUENCY: ICD-10-CM

## 2018-02-02 DIAGNOSIS — N30.00 ACUTE CYSTITIS WITHOUT HEMATURIA: ICD-10-CM

## 2018-02-02 DIAGNOSIS — M19.90 ARTHRITIS: ICD-10-CM

## 2018-02-02 DIAGNOSIS — K21.00 GASTROESOPHAGEAL REFLUX DISEASE WITH ESOPHAGITIS: ICD-10-CM

## 2018-02-02 DIAGNOSIS — R39.15 URGENCY OF URINATION: ICD-10-CM

## 2018-02-02 LAB
BILIRUB BLD-MCNC: NEGATIVE MG/DL
CLARITY, POC: ABNORMAL
COLOR UR: YELLOW
GLUCOSE UR STRIP-MCNC: NEGATIVE MG/DL
KETONES UR QL: NEGATIVE
LEUKOCYTE EST, POC: ABNORMAL
NITRITE UR-MCNC: NEGATIVE MG/ML
PH UR: 8 [PH] (ref 5–8)
PROT UR STRIP-MCNC: NEGATIVE MG/DL
RBC # UR STRIP: NEGATIVE /UL
SP GR UR: 1.01 (ref 1–1.03)
UROBILINOGEN UR QL: NORMAL

## 2018-02-02 PROCEDURE — 81003 URINALYSIS AUTO W/O SCOPE: CPT | Performed by: NURSE PRACTITIONER

## 2018-02-02 PROCEDURE — 99214 OFFICE O/P EST MOD 30 MIN: CPT | Performed by: NURSE PRACTITIONER

## 2018-02-02 PROCEDURE — 96372 THER/PROPH/DIAG INJ SC/IM: CPT | Performed by: NURSE PRACTITIONER

## 2018-02-02 RX ORDER — CEFTRIAXONE 1 G/1
1 INJECTION, POWDER, FOR SOLUTION INTRAMUSCULAR; INTRAVENOUS ONCE
Status: COMPLETED | OUTPATIENT
Start: 2018-02-02 | End: 2018-02-02

## 2018-02-02 RX ORDER — LANSOPRAZOLE 30 MG/1
30 CAPSULE, DELAYED RELEASE ORAL 2 TIMES DAILY
Qty: 60 CAPSULE | Refills: 0 | Status: SHIPPED | OUTPATIENT
Start: 2018-02-02 | End: 2018-03-02

## 2018-02-02 RX ORDER — CEPHALEXIN 500 MG/1
500 CAPSULE ORAL 2 TIMES DAILY
Qty: 10 CAPSULE | Refills: 0 | Status: SHIPPED | OUTPATIENT
Start: 2018-02-02 | End: 2018-02-07

## 2018-02-02 RX ORDER — HYDROCODONE BITARTRATE AND ACETAMINOPHEN 7.5; 325 MG/1; MG/1
1 TABLET ORAL 3 TIMES DAILY PRN
Qty: 30 TABLET | Refills: 0 | Status: SHIPPED | OUTPATIENT
Start: 2018-02-02 | End: 2018-03-02 | Stop reason: SDUPTHER

## 2018-02-02 RX ADMIN — CEFTRIAXONE 1 G: 1 INJECTION, POWDER, FOR SOLUTION INTRAMUSCULAR; INTRAVENOUS at 15:08

## 2018-02-02 NOTE — PROGRESS NOTES
Subjective   Annie Mejia is a 72 y.o. female.     HPI Comments: Patient is here today for complaints of urinary symptoms for the past several days. She has been having dysuria, urinary frequency, and urgency and bladder pressure. She states she is having surgery next week so needs it taken care of.     Patient is also here for follow up on her GERD. She states she GI Dr prescribed Dexilant but it is too expensive for her. She needs something else for it though because it is terrible if she does not take medication.     Patient is also requesting refill on her Hydrocodone for her right hip pain. She states she is having surgery next week but will run out of her pain medication before then.      The following portions of the patient's history were reviewed and updated as appropriate: allergies, current medications, past family history, past medical history, past social history, past surgical history and problem list.    Review of Systems   Constitutional: Negative.    HENT: Negative.    Eyes: Negative.    Respiratory: Negative.    Cardiovascular: Negative.    Gastrointestinal: Negative.    Genitourinary: Positive for decreased urine volume, difficulty urinating, dysuria, frequency, pelvic pain and urgency. Negative for enuresis, flank pain and hematuria.   Musculoskeletal: Negative.    Skin: Negative.    Neurological: Negative for dizziness, syncope, weakness and numbness.   Hematological: Negative for adenopathy.   Psychiatric/Behavioral: Negative for confusion and suicidal ideas. The patient is not nervous/anxious.        Objective   Physical Exam   Constitutional: She is oriented to person, place, and time. She appears well-developed and well-nourished. No distress.   HENT:   Head: Normocephalic.   Right Ear: External ear normal.   Left Ear: External ear normal.   Nose: Nose normal.   Eyes: Conjunctivae are normal.   Neck: Normal range of motion. Neck supple. No tracheal deviation present. No thyromegaly  present.   Cardiovascular: Normal rate, regular rhythm and normal heart sounds.    No murmur heard.  Pulmonary/Chest: Effort normal and breath sounds normal. No respiratory distress. She has no wheezes. She has no rales. She exhibits no tenderness.   Abdominal: Soft. Bowel sounds are normal. She exhibits no distension and no mass. There is no hepatosplenomegaly or splenomegaly. There is tenderness in the suprapubic area. There is no rebound, no guarding and no CVA tenderness. No hernia.   Musculoskeletal: Normal range of motion. She exhibits no edema.   Painful and decreased ROM of chacha hips   Neurological: She is alert and oriented to person, place, and time. Coordination and gait normal.   Skin: Skin is warm and dry. No rash noted.   Psychiatric: She has a normal mood and affect. Her behavior is normal. Judgment and thought content normal.   Nursing note and vitals reviewed.      Assessment/Plan   Annie was seen today for abdominal cramping and bladder problem.    Diagnoses and all orders for this visit:    Acute cystitis without hematuria  -     cephalexin (KEFLEX) 500 MG capsule; Take 1 capsule by mouth 2 (Two) Times a Day for 5 days.  -     Urine Culture - Urine, Urine, Clean Catch  -     cefTRIAXone (ROCEPHIN) injection 1 g; Inject 1 g into the shoulder, thigh, or buttocks 1 (One) Time.  -     POCT urinalysis dipstick, automated    Urgency of urination  -     Urine Culture - Urine, Urine, Clean Catch  -     POCT urinalysis dipstick, automated    Dysuria  -     Urine Culture - Urine, Urine, Clean Catch  -     cefTRIAXone (ROCEPHIN) injection 1 g; Inject 1 g into the shoulder, thigh, or buttocks 1 (One) Time.  -     POCT urinalysis dipstick, automated    Urinary frequency  -     Urine Culture - Urine, Urine, Clean Catch  -     cefTRIAXone (ROCEPHIN) injection 1 g; Inject 1 g into the shoulder, thigh, or buttocks 1 (One) Time.  -     POCT urinalysis dipstick, automated    Gastric hypersecretion    Gastroesophageal  reflux disease with esophagitis    Arthritis  -     HYDROcodone-acetaminophen (NORCO) 7.5-325 MG per tablet; Take 1 tablet by mouth 3 (Three) Times a Day As Needed for Severe Pain .    Other orders  -     lansoprazole (PREVACID) 30 MG capsule; Take 1 capsule by mouth 2 (Two) Times a Day for 30 days.       UA showed a UTI in the clinic today. Urine sent for culture.  Rocephin injection given in the clinic. Keflex prescribed today for patient to continue if still having symptoms after 72 hours.     Norco refilled today for her hip pain.    Prevacid prescribed today for her GERD and hypersecretion issues, since she can no afford Dexilant. I prescribed it bid, given the severity of her condition and her history with this.     Patient was encouraged to keep me informed of any acute changes, lack of improvement, or any new concerning symptoms. Patient voiced understanding of all instructions and denied further questions.    Patient to follow up at her scheduled appointment with Dr Pimentel in March and stephan .

## 2018-02-05 LAB
BACTERIA UR CULT: ABNORMAL
BACTERIA UR CULT: ABNORMAL
OTHER ANTIBIOTIC SUSC ISLT: ABNORMAL

## 2018-02-07 ENCOUNTER — TELEPHONE (OUTPATIENT)
Dept: FAMILY MEDICINE CLINIC | Facility: CLINIC | Age: 73
End: 2018-02-07

## 2018-03-02 ENCOUNTER — OFFICE VISIT (OUTPATIENT)
Dept: FAMILY MEDICINE CLINIC | Facility: CLINIC | Age: 73
End: 2018-03-02

## 2018-03-02 VITALS
BODY MASS INDEX: 34.32 KG/M2 | OXYGEN SATURATION: 98 % | DIASTOLIC BLOOD PRESSURE: 70 MMHG | HEIGHT: 65 IN | HEART RATE: 100 BPM | WEIGHT: 206 LBS | SYSTOLIC BLOOD PRESSURE: 110 MMHG

## 2018-03-02 DIAGNOSIS — M19.90 ARTHRITIS: ICD-10-CM

## 2018-03-02 DIAGNOSIS — Z79.4 TYPE 2 DIABETES MELLITUS WITHOUT COMPLICATION, WITH LONG-TERM CURRENT USE OF INSULIN (HCC): ICD-10-CM

## 2018-03-02 DIAGNOSIS — L82.0 INFLAMED SEBORRHEIC KERATOSIS: ICD-10-CM

## 2018-03-02 DIAGNOSIS — I10 ESSENTIAL HYPERTENSION: ICD-10-CM

## 2018-03-02 DIAGNOSIS — E11.9 TYPE 2 DIABETES MELLITUS WITHOUT COMPLICATION, WITH LONG-TERM CURRENT USE OF INSULIN (HCC): ICD-10-CM

## 2018-03-02 DIAGNOSIS — K21.00 GASTROESOPHAGEAL REFLUX DISEASE WITH ESOPHAGITIS: Primary | ICD-10-CM

## 2018-03-02 DIAGNOSIS — N28.9 RENAL INSUFFICIENCY: ICD-10-CM

## 2018-03-02 DIAGNOSIS — E66.09 CLASS 1 OBESITY DUE TO EXCESS CALORIES WITH SERIOUS COMORBIDITY AND BODY MASS INDEX (BMI) OF 34.0 TO 34.9 IN ADULT: ICD-10-CM

## 2018-03-02 DIAGNOSIS — F41.8 MIXED ANXIETY DEPRESSIVE DISORDER: ICD-10-CM

## 2018-03-02 DIAGNOSIS — E78.2 MIXED HYPERLIPIDEMIA: ICD-10-CM

## 2018-03-02 LAB
CHOLEST SERPL-MCNC: 150 MG/DL (ref 0–199)
HBA1C MFR BLD: 7.7 %
HDLC SERPL-MCNC: 52 MG/DL (ref 40–60)
LDLC SERPL CALC-MCNC: 68 MG/DL (ref 0–99)
TRIGL SERPL-MCNC: 151 MG/DL
VLDLC SERPL CALC-MCNC: 30.2 MG/DL

## 2018-03-02 PROCEDURE — 17110 DESTRUCTION B9 LES UP TO 14: CPT | Performed by: FAMILY MEDICINE

## 2018-03-02 PROCEDURE — 99214 OFFICE O/P EST MOD 30 MIN: CPT | Performed by: FAMILY MEDICINE

## 2018-03-02 RX ORDER — HYDROCODONE BITARTRATE AND ACETAMINOPHEN 7.5; 325 MG/1; MG/1
1 TABLET ORAL 3 TIMES DAILY PRN
Qty: 30 TABLET | Refills: 0 | Status: SHIPPED | OUTPATIENT
Start: 2018-03-02 | End: 2018-03-14 | Stop reason: SDUPTHER

## 2018-03-02 RX ORDER — OMEPRAZOLE 20 MG/1
20 CAPSULE, DELAYED RELEASE ORAL 2 TIMES DAILY
Qty: 180 CAPSULE | Refills: 3 | Status: SHIPPED | OUTPATIENT
Start: 2018-03-02 | End: 2018-05-03

## 2018-03-02 RX ORDER — ALPRAZOLAM 0.25 MG/1
TABLET ORAL
Qty: 90 TABLET | Refills: 1 | Status: SHIPPED | OUTPATIENT
Start: 2018-03-02 | End: 2018-05-02 | Stop reason: SDUPTHER

## 2018-03-02 RX ORDER — OXYCODONE AND ACETAMINOPHEN 7.5; 325 MG/1; MG/1
TABLET ORAL
COMMUNITY
Start: 2018-02-12 | End: 2018-03-02

## 2018-03-02 NOTE — PROGRESS NOTES
"Subjective   Annie Mejia is a 72 y.o. female.     History of Present Illness  Ms. Mejia presents today to f/u on several chronic issues:  1) She has severe n/v/reflux symptoms prompting multiple ER visits. She has had no ER visits since last apt. She has >1 yr h/o intermittent severe nausea with intermittent bouts of intractable vomiting. Assoc'd abd cramping but no chronic abd pain. She has had extensive w/u including EGD, colonoscopy, UGI and SBFT. SHe has seen Dr. Rowe as well as Dr. Martínez. No clear diagnosis made by her understanding other than GERD and hiatal hernia. She has tried multiple medications including PPI, H2 blockers, carafate, Reglan, and antiemetics which have all helped to a varying degree. Multiple medication adjustments have been made including d/c reglan, only prn use of Compazine. Switching to back to phenergan. She has cont'd PPI as well. Prevacid has worked well but she is unable to afford it any longer; would like to switch back to bid omeprazole. She admits she has \"not been drinking enough\". No melena or hematochezia. She generally does well taking phenergan and low dose xanax for acute, severe symptoms which seems to resolve symptoms in approx 30 mins. Weight decreased from last visit . She states her appetite is decreased due to pain. No blood in stool. No dysphagia.      2) She has had generally well controlled DM. She was taken off all her oral meds and switched to basal insulin only due to persisent GI symptoms on oral meds. Blood glucose is well controlled (log reviewed). Fasting blood glucose average 100-110. Rare PP reading over 200. She denies hypoglycemic spells. She is taking statin and Ace-inh as rx'd. Last A1c 7.5 which is at goal for her. She has comorbid HTN, CRI, PVD and HLP. BP has been well controlled. Kidney function has been stable..      3) She has a long-standing h/o severe anxiety. She was previously on zoloft but dc'd due to \"not feeling it was working\". " "She has continued to take low dose xanax for symptoms as above. PRN use qhs and occ during day for panic. She reports increased anxiety related to pain. Wants \"something extra\" to help with anxiety. She assoc's her n/v spells with severe anxiety as above. She generally only requires 0.25 mg dose up to bid.       4) She is followed by pain mgnt for L-DDD/DJD and has received several injections. She requests a refill of her hydrocodone/Acet. She uses this very intermittently for more severe lbp, hip and knee pain due to arthritis. Denies side effect from medication. Recent exacerbation of pain as she has undergone to recent SI joint procedure per Dr. Ibrahim 2/9/18.    5) She c/o irritated skin lesion on her forehead. It is occ pruritic. Often \"trims it off\" and then it regrows. No bleeding, mildly sore.      The following portions of the patient's history were reviewed and updated as appropriate: allergies, current medications, past family history, past medical history, past social history, past surgical history and problem list.    Review of Systems   Constitutional: Positive for fatigue. Negative for appetite change, fever and unexpected weight change.   HENT: Negative for congestion, ear pain, hearing loss, nosebleeds, rhinorrhea, sneezing, sore throat, tinnitus and trouble swallowing.    Eyes: Negative for pain, discharge, redness and visual disturbance.   Respiratory: Negative for cough, chest tightness, shortness of breath and wheezing.    Cardiovascular: Positive for palpitations. Negative for chest pain and leg swelling.   Gastrointestinal: Positive for diarrhea and nausea. Negative for abdominal pain, blood in stool, constipation and vomiting.        As per HPI   Endocrine: Positive for heat intolerance. Negative for polydipsia and polyuria.        Hot flashes   Genitourinary: Negative for dysuria, flank pain, frequency, hematuria, pelvic pain, urgency and vaginal bleeding.   Musculoskeletal: Positive for " arthralgias, back pain and myalgias (located in shins, chronic for years, even as child). Negative for joint swelling and neck pain.   Skin: Negative for rash and wound.   Neurological: Positive for dizziness (occ), tremors and weakness (generalized). Negative for seizures, syncope, speech difficulty, numbness and headaches.   Hematological: Negative for adenopathy. Does not bruise/bleed easily.   Psychiatric/Behavioral: Positive for sleep disturbance. Negative for confusion, dysphoric mood and suicidal ideas. The patient is nervous/anxious.        Objective    Vitals:    03/02/18 0913   BP: 110/70   Pulse: 100   SpO2: 98%     Body mass index is 34.28 kg/(m^2).  Last 2 weights    03/02/18 0913   Weight: 93.4 kg (206 lb)       Physical Exam   Constitutional: She is oriented to person, place, and time. She appears well-developed and well-nourished. She is cooperative. She does not appear ill. No distress.   obese   HENT:   Head: Normocephalic and atraumatic.   Mouth/Throat: Oropharynx is clear and moist and mucous membranes are normal. Mucous membranes are not dry. No oral lesions.   Eyes: Conjunctivae and lids are normal.   Neck: Phonation normal. Neck supple. Normal carotid pulses present. Carotid bruit is not present. No thyroid mass and no thyromegaly present.   Cardiovascular: Normal rate, regular rhythm, normal heart sounds and intact distal pulses.    Pulmonary/Chest: Effort normal and breath sounds normal.       Vascular Status -  Her exam exhibits no right foot edema. Her exam exhibits no left foot edema.  Lymphadenopathy:     She has no cervical adenopathy.   Neurological: She is alert and oriented to person, place, and time. Gait (antalgic, using walker today) abnormal.   Skin: Skin is warm and dry. Lesion (approx 1 cm raised hyperpigmented lesion left forehead, rough, generally regular order, mildly inflamed) noted. No bruising and no rash noted.   Psychiatric: She has a normal mood and affect. Her speech  is normal and behavior is normal. Thought content normal. Cognition and memory are normal.   Nursing note and vitals reviewed.    Recent consult note reviewed on chart.  Recent labs reviewed in chart.    Assessment/Plan   Annie was seen today for heartburn, anxiety and medication problem.    Diagnoses and all orders for this visit:    Gastroesophageal reflux disease with esophagitis    Mixed anxiety depressive disorder  -     ALPRAZolam (XANAX) 0.25 MG tablet; 1-2 po up to bid as needed for panic/anxiety    Mixed hyperlipidemia  -     Lipid Panel    Essential hypertension  -     lisinopril-hydrochlorothiazide (PRINZIDE,ZESTORETIC) 20-25 MG per tablet; Take 1 tablet by mouth Daily.    Renal insufficiency    Type 2 diabetes mellitus without complication, with long-term current use of insulin  -     Hemoglobin A1c    Arthritis  -     HYDROcodone-acetaminophen (NORCO) 7.5-325 MG per tablet; Take 1 tablet by mouth 3 (Three) Times a Day As Needed for Severe Pain .    Class 1 obesity due to excess calories with serious comorbidity and body mass index (BMI) of 34.0 to 34.9 in adult    Inflamed seborrheic keratosis    Other orders  -     omeprazole (PRILOSEC) 20 MG capsule; Take 1 capsule by mouth 2 (Two) Times a Day.  -     sertraline (ZOLOFT) 50 MG tablet; 1/2 po daily x 2 weeks then continue with 1 po daily    Recent exacerbation of anxiety likely related to pain and increased psychosocial stressors. Continue xanax as needed but restart zoloft.    Stable NIDDM. Continue current mgnt.    HLP and HTN. Continue statin. BP well controlled.     GERD stable overall. Switch back to prilosec bid as this is more affordable for her.    Pt advised to eat a heart healthy diet and get regular aerobic exercise.  Nutrition and activity goals reviewed including: mainly water to drink, limit white flour/processed sugar, high protein, high fiber carbs, good breakfast, working toward 150 mins cardio per week when she is more mobile.    I  have reviewed risks/benefits and potential side effects of various treatment options for inflamed keratosis possibly with assoc'd AK. Pt voices understanding and wishes to proceed with cryotherapy (see procedure below).    F/U with specialists as scheduled.  Routine f/u in 3 months, sooner as needed/instructed.  I will contact patient regarding test results and provide instructions regarding any necessary changes in plan of care.  Patient was encouraged to keep me informed of any acute changes, lack of improvement, or any new concerning symptoms.  Pt is aware of reasons to seek emergent care.  Patient voiced understanding of all instructions and denied further questions.    As part of patient's treatment plan I am prescribing a controlled substance.  The patient has been made aware of the appropriate use of such medications, including potential risk of somnolence, limited ability to drive and/or work safely, and potential for dependence and/or overdose.  It has also been made clear that these medications are for use by this patient only, without concomitant use of alcohol or other substances, unless prescribed.  LILIAM report reviewed and scanned into chart.  Last LILIAM date 3/2/18.      PROCEDURE: Cryotherapy inflamed keratosis  Reasons for procedure: inflamed, enlarging, possible assoc'd AK    Physical Exam Findings  Location: left forehead  Size: approx 1 cm  Features: as above    Informed consent obtained: Verbal- yes    Site prepped with sterile alcohol pad.    Lesion tx'd with liquid nitrogen using 3 cycles of 10 sec each.    Complications: none    Pt tolerated procedure well.  Wound care instructions provided.  Patient voiced understanding of all instructions and denied further questions.

## 2018-03-03 RX ORDER — LISINOPRIL AND HYDROCHLOROTHIAZIDE 25; 20 MG/1; MG/1
1 TABLET ORAL
Qty: 90 TABLET | Refills: 1 | Status: SHIPPED | OUTPATIENT
Start: 2018-03-03 | End: 2018-05-30 | Stop reason: SDUPTHER

## 2018-03-12 ENCOUNTER — TELEPHONE (OUTPATIENT)
Dept: FAMILY MEDICINE CLINIC | Facility: CLINIC | Age: 73
End: 2018-03-12

## 2018-03-12 NOTE — TELEPHONE ENCOUNTER
Her goal fasting blood sugar in morning is <120. Her goal blood sugar after eating is 180. She should use current dose for at least 5 days. If numbers are not at goal, she can increase by 3 units. Then she will again monitor for 5 days before increasing.    Please relate to her that it is more important to prevent low blood sugar than for her blood sugar to run a little high for a few weeks while she is seeing pain mgnt. So she should increased insulin with caution and not skip meals.

## 2018-03-12 NOTE — TELEPHONE ENCOUNTER
Patient states she is getting cortisone injections in back and her sugar is going up. Patient was told by Dr. Pearson to increase insulin. Patient wants to know how much she should increase. Sugar has been 180, 209. Patient normally takes 10 units. Patient states she increased it to 15 units.

## 2018-03-12 NOTE — TELEPHONE ENCOUNTER
----- Message from Olga Pimentel MD sent at 3/6/2018  2:34 PM EST -----  Please inform pt her recent labs are stable other than slight increase in A1c to 7.7. She should limit sweets, sugary beverages, fast/junk foods and increase her daily activity.

## 2018-03-14 ENCOUNTER — OFFICE VISIT (OUTPATIENT)
Dept: FAMILY MEDICINE CLINIC | Facility: CLINIC | Age: 73
End: 2018-03-14

## 2018-03-14 VITALS
HEIGHT: 65 IN | WEIGHT: 204 LBS | OXYGEN SATURATION: 96 % | HEART RATE: 104 BPM | BODY MASS INDEX: 33.99 KG/M2 | TEMPERATURE: 98.1 F | SYSTOLIC BLOOD PRESSURE: 100 MMHG | DIASTOLIC BLOOD PRESSURE: 74 MMHG

## 2018-03-14 DIAGNOSIS — M19.90 ARTHRITIS: ICD-10-CM

## 2018-03-14 DIAGNOSIS — E11.9 WELL CONTROLLED DIABETES MELLITUS (HCC): ICD-10-CM

## 2018-03-14 DIAGNOSIS — G89.4 CHRONIC PAIN SYNDROME: ICD-10-CM

## 2018-03-14 DIAGNOSIS — N30.00 ACUTE CYSTITIS WITHOUT HEMATURIA: Primary | ICD-10-CM

## 2018-03-14 DIAGNOSIS — F41.8 MIXED ANXIETY DEPRESSIVE DISORDER: ICD-10-CM

## 2018-03-14 LAB
BILIRUB BLD-MCNC: NEGATIVE MG/DL
CLARITY, POC: ABNORMAL
COLOR UR: ABNORMAL
GLUCOSE UR STRIP-MCNC: ABNORMAL MG/DL
KETONES UR QL: NEGATIVE
LEUKOCYTE EST, POC: ABNORMAL
NITRITE UR-MCNC: NEGATIVE MG/ML
PH UR: 6 [PH] (ref 5–8)
PROT UR STRIP-MCNC: NEGATIVE MG/DL
RBC # UR STRIP: NEGATIVE /UL
SP GR UR: 1.01 (ref 1–1.03)
UROBILINOGEN UR QL: NORMAL

## 2018-03-14 PROCEDURE — 99214 OFFICE O/P EST MOD 30 MIN: CPT | Performed by: FAMILY MEDICINE

## 2018-03-14 PROCEDURE — 81003 URINALYSIS AUTO W/O SCOPE: CPT | Performed by: FAMILY MEDICINE

## 2018-03-14 RX ORDER — HYDROCODONE BITARTRATE AND ACETAMINOPHEN 7.5; 325 MG/1; MG/1
1 TABLET ORAL EVERY 4 HOURS PRN
Qty: 120 TABLET | Refills: 0 | Status: SHIPPED | OUTPATIENT
Start: 2018-03-14 | End: 2018-04-17 | Stop reason: SDUPTHER

## 2018-03-14 RX ORDER — CEPHALEXIN 500 MG/1
500 CAPSULE ORAL 3 TIMES DAILY
Qty: 21 CAPSULE | Refills: 0 | Status: SHIPPED | OUTPATIENT
Start: 2018-03-14 | End: 2018-03-26

## 2018-03-14 NOTE — PROGRESS NOTES
"Subjective   Annie Mejia is a 72 y.o. female.     History of Present Illness  Mrs. Mejia presents today with her  with c/o dysuria, bladder pressure, urinary hesitancy for past few days. No hematuria, no fever. She has h/o recurrent UTI.    She c/o worsened right hip/leg pain s/p sacroiliac procedure. Has had recent f/u with Dr. Ibrahim. Placed on Medrol dose jana and instructed to take ibuprofen as it was felt inflammation at site was cause of increased pain. She has done so. Not much improved. Has been taking 800 mg ibuprofen tid and norco tid. Not sleeping well. Increasing anxiety, nausea. Reports \"infection was ruled out\".     Has controlled IDDM (has not tolerated oral meds). Blood sugar has been runnig 150-180 on average. Rare reading over 200. Log brought in is reviewed.    She c/o worsening anxiety which she relates to increased pain. Apparently had not started the zoloft rx'd previously. Is taking xanax as needed. Wants to know if she can start it now.    The following portions of the patient's history were reviewed and updated as appropriate: allergies, current medications, past family history, past medical history, past social history, past surgical history and problem list.    Review of Systems   Constitutional: Positive for appetite change and fatigue. Negative for fever and unexpected weight change.   HENT: Negative for congestion, ear pain, hearing loss, nosebleeds, rhinorrhea, sneezing, sore throat, tinnitus and trouble swallowing.    Eyes: Negative for pain, discharge, redness and visual disturbance.   Respiratory: Negative for cough, chest tightness, shortness of breath and wheezing.    Cardiovascular: Positive for palpitations. Negative for chest pain and leg swelling.   Gastrointestinal: Positive for diarrhea and nausea. Negative for abdominal pain, blood in stool, constipation and vomiting.   Endocrine: Positive for heat intolerance. Negative for polydipsia and polyuria.        Hot " flashes   Genitourinary: Negative for dysuria, flank pain, frequency, hematuria, pelvic pain, urgency and vaginal bleeding.   Musculoskeletal: Positive for arthralgias, back pain and myalgias (located in shins, chronic for years, even as child). Negative for joint swelling and neck pain.   Skin: Negative for rash and wound.   Neurological: Positive for dizziness (occ), tremors and weakness (generalized). Negative for seizures, syncope, speech difficulty, numbness and headaches.   Hematological: Negative for adenopathy. Does not bruise/bleed easily.   Psychiatric/Behavioral: Positive for dysphoric mood and sleep disturbance. Negative for confusion and suicidal ideas. The patient is nervous/anxious.        Objective    Vitals:    03/14/18 1555   BP: 100/74   Pulse: 104   Temp: 98.1 °F (36.7 °C)   SpO2: 96%     Body mass index is 33.95 kg/m².  1    03/14/18  1555   Weight: 92.5 kg (204 lb)     Physical Exam   Constitutional: She is oriented to person, place, and time. She appears well-developed and well-nourished. She is cooperative. She does not appear ill. She appears distressed (due to pain).   obese   HENT:   Head: Normocephalic and atraumatic.   Mouth/Throat: Oropharynx is clear and moist and mucous membranes are normal. Mucous membranes are not dry. No oral lesions.   Eyes: Conjunctivae and lids are normal.   Cardiovascular: Normal rate, regular rhythm, normal heart sounds and intact distal pulses.    Pulmonary/Chest: Effort normal and breath sounds normal.   Abdominal: Soft. She exhibits no distension. There is no tenderness.     Vascular Status -  Her right foot exhibits normal right foot edema. Her left foot exhibits normal left foot edema.  Neurological: She is alert and oriented to person, place, and time. Gait (antalgic) abnormal.   Skin: Skin is warm and dry. No bruising and no rash noted.   Psychiatric: Her speech is normal. Thought content normal. Her mood appears anxious. She is agitated (mildly).  Cognition and memory are normal. She exhibits a depressed mood. She expresses no suicidal ideation.   Nursing note and vitals reviewed.    Brief Urine Lab Results  (Last result in the past 365 days)      Color   Clarity   Blood   Leuk Est   Nitrite   Protein   CREAT   Urine HCG        03/14/18 1645 Dark Yellow Cloudy(A) Negative 500 Mariano/ul(A) Negative Negative               Assessment/Plan   Annie was seen today for back pain and difficulty urinating.    Diagnoses and all orders for this visit:    Acute cystitis without hematuria  -     cephalexin (KEFLEX) 500 MG capsule; Take 1 capsule by mouth 3 (Three) Times a Day.  -     POCT urinalysis dipstick, automated  -     Urine Culture - Urine, Urine, Clean Catch    Arthritis  -     HYDROcodone-acetaminophen (NORCO) 7.5-325 MG per tablet; Take 1 tablet by mouth Every 4 (Four) Hours As Needed for Severe Pain .    Chronic pain syndrome    Well controlled diabetes mellitus    Mixed anxiety depressive disorder    UTI with pending cx. Tx as above; further rec's pending cx report    F/U with Dr. Ibrahim as scheduled. High risk for high NSAID dose use particularly along with corticosteroids. Instructed to decreased to 400 mg tid at most and will increase frequency of Norco to every 4 hours as needed. New rx provided.    BG should improve as corticosteroids leave system. Continue current tx plan.    Worsening anxiety with dysphoric mood, poor sleep quality. Restart Zoloft as previously rx'd.    Routine f/u as schedule. F/U sooner as needed/instructed.  I will contact patient regarding test results and provide instructions regarding any necessary changes in plan of care.  Patient was encouraged to keep me informed of any acute changes, lack of improvement, or any new concerning symptoms.  Pt is aware of reasons to seek emergent care.  Patient voiced understanding of all instructions and denied further questions.  As part of patient's treatment plan I am prescribing a controlled  substance.  The patient has been made aware of the appropriate use of such medications, including potential risk of somnolence, limited ability to drive and/or work safely, and potential for dependence and/or overdose.  It has also been made clear that these medications are for use by this patient only, without concomitant use of alcohol or other substances, unless prescribed.  LILIAM report reviewed and scanned into chart.  Last LILIAM date 2/28/18.

## 2018-03-14 NOTE — PATIENT INSTRUCTIONS
TAKE NORCO EVERY 4 HOURS AS NEEDED FOR SEVERE PAIN    TAKE IBUPROFEN 400 MG EVERY 8 HOURS AS NEEDED FOR PAIN WITH FOOD

## 2018-03-15 ENCOUNTER — LAB (OUTPATIENT)
Dept: FAMILY MEDICINE CLINIC | Facility: CLINIC | Age: 73
End: 2018-03-15

## 2018-03-15 DIAGNOSIS — M24.60 FUSION OF JOINT: Primary | ICD-10-CM

## 2018-03-15 DIAGNOSIS — M46.1 SACROILIITIS (HCC): ICD-10-CM

## 2018-03-15 DIAGNOSIS — B99.9 INFECTION: ICD-10-CM

## 2018-03-15 LAB
BASOPHILS # BLD AUTO: 0.04 10*3/MM3 (ref 0–0.2)
BASOPHILS NFR BLD AUTO: 0.3 % (ref 0–2.5)
CRP SERPL-MCNC: <0.5 MG/DL (ref 0–1)
EOSINOPHIL # BLD AUTO: 0.1 10*3/MM3 (ref 0–0.7)
EOSINOPHIL NFR BLD AUTO: 0.8 % (ref 0–7)
ERYTHROCYTE [DISTWIDTH] IN BLOOD BY AUTOMATED COUNT: 15.5 % (ref 11.5–14.5)
ERYTHROCYTE [SEDIMENTATION RATE] IN BLOOD BY WESTERGREN METHOD: 5 MM/HR (ref 0–20)
HCT VFR BLD AUTO: 43.5 % (ref 37–47)
HGB BLD-MCNC: 14.2 G/DL (ref 12–16)
IMM GRANULOCYTES # BLD: 0.08 10*3/MM3 (ref 0–0.06)
IMM GRANULOCYTES NFR BLD: 0.7 % (ref 0–0.6)
LYMPHOCYTES # BLD AUTO: 5.12 10*3/MM3 (ref 0.6–3.4)
LYMPHOCYTES NFR BLD AUTO: 42.1 % (ref 10–50)
MCH RBC QN AUTO: 30.5 PG (ref 27–31)
MCHC RBC AUTO-ENTMCNC: 32.6 G/DL (ref 30–37)
MCV RBC AUTO: 93.3 FL (ref 81–99)
MONOCYTES # BLD AUTO: 0.73 10*3/MM3 (ref 0–0.9)
MONOCYTES NFR BLD AUTO: 6 % (ref 0–12)
NEUTROPHILS # BLD AUTO: 6.1 10*3/MM3 (ref 2–6.9)
NEUTROPHILS NFR BLD AUTO: 50.1 % (ref 37–80)
NRBC BLD AUTO-RTO: 0 /100 WBC (ref 0–0)
PLATELET # BLD AUTO: 247 10*3/MM3 (ref 130–400)
RBC # BLD AUTO: 4.66 10*6/MM3 (ref 4.2–5.4)
WBC # BLD AUTO: 12.17 10*3/MM3 (ref 4.8–10.8)

## 2018-03-17 LAB
BACTERIA UR CULT: ABNORMAL
BACTERIA UR CULT: ABNORMAL
OTHER ANTIBIOTIC SUSC ISLT: ABNORMAL

## 2018-03-23 ENCOUNTER — TELEPHONE (OUTPATIENT)
Dept: FAMILY MEDICINE CLINIC | Facility: CLINIC | Age: 73
End: 2018-03-23

## 2018-03-23 RX ORDER — POLYETHYLENE GLYCOL 3350 17 G/17G
17 POWDER, FOR SOLUTION ORAL 2 TIMES DAILY PRN
Qty: 850 G | Refills: 0 | Status: SHIPPED | OUTPATIENT
Start: 2018-03-23 | End: 2020-06-29

## 2018-03-23 NOTE — TELEPHONE ENCOUNTER
Patient called in to see if you can send her in something for her constipation, she stated it was bad due to the pain med she is taking. She has used glycol polyethylene in past and would like that sent to [facility].

## 2018-03-26 ENCOUNTER — OFFICE VISIT (OUTPATIENT)
Dept: FAMILY MEDICINE CLINIC | Facility: CLINIC | Age: 73
End: 2018-03-26

## 2018-03-26 VITALS
SYSTOLIC BLOOD PRESSURE: 126 MMHG | DIASTOLIC BLOOD PRESSURE: 88 MMHG | HEART RATE: 102 BPM | HEIGHT: 65 IN | OXYGEN SATURATION: 98 % | WEIGHT: 204 LBS | BODY MASS INDEX: 33.99 KG/M2

## 2018-03-26 DIAGNOSIS — E11.9 WELL CONTROLLED DIABETES MELLITUS (HCC): ICD-10-CM

## 2018-03-26 DIAGNOSIS — D72.828 OTHER ELEVATED WHITE BLOOD CELL (WBC) COUNT: ICD-10-CM

## 2018-03-26 DIAGNOSIS — I73.9 PERIPHERAL VASCULAR DISEASE (HCC): ICD-10-CM

## 2018-03-26 DIAGNOSIS — I10 ESSENTIAL HYPERTENSION: ICD-10-CM

## 2018-03-26 DIAGNOSIS — R94.5 ABNORMAL RESULTS OF LIVER FUNCTION STUDIES: ICD-10-CM

## 2018-03-26 DIAGNOSIS — N28.9 RENAL INSUFFICIENCY: ICD-10-CM

## 2018-03-26 DIAGNOSIS — I44.0 FIRST DEGREE ATRIOVENTRICULAR BLOCK: ICD-10-CM

## 2018-03-26 DIAGNOSIS — E78.2 MIXED HYPERLIPIDEMIA: ICD-10-CM

## 2018-03-26 DIAGNOSIS — F41.8 MIXED ANXIETY DEPRESSIVE DISORDER: ICD-10-CM

## 2018-03-26 DIAGNOSIS — Z01.818 PREOPERATIVE CLEARANCE: Primary | ICD-10-CM

## 2018-03-26 LAB
ALBUMIN SERPL-MCNC: 3.8 G/DL (ref 3.5–5)
ALBUMIN/GLOB SERPL: 1.3 G/DL (ref 1–2)
ALP SERPL-CCNC: 110 U/L (ref 38–126)
ALT SERPL-CCNC: 32 U/L (ref 13–69)
AST SERPL-CCNC: 22 U/L (ref 15–46)
BASOPHILS # BLD AUTO: 0.04 10*3/MM3 (ref 0–0.2)
BASOPHILS NFR BLD AUTO: 0.6 % (ref 0–2.5)
BILIRUB SERPL-MCNC: 0.5 MG/DL (ref 0.2–1.3)
BUN SERPL-MCNC: 30 MG/DL (ref 7–20)
BUN/CREAT SERPL: 27.3 (ref 7.1–23.5)
CALCIUM SERPL-MCNC: 10.3 MG/DL (ref 8.4–10.2)
CHLORIDE SERPL-SCNC: 100 MMOL/L (ref 98–107)
CO2 SERPL-SCNC: 26 MMOL/L (ref 26–30)
CREAT SERPL-MCNC: 1.1 MG/DL (ref 0.6–1.3)
EOSINOPHIL # BLD AUTO: 0.08 10*3/MM3 (ref 0–0.7)
EOSINOPHIL NFR BLD AUTO: 1.2 % (ref 0–7)
ERYTHROCYTE [DISTWIDTH] IN BLOOD BY AUTOMATED COUNT: 15.3 % (ref 11.5–14.5)
GFR SERPLBLD CREATININE-BSD FMLA CKD-EPI: 49 ML/MIN/1.73
GFR SERPLBLD CREATININE-BSD FMLA CKD-EPI: 59 ML/MIN/1.73
GLOBULIN SER CALC-MCNC: 3 GM/DL
GLUCOSE SERPL-MCNC: 165 MG/DL (ref 74–98)
HCT VFR BLD AUTO: 44.4 % (ref 37–47)
HGB BLD-MCNC: 14.3 G/DL (ref 12–16)
IMM GRANULOCYTES # BLD: 0.01 10*3/MM3 (ref 0–0.06)
IMM GRANULOCYTES NFR BLD: 0.2 % (ref 0–0.6)
INR PPP: 1.09 (ref 0.9–1.1)
LYMPHOCYTES # BLD AUTO: 2.98 10*3/MM3 (ref 0.6–3.4)
LYMPHOCYTES NFR BLD AUTO: 46.1 % (ref 10–50)
MCH RBC QN AUTO: 30.5 PG (ref 27–31)
MCHC RBC AUTO-ENTMCNC: 32.2 G/DL (ref 30–37)
MCV RBC AUTO: 94.7 FL (ref 81–99)
MONOCYTES # BLD AUTO: 0.42 10*3/MM3 (ref 0–0.9)
MONOCYTES NFR BLD AUTO: 6.5 % (ref 0–12)
NEUTROPHILS # BLD AUTO: 2.93 10*3/MM3 (ref 2–6.9)
NEUTROPHILS NFR BLD AUTO: 45.4 % (ref 37–80)
NRBC BLD AUTO-RTO: 0 /100 WBC (ref 0–0)
PLATELET # BLD AUTO: 209 10*3/MM3 (ref 130–400)
POTASSIUM SERPL-SCNC: 4.5 MMOL/L (ref 3.5–5.1)
PROT SERPL-MCNC: 6.8 G/DL (ref 6.3–8.2)
PROTHROMBIN TIME: 12.2 SECONDS (ref 9.3–12.1)
RBC # BLD AUTO: 4.69 10*6/MM3 (ref 4.2–5.4)
SODIUM SERPL-SCNC: 143 MMOL/L (ref 137–145)
TSH SERPL DL<=0.005 MIU/L-ACNC: 2.11 MIU/ML (ref 0.47–4.68)
WBC # BLD AUTO: 6.46 10*3/MM3 (ref 4.8–10.8)

## 2018-03-26 PROCEDURE — 93000 ELECTROCARDIOGRAM COMPLETE: CPT | Performed by: FAMILY MEDICINE

## 2018-03-26 PROCEDURE — 99215 OFFICE O/P EST HI 40 MIN: CPT | Performed by: FAMILY MEDICINE

## 2018-03-26 NOTE — PROGRESS NOTES
Subjective   Annie Mejia is a 72 y.o. female.     History of Present Illness  Mrs. Mejia presents today with her  for pre-op clearance as requested per Dr. Ibrahim.  She will be undergoing SI joint fusion on the left as well as revision of previous SI joint fusion on the right in approx 2 weeks.  She underwent right SI joint arthrodesis with bone grafting on 2/9/18, unfortunately has had persistent pain since that time.  CT of pelvis on 3/16/18 showed right SI fusion but without hardware complication. Lab eval showed normal ESR, CRP. WBC was mildly elevated. Of note, she has received corticosteroids within the previous 14 days.    She has been seen here in the office twice since that time.  Once on 3/3/18 for routine follow-up on chronic medical problems.  She was medically stable at that time.  (Note reviewed)    She was again seen here in clinic on 3/15/18 for possible UTI.  At that time she also reported acute exacerbation of her chronic pain particularly in the right SI area.  At that time she had adjustment to dosing of her opioid analgesic and was advised to follow-up with Dr. Ibrahim as scheduled.  She has subsequently been scheduled for procedures as above. Pt was tx'd for UTI with culture + E coli being sensitive to tx chosen (Keflex). She denies urinary symptoms at this time.    Since that time she denies any particular changes in status other than persistent, worsening pain right SI area as well as chronic pain in left SI area. No fever, no injury/fall.     She has IDDM which has been fairly well controlled. Recent mild increased in BG average but only 1 BG over 200 in past 2 weeks. She denies any non-healing skin wounds/lesions.    She has HTN, HLP, PVD with recent good BP control. Is currently on ASA, statin, zestoretic and taking as rx'd. Denies CP, SOA, peripheral edema, orthopnea. No change in aerobic capacity/functional status other than as related to SI pain. Has some intermittent  "dizziness and palpitations with increased anxiety/panic.     She has chronic depression with anxiety. Recent exacerbation of anxiety due to pain. Is taking Zoloft as rx'd along with low dose xanax prn. Sleep recently impaired due to sleep.    She has stable mild renal insufficiency which is being closely monitored. She continues to struggle with maintaining good hydration as she \"dosen't like to drink water\".    The following portions of the patient's history were reviewed and updated as appropriate: allergies, current medications, past family history, past medical history, past social history, past surgical history and problem list.    Review of Systems   Constitutional: Positive for appetite change, diaphoresis and fatigue. Negative for chills, fever and unexpected weight change.   HENT: Negative for congestion, ear pain, hearing loss, nosebleeds, rhinorrhea, sneezing, sore throat, tinnitus and trouble swallowing.    Eyes: Negative for pain, discharge, redness and visual disturbance.   Respiratory: Negative for cough, chest tightness, shortness of breath and wheezing.    Cardiovascular: Positive for palpitations (when anxious). Negative for chest pain and leg swelling.   Gastrointestinal: Positive for diarrhea and nausea. Negative for abdominal pain, blood in stool, constipation and vomiting.   Endocrine: Positive for heat intolerance. Negative for polydipsia and polyuria.        Hot flashes   Genitourinary: Negative for dysuria, flank pain, frequency, hematuria, pelvic pain, urgency and vaginal bleeding.   Musculoskeletal: Positive for arthralgias, back pain and myalgias (located in shins, chronic for years, even as child). Negative for joint swelling and neck pain.   Skin: Negative for rash and wound.   Neurological: Positive for dizziness (occ), tremors and weakness (generalized). Negative for seizures, syncope, speech difficulty, numbness and headaches.   Hematological: Negative for adenopathy. Does not " bruise/bleed easily.   Psychiatric/Behavioral: Positive for sleep disturbance (due to pain). Negative for confusion, dysphoric mood and suicidal ideas. The patient is nervous/anxious.        Objective    Vitals:    03/26/18 1122   BP: 126/88   Pulse: 102   SpO2: 98%     Body mass index is 33.95 kg/m².  1    03/26/18  1122   Weight: 92.5 kg (204 lb)       Physical Exam   Constitutional: She is oriented to person, place, and time. She appears well-developed and well-nourished. She is cooperative. She does not appear ill. No distress.   obese   HENT:   Head: Normocephalic and atraumatic.   Mouth/Throat: Oropharynx is clear and moist and mucous membranes are normal. Mucous membranes are not dry. No oral lesions.   Eyes: Conjunctivae and lids are normal.   Neck: Phonation normal. Neck supple. Normal carotid pulses present. No thyroid mass present.   Cardiovascular: Normal rate, regular rhythm, normal heart sounds and intact distal pulses.  Exam reveals no gallop.    No murmur heard.  No edema; heart rate decreased to 80s from time of triage and EKG   Pulmonary/Chest: Effort normal and breath sounds normal.   Abdominal: Soft. She exhibits no distension. There is no tenderness.   Neurological: She is alert and oriented to person, place, and time. Gait (antalgic) abnormal.   Skin: Skin is warm and dry. No bruising and no rash noted.   Psychiatric: She has a normal mood and affect. Her speech is normal and behavior is normal. Thought content normal. Cognition and memory are normal.   Nursing note and vitals reviewed.      ECG 12 Lead  Date/Time: 3/26/2018 11:48 AM  Performed by: RAMY LOMBARDI  Authorized by: RAMY LOMBARDI   Comparison: compared with previous ECG from 5/17/2017  Comparison to previous ECG: Rate now 115 compared with previous of 98. QTc now normal compared with previous of 433. QRS previously 131 ms, now normal. RBBB and LPFB now absent. Nonspecific ST up-sloping depression now seen in precordial leads as  below.  Rhythm: sinus tachycardia  Ectopy comments: none  Rate: tachycardic  BPM: 115  Conduction: conduction normal  ST Depression: V3, V4, V5 and V6  T flattening: V1 and V2  QRS axis: indeterminate  Q waves: II, III and aVF (non-diagnostic)  Clinical impression: abnormal ECG and non-specific ECG  Comments: SD int: 212 ms  QRS dur: 99 ms  QTc: 375 ms  Pt anxious/distressed to due orthopedic pain, background noise noted in EKG.          Lab Results   Component Value Date    WBC 12.17 (H) 03/15/2018    HGB 14.2 03/15/2018    HCT 43.5 03/15/2018    MCV 93.3 03/15/2018     03/15/2018     Lab Results   Component Value Date    GLUCOSE 86 12/08/2016    BUN 18 09/06/2017    CREATININE 1.20 09/06/2017    EGFRIFNONA 44 (L) 09/06/2017    EGFRIFAFRI 53 (L) 09/06/2017    BCR 15.0 09/06/2017    K 4.6 09/06/2017    CO2 29.0 09/06/2017    CALCIUM 10.1 09/06/2017    PROTENTOTREF 6.9 09/06/2017    ALBUMIN 4.20 09/06/2017    LABIL2 1.6 09/06/2017    AST 25 09/06/2017    ALT 33 09/06/2017     Lab Results   Component Value Date    HGBA1C 7.70 03/02/2018     Lab Results   Component Value Date    SEDRATE 5 03/15/2018     Previous CXR 1/2018 reviewed on chart; non-acute.    Assessment/Plan   Annie was seen today for pre-op exam.    Diagnoses and all orders for this visit:    Preoperative clearance  -     ECG 12 Lead  -     CBC & Differential  -     Comprehensive Metabolic Panel  -     Protime-INR  -     TSH Rfx On Abnormal To Free T4  -     XR Chest PA & Lateral; Future    First degree atrioventricular block  -     ECG 12 Lead    Mixed hyperlipidemia    Essential hypertension  -     ECG 12 Lead  -     CBC & Differential  -     TSH Rfx On Abnormal To Free T4    Peripheral vascular disease    Mixed anxiety depressive disorder  -     TSH Rfx On Abnormal To Free T4    Renal insufficiency  -     Comprehensive Metabolic Panel    Well controlled diabetes mellitus  -     CBC & Differential  -     Comprehensive Metabolic Panel    Other  elevated white blood cell (WBC) count  -     CBC & Differential  -     XR Chest PA & Lateral; Future    Abnormal results of liver function studies   -     Protime-INR    Chronic medical problems appear clinically stable other than increased pain.  Has had similar procedure within past 90 days without any complications.  Cleared for surgery pending review of labs as above, CXR ordered as above.  Results to be forwarded to Dr. Ibrahim upon completion.  I will contact patient regarding test results and provide instructions regarding any necessary changes in plan of care.  Patient was encouraged to keep me informed of any acute changes, lack of improvement, or any new concerning symptoms.  Pt is aware of reasons to seek emergent care.  Routine f/u in 3 months as scheduled, sooner as needed/instructed.  Patient voiced understanding of all instructions and denied further questions.    3/27/18 Addendum    CXR with chronic changes, non-acute, stable.  Labs stable.  Pt cleared for surgery.  Records to be faxed as requested.

## 2018-03-27 ENCOUNTER — TELEPHONE (OUTPATIENT)
Dept: FAMILY MEDICINE CLINIC | Facility: CLINIC | Age: 73
End: 2018-03-27

## 2018-03-27 NOTE — TELEPHONE ENCOUNTER
----- Message from Pippa James sent at 3/27/2018  2:03 PM EDT -----  Regarding: RE: Pre-op Records  Preop info faxed as requested to Julia at [phone].  ----- Message -----  From: Olga Pimentel MD  Sent: 3/27/2018   1:56 PM  To: [facility]  [place]  Subject: Pre-op Records                                   Please fax pt's last office visit note, EKGs, CXR, labs as requested per form from Dr. Ibrahim's office.

## 2018-04-04 ENCOUNTER — TELEPHONE (OUTPATIENT)
Dept: FAMILY MEDICINE CLINIC | Facility: CLINIC | Age: 73
End: 2018-04-04

## 2018-04-04 RX ORDER — ATORVASTATIN CALCIUM 40 MG/1
40 TABLET, FILM COATED ORAL NIGHTLY
Qty: 90 TABLET | Refills: 0 | Status: SHIPPED | OUTPATIENT
Start: 2018-04-04 | End: 2018-07-05 | Stop reason: SDUPTHER

## 2018-04-17 ENCOUNTER — OFFICE VISIT (OUTPATIENT)
Dept: FAMILY MEDICINE CLINIC | Facility: CLINIC | Age: 73
End: 2018-04-17

## 2018-04-17 VITALS
OXYGEN SATURATION: 98 % | HEART RATE: 109 BPM | SYSTOLIC BLOOD PRESSURE: 110 MMHG | DIASTOLIC BLOOD PRESSURE: 78 MMHG | HEIGHT: 65 IN

## 2018-04-17 DIAGNOSIS — E87.1 HYPONATREMIA: ICD-10-CM

## 2018-04-17 DIAGNOSIS — I10 ESSENTIAL HYPERTENSION: ICD-10-CM

## 2018-04-17 DIAGNOSIS — E83.51 HYPOCALCEMIA: ICD-10-CM

## 2018-04-17 DIAGNOSIS — D64.89 ANEMIA DUE TO OTHER CAUSE, NOT CLASSIFIED: ICD-10-CM

## 2018-04-17 DIAGNOSIS — R79.9 ABNORMAL FINDING OF BLOOD CHEMISTRY: ICD-10-CM

## 2018-04-17 DIAGNOSIS — F41.8 MIXED ANXIETY DEPRESSIVE DISORDER: ICD-10-CM

## 2018-04-17 DIAGNOSIS — R00.0 TACHYCARDIA: Primary | ICD-10-CM

## 2018-04-17 DIAGNOSIS — E11.9 WELL CONTROLLED DIABETES MELLITUS (HCC): ICD-10-CM

## 2018-04-17 DIAGNOSIS — R00.2 HEART PALPITATIONS: ICD-10-CM

## 2018-04-17 DIAGNOSIS — M19.90 ARTHRITIS: ICD-10-CM

## 2018-04-17 PROCEDURE — 99214 OFFICE O/P EST MOD 30 MIN: CPT | Performed by: FAMILY MEDICINE

## 2018-04-17 RX ORDER — DEXLANSOPRAZOLE 60 MG/1
CAPSULE, DELAYED RELEASE ORAL
COMMUNITY
Start: 2018-03-28 | End: 2019-03-06

## 2018-04-17 RX ORDER — HYDROCODONE BITARTRATE AND ACETAMINOPHEN 7.5; 325 MG/1; MG/1
1 TABLET ORAL EVERY 4 HOURS PRN
Qty: 120 TABLET | Refills: 0 | Status: SHIPPED | OUTPATIENT
Start: 2018-04-17 | End: 2018-06-21 | Stop reason: SDUPTHER

## 2018-04-17 RX ORDER — CEPHALEXIN 500 MG/1
CAPSULE ORAL
COMMUNITY
Start: 2018-04-05 | End: 2018-05-03

## 2018-04-17 RX ORDER — PROPRANOLOL HYDROCHLORIDE 10 MG/1
TABLET ORAL
Qty: 30 TABLET | Refills: 0 | Status: SHIPPED | OUTPATIENT
Start: 2018-04-17 | End: 2019-11-25

## 2018-04-17 NOTE — PROGRESS NOTES
"Subjective   Annie Mejia is a 72 y.o. female.     History of Present Illness  Mrs. Mejia presents today with her  for hospital follow-up.  She was admitted on 4/13/18 at [facility] 2 undergo orthopedic procedure per Dr. Ibrahim.     Preop labs at that time included CMP essentially normal other than mildly decreased GFR 47.  Protein low at 3.2, troponin negative, BNP 94.  CBC normal at that time.  Urinalysis normal.  Prior to surgery she reports having had changes in blood pressure and heart rate which prompted what sounds like a cardiology evaluation.  EKG performed just prior to surgery and showed no changes from previous.    She underwent a left sacroiliac joint arthrodesis with instrumentation for left sacroiliac joint osteoarthritis with persistent severe pain, sacroiliitis.  In addition she underwent revision minimally invasive of right sacroiliac joint arthrodesis and sacroiliac joint instrumentation and bone grafting.  There were no apparent complications with the surgery.  She reports \"kept in recovery all day\" apparently due to persistently low blood pressures.  She was kept overnight for continued monitoring.  No apparent new problems developed.    Upon discharge she was instructed to be \"50% weightbearing leg\".  She is using walker or wheelchair since that time.  She is requiring hydrocodone 5/325 every 4 hours for pain.  She denies any known side effects from this medication.  Is managing her intermittent constipation with fiber, Colace, MiraLAX.  She has scheduled follow-up with orthopedics later this month.    Discharge labs showed CMP with sodium 133, GFR 60, calcium 7.3, total protein 4.8, abdomen 2.7 otherwise normal.  Discharge CBC with RBC 3.18, hemoglobin 9.6, hematocrit 30, platelet 134, normal differential.    Consultation notes, operative notes, laboratory results, radiological studies reviewed on chart.    Mrs. Mejia's main concern today is that of heart palpitations.  Has " "been a problem intermittently for 2-3 weeks.  Seemed to be worse in the afternoon.  No known triggers) factors.  Associated with shortness of breath, chest discomfort, nausea.  No mental status changes or other focal neurological symptoms.  She denies feeling that her heart is \"skipping beats\" but rather \"beating strong and fast\".  She has also had recurrent episodes of her previous spells of dysphagia, nausea, abdominal cramping and heartburn.  She is generally able to treat these with previous regimen of Phenergan, Xanax.  No syncope or presyncope.    She has underlying conditions of hypertension, well-controlled diabetes, chronic depression and anxiety.  Blood pressure has been well-controlled if not on the \"low side\". Blood sugars have been well-controlled with frequent fasting sugars below 100.  This is generally stable other than increased anxiety associated with palpitations as discussed above.    The following portions of the patient's history were reviewed and updated as appropriate: allergies, current medications, past family history, past medical history, past social history, past surgical history and problem list.    Review of Systems   Constitutional: Positive for appetite change, diaphoresis and fatigue. Negative for chills, fever and unexpected weight change.   HENT: Negative for congestion, ear pain, hearing loss, nosebleeds, rhinorrhea, sneezing, sore throat, tinnitus and trouble swallowing.    Eyes: Negative for pain, discharge, redness and visual disturbance.   Respiratory: Negative for cough, chest tightness, shortness of breath and wheezing.    Cardiovascular: Positive for palpitations (when anxious). Negative for chest pain and leg swelling.   Gastrointestinal: Positive for diarrhea and nausea. Negative for abdominal pain, blood in stool, constipation and vomiting.   Endocrine: Positive for heat intolerance. Negative for polydipsia and polyuria.        Hot flashes   Genitourinary: Negative for " dysuria, flank pain, frequency, hematuria, pelvic pain, urgency and vaginal bleeding.   Musculoskeletal: Positive for arthralgias, back pain and myalgias (located in shins, chronic for years, even as child). Negative for joint swelling and neck pain.   Skin: Negative for rash and wound.   Neurological: Positive for dizziness (occ), tremors and weakness (generalized). Negative for seizures, syncope, speech difficulty, numbness and headaches.   Hematological: Negative for adenopathy. Does not bruise/bleed easily.   Psychiatric/Behavioral: Positive for sleep disturbance (due to pain). Negative for confusion, dysphoric mood and suicidal ideas. The patient is nervous/anxious.        Objective    Vitals:    04/17/18 1119   BP: 110/78   Pulse: 109   SpO2: 98%     Physical Exam   Constitutional: She is oriented to person, place, and time. She appears well-developed and well-nourished. She is cooperative. She does not appear ill. No distress.   Obese    HENT:   Head: Normocephalic and atraumatic.   Mouth/Throat: Mucous membranes are normal. Mucous membranes are not dry.   Eyes: Conjunctivae and lids are normal.   Cardiovascular: Normal rate, regular rhythm and intact distal pulses.   No extrasystoles are present. Exam reveals distant heart sounds.    No peripheral edema. HR decreased to 70s from time of triage   Pulmonary/Chest: Effort normal and breath sounds normal.   Neurological: She is alert and oriented to person, place, and time. Gait (non-weight bearing, in wheelchair today) abnormal.   Skin: Skin is warm and dry. No bruising and no rash noted.   Psychiatric: Her speech is normal and behavior is normal. Thought content normal. Her mood appears anxious. Cognition and memory are normal.   Nursing note and vitals reviewed.    EKG performed here in clinic on 3/29/18 reviewed.  At that time she also had sinus tachycardia associated with significant anxiety.    Assessment/Plan   Annie was seen today for palpitations and  shortness of breath.    Diagnoses and all orders for this visit:    Tachycardia  -     CBC (No Diff)    Heart palpitations  -     CBC (No Diff)  -     Iron  -     Comprehensive Metabolic Panel  -     Magnesium  -     Phosphorus    Essential hypertension  -     CBC (No Diff)  -     Comprehensive Metabolic Panel    Mixed anxiety depressive disorder  -     CBC (No Diff)  -     Comprehensive Metabolic Panel    Well controlled diabetes mellitus  -     Comprehensive Metabolic Panel    Anemia due to other cause, not classified  -     CBC (No Diff)  -     Iron  -     Vitamin B12    Hyponatremia  -     Comprehensive Metabolic Panel  -     Magnesium  -     Phosphorus    Hypocalcemia  -     Comprehensive Metabolic Panel  -     Magnesium  -     Phosphorus    Abnormal finding of blood chemistry   -     Iron    Other orders  -     propranolol (INDERAL) 10 MG tablet; 1 po every 8 hrs as needed for palpitations/rapid heart rate    Intermittent episodes of tachycardia/palpitations with multiple possible etiologies.  I reviewed with her and her  that this may be a side effect of increased dosages of hydrocodone/acetaminophen.  In addition she had multiple I abnormalities as well as anemia upon discharge and he should be rechecked.  She appears clinically stable at this time.  It is difficult to ascertain if symptoms are a response to increased anxiety or vice versa.  Encouraged them to monitor her symptoms in relation to Lortab dosing.  Labs as above, treat as indicated.  In the interim I have provided her with a prescription of propranolol 10 mg to be used every 8 hours as needed for tachycardia palpitations.  I warned her and her  this may exacerbate/Amaya symptoms of hypoglycemia and her blood sugar should be closer monitored.  They are aware of the appropriate way in which to treat hypoglycemia.    Continue current treatment of anxiety and depression with associated panic.  Consider increased dose of Zoloft  follow-up.    Chronic pain with recent exacerbation secondary to condition/procedures above.  Continue every 4 hours dosing.  Refill provided today.  She will discuss this further with Dr. hernandez at her follow-up appointment.    Follow-up as scheduled in approximately 6 weeks, sooner as needed/instructed.  Records requested from previous primary provider as well as any consulting physician, admitting hospitals, etc. Further recommendations pending review.  Patient was encouraged to keep me informed of any acute changes, lack of improvement, or any new concerning symptoms.  Pt is aware of reasons to seek emergent care.  Patient voiced understanding of all instructions and denied further questions.

## 2018-04-18 LAB
ALBUMIN SERPL-MCNC: 3.4 G/DL (ref 3.5–5)
ALBUMIN/GLOB SERPL: 1.3 G/DL (ref 1–2)
ALP SERPL-CCNC: 91 U/L (ref 38–126)
ALT SERPL-CCNC: 33 U/L (ref 13–69)
AST SERPL-CCNC: 33 U/L (ref 15–46)
BILIRUB SERPL-MCNC: 0.6 MG/DL (ref 0.2–1.3)
BUN SERPL-MCNC: 12 MG/DL (ref 7–20)
BUN/CREAT SERPL: 13.3 (ref 7.1–23.5)
CALCIUM SERPL-MCNC: 9.6 MG/DL (ref 8.4–10.2)
CHLORIDE SERPL-SCNC: 102 MMOL/L (ref 98–107)
CO2 SERPL-SCNC: 28 MMOL/L (ref 26–30)
CREAT SERPL-MCNC: 0.9 MG/DL (ref 0.6–1.3)
ERYTHROCYTE [DISTWIDTH] IN BLOOD BY AUTOMATED COUNT: 15.1 % (ref 11.5–14.5)
GFR SERPLBLD CREATININE-BSD FMLA CKD-EPI: 61 ML/MIN/1.73
GFR SERPLBLD CREATININE-BSD FMLA CKD-EPI: 74 ML/MIN/1.73
GLOBULIN SER CALC-MCNC: 2.7 GM/DL
GLUCOSE SERPL-MCNC: 100 MG/DL (ref 74–98)
HCT VFR BLD AUTO: 37.4 % (ref 37–47)
HGB BLD-MCNC: 11.9 G/DL (ref 12–16)
IRON SERPL-MCNC: 62 MCG/DL (ref 37–181)
MAGNESIUM SERPL-MCNC: 1.7 MG/DL (ref 1.6–2.3)
MCH RBC QN AUTO: 30.4 PG (ref 27–31)
MCHC RBC AUTO-ENTMCNC: 31.8 G/DL (ref 30–37)
MCV RBC AUTO: 95.4 FL (ref 81–99)
PHOSPHATE SERPL-MCNC: 3.2 MG/DL (ref 2.5–4.5)
PLATELET # BLD AUTO: 154 10*3/MM3 (ref 130–400)
POTASSIUM SERPL-SCNC: 4.4 MMOL/L (ref 3.5–5.1)
PROT SERPL-MCNC: 6.1 G/DL (ref 6.3–8.2)
RBC # BLD AUTO: 3.92 10*6/MM3 (ref 4.2–5.4)
SODIUM SERPL-SCNC: 141 MMOL/L (ref 137–145)
VIT B12 SERPL-MCNC: 626 PG/ML (ref 239–931)
WBC # BLD AUTO: 5.3 10*3/MM3 (ref 4.8–10.8)

## 2018-04-24 ENCOUNTER — TELEPHONE (OUTPATIENT)
Dept: FAMILY MEDICINE CLINIC | Facility: CLINIC | Age: 73
End: 2018-04-24

## 2018-04-24 NOTE — TELEPHONE ENCOUNTER
Pt called stating the ER gave her a lot of discharge meds and was unsure how to take them. Informed pt that the ER made her a fu appt for 5/2.

## 2018-04-25 ENCOUNTER — TRANSITIONAL CARE MANAGEMENT TELEPHONE ENCOUNTER (OUTPATIENT)
Dept: FAMILY MEDICINE CLINIC | Facility: CLINIC | Age: 73
End: 2018-04-25

## 2018-04-25 ENCOUNTER — TELEPHONE (OUTPATIENT)
Dept: FAMILY MEDICINE CLINIC | Facility: CLINIC | Age: 73
End: 2018-04-25

## 2018-04-25 NOTE — OUTREACH NOTE
CELI call completed.  Please refer to TCM call flowsheet for call documentation.    Called PCP office, s/w Esise to notify of patient symptoms and concerns and she states she will consult with Dr. Pimentel for further recommendations.  Pt declined ED eval at this time.

## 2018-04-25 NOTE — PROGRESS NOTES
I called and spoke with patient and her .  He states she is just as sick as when she left the hospital.  They gave her Cipro, (which she had 2 bottles of from 2 different physicians from hospital). I informed her to take one bottle not both at same time.  They also gave her Zofran, Carafate, and citalopram for depression. She didn't know if she should start the citalopram since she is already on Zoloft.  She stated she is haveing abdominal cramps, gas and nausea. She has the urge to pass stool, but when she goes theres only a little liquid that passes. Patient has not started any of medication yet until she checked with you.   stated he would go ahead and give her the antibiotic and Carafate but wait to see if you wanted her to start the citalapram.

## 2018-05-02 ENCOUNTER — OFFICE VISIT (OUTPATIENT)
Dept: FAMILY MEDICINE CLINIC | Facility: CLINIC | Age: 73
End: 2018-05-02

## 2018-05-02 VITALS
OXYGEN SATURATION: 97 % | HEART RATE: 90 BPM | BODY MASS INDEX: 33.32 KG/M2 | DIASTOLIC BLOOD PRESSURE: 68 MMHG | SYSTOLIC BLOOD PRESSURE: 112 MMHG | WEIGHT: 200 LBS | HEIGHT: 65 IN

## 2018-05-02 DIAGNOSIS — E11.9 WELL CONTROLLED DIABETES MELLITUS (HCC): ICD-10-CM

## 2018-05-02 DIAGNOSIS — F41.8 MIXED ANXIETY DEPRESSIVE DISORDER: ICD-10-CM

## 2018-05-02 DIAGNOSIS — K21.00 GASTROESOPHAGEAL REFLUX DISEASE WITH ESOPHAGITIS: ICD-10-CM

## 2018-05-02 DIAGNOSIS — I10 ESSENTIAL HYPERTENSION: ICD-10-CM

## 2018-05-02 DIAGNOSIS — K44.9 HIATAL HERNIA: ICD-10-CM

## 2018-05-02 DIAGNOSIS — R11.0 NAUSEA: ICD-10-CM

## 2018-05-02 DIAGNOSIS — K31.84 GASTROPARESIS: ICD-10-CM

## 2018-05-02 DIAGNOSIS — E66.09 CLASS 1 OBESITY DUE TO EXCESS CALORIES WITH SERIOUS COMORBIDITY AND BODY MASS INDEX (BMI) OF 33.0 TO 33.9 IN ADULT: ICD-10-CM

## 2018-05-02 DIAGNOSIS — G89.4 CHRONIC PAIN SYNDROME: ICD-10-CM

## 2018-05-02 DIAGNOSIS — K57.93 DIVERTICULITIS OF INTESTINE WITHOUT PERFORATION OR ABSCESS WITH BLEEDING, UNSPECIFIED PART OF INTESTINAL TRACT: Primary | ICD-10-CM

## 2018-05-02 PROCEDURE — 99496 TRANSJ CARE MGMT HIGH F2F 7D: CPT | Performed by: FAMILY MEDICINE

## 2018-05-02 RX ORDER — SUCRALFATE 1 G/1
TABLET ORAL
COMMUNITY
Start: 2018-04-21 | End: 2019-11-25

## 2018-05-02 RX ORDER — METOCLOPRAMIDE 10 MG/1
TABLET ORAL
COMMUNITY
Start: 2018-05-01 | End: 2019-03-06

## 2018-05-02 RX ORDER — CIPROFLOXACIN 500 MG/1
TABLET, FILM COATED ORAL
COMMUNITY
Start: 2018-04-23 | End: 2018-05-25

## 2018-05-02 RX ORDER — ONDANSETRON 4 MG/1
TABLET, ORALLY DISINTEGRATING ORAL
COMMUNITY
Start: 2018-04-23 | End: 2018-05-25

## 2018-05-02 RX ORDER — ALPRAZOLAM 0.25 MG/1
TABLET ORAL
Qty: 90 TABLET | Refills: 1 | Status: SHIPPED | OUTPATIENT
Start: 2018-05-02 | End: 2018-09-14 | Stop reason: SDUPTHER

## 2018-05-02 RX ORDER — CIPROFLOXACIN 250 MG/1
TABLET, FILM COATED ORAL
COMMUNITY
Start: 2018-04-21 | End: 2018-05-03

## 2018-05-02 NOTE — PROGRESS NOTES
Transitional Care Follow Up Visit  Subjective     Annie Mejia is a 72 y.o. female who presents for a transitional care management visit.    Within 48 business hours after discharge our office contacted her via telephone to coordinate her care and needs.  CELI call completed 4/25/18 per Shakila Campa RN.      I reviewed and discussed the details of that call along with the discharge summary, hospital problems, inpatient lab results, inpatient diagnostic studies, and consultation reports with Annie.     Current outpatient and discharge medications have been reconciled for the patient.    Date of TCM Phone Call 4/25/2018   Hospital [facility]   Date of Admission 4/21/2018   Date of Discharge 4/23/2018   Discharge Disposition Home or Self Care     Risk for Readmission (LACE) = 15    History of Present Illness   Course During Hospital Stay:      Mrs. Mejia has actually had 2 hospital admissions over the last 2 weeks.    Seen initially at [facility] and admitted 4/21/18.  Discharged 4/23/18.  Presenting symptoms included worsening nausea, vomiting, weakness.  Discharge diagnoses included acute on chronic nausea and vomiting, severe anxiety disorder, well-controlled type 2 diabetes, UTI due to Escherichia coli treated with Cipro, stable chronic anemia, controlled hypertension, dyslipidemia, GERD.    CMP with glucose 105, AST 41 otherwise normal.  Lipase 106.  Troponin normal.  CBC normal.  Urinalysis with 1+ leukocyte esterase, specific gravity 1.010, 10-20 white blood cells, 2-4 epithelial cells, 1+ bacteria, 0-5 granular casts.  A1c 7.8.    CT of abdomen and pelvis without contrast 4/21/18 essentially normal other than multiple hepatic cysts, left-sided diverticulosis.    Portable chest x-ray normal.  Did show previous old left-sided rib fractures.    She was secondarily seen at [facility] and admitted 4/26/18, discharge 5/1/18.  Presenting symptoms included worsening  abdominal pain, persistent nausea and vomiting and weakness.  Discharge diagnoses included intractable nausea, resolved sepsis, diverticulitis, severe anxiety, control diabetes mellitus, GERD, controlled hypertension.     She was treated with IV antibiotics, IV fluids.  She was provided with analgesics for chronic pain related to severe diffuse osteoarthritis as well as postoperative pain related to recent SI joint fusion.  She received gastroenterology consult with gastric emptying study.  She was discharged to home.  Currently no home health services.  Has upcoming appointment with Dr. Martínez.     Portable chest x-ray 4/30/18 showed right midlung field atelectasis, diffuse interstitial changes felt to be chronic.  Single view of the abdomen showed a nonspecific nonobstructive bowel gas pattern with no abnormally dilated loops of bowel no free air.  20° of lumbar scoliosis convex to the right noted.  Postsurgical changes related to bilateral SI joint fusion.    Nuclear gastric emptying study performed 4/30/18 showed elevated T-1/2 indicative of gastroparesis versus gastric outlet obstruction.    CT abdomen and pelvis with contrast performed 4/55/18 showed diverticulitis without obstruction.  Previously noted multiple hepatic cysts, bilateral renal cysts.    Troponin negative ×3, coags normal, TSH 1.200 CMP within normal limits.  Magnesium 1.8, phosphorus 2.6, hemoglobin A1c 7.2, lipase normal at 82, normal lactic acid levels.  Elevated CRP at 2.3.  CBC upon discharge normal other than hemoglobin hematocrit 9.5/30.1 respectively.  Admission white blood cell count 12.7 with left shift.  Repeat urinalysis with specific gravity of 1.044 otherwise normal.    Discharge medication list reviewed and reconciled in chart.  Notable changes include Cipro 500 mg twice daily for continued treatment of diverticulitis.  In addition she was started on Reglan 10 mg up to 4 times daily.    Since discharge she has continued to have  "significant joint and lower back pain with radiation of pain into the right lower extremity.  She continues to be essentially nonweightbearing.  Has follow-up with Dr. Ibrahim on 5/3/18.  Continues to use opioid analgesics every 4-6 hours.  Denies side effects.  Denies constipation.  Has been saying she has not had mental status changes.    She continues to have significant anxiety which she feels is been worsened by postoperative pain and recent illness.  Currently on Zoloft 50 mg with low-dose alprazolam as needed.    Diabetes mellitus has been well controlled.  Blood glucose log reviewed as brought in by .  No glucose numbers above or near 200.  No hypoglycemic episodes.  She is currently holding her insulin due to concern for hyperglycemia.    She continues to have nausea, early satiety, bloating.  Some mild lower abdominal pain but no blood in stool.  She generally feels \"weak all over\" and \"shaky\".  She reports she does feel improved from time of admission to hospital.  She was discharged on omeprazole but plans to take Exelon as previously prescribed by gastroenterology.  She has upcoming appointment with Dr. Martínez on 5/10/18.  She is eating a bland diet and has had decreased appetite/by mouth intake.  Staying decently hydrated.  Denies dysuria, hematuria, fever, cough.     The following portions of the patient's history were reviewed and updated as appropriate: allergies, current medications, past family history, past medical history, past social history, past surgical history and problem list.    Review of Systems   Constitutional: Positive for appetite change, diaphoresis, fatigue and unexpected weight change. Negative for chills and fever.   HENT: Negative for congestion, ear pain, hearing loss, nosebleeds, rhinorrhea, sneezing, sore throat, tinnitus and trouble swallowing.    Eyes: Negative for pain, discharge, redness and visual disturbance.   Respiratory: Negative for cough, chest tightness, " shortness of breath and wheezing.    Cardiovascular: Positive for palpitations (when anxious). Negative for chest pain and leg swelling.   Gastrointestinal: Positive for abdominal pain, blood in stool, diarrhea and nausea. Negative for constipation and vomiting.   Endocrine: Positive for heat intolerance. Negative for polydipsia and polyuria.        Hot flashes   Genitourinary: Negative for dysuria, flank pain, frequency, hematuria, pelvic pain, urgency and vaginal bleeding.   Musculoskeletal: Positive for arthralgias, back pain and myalgias (located in shins, chronic for years, even as child). Negative for joint swelling and neck pain.   Skin: Negative for rash and wound.   Neurological: Positive for dizziness (occ), tremors and weakness (generalized). Negative for seizures, syncope, speech difficulty, numbness and headaches.   Hematological: Negative for adenopathy. Does not bruise/bleed easily.   Psychiatric/Behavioral: Positive for sleep disturbance (due to pain). Negative for confusion, dysphoric mood and suicidal ideas. The patient is nervous/anxious.        Objective    Vitals:    05/02/18 1046   BP: 112/68   Pulse: 90   SpO2: 97%     Body mass index is 33.28 kg/m².  1    05/02/18  1046   Weight: 90.7 kg (200 lb)       Physical Exam   Constitutional: She is oriented to person, place, and time. She appears well-developed and well-nourished. She is cooperative. She appears ill (mildly). No distress (emotionally).   Obese    HENT:   Head: Normocephalic and atraumatic.   Mouth/Throat: Mucous membranes are normal. Mucous membranes are not dry.   Eyes: Conjunctivae and lids are normal.   Neck: Neck supple. Normal carotid pulses present.   Cardiovascular: Normal rate, regular rhythm and intact distal pulses.   No extrasystoles are present. Exam reveals distant heart sounds. Exam reveals no gallop.    No murmur heard.  No peripheral edema. HR decreased to 80s from time of triage   Pulmonary/Chest: Effort normal and  breath sounds normal.   Abdominal: Soft. Bowel sounds are normal. She exhibits no distension and no mass. There is tenderness (mild) in the epigastric area and left lower quadrant. There is no rigidity, no rebound and no guarding.   Lymphadenopathy:     She has no cervical adenopathy.   Neurological: She is alert and oriented to person, place, and time. She has normal strength. She displays tremor (generalized). Gait (non-weight bearing, in wheelchair today) abnormal.   Skin: Skin is warm and dry. No bruising and no rash noted. She is not diaphoretic.   Psychiatric: Her speech is normal and behavior is normal. Thought content normal. Her mood appears anxious. Cognition and memory are normal.   Nursing note and vitals reviewed.    Medical records from recent hospitalizations reviewed and summarized as above.    Assessment/Plan   Annie was seen today for follow-up, diabetes and anxiety.    Diagnoses and all orders for this visit:    Diverticulitis of intestine without perforation or abscess with bleeding, unspecified part of intestinal tract    Mixed anxiety depressive disorder  -     ALPRAZolam (XANAX) 0.25 MG tablet; 1-2 po up to bid as needed for panic/anxiety    Essential hypertension    Gastroparesis    Gastroesophageal reflux disease with esophagitis    Hiatal hernia    Nausea    Class 1 obesity due to excess calories with serious comorbidity and body mass index (BMI) of 33.0 to 33.9 in adult    Well controlled diabetes mellitus    Chronic pain syndrome    Appears clinically stable overall since discharge on 5/1/18. Blood pressure well controlled.    Complete course of Cipro twice daily as prescribed for diverticulitis/UTI.    Diabetes mellitus previously insulin-dependent well-controlled at this time without Lantus.  She is encouraged to hold and was put glucose reach 200 or above.  She is high risk for fall and palpitations due to hyperglycemia.  She is not been able to tolerate oral medications due to GI side  effects.    Chronic severe anxiety/depressive disorder with recent exacerbation of anxiety due to postoperative pain and infection with hospitalization.  She is encouraged to increase Zoloft 100 mg daily, continue alprazolam as needed.    Chronic, intractable nausea with intermittent vomiting.  Underlying conditions including GERD with esophagitis, hiatal hernia, obesity, diabetes with suspected gastroparesis based on recent gastric emptying study.  She will continue PPI (Dexilant as previously prescribed by GI), anti-emetics, alprazolam for control of panic associated with severe nausea/vomiting.  She is encouraged to keep up appointment with Dr. Martínez as scheduled on 5/10/18.    Chronic pain with postoperative exacerbation.  She will follow up with Dr. Ibrahim as scheduled on 5/3/18.  Continue current opioid analgesic as prescribed.  Risk/benefits of imaging side effects of use of opioid analgesics again reviewed with patient and her .  They voice understanding and wished to proceed with treatment.  Most recent Tres appropriate.    She is encouraged to have yearly diabetic eye exam as previously recommended.  She is encouraged to schedule Medicare annual wellness.  Appears she is due for Pneumovax.  Patient believes she has had this recently and not yet due.  Records to be reviewed.    Routine follow-up in one month as patient was scheduled, follow up sooner as needed.  Patient was encouraged to keep me informed of any acute changes, lack of improvement, or any new concerning symptoms.  Pt is aware of reasons to seek emergent care.  Patient voiced understanding of all instructions and denied further questions.    As part of patient's treatment plan I am prescribing a controlled substance.  The patient has been made aware of the appropriate use of such medications, including potential risk of somnolence, limited ability to drive and/or work safely, and potential for dependence and/or overdose.  It has  also been made clear that these medications are for use by this patient only, without concomitant use of alcohol or other substances, unless prescribed.  LILIAM report reviewed and scanned into chart.  Last LILIAM date 4/15/18.    This transition of care visit required high complexity medical management/decision making.

## 2018-05-03 PROBLEM — G89.4 CHRONIC PAIN SYNDROME: Status: ACTIVE | Noted: 2018-05-03

## 2018-05-09 DIAGNOSIS — R00.2 PALPITATIONS: Primary | ICD-10-CM

## 2018-05-09 DIAGNOSIS — R07.89 ATYPICAL CHEST PAIN: ICD-10-CM

## 2018-05-09 DIAGNOSIS — R42 DIZZINESS: ICD-10-CM

## 2018-05-09 DIAGNOSIS — E11.9 DIABETES MELLITUS, STABLE (HCC): ICD-10-CM

## 2018-05-09 RX ORDER — BLOOD SUGAR DIAGNOSTIC
STRIP MISCELLANEOUS
Qty: 100 EACH | Refills: 5 | Status: SHIPPED | OUTPATIENT
Start: 2018-05-09 | End: 2018-05-15 | Stop reason: SDUPTHER

## 2018-05-09 RX ORDER — LANCETS 28 GAUGE
EACH MISCELLANEOUS
Qty: 100 EACH | Refills: 5 | Status: SHIPPED | OUTPATIENT
Start: 2018-05-09 | End: 2018-05-15 | Stop reason: SDUPTHER

## 2018-05-15 DIAGNOSIS — E11.9 DIABETES MELLITUS, STABLE (HCC): ICD-10-CM

## 2018-05-15 RX ORDER — BLOOD SUGAR DIAGNOSTIC
STRIP MISCELLANEOUS
Qty: 100 EACH | Refills: 5 | Status: SHIPPED | OUTPATIENT
Start: 2018-05-15 | End: 2018-05-18 | Stop reason: SDUPTHER

## 2018-05-15 RX ORDER — LANCETS 28 GAUGE
EACH MISCELLANEOUS
Qty: 100 EACH | Refills: 5 | Status: SHIPPED | OUTPATIENT
Start: 2018-05-15 | End: 2020-12-15 | Stop reason: SDUPTHER

## 2018-05-17 DIAGNOSIS — K21.00 GASTROESOPHAGEAL REFLUX DISEASE WITH ESOPHAGITIS: ICD-10-CM

## 2018-05-17 RX ORDER — PROMETHAZINE HYDROCHLORIDE 25 MG/1
25 TABLET ORAL EVERY 8 HOURS PRN
Qty: 90 TABLET | Refills: 0 | Status: SHIPPED | OUTPATIENT
Start: 2018-05-17 | End: 2019-09-23 | Stop reason: SDUPTHER

## 2018-05-18 DIAGNOSIS — E11.9 DIABETES MELLITUS, STABLE (HCC): ICD-10-CM

## 2018-05-18 RX ORDER — BLOOD SUGAR DIAGNOSTIC
STRIP MISCELLANEOUS
Qty: 100 EACH | Refills: 5 | Status: SHIPPED | OUTPATIENT
Start: 2018-05-18 | End: 2018-05-22 | Stop reason: SDUPTHER

## 2018-05-21 ENCOUNTER — TELEPHONE (OUTPATIENT)
Dept: FAMILY MEDICINE CLINIC | Facility: CLINIC | Age: 73
End: 2018-05-21

## 2018-05-21 NOTE — TELEPHONE ENCOUNTER
Pt called stating that she was given a prescription for Reglan while she was in the hospital.  They did not give her any refills, and pt sts that she is still having symptoms.  Wants to know if Dr Pimentel thinks she should get another rx for this or if she should do something else.  Please call pt to advise.

## 2018-05-22 ENCOUNTER — TELEPHONE (OUTPATIENT)
Dept: FAMILY MEDICINE CLINIC | Facility: CLINIC | Age: 73
End: 2018-05-22

## 2018-05-24 ENCOUNTER — TELEPHONE (OUTPATIENT)
Dept: FAMILY MEDICINE CLINIC | Facility: CLINIC | Age: 73
End: 2018-05-24

## 2018-05-24 NOTE — TELEPHONE ENCOUNTER
Pt called in to office again re: her sx.  Stated that Dr Martínez was out of office and would not be back until Tuesday and she was advised to see her PCP for medications.  I advised pt as detailed in your message re: her request for antibiotics and need to be seen.  She scheduled an appointment with Kristan for tomorrow afternoon.

## 2018-05-24 NOTE — TELEPHONE ENCOUNTER
"No, I do not recommend she take \"another round of antibiotics\" unless we know what we are treating. This may just worsen her problem. If she has specific symptoms she would like checked (i.e cough, urinary symptoms, etc- we can do that but she will need to be seen in office. Kristan has openings tomorrow.  "

## 2018-05-24 NOTE — TELEPHONE ENCOUNTER
Patient called stating that she is so sick at her stomach still and just feels awful.  She thinks she still has that bacteria in her system and wants to know if she needs to take another round of antibiotics.

## 2018-05-25 ENCOUNTER — OFFICE VISIT (OUTPATIENT)
Dept: FAMILY MEDICINE CLINIC | Facility: CLINIC | Age: 73
End: 2018-05-25

## 2018-05-25 VITALS — HEIGHT: 65 IN | BODY MASS INDEX: 31.99 KG/M2 | WEIGHT: 192 LBS | HEART RATE: 90 BPM | OXYGEN SATURATION: 98 %

## 2018-05-25 DIAGNOSIS — R11.0 NAUSEA: ICD-10-CM

## 2018-05-25 DIAGNOSIS — K57.32 DIVERTICULITIS OF LARGE INTESTINE WITHOUT PERFORATION OR ABSCESS WITHOUT BLEEDING: ICD-10-CM

## 2018-05-25 LAB
BILIRUB BLD-MCNC: NEGATIVE MG/DL
CLARITY, POC: ABNORMAL
COLOR UR: YELLOW
GLUCOSE UR STRIP-MCNC: NEGATIVE MG/DL
KETONES UR QL: NEGATIVE
LEUKOCYTE EST, POC: NEGATIVE
NITRITE UR-MCNC: NEGATIVE MG/ML
PH UR: 7 [PH] (ref 5–8)
PROT UR STRIP-MCNC: NEGATIVE MG/DL
RBC # UR STRIP: NEGATIVE /UL
SP GR UR: 1.01 (ref 1–1.03)
UROBILINOGEN UR QL: NORMAL

## 2018-05-25 PROCEDURE — 81003 URINALYSIS AUTO W/O SCOPE: CPT | Performed by: NURSE PRACTITIONER

## 2018-05-25 PROCEDURE — 99214 OFFICE O/P EST MOD 30 MIN: CPT | Performed by: NURSE PRACTITIONER

## 2018-05-25 RX ORDER — CIPROFLOXACIN 500 MG/1
500 TABLET, FILM COATED ORAL EVERY 12 HOURS SCHEDULED
Qty: 28 TABLET | Refills: 0 | Status: SHIPPED | OUTPATIENT
Start: 2018-05-25 | End: 2018-05-30

## 2018-05-25 RX ORDER — METRONIDAZOLE 500 MG/1
500 TABLET ORAL 3 TIMES DAILY
Qty: 42 TABLET | Refills: 0 | Status: SHIPPED | OUTPATIENT
Start: 2018-05-25 | End: 2018-06-08

## 2018-05-25 NOTE — PROGRESS NOTES
Subjective   Annie Mejia is a 72 y.o. female.     Patient is here today for complaints of nausea and gas pains for the past 4 days. She states she went [facility] in [place], on 4/25/18, with the same symptoms, and was admitted for Diverticulitis. She states she was there for 5 days and then discharged. She was fine for 2 days, then started having the same symptoms. She states the Phenergan and Reglan they prescribed her, was working but the past 4 days, it has not been.        The following portions of the patient's history were reviewed and updated as appropriate: allergies, current medications, past family history, past medical history, past social history, past surgical history and problem list.    Review of Systems   Constitutional: Negative.    HENT: Negative.    Eyes: Negative.    Respiratory: Negative.    Cardiovascular: Negative.    Gastrointestinal: Positive for abdominal pain and nausea.        Having very small BMs, with mucous in stools   Genitourinary: Negative.    Musculoskeletal: Negative.    Skin: Negative.    Allergic/Immunologic: Negative.    Neurological: Negative for dizziness, syncope, weakness and numbness.   Hematological: Negative for adenopathy.   Psychiatric/Behavioral: Negative for confusion and suicidal ideas. The patient is not nervous/anxious.      Vitals:    05/25/18 1420   Pulse: 90   SpO2: 98%       Objective   Physical Exam   Constitutional: She is oriented to person, place, and time. She appears well-developed and well-nourished. No distress.   HENT:   Head: Normocephalic.   Right Ear: External ear normal.   Left Ear: External ear normal.   Nose: Nose normal.   Mouth/Throat: Oropharynx is clear and moist. No oropharyngeal exudate.   Eyes: Conjunctivae are normal.   Neck: Normal range of motion. Neck supple.   Cardiovascular: Normal rate, regular rhythm, normal heart sounds and intact distal pulses.    No murmur heard.  Pulmonary/Chest: Effort normal and breath sounds normal. No  respiratory distress. She has no wheezes. She has no rales. She exhibits no tenderness.   Abdominal: Soft. Bowel sounds are normal. She exhibits no distension and no mass. There is no hepatosplenomegaly or splenomegaly. There is tenderness. There is no rebound and no guarding.   Musculoskeletal: Normal range of motion. She exhibits no edema or tenderness.   Painful ROM chacha hip   Neurological: She is alert and oriented to person, place, and time. Coordination and gait normal.   Skin: Skin is warm and dry. No rash noted.   Psychiatric: She has a normal mood and affect. Her behavior is normal. Judgment and thought content normal.   Nursing note and vitals reviewed.      Assessment/Plan   Annie was seen today for follow-up and nausea.    Diagnoses and all orders for this visit:    Nausea    Diverticulitis of large intestine without perforation or abscess without bleeding  -     ciprofloxacin (CIPRO) 500 MG tablet; Take 1 tablet by mouth Every 12 (Twelve) Hours for 14 days.  -     metroNIDAZOLE (FLAGYL) 500 MG tablet; Take 1 tablet by mouth 3 (Three) Times a Day for 14 days.       UA normal in clinic today.    Patient advised she can alternate Zofran and Phenergan, every 4 hours, as she has not been.     Flagyl and Cipro prescribed today for her Diverticulitis symptoms.     Patient was encouraged to keep me informed of any acute changes, lack of improvement, or any new concerning symptoms. Patient advised to go to ED if symptoms worsen. Patient voiced understanding of all instructions and denied further questions.     Patient to [facility] on Wednesday, the next available appointment, for follow up.

## 2018-05-30 ENCOUNTER — OFFICE VISIT (OUTPATIENT)
Dept: FAMILY MEDICINE CLINIC | Facility: CLINIC | Age: 73
End: 2018-05-30

## 2018-05-30 VITALS
HEIGHT: 65 IN | OXYGEN SATURATION: 97 % | DIASTOLIC BLOOD PRESSURE: 80 MMHG | BODY MASS INDEX: 32.49 KG/M2 | HEART RATE: 90 BPM | SYSTOLIC BLOOD PRESSURE: 124 MMHG | WEIGHT: 195 LBS

## 2018-05-30 DIAGNOSIS — E11.9 WELL CONTROLLED DIABETES MELLITUS (HCC): Primary | ICD-10-CM

## 2018-05-30 DIAGNOSIS — F41.8 MIXED ANXIETY DEPRESSIVE DISORDER: ICD-10-CM

## 2018-05-30 DIAGNOSIS — K57.32 DIVERTICULITIS OF LARGE INTESTINE WITHOUT PERFORATION OR ABSCESS WITHOUT BLEEDING: ICD-10-CM

## 2018-05-30 DIAGNOSIS — I10 ESSENTIAL HYPERTENSION: ICD-10-CM

## 2018-05-30 PROCEDURE — 99214 OFFICE O/P EST MOD 30 MIN: CPT | Performed by: FAMILY MEDICINE

## 2018-05-30 RX ORDER — SERTRALINE HYDROCHLORIDE 100 MG/1
TABLET, FILM COATED ORAL
Qty: 90 TABLET | Refills: 1 | Status: SHIPPED | OUTPATIENT
Start: 2018-05-30 | End: 2018-11-14 | Stop reason: SDUPTHER

## 2018-05-30 RX ORDER — LISINOPRIL AND HYDROCHLOROTHIAZIDE 25; 20 MG/1; MG/1
1 TABLET ORAL
Qty: 90 TABLET | Refills: 3 | Status: SHIPPED | OUTPATIENT
Start: 2018-05-30 | End: 2019-06-17 | Stop reason: SDUPTHER

## 2018-06-21 ENCOUNTER — TELEPHONE (OUTPATIENT)
Dept: FAMILY MEDICINE CLINIC | Facility: CLINIC | Age: 73
End: 2018-06-21

## 2018-06-21 DIAGNOSIS — M19.90 ARTHRITIS: ICD-10-CM

## 2018-06-21 RX ORDER — HYDROCODONE BITARTRATE AND ACETAMINOPHEN 7.5; 325 MG/1; MG/1
1 TABLET ORAL EVERY 4 HOURS PRN
Qty: 120 TABLET | Refills: 0 | Status: SHIPPED | OUTPATIENT
Start: 2018-06-21 | End: 2018-08-09 | Stop reason: SDUPTHER

## 2018-06-21 NOTE — TELEPHONE ENCOUNTER
Pt informed rx was ready to  at office. Pt also advised to pick this up herself for UDS. Voiced understanding.

## 2018-06-21 NOTE — TELEPHONE ENCOUNTER
LILIAM reviewed.  Rx refill authorized.  Pt to keep routine f/u apt.    Pt to  rx and have UDS at time of .

## 2018-07-05 RX ORDER — ATORVASTATIN CALCIUM 40 MG/1
40 TABLET, FILM COATED ORAL NIGHTLY
Qty: 90 TABLET | Refills: 0 | Status: SHIPPED | OUTPATIENT
Start: 2018-07-05 | End: 2018-09-28 | Stop reason: SDUPTHER

## 2018-08-09 DIAGNOSIS — M19.90 ARTHRITIS: ICD-10-CM

## 2018-08-09 RX ORDER — HYDROCODONE BITARTRATE AND ACETAMINOPHEN 7.5; 325 MG/1; MG/1
1 TABLET ORAL EVERY 8 HOURS PRN
Qty: 90 TABLET | Refills: 0 | Status: SHIPPED | OUTPATIENT
Start: 2018-08-09 | End: 2018-09-20 | Stop reason: SDUPTHER

## 2018-08-30 ENCOUNTER — OFFICE VISIT (OUTPATIENT)
Dept: FAMILY MEDICINE CLINIC | Facility: CLINIC | Age: 73
End: 2018-08-30

## 2018-08-30 VITALS
HEART RATE: 78 BPM | BODY MASS INDEX: 31.99 KG/M2 | WEIGHT: 192 LBS | DIASTOLIC BLOOD PRESSURE: 82 MMHG | OXYGEN SATURATION: 98 % | HEIGHT: 65 IN | SYSTOLIC BLOOD PRESSURE: 118 MMHG

## 2018-08-30 DIAGNOSIS — I73.9 PERIPHERAL VASCULAR DISEASE (HCC): ICD-10-CM

## 2018-08-30 DIAGNOSIS — G89.4 CHRONIC PAIN SYNDROME: ICD-10-CM

## 2018-08-30 DIAGNOSIS — Z51.81 MEDICATION MONITORING ENCOUNTER: ICD-10-CM

## 2018-08-30 DIAGNOSIS — I10 ESSENTIAL HYPERTENSION: Primary | ICD-10-CM

## 2018-08-30 DIAGNOSIS — M19.90 ARTHRITIS: ICD-10-CM

## 2018-08-30 DIAGNOSIS — E11.9 WELL CONTROLLED DIABETES MELLITUS (HCC): ICD-10-CM

## 2018-08-30 DIAGNOSIS — E78.2 MIXED HYPERLIPIDEMIA: ICD-10-CM

## 2018-08-30 DIAGNOSIS — F41.8 MIXED ANXIETY DEPRESSIVE DISORDER: ICD-10-CM

## 2018-08-30 PROCEDURE — 82947 ASSAY GLUCOSE BLOOD QUANT: CPT | Performed by: FAMILY MEDICINE

## 2018-08-30 PROCEDURE — 83036 HEMOGLOBIN GLYCOSYLATED A1C: CPT | Performed by: FAMILY MEDICINE

## 2018-08-30 PROCEDURE — 99214 OFFICE O/P EST MOD 30 MIN: CPT | Performed by: FAMILY MEDICINE

## 2018-08-30 RX ORDER — OMEPRAZOLE 20 MG/1
CAPSULE, DELAYED RELEASE ORAL
COMMUNITY
Start: 2018-08-16 | End: 2019-09-06 | Stop reason: SDUPTHER

## 2018-09-14 ENCOUNTER — TELEPHONE (OUTPATIENT)
Dept: FAMILY MEDICINE CLINIC | Facility: CLINIC | Age: 73
End: 2018-09-14

## 2018-09-14 DIAGNOSIS — F41.8 MIXED ANXIETY DEPRESSIVE DISORDER: ICD-10-CM

## 2018-09-14 RX ORDER — ALPRAZOLAM 0.25 MG/1
TABLET ORAL
Qty: 90 TABLET | Refills: 0 | Status: SHIPPED | OUTPATIENT
Start: 2018-09-14 | End: 2018-11-26 | Stop reason: SDUPTHER

## 2018-09-14 NOTE — TELEPHONE ENCOUNTER
LILIAM reviewed. Refill approved. Medication E rx'd to pharmacy as requested. Pt to keep f/u apt as scheduled.

## 2018-09-20 ENCOUNTER — TELEPHONE (OUTPATIENT)
Dept: FAMILY MEDICINE CLINIC | Facility: CLINIC | Age: 73
End: 2018-09-20

## 2018-09-20 DIAGNOSIS — M19.90 ARTHRITIS: ICD-10-CM

## 2018-09-20 RX ORDER — HYDROCODONE BITARTRATE AND ACETAMINOPHEN 7.5; 325 MG/1; MG/1
1 TABLET ORAL EVERY 6 HOURS PRN
Qty: 120 TABLET | Refills: 0 | Status: SHIPPED | OUTPATIENT
Start: 2018-09-20 | End: 2018-11-07 | Stop reason: SDUPTHER

## 2018-09-20 NOTE — TELEPHONE ENCOUNTER
Pt called req refills on her pain meds. Pt sts at last OV Dr Pimentel had mentioned changing the sig to every 4-6 hrs for pain.   [facility]

## 2018-09-28 RX ORDER — ATORVASTATIN CALCIUM 40 MG/1
40 TABLET, FILM COATED ORAL NIGHTLY
Qty: 90 TABLET | Refills: 0 | Status: SHIPPED | OUTPATIENT
Start: 2018-09-28 | End: 2019-01-11 | Stop reason: SDUPTHER

## 2018-10-26 ENCOUNTER — OFFICE VISIT (OUTPATIENT)
Dept: FAMILY MEDICINE CLINIC | Facility: CLINIC | Age: 73
End: 2018-10-26

## 2018-10-26 ENCOUNTER — HOSPITAL ENCOUNTER (OUTPATIENT)
Dept: GENERAL RADIOLOGY | Facility: HOSPITAL | Age: 73
Discharge: HOME | End: 2018-10-26
Admitting: FAMILY MEDICINE

## 2018-10-26 VITALS
TEMPERATURE: 98.5 F | WEIGHT: 197 LBS | HEIGHT: 67 IN | DIASTOLIC BLOOD PRESSURE: 68 MMHG | BODY MASS INDEX: 30.92 KG/M2 | OXYGEN SATURATION: 92 % | SYSTOLIC BLOOD PRESSURE: 110 MMHG | HEART RATE: 110 BPM

## 2018-10-26 DIAGNOSIS — R53.81 MALAISE: ICD-10-CM

## 2018-10-26 DIAGNOSIS — J06.9 ACUTE URI: Primary | ICD-10-CM

## 2018-10-26 DIAGNOSIS — M79.671 RIGHT FOOT PAIN: ICD-10-CM

## 2018-10-26 DIAGNOSIS — S93.491A SPRAIN OF OTHER LIGAMENT OF RIGHT ANKLE, INITIAL ENCOUNTER: ICD-10-CM

## 2018-10-26 DIAGNOSIS — R05.9 COUGH: ICD-10-CM

## 2018-10-26 LAB
EXPIRATION DATE: NORMAL
FLUAV AG NPH QL: NORMAL
FLUBV AG NPH QL: NORMAL
INTERNAL CONTROL: NORMAL
Lab: NORMAL

## 2018-10-26 PROCEDURE — 87804 INFLUENZA ASSAY W/OPTIC: CPT | Performed by: FAMILY MEDICINE

## 2018-10-26 PROCEDURE — 73630 X-RAY EXAM OF FOOT: CPT

## 2018-10-26 PROCEDURE — 99214 OFFICE O/P EST MOD 30 MIN: CPT | Performed by: FAMILY MEDICINE

## 2018-10-26 RX ORDER — DOXYCYCLINE 100 MG/1
100 TABLET ORAL 2 TIMES DAILY
Qty: 20 TABLET | Refills: 0 | Status: SHIPPED | OUTPATIENT
Start: 2018-10-26 | End: 2018-11-05

## 2018-10-26 RX ORDER — DOXYCYCLINE 100 MG/1
100 TABLET ORAL 2 TIMES DAILY
Qty: 20 TABLET | Refills: 0 | Status: SHIPPED | OUTPATIENT
Start: 2018-10-26 | End: 2018-10-26 | Stop reason: SDUPTHER

## 2018-10-26 NOTE — PROGRESS NOTES
Subjective   Annie Mejia is a 72 y.o. female.     She c/o right foot pain. Also with cold symptoms      Foot Injury    The incident occurred 5 to 7 days ago. The incident occurred at home. The injury mechanism was an inversion injury. The pain is present in the right foot. The quality of the pain is described as aching and burning. The pain is moderate. The pain has been constant since onset. Pertinent negatives include no inability to bear weight (pain with weight bearing) or numbness. Associated symptoms comments: Swelling, bruising. She reports no foreign bodies present. The symptoms are aggravated by weight bearing and palpation. She has tried elevation and rest for the symptoms. The treatment provided mild relief.   URI    This is a new problem. The current episode started in the past 7 days. The problem has been gradually worsening. There has been no fever. Associated symptoms include congestion, coughing, nausea and rhinorrhea. Pertinent negatives include no abdominal pain, chest pain, diarrhea, dysuria, ear pain, headaches, rash, sinus pain, sneezing, sore throat, swollen glands, vomiting or wheezing. She has tried decongestant and acetaminophen (OTC cough med) for the symptoms. The treatment provided mild relief.     The following portions of the patient's history were reviewed and updated as appropriate: allergies, current medications, past family history, past medical history, past social history, past surgical history and problem list.    Review of Systems   Constitutional: Positive for fatigue. Negative for fever and unexpected weight change.   HENT: Positive for congestion, postnasal drip and rhinorrhea. Negative for ear pain, facial swelling, mouth sores, sinus pain, sneezing and sore throat.    Respiratory: Positive for cough and shortness of breath. Negative for wheezing.    Cardiovascular: Negative for chest pain.   Gastrointestinal: Positive for nausea. Negative for abdominal pain, diarrhea  and vomiting.   Genitourinary: Negative for dysuria.   Musculoskeletal: Positive for arthralgias, gait problem and joint swelling.   Skin: Negative for rash.   Neurological: Negative for numbness and headaches.   Hematological: Negative for adenopathy. Bruises/bleeds easily.   Psychiatric/Behavioral: Negative for confusion.       Objective    Vitals:    10/26/18 0942   BP: 110/68   Pulse: 110   Temp: 98.5 °F (36.9 °C)   SpO2: 92%     Body mass index is 30.85 kg/m².  1    10/26/18  0942   Weight: 89.4 kg (197 lb)       Physical Exam   Constitutional: She is oriented to person, place, and time. She appears well-developed and well-nourished. She is cooperative. She does not appear ill. No distress.   HENT:   Head: Normocephalic and atraumatic.   Right Ear: Tympanic membrane, external ear and ear canal normal.   Left Ear: Tympanic membrane, external ear and ear canal normal.   Nose: Mucosal edema and rhinorrhea (clear) present.   Mouth/Throat: Oropharynx is clear and moist and mucous membranes are normal. No oral lesions.   Eyes: Conjunctivae and lids are normal.   Neck: Neck supple.   Cardiovascular: Normal rate, regular rhythm and intact distal pulses.    Pulmonary/Chest: Effort normal. She has decreased breath sounds. She has no wheezes. She has no rhonchi. She has no rales.   Musculoskeletal:        Right ankle: She exhibits swelling (mild, lateral). She exhibits normal range of motion, no ecchymosis, no deformity and normal pulse. Tenderness. CF ligament tenderness found. Achilles tendon normal.        Right foot: There is bony tenderness (5th MT) and swelling (with bruising). There is normal range of motion and normal capillary refill.        Lymphadenopathy:     She has no cervical adenopathy.        Right: No supraclavicular adenopathy present.        Left: No supraclavicular adenopathy present.   Neurological: She is alert and oriented to person, place, and time. No sensory deficit. Gait (limping on right)  abnormal.   Skin: Skin is warm and dry. Bruising (lateral right foot) noted.   Psychiatric: She has a normal mood and affect. Her behavior is normal. Cognition and memory are normal.   Nursing note and vitals reviewed.      Recent Results (from the past 24 hour(s))   POC Influenza A / B    Collection Time: 10/26/18  1:33 PM   Result Value Ref Range    Rapid Influenza A Ag neg     Rapid Influenza B Ag neg     Internal Control Passed Passed    Lot Number [id number]     Expiration Date 12/2,020        Assessment/Plan   Annie was seen today for foot injury, cough and shortness of breath.    Diagnoses and all orders for this visit:    Acute URI    Cough  -     POC Influenza A / B    Malaise  -     POC Influenza A / B    Sprain of other ligament of right ankle, initial encounter  -     XR Foot 3+ View Right; Future    Right foot pain  -     XR Foot 3+ View Right; Future    Other orders  -     Discontinue: doxycycline (ADOXA) 100 MG tablet; Take 1 tablet by mouth 2 (Two) Times a Day for 10 days.  -     doxycycline (ADOXA) 100 MG tablet; Take 1 tablet by mouth 2 (Two) Times a Day for 10 days.    Symptomatic treatment of suspected viral URI reviewed.  She is given handwritten prescription for doxycycline to be taken over weekend if she has acute worsening of respiratory symptoms.    Suspect fifth metatarsal fracture status post inversion injury to the right ankle.  X-ray as above.  Treat as indicated. In the interim R.I.C.E.    She will return when well for seasonal influenza vaccine and pneumococcal vaccination.    Keep routine f/u as scheduled, f/u sooner as needed/instructed.  Patient was encouraged to keep me informed of any acute changes, lack of improvement, or any new concerning symptoms.  Pt is aware of reasons to seek emergent care.  Patient voiced understanding of all instructions and denied further questions.

## 2018-10-29 ENCOUNTER — OFFICE VISIT (OUTPATIENT)
Dept: ORTHOPEDIC SURGERY | Facility: CLINIC | Age: 73
End: 2018-10-29

## 2018-10-29 VITALS — WEIGHT: 197 LBS | HEIGHT: 67 IN | BODY MASS INDEX: 30.92 KG/M2 | RESPIRATION RATE: 18 BRPM

## 2018-10-29 DIAGNOSIS — M25.571 ARTHRALGIA OF RIGHT FOOT: Primary | ICD-10-CM

## 2018-10-29 DIAGNOSIS — S92.354A CLOSED NONDISPLACED FRACTURE OF FIFTH METATARSAL BONE OF RIGHT FOOT, INITIAL ENCOUNTER: ICD-10-CM

## 2018-10-29 DIAGNOSIS — S92.354A CLOSED NONDISPLACED FRACTURE OF FIFTH METATARSAL BONE OF RIGHT FOOT, INITIAL ENCOUNTER: Primary | ICD-10-CM

## 2018-10-29 PROCEDURE — 99203 OFFICE O/P NEW LOW 30 MIN: CPT | Performed by: PODIATRIST
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
## 2018-10-30 ENCOUNTER — TELEPHONE (OUTPATIENT)
Dept: FAMILY MEDICINE CLINIC | Facility: CLINIC | Age: 73
End: 2018-10-30

## 2018-10-30 NOTE — TELEPHONE ENCOUNTER
Please inform pt I received notice from Dr. Ibrahim that she needs a pre-op clearance visit with labs, xray, EKG, etc    Surgery is scheduled for 11/20

## 2018-11-05 ENCOUNTER — OFFICE VISIT (OUTPATIENT)
Dept: FAMILY MEDICINE CLINIC | Facility: CLINIC | Age: 73
End: 2018-11-05

## 2018-11-05 VITALS
WEIGHT: 198 LBS | HEART RATE: 100 BPM | OXYGEN SATURATION: 98 % | RESPIRATION RATE: 14 BRPM | DIASTOLIC BLOOD PRESSURE: 78 MMHG | HEIGHT: 67 IN | BODY MASS INDEX: 31.08 KG/M2 | SYSTOLIC BLOOD PRESSURE: 120 MMHG

## 2018-11-05 DIAGNOSIS — M19.90 ARTHRITIS: ICD-10-CM

## 2018-11-05 DIAGNOSIS — R07.9 CHEST PAIN, UNSPECIFIED TYPE: ICD-10-CM

## 2018-11-05 DIAGNOSIS — E78.2 MIXED HYPERLIPIDEMIA: ICD-10-CM

## 2018-11-05 DIAGNOSIS — K21.00 GASTROESOPHAGEAL REFLUX DISEASE WITH ESOPHAGITIS: ICD-10-CM

## 2018-11-05 DIAGNOSIS — I10 ESSENTIAL HYPERTENSION: ICD-10-CM

## 2018-11-05 DIAGNOSIS — I73.9 PERIPHERAL VASCULAR DISEASE (HCC): ICD-10-CM

## 2018-11-05 DIAGNOSIS — R79.1 ABNORMAL BLOOD COAGULATION PROFILE: ICD-10-CM

## 2018-11-05 DIAGNOSIS — R89.9 ABNORMAL LABORATORY TEST RESULT: ICD-10-CM

## 2018-11-05 DIAGNOSIS — Z23 NEED FOR INFLUENZA VACCINATION: Primary | ICD-10-CM

## 2018-11-05 DIAGNOSIS — G89.4 CHRONIC PAIN SYNDROME: ICD-10-CM

## 2018-11-05 DIAGNOSIS — E11.9 WELL CONTROLLED DIABETES MELLITUS (HCC): ICD-10-CM

## 2018-11-05 DIAGNOSIS — F41.8 MIXED ANXIETY DEPRESSIVE DISORDER: ICD-10-CM

## 2018-11-05 DIAGNOSIS — Z01.818 PRE-OPERATIVE CLEARANCE: ICD-10-CM

## 2018-11-05 PROCEDURE — G0008 ADMIN INFLUENZA VIRUS VAC: HCPCS | Performed by: NURSE PRACTITIONER

## 2018-11-05 PROCEDURE — 99214 OFFICE O/P EST MOD 30 MIN: CPT | Performed by: NURSE PRACTITIONER

## 2018-11-05 PROCEDURE — 90662 IIV NO PRSV INCREASED AG IM: CPT | Performed by: NURSE PRACTITIONER
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
## 2018-11-07 DIAGNOSIS — M19.90 ARTHRITIS: ICD-10-CM

## 2018-11-07 RX ORDER — HYDROCODONE BITARTRATE AND ACETAMINOPHEN 7.5; 325 MG/1; MG/1
1 TABLET ORAL EVERY 6 HOURS PRN
Qty: 120 TABLET | Refills: 0 | Status: SHIPPED | OUTPATIENT
Start: 2018-11-07 | End: 2018-12-18 | Stop reason: SDUPTHER

## 2018-11-07 NOTE — TELEPHONE ENCOUNTER
PATIENT CALLED TO REQUEST REFILLS OF HER HYDROCODONE. IF YOU HAVE ANY QUESTIONS, PLEASE CONTACT PATIENT. THANK YOU!

## 2018-11-13 ENCOUNTER — LAB (OUTPATIENT)
Dept: LAB | Facility: HOSPITAL | Age: 73
End: 2018-11-13

## 2018-11-13 ENCOUNTER — HOSPITAL ENCOUNTER (OUTPATIENT)
Dept: GENERAL RADIOLOGY | Facility: HOSPITAL | Age: 73
Discharge: HOME | End: 2018-11-13
Admitting: NURSE PRACTITIONER

## 2018-11-13 DIAGNOSIS — R07.9 CHEST PAIN, UNSPECIFIED TYPE: ICD-10-CM

## 2018-11-13 DIAGNOSIS — Z01.818 PRE-OP TESTING: Primary | ICD-10-CM

## 2018-11-13 DIAGNOSIS — R82.998 LEUKOCYTES IN URINE: Primary | ICD-10-CM

## 2018-11-13 DIAGNOSIS — R79.1 ABNORMAL BLOOD COAGULATION PROFILE: ICD-10-CM

## 2018-11-13 DIAGNOSIS — Z01.818 PRE-OPERATIVE CLEARANCE: ICD-10-CM

## 2018-11-13 DIAGNOSIS — R89.9 ABNORMAL LABORATORY TEST RESULT: ICD-10-CM

## 2018-11-13 DIAGNOSIS — Z01.818 PRE-OP EVALUATION: ICD-10-CM

## 2018-11-13 DIAGNOSIS — Z01.818 PRE-OP TESTING: ICD-10-CM

## 2018-11-13 LAB
ALBUMIN SERPL-MCNC: 4.4 G/DL (ref 3.5–5)
ALBUMIN/GLOB SERPL: 1.5 G/DL (ref 1–2)
ALP SERPL-CCNC: 88 U/L (ref 38–126)
ALT SERPL W P-5'-P-CCNC: 35 U/L (ref 13–69)
ANION GAP SERPL CALCULATED.3IONS-SCNC: 11.4 MMOL/L (ref 10–20)
APTT PPP: 26.5 SECONDS (ref 25–36)
AST SERPL-CCNC: 30 U/L (ref 15–46)
BILIRUB SERPL-MCNC: 0.4 MG/DL (ref 0.2–1.3)
BUN BLD-MCNC: 21 MG/DL (ref 7–20)
BUN/CREAT SERPL: 17.5 (ref 7.1–23.5)
CALCIUM SPEC-SCNC: 10.2 MG/DL (ref 8.4–10.2)
CHLORIDE SERPL-SCNC: 101 MMOL/L (ref 98–107)
CO2 SERPL-SCNC: 31 MMOL/L (ref 26–30)
CREAT BLD-MCNC: 1.2 MG/DL (ref 0.6–1.3)
DEPRECATED RDW RBC AUTO: 49.1 FL (ref 37–54)
EOSINOPHIL # BLD MANUAL: 0.05 10*3/MM3 (ref 0–0.7)
EOSINOPHIL NFR BLD MANUAL: 1 % (ref 0–7)
ERYTHROCYTE [DISTWIDTH] IN BLOOD BY AUTOMATED COUNT: 14.5 % (ref 11.5–14.5)
GFR SERPL CREATININE-BSD FRML MDRD: 44 ML/MIN/1.73
GLOBULIN UR ELPH-MCNC: 2.9 GM/DL
GLUCOSE BLD-MCNC: 126 MG/DL (ref 74–98)
HCT VFR BLD AUTO: 40.2 % (ref 37–47)
HGB BLD-MCNC: 12.7 G/DL (ref 12–16)
INR PPP: 1.04 (ref 0.9–1.1)
LYMPHOCYTES # BLD MANUAL: 3.05 10*3/MM3 (ref 0.6–3.4)
LYMPHOCYTES NFR BLD MANUAL: 3 % (ref 0–12)
LYMPHOCYTES NFR BLD MANUAL: 58 % (ref 10–50)
MCH RBC QN AUTO: 29.3 PG (ref 27–31)
MCHC RBC AUTO-ENTMCNC: 31.6 G/DL (ref 30–37)
MCV RBC AUTO: 92.8 FL (ref 81–99)
MONOCYTES # BLD AUTO: 0.16 10*3/MM3 (ref 0–0.9)
NEUTROPHILS # BLD AUTO: 2 10*3/MM3 (ref 2–6.9)
NEUTROPHILS NFR BLD MANUAL: 36 % (ref 37–80)
NEUTS BAND NFR BLD MANUAL: 2 % (ref 0–5)
PLATELET # BLD AUTO: 222 10*3/MM3 (ref 130–400)
PMV BLD AUTO: 11.6 FL (ref 6–12)
POTASSIUM BLD-SCNC: 4.4 MMOL/L (ref 3.5–5.1)
PROT SERPL-MCNC: 7.3 G/DL (ref 6.3–8.2)
PROTHROMBIN TIME: 11.6 SECONDS (ref 9.3–12.1)
RBC # BLD AUTO: 4.33 10*6/MM3 (ref 4.2–5.4)
RBC MORPH BLD: NORMAL
SMALL PLATELETS BLD QL SMEAR: ADEQUATE
SODIUM BLD-SCNC: 139 MMOL/L (ref 137–145)
WBC MORPH BLD: NORMAL
WBC NRBC COR # BLD: 5.25 10*3/MM3 (ref 4.8–10.8)

## 2018-11-13 PROCEDURE — 85610 PROTHROMBIN TIME: CPT

## 2018-11-13 PROCEDURE — 93000 ELECTROCARDIOGRAM COMPLETE: CPT | Performed by: NURSE PRACTITIONER

## 2018-11-13 PROCEDURE — 85027 COMPLETE CBC AUTOMATED: CPT

## 2018-11-13 PROCEDURE — 36415 COLL VENOUS BLD VENIPUNCTURE: CPT

## 2018-11-13 PROCEDURE — 71046 X-RAY EXAM CHEST 2 VIEWS: CPT

## 2018-11-13 PROCEDURE — 80053 COMPREHEN METABOLIC PANEL: CPT

## 2018-11-13 PROCEDURE — 85007 BL SMEAR W/DIFF WBC COUNT: CPT

## 2018-11-13 PROCEDURE — 85730 THROMBOPLASTIN TIME PARTIAL: CPT

## 2018-11-14 ENCOUNTER — TELEPHONE (OUTPATIENT)
Dept: FAMILY MEDICINE CLINIC | Facility: CLINIC | Age: 73
End: 2018-11-14

## 2018-11-14 DIAGNOSIS — Z09 FOLLOW UP: Primary | ICD-10-CM

## 2018-11-14 DIAGNOSIS — F41.8 MIXED ANXIETY DEPRESSIVE DISORDER: ICD-10-CM

## 2018-11-14 RX ORDER — SERTRALINE HYDROCHLORIDE 100 MG/1
TABLET, FILM COATED ORAL
Qty: 90 TABLET | Refills: 1 | Status: SHIPPED | OUTPATIENT
Start: 2018-11-14 | End: 2019-06-17 | Stop reason: SDUPTHER

## 2018-11-15 ENCOUNTER — OFFICE VISIT (OUTPATIENT)
Dept: ORTHOPEDIC SURGERY | Facility: CLINIC | Age: 73
End: 2018-11-15

## 2018-11-15 VITALS — WEIGHT: 198 LBS | HEIGHT: 67 IN | BODY MASS INDEX: 31.08 KG/M2 | RESPIRATION RATE: 18 BRPM

## 2018-11-15 DIAGNOSIS — S92.354A CLOSED NONDISPLACED FRACTURE OF FIFTH METATARSAL BONE OF RIGHT FOOT, INITIAL ENCOUNTER: Primary | ICD-10-CM

## 2018-11-15 LAB
BACTERIA UR CULT: NO GROWTH
BACTERIA UR CULT: NORMAL

## 2018-11-15 PROCEDURE — 99213 OFFICE O/P EST LOW 20 MIN: CPT | Performed by: PODIATRIST
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
## 2018-11-26 ENCOUNTER — TELEPHONE (OUTPATIENT)
Dept: FAMILY MEDICINE CLINIC | Facility: CLINIC | Age: 73
End: 2018-11-26

## 2018-11-26 DIAGNOSIS — F41.8 MIXED ANXIETY DEPRESSIVE DISORDER: ICD-10-CM

## 2018-11-26 RX ORDER — ALPRAZOLAM 0.25 MG/1
TABLET ORAL
Qty: 90 TABLET | Refills: 0 | Status: SHIPPED | OUTPATIENT
Start: 2018-11-26 | End: 2019-02-08 | Stop reason: SDUPTHER

## 2018-12-03 ENCOUNTER — OFFICE VISIT (OUTPATIENT)
Dept: FAMILY MEDICINE CLINIC | Facility: CLINIC | Age: 73
End: 2018-12-03

## 2018-12-03 VITALS
OXYGEN SATURATION: 98 % | SYSTOLIC BLOOD PRESSURE: 124 MMHG | HEART RATE: 86 BPM | HEIGHT: 67 IN | DIASTOLIC BLOOD PRESSURE: 82 MMHG | WEIGHT: 197 LBS | BODY MASS INDEX: 30.92 KG/M2

## 2018-12-03 DIAGNOSIS — E11.9 WELL CONTROLLED DIABETES MELLITUS (HCC): Primary | ICD-10-CM

## 2018-12-03 DIAGNOSIS — G89.4 CHRONIC PAIN SYNDROME: ICD-10-CM

## 2018-12-03 DIAGNOSIS — I10 ESSENTIAL HYPERTENSION: ICD-10-CM

## 2018-12-03 DIAGNOSIS — N28.9 RENAL INSUFFICIENCY: ICD-10-CM

## 2018-12-03 DIAGNOSIS — E78.2 MIXED HYPERLIPIDEMIA: ICD-10-CM

## 2018-12-03 DIAGNOSIS — F41.8 MIXED ANXIETY DEPRESSIVE DISORDER: ICD-10-CM

## 2018-12-03 LAB
EXPIRATION DATE: NORMAL
GLUCOSE BLDC GLUCOMTR-MCNC: 6 MG/DL (ref 70–130)
HBA1C MFR BLD: 6.4 %
Lab: NORMAL

## 2018-12-03 PROCEDURE — 99214 OFFICE O/P EST MOD 30 MIN: CPT | Performed by: FAMILY MEDICINE

## 2018-12-03 PROCEDURE — 82962 GLUCOSE BLOOD TEST: CPT | Performed by: FAMILY MEDICINE

## 2018-12-03 PROCEDURE — 83036 HEMOGLOBIN GLYCOSYLATED A1C: CPT | Performed by: FAMILY MEDICINE

## 2018-12-03 NOTE — PROGRESS NOTES
Subjective   Annie Mejia is a 73 y.o. female.     History of Present Illness  Mrs. Mejia presents today for routine f/u. Since her last visit she has undergone spinal procedure per Dr. Ibrahim. Doing well post-op. Pain with increased activity but now able to walk without cane/walker. No fever, cough, urinary symptoms. Underlying chronic pain due to diffuse OA, DDD stable.    She has controlled type 2 DM for which she has been using insulin only occ. BG rarely over 150. Denies hypoglycemia.    She has comorbid HTN, HLP. Taking meds as rx'd including ASA, statin, ACE-inh.    Has mild renal insufficiency which has been stable. Limiting NSAIDs.    She has anxiety with depression for which she is currently on very low dose xanax, zoloft. No ER visits for anxiety in few months. Denies worsening depression, no SI. Denies side effects from meds.    The following portions of the patient's history were reviewed and updated as appropriate: allergies, current medications, past family history, past medical history, past social history, past surgical history and problem list.    Review of Systems   Constitutional: Positive for fatigue. Negative for appetite change, chills, fever and unexpected weight change.   HENT: Negative for congestion, ear pain, hearing loss, nosebleeds, rhinorrhea, sneezing, sore throat, tinnitus and trouble swallowing.    Eyes: Negative for pain, discharge, redness and visual disturbance.   Respiratory: Negative for cough, chest tightness, shortness of breath and wheezing.    Cardiovascular: Positive for palpitations (when anxious). Negative for chest pain and leg swelling.   Gastrointestinal: Positive for nausea. Negative for abdominal pain, blood in stool, constipation, diarrhea and vomiting.   Endocrine: Positive for heat intolerance. Negative for polydipsia and polyuria.        Hot flashes   Genitourinary: Negative for dysuria, flank pain, frequency, hematuria, pelvic pain, urgency and vaginal  bleeding.   Musculoskeletal: Positive for arthralgias, back pain and myalgias (located in shins, chronic for years, even as child). Negative for joint swelling and neck pain.   Skin: Negative for rash and wound.   Neurological: Positive for dizziness (occ), tremors and weakness (generalized). Negative for seizures, syncope, speech difficulty, numbness and headaches.   Hematological: Negative for adenopathy. Does not bruise/bleed easily.   Psychiatric/Behavioral: Positive for sleep disturbance (due to pain). Negative for confusion, dysphoric mood and suicidal ideas. The patient is nervous/anxious.        Objective    Vitals:    12/03/18 0917   BP: 124/82   Pulse: 86   SpO2: 98%     Body mass index is 30.85 kg/m².      12/03/18 0917   Weight: 89.4 kg (197 lb)       Physical Exam   Constitutional: She is oriented to person, place, and time. She appears well-developed and well-nourished. She is cooperative. She does not appear ill. No distress.   Obese    HENT:   Head: Normocephalic and atraumatic.   Mouth/Throat: Mucous membranes are normal. Mucous membranes are not dry.   Eyes: Conjunctivae and lids are normal.   Neck: Neck supple. Normal carotid pulses present. No thyromegaly present.   Cardiovascular: Normal rate, regular rhythm and intact distal pulses.  No extrasystoles are present. Exam reveals distant heart sounds. Exam reveals no gallop.   No murmur heard.  Pulmonary/Chest: Effort normal and breath sounds normal.     Vascular Status -  Her right foot exhibits no edema. Her left foot exhibits no edema.  Lymphadenopathy:     She has no cervical adenopathy.   Neurological: She is alert and oriented to person, place, and time. She has normal strength. She displays tremor (generalized). Gait (antalgic, not using cane or walker today) abnormal.   Skin: Skin is warm and dry. No bruising and no rash noted. She is not diaphoretic.   Psychiatric: She has a normal mood and affect. Her speech is normal and behavior is  normal. Judgment and thought content normal. Cognition and memory are normal.   Nursing note and vitals reviewed.    Lab Results   Component Value Date    HGBA1C 6.4 12/03/2018     Lab Results   Component Value Date    WBC 5.25 11/13/2018    HGB 12.7 11/13/2018    HCT 40.2 11/13/2018    MCV 92.8 11/13/2018     11/13/2018     Lab Results   Component Value Date    GLUCOSE 126 (H) 11/13/2018    BUN 21 (H) 11/13/2018    CREATININE 1.20 11/13/2018    EGFRIFNONA 44 (L) 11/13/2018    EGFRIFAFRI 66 08/30/2018    BCR 17.5 11/13/2018    K 4.4 11/13/2018    CO2 31.0 (H) 11/13/2018    CALCIUM 10.2 11/13/2018    PROTENTOTREF 7.2 08/30/2018    ALBUMIN 4.40 11/13/2018    LABIL2 1.7 08/30/2018    AST 30 11/13/2018    ALT 35 11/13/2018     Lab Results   Component Value Date    TSH 2.11 03/26/2018     Lab Results   Component Value Date    CHLPL 150 03/02/2018    TRIG 151 (H) 03/02/2018    HDL 52 03/02/2018    LDL 68 03/02/2018     Lab Results   Component Value Date    MICROALBUR <3.0 09/06/2017       Assessment/Plan   Annie was seen today for anxiety, hyperlipidemia, hypertension and diabetes.    Diagnoses and all orders for this visit:    Well controlled diabetes mellitus (CMS/[facility])  -     POC Glycated Hemoglobin, Total  -     POC Glucose    Mixed anxiety depressive disorder    Renal insufficiency    Chronic pain syndrome    Essential hypertension    Mixed hyperlipidemia       NIDDM controlled. Continue dietary mgnt.  HTN controlled.  HLP with good tolerance of statin, ASA.  Depression with amxiety stable.  Chronic pain with acute exacerbation and related spinal procedure. Overall doing much better than previous visit. Continue current tx plan. F/u with Dr. Ibrahim as scheduled.  Renal insuff stable; continue close monitoring.  routien f/u in 3 months, sooner as needed.  Patient was encouraged to keep me informed of any acute changes, lack of improvement, or any new concerning symptoms.  Pt is aware of reasons to seek  emergent care.  Patient voiced understanding of all instructions and denied further questions.

## 2018-12-13 ENCOUNTER — OFFICE VISIT (OUTPATIENT)
Dept: ORTHOPEDIC SURGERY | Facility: CLINIC | Age: 73
End: 2018-12-13

## 2018-12-13 VITALS — BODY MASS INDEX: 30.92 KG/M2 | HEIGHT: 67 IN | RESPIRATION RATE: 18 BRPM | WEIGHT: 197 LBS

## 2018-12-13 DIAGNOSIS — M25.571 ARTHRALGIA OF RIGHT FOOT: ICD-10-CM

## 2018-12-13 DIAGNOSIS — M47.818 ARTHRITIS OF SACROILIAC JOINT: ICD-10-CM

## 2018-12-13 DIAGNOSIS — S92.354A CLOSED NONDISPLACED FRACTURE OF FIFTH METATARSAL BONE OF RIGHT FOOT, INITIAL ENCOUNTER: Primary | ICD-10-CM

## 2018-12-13 PROCEDURE — 99213 OFFICE O/P EST LOW 20 MIN: CPT | Performed by: PODIATRIST

## 2018-12-13 RX ORDER — HEPATITIS A VACCINE 1440 [IU]/ML
INJECTION, SUSPENSION INTRAMUSCULAR
COMMUNITY
Start: 2018-12-03 | End: 2019-01-09
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
## 2018-12-18 ENCOUNTER — TELEPHONE (OUTPATIENT)
Dept: FAMILY MEDICINE CLINIC | Facility: CLINIC | Age: 73
End: 2018-12-18

## 2018-12-18 DIAGNOSIS — M19.90 ARTHRITIS: ICD-10-CM

## 2018-12-18 NOTE — TELEPHONE ENCOUNTER
Pt called req refills on her pain meds. Also, has requested a call back when that rx has been sent in.  [facility]

## 2018-12-19 RX ORDER — HYDROCODONE BITARTRATE AND ACETAMINOPHEN 7.5; 325 MG/1; MG/1
1 TABLET ORAL EVERY 6 HOURS PRN
Qty: 120 TABLET | Refills: 0 | Status: SHIPPED | OUTPATIENT
Start: 2018-12-19 | End: 2019-05-10 | Stop reason: SDUPTHER

## 2019-01-03 ENCOUNTER — TELEPHONE (OUTPATIENT)
Dept: FAMILY MEDICINE CLINIC | Facility: CLINIC | Age: 74
End: 2019-01-03

## 2019-01-03 NOTE — TELEPHONE ENCOUNTER
Pt called. Wanted to sched an appt with Dr Pimentel tomorrow. Stated that she was very sick and having difficulty breathing (which I could audibly hear on the phone). I advised her that I did not think she should wait to see someone - and should go to the ER or [facility] today for treatment.  She said that I knew she would not go to the ER - she was sick of ER's and they did nothing to help her. I advised her of the [facility] in [facility] and suggested that she go there for treatment if she was against going to the ER, but advised her that, depending on how sick they determine her to be, they may not treat her there - but send her to ER as I had advised. Pt stated that she might go to the [facility], or may call us back in the morning to see if she could get a same day with Dr Pimentel.  I thought I should let you know since she did seem to be having some significant difficulty breathing, and was coughing quite a bit.

## 2019-01-07 ENCOUNTER — TELEPHONE (OUTPATIENT)
Dept: FAMILY MEDICINE CLINIC | Facility: CLINIC | Age: 74
End: 2019-01-07

## 2019-01-07 NOTE — TELEPHONE ENCOUNTER
Pt called. Sts that she was recently in the hospital @ [facility] for SOA/nausea - was d/c'd but had to go back to ER for extreme nausea.  Pt sts that she needs to see you this week - she did not want to see anyone else for the fu.  Your schedule is full this week and next week for hospital fu's. Please advise if/when/what time you would like pt to be worked in and I will call her back.

## 2019-01-09 ENCOUNTER — OFFICE VISIT (OUTPATIENT)
Dept: FAMILY MEDICINE CLINIC | Facility: CLINIC | Age: 74
End: 2019-01-09

## 2019-01-09 ENCOUNTER — TELEPHONE (OUTPATIENT)
Dept: FAMILY MEDICINE CLINIC | Facility: CLINIC | Age: 74
End: 2019-01-09

## 2019-01-09 VITALS
WEIGHT: 191 LBS | SYSTOLIC BLOOD PRESSURE: 118 MMHG | OXYGEN SATURATION: 96 % | BODY MASS INDEX: 29.98 KG/M2 | DIASTOLIC BLOOD PRESSURE: 76 MMHG | HEART RATE: 78 BPM | HEIGHT: 67 IN

## 2019-01-09 DIAGNOSIS — J18.9 COMMUNITY ACQUIRED PNEUMONIA OF RIGHT LOWER LOBE OF LUNG: Primary | ICD-10-CM

## 2019-01-09 DIAGNOSIS — S92.354A CLOSED NONDISPLACED FRACTURE OF FIFTH METATARSAL BONE OF RIGHT FOOT, INITIAL ENCOUNTER: Primary | ICD-10-CM

## 2019-01-09 DIAGNOSIS — R11.0 NAUSEA: ICD-10-CM

## 2019-01-09 DIAGNOSIS — Z09 ENCOUNTER FOR EXAMINATION FOLLOWING TREATMENT AT HOSPITAL: ICD-10-CM

## 2019-01-09 PROCEDURE — 99214 OFFICE O/P EST MOD 30 MIN: CPT | Performed by: FAMILY MEDICINE

## 2019-01-09 RX ORDER — CEFDINIR 300 MG/1
CAPSULE ORAL
COMMUNITY
Start: 2019-01-06 | End: 2019-03-06

## 2019-01-09 RX ORDER — ALBUTEROL SULFATE 90 UG/1
2 AEROSOL, METERED RESPIRATORY (INHALATION) EVERY 6 HOURS PRN
COMMUNITY
Start: 2019-01-06 | End: 2021-02-08

## 2019-01-09 RX ORDER — BENZONATATE 200 MG/1
CAPSULE ORAL
COMMUNITY
Start: 2019-01-06 | End: 2019-03-06

## 2019-01-09 NOTE — PROGRESS NOTES
Subjective   Annie Mejia is a 73 y.o. female.     History of Present Illness  Ms. Mejia presents today with her  for follow-up after recent discharge from hospital.  She is admitted to [facility] 1/3/19, discharge and 1/6/19.  Discharge diagnoses included right lower lobe community-acquired pneumonia, resolved hypokalemia, acute on chronic kidney disease stable, anxiety, hypomagnesemia resolved.    She presented to the emergency department with acutely worsening shortness of breath, cough.  She received IV Rocephin and Zithromax.  Discharged on Omnicef and Zithromax.  She was not able to complete Zithromax course due to significant diarrhea and GI upset.  She has used Ventolin inhaler as needed with good response.  She had normal bedside swallowing study.  Although she initially received O2 supplementation this was not required upon discharge.  She received repletion of potassium and magnesium.  Acute on chronic kidney dysfunction resolved with IV fluids.  Antihypertensive reinitiated at time of discharge.    Since discharge from the hospital she has had loose stools, increased nausea, abdominal cramping associated with use of Zithromax.  This has improved since discontinuation of the antibiotic.  She has continued Omnicef as instructed.  She reports bowel movements are now normal.  No blood in stool.  No fever.  No further cough, DZOE, wheeze, hemoptysis or chest pain.    Chest x-ray at time of discharge showed persistent subtle right upper lobe airspace disease.    Labs at time of discharge showed CMP with BUN 10, creatinine 0.92 otherwise normal, lipase 182, troponin negative, CBC with hemoglobin hematocrit 11.0/35.3 respectively.  This is improved significantly from time of admission.  Respiratory culture showed light respiratory felicita, no staph aureus or MRSA.  Blood cultures negative.  Influenza screen was negative, lactic acid levels normal.    The following portions of the  patient's history were reviewed and updated as appropriate: allergies, current medications, past family history, past medical history, past social history, past surgical history and problem list.    Review of Systems   Constitutional: Positive for fatigue. Negative for appetite change, chills, fever and unexpected weight change.   HENT: Negative for congestion, ear pain, hearing loss, nosebleeds, rhinorrhea, sneezing, sore throat, tinnitus and trouble swallowing.    Eyes: Negative for pain, discharge, redness and visual disturbance.   Respiratory: Negative for cough, chest tightness, shortness of breath and wheezing.    Cardiovascular: Positive for palpitations (when anxious). Negative for chest pain and leg swelling.   Gastrointestinal: Positive for nausea. Negative for abdominal pain, blood in stool, constipation, diarrhea and vomiting.   Endocrine: Positive for heat intolerance. Negative for polydipsia and polyuria.        Hot flashes   Genitourinary: Negative for dysuria, flank pain, frequency, hematuria, pelvic pain, urgency and vaginal bleeding.   Musculoskeletal: Positive for arthralgias, back pain and myalgias (located in shins, chronic for years, even as child). Negative for joint swelling and neck pain.   Skin: Negative for rash and wound.   Neurological: Positive for dizziness (occ), tremors and weakness (generalized). Negative for seizures, syncope, speech difficulty, numbness and headaches.   Hematological: Negative for adenopathy. Does not bruise/bleed easily.   Psychiatric/Behavioral: Positive for sleep disturbance (due to pain). Negative for confusion, dysphoric mood and suicidal ideas. The patient is nervous/anxious.        Objective    Vitals:    01/09/19 0834   BP: 118/76   Pulse: 78   SpO2: 96%     Body mass index is 29.91 kg/m².      01/09/19  0834   Weight: 86.6 kg (191 lb)       Physical Exam   Constitutional: She is oriented to person, place, and time. She appears well-developed and  well-nourished. She is cooperative. She does not appear ill. No distress.   Obese    HENT:   Head: Normocephalic and atraumatic.   Mouth/Throat: Oropharynx is clear and moist and mucous membranes are normal. Mucous membranes are not dry. No oral lesions.   Eyes: Conjunctivae and lids are normal.   Cardiovascular: Normal rate, regular rhythm and intact distal pulses. Exam reveals distant heart sounds. Exam reveals no gallop.   No murmur heard.  Pulmonary/Chest: Effort normal and breath sounds normal.   Abdominal: Soft. Bowel sounds are normal. She exhibits no distension and no mass. There is no hepatosplenomegaly. There is no tenderness.     Vascular Status -  Her right foot exhibits no edema. Her left foot exhibits no edema.  Neurological: She is alert and oriented to person, place, and time. She has normal strength. She displays tremor (generalized). Gait (antalgic, not using cane or walker today) abnormal.   Skin: Skin is warm and dry. No bruising and no rash noted.   Psychiatric: She has a normal mood and affect. Her behavior is normal. Cognition and memory are normal.   Good eye contact. Answers questions appropriately. Good personal hygiene and grooming.   Nursing note and vitals reviewed.    Assessment/Plan   Annie was seen today for pneumonia.    Diagnoses and all orders for this visit:    Community acquired pneumonia of right lower lobe of lung (CMS/HCC)  -     XR Chest PA & Lateral; Future    Nausea    Encounter for examination following treatment at hospital    Other orders  -     atorvastatin (LIPITOR) 40 MG tablet; Take 1 tablet by mouth Every Night.       Recent admission for community acquired pneumonia.  Clinically she appears much improved.  She will need a follow-up chest x-ray in 4-6 weeks to a short resolution.  She is clinically stable at this time.    GI symptoms with nausea, loose stools most likely secondary to antibiotics.  No further diarrhea and nausea is improving since discontinuation of  Zithromax.  She will keep me informed of any lack of complete resolution or acute worsening.    She is encouraged to keep her routine scheduled follow-up visit in 2 months, follow-up sooner as needed.    Patient was encouraged to keep me informed of any acute changes, lack of improvement, or any new concerning symptoms.  Pt is aware of reasons to seek emergent care.  Patient voiced understanding of all instructions and denied further questions.          Please note that portions of this note may have been completed with a voice recognition program. Efforts were made to edit the dictations, but occasionally words are mistranscribed.

## 2019-01-09 NOTE — TELEPHONE ENCOUNTER
Pt sts that she forgot to ask Dr Pimentel today in her visit if her n/v/d could be because she has diverticulitis again.  She said if Dr Pimentel thought that was the cause, what can she do for it?  Please advise.

## 2019-01-10 ENCOUNTER — OFFICE VISIT (OUTPATIENT)
Dept: ORTHOPEDIC SURGERY | Facility: CLINIC | Age: 74
End: 2019-01-10

## 2019-01-10 VITALS — HEIGHT: 67 IN | BODY MASS INDEX: 29.98 KG/M2 | RESPIRATION RATE: 18 BRPM | WEIGHT: 191 LBS

## 2019-01-10 DIAGNOSIS — S92.354A CLOSED NONDISPLACED FRACTURE OF FIFTH METATARSAL BONE OF RIGHT FOOT, INITIAL ENCOUNTER: Primary | ICD-10-CM

## 2019-01-10 PROCEDURE — 99213 OFFICE O/P EST LOW 20 MIN: CPT | Performed by: PODIATRIST

## 2019-01-11 PROBLEM — J18.9 COMMUNITY ACQUIRED PNEUMONIA OF RIGHT UPPER LOBE OF LUNG: Status: ACTIVE | Noted: 2019-01-11

## 2019-01-11 RX ORDER — ATORVASTATIN CALCIUM 40 MG/1
40 TABLET, FILM COATED ORAL NIGHTLY
Qty: 90 TABLET | Refills: 3 | Status: SHIPPED | OUTPATIENT
Start: 2019-01-11 | End: 2020-01-15

## 2019-01-17 ENCOUNTER — OFFICE VISIT (OUTPATIENT)
Dept: FAMILY MEDICINE CLINIC | Facility: CLINIC | Age: 74
End: 2019-01-17

## 2019-01-17 VITALS
OXYGEN SATURATION: 98 % | HEART RATE: 72 BPM | WEIGHT: 195 LBS | DIASTOLIC BLOOD PRESSURE: 76 MMHG | HEIGHT: 67 IN | BODY MASS INDEX: 30.61 KG/M2 | SYSTOLIC BLOOD PRESSURE: 124 MMHG

## 2019-01-17 DIAGNOSIS — B37.0 THRUSH: ICD-10-CM

## 2019-01-17 DIAGNOSIS — M25.511 CHRONIC RIGHT SHOULDER PAIN: Primary | ICD-10-CM

## 2019-01-17 DIAGNOSIS — G89.29 CHRONIC RIGHT SHOULDER PAIN: Primary | ICD-10-CM

## 2019-01-17 PROCEDURE — 99214 OFFICE O/P EST MOD 30 MIN: CPT | Performed by: FAMILY MEDICINE

## 2019-01-17 RX ORDER — TIZANIDINE 2 MG/1
2 TABLET ORAL EVERY 8 HOURS PRN
Qty: 30 TABLET | Refills: 1 | Status: SHIPPED | OUTPATIENT
Start: 2019-01-17 | End: 2019-11-25

## 2019-01-17 NOTE — PROGRESS NOTES
"Subjective   Annie Mejia is a 73 y.o. female.     History of Present Illness  Mrs. Mejia presents with her  with 2 concerns.    Since being on her antibiotics for recent pneumonia she has developed a swollen, painful tongue as well as generally sensitive \"lining of the mouth\".  Intermittent white patches although not severe.  She denies any trouble swallowing.  No fever.  She is breathing better, no residual cough.  No fever or hemoptysis.  No chest pain.    She completes of worsening right shoulder pain. assoc'd with spasm, tight muscle. She has had this off and on in past but states it has been more severe over the last 3-4 days.  No change in activity, fall or injury to the area.  She is right-hand dominant.  Pain is aching/burning.  Aggravated with increased use of the right shoulder, reaching overhead, pushing/pull.  No weakness or numbness in the right arm.  She is followed by Dr. Ibrahim for severe diffuse degenerative disc disease/DJD.  She is unsure of any specific abnormalities in the thoracic area.    The following portions of the patient's history were reviewed and updated as appropriate: allergies, current medications, past family history, past medical history, past social history, past surgical history and problem list.    Review of Systems   Constitutional: Positive for fatigue. Negative for appetite change, chills, fever and unexpected weight change.   HENT: Positive for mouth sores. Negative for congestion, ear pain, hearing loss, nosebleeds, rhinorrhea, sneezing, sore throat, tinnitus and trouble swallowing.    Eyes: Negative for pain, discharge, redness and visual disturbance.   Respiratory: Negative for cough, chest tightness, shortness of breath and wheezing.    Cardiovascular: Positive for palpitations (when anxious). Negative for chest pain and leg swelling.   Gastrointestinal: Positive for nausea. Negative for abdominal pain, blood in stool, constipation, diarrhea and vomiting. "   Endocrine: Positive for heat intolerance. Negative for polydipsia and polyuria.        Hot flashes   Genitourinary: Negative for dysuria, flank pain, frequency, hematuria, pelvic pain, urgency and vaginal bleeding.   Musculoskeletal: Positive for arthralgias, back pain and myalgias (located in shins, chronic for years, even as child). Negative for joint swelling and neck pain.   Skin: Negative for rash and wound.   Neurological: Positive for dizziness (occ), tremors and weakness (generalized). Negative for seizures, syncope, speech difficulty, numbness and headaches.   Hematological: Negative for adenopathy. Does not bruise/bleed easily.   Psychiatric/Behavioral: Positive for sleep disturbance (due to pain). Negative for confusion, dysphoric mood and suicidal ideas. The patient is nervous/anxious.        Objective    Vitals:    01/17/19 0848   BP: 124/76   Pulse: 72   SpO2: 98%     Body mass index is 30.54 kg/m².      01/17/19  0848   Weight: 88.5 kg (195 lb)       Physical Exam   Constitutional: She is oriented to person, place, and time. She appears well-developed and well-nourished. She does not appear ill. No distress.   HENT:   Head: Normocephalic and atraumatic.   Mouth/Throat: Mucous membranes are not dry. Oral lesions (generally inflamed tongue, buccal mucosa with scattered white plaque, has angular chelitis as well) present. Posterior oropharyngeal erythema present. No oropharyngeal exudate or posterior oropharyngeal edema.   Eyes: Conjunctivae and lids are normal.   Cardiovascular: Normal rate, regular rhythm and intact distal pulses.   Pulmonary/Chest: Effort normal and breath sounds normal.   Musculoskeletal:        Right shoulder: She exhibits decreased range of motion (only mildly limited flexion, abduction), tenderness (diffuse soft tissue as demarcated) and spasm. She exhibits no bony tenderness, no crepitus, no deformity, normal pulse and normal strength.        Arms:    Vascular Status -  Her  right foot exhibits no edema. Her left foot exhibits no edema.  Lymphadenopathy:     She has no cervical adenopathy.   Neurological: She is alert and oriented to person, place, and time.   Skin: Skin is warm and dry. No rash noted.   Psychiatric: She has a normal mood and affect. Her behavior is normal. Cognition and memory are normal.   Nursing note and vitals reviewed.      Assessment/Plan   Annie was seen today for oral pain and shoulder pain.    Diagnoses and all orders for this visit:    Chronic right shoulder pain    Thrush    Other orders  -     tiZANidine (ZANAFLEX) 2 MG tablet; Take 1 tablet by mouth Every 8 (Eight) Hours As Needed for Muscle Spasms.  -     nystatin (MYCOSTATIN) 173036 UNIT/ML suspension; Swish and swallow 5 mL 4 (Four) Times a Day.       She has pain/spasm and upper trapezius as well as deltoid muscles.  Likely and overuse/misuse injury I reviewed conservative management including ice/heat, gentle range of motions/stretching exercises.  Due to severity of spasm have prescribed low-dose tizanidine with discussion of her/benefits and potential side effects to which she voiced understanding.  She is also encouraged to follow-up with orthopedics as scheduled as DDD/DJD may play role.    Symptomatic management of thrush reviewed.  Nystatin swish and swallow as above.    Keep routine follow-up appointment in approximately 6 weeks as scheduled, sooner as needed.  Patient was encouraged to keep me informed of any acute changes, lack of improvement, or any new concerning symptoms.  Pt is aware of reasons to seek emergent care.  Patient voiced understanding of all instructions and denied further questions.        Please note that portions of this note may have been completed with a voice recognition program. Efforts were made to edit the dictations, but occasionally words are mistranscribed.

## 2019-01-17 NOTE — PATIENT INSTRUCTIONS
Oral Thrush, Adult  Oral thrush, also called oral candidiasis, is a fungal infection that develops in the mouth and throat and on the tongue. It causes white patches to form on the mouth and tongue. Thrush is most common in older adults, but it can occur at any age.  Many cases of thrush are mild, but this infection can also be serious. Thrush can be a repeated (recurrent) problem for certain people who have a weak body defense system (immune system). The weakness can be caused by chronic illnesses, or by taking medicines that limit the body's ability to fight infection. If a person has difficulty fighting infection, the fungus that causes thrush can spread through the body. This can cause life-threatening blood or organ infections.  What are the causes?  This condition is caused by a fungus (yeast) called Candida albicans.  · This fungus is normally present in small amounts in the mouth and on other mucous membranes. It usually causes no harm.  · If conditions are present that allow the fungus to grow without control, it invades surrounding tissues and becomes an infection.  · Other Candida species can also lead to thrush (rare).    What increases the risk?  This condition is more likely to develop in:  · People with a weakened immune system.  · Older adults.  · People with HIV (human immunodeficiency virus).  · People with diabetes.  · People with dry mouth (xerostomia).  · Pregnant women.  · People with poor dental care, especially people who have false teeth.  · People who use antibiotic medicines.    What are the signs or symptoms?  Symptoms of this condition can vary from mild and moderate to severe and persistent. Symptoms may include:  · A burning feeling in the mouth and throat. This can occur at the start of a thrush infection.  · White patches that stick to the mouth and tongue. The tissue around the patches may be red, raw, and painful. If rubbed (during tooth brushing, for example), the patches and the  tissue of the mouth may bleed easily.  · A bad taste in the mouth or difficulty tasting foods.  · A cottony feeling in the mouth.  · Pain during eating and swallowing.  · Poor appetite.  · Cracking at the corners of the mouth.    How is this diagnosed?  This condition is diagnosed based on:  · Physical exam. Your health care provider will look in your mouth.  · Health history. Your health care provider will ask you questions about your health.    How is this treated?  This condition is treated with medicines called antifungals, which prevent the growth of fungi. These medicines are either applied directly to the affected area (topical) or swallowed (oral). The treatment will depend on the severity of the condition.  Mild thrush  Mild cases of thrush may clear up with the use of an antifungal mouth rinse or lozenges. Treatment usually lasts about 14 days.  Moderate to severe thrush  · More severe thrush infections that have spread to the esophagus are treated with an oral antifungal medicine. A topical antifungal medicine may also be used.  · For some severe infections, treatment may need to continue for more than 14 days.  · Oral antifungal medicines are rarely used during pregnancy because they may be harmful to the unborn child. If you are pregnant, talk with your health care provider about options for treatment.  Persistent or recurrent thrush  For cases of thrush that do not go away or keep coming back:  · Treatment may be needed twice as long as the symptoms last.  · Treatment will include both oral and topical antifungal medicines.  · People with a weakened immune system can take an antifungal medicine on a continuous basis to prevent thrush infections.    It is important to treat conditions that make a person more likely to get thrush, such as diabetes or HIV.  Follow these instructions at home:  Medicines  · Take over-the-counter and prescription medicines only as told by your health care provider.  · Talk  with your health care provider about an over-the-counter medicine called gentian violet, which kills bacteria and fungi.  Relieving soreness and discomfort  To help reduce the discomfort of thrush:  · Drink cold liquids such as water or iced tea.  · Try flavored ice treats or frozen juices.  · Eat foods that are easy to swallow, such as gelatin, ice cream, or custard.  · Try drinking from a straw if the patches in your mouth are painful.    General instructions  · Eat plain, unflavored yogurt as directed by your health care provider. Check the label to make sure the yogurt contains live cultures. This yogurt can help healthy bacteria to grow in the mouth and can stop the growth of the fungus that causes thrush.  · If you wear dentures, remove the dentures before going to bed, brush them vigorously, and soak them in a cleaning solution as directed by your health care provider.  · Rinse your mouth with a warm salt-water mixture several times a day. To make a salt-water mixture, completely dissolve 1/2-1 tsp of salt in 1 cup of warm water.  Contact a health care provider if:  · Your symptoms are getting worse or are not improving within 7 days of starting treatment.  · You have symptoms of a spreading infection, such as white patches on the skin outside of the mouth.  This information is not intended to replace advice given to you by your health care provider. Make sure you discuss any questions you have with your health care provider.  Document Released: 09/12/2005 Document Revised: 09/11/2017 Document Reviewed: 09/11/2017  [employer] Patient Education © 2017 [employer]

## 2019-01-25 ENCOUNTER — TELEPHONE (OUTPATIENT)
Dept: FAMILY MEDICINE CLINIC | Facility: CLINIC | Age: 74
End: 2019-01-25

## 2019-02-08 ENCOUNTER — TELEPHONE (OUTPATIENT)
Dept: FAMILY MEDICINE CLINIC | Facility: CLINIC | Age: 74
End: 2019-02-08

## 2019-02-08 DIAGNOSIS — F41.8 MIXED ANXIETY DEPRESSIVE DISORDER: ICD-10-CM

## 2019-02-08 DIAGNOSIS — S92.354A CLOSED NONDISPLACED FRACTURE OF FIFTH METATARSAL BONE OF RIGHT FOOT, INITIAL ENCOUNTER: Primary | ICD-10-CM

## 2019-02-08 RX ORDER — ALPRAZOLAM 0.25 MG/1
TABLET ORAL
Qty: 90 TABLET | Refills: 0 | Status: SHIPPED | OUTPATIENT
Start: 2019-02-08 | End: 2019-04-16 | Stop reason: SDUPTHER

## 2019-02-11 ENCOUNTER — OFFICE VISIT (OUTPATIENT)
Dept: ORTHOPEDIC SURGERY | Facility: CLINIC | Age: 74
End: 2019-02-11

## 2019-02-11 VITALS — BODY MASS INDEX: 30.61 KG/M2 | RESPIRATION RATE: 18 BRPM | WEIGHT: 195 LBS | HEIGHT: 67 IN

## 2019-02-11 DIAGNOSIS — S92.354A CLOSED NONDISPLACED FRACTURE OF FIFTH METATARSAL BONE OF RIGHT FOOT, INITIAL ENCOUNTER: Primary | ICD-10-CM

## 2019-02-11 DIAGNOSIS — S92.901K NONUNION OF FOOT FRACTURE, RIGHT: ICD-10-CM

## 2019-02-11 PROCEDURE — 99213 OFFICE O/P EST LOW 20 MIN: CPT | Performed by: PODIATRIST
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
## 2019-02-21 ENCOUNTER — TELEPHONE (OUTPATIENT)
Dept: FAMILY MEDICINE CLINIC | Facility: CLINIC | Age: 74
End: 2019-02-21

## 2019-03-06 ENCOUNTER — OFFICE VISIT (OUTPATIENT)
Dept: FAMILY MEDICINE CLINIC | Facility: CLINIC | Age: 74
End: 2019-03-06

## 2019-03-06 VITALS
BODY MASS INDEX: 30.85 KG/M2 | DIASTOLIC BLOOD PRESSURE: 78 MMHG | SYSTOLIC BLOOD PRESSURE: 122 MMHG | WEIGHT: 197 LBS | OXYGEN SATURATION: 97 % | HEART RATE: 87 BPM

## 2019-03-06 DIAGNOSIS — R93.89 ABNORMAL CHEST X-RAY: ICD-10-CM

## 2019-03-06 DIAGNOSIS — K21.00 GASTROESOPHAGEAL REFLUX DISEASE WITH ESOPHAGITIS: ICD-10-CM

## 2019-03-06 DIAGNOSIS — G89.4 CHRONIC PAIN SYNDROME: ICD-10-CM

## 2019-03-06 DIAGNOSIS — E11.9 WELL CONTROLLED DIABETES MELLITUS (HCC): ICD-10-CM

## 2019-03-06 DIAGNOSIS — E78.2 MIXED HYPERLIPIDEMIA: ICD-10-CM

## 2019-03-06 DIAGNOSIS — I73.9 PERIPHERAL VASCULAR DISEASE (HCC): ICD-10-CM

## 2019-03-06 DIAGNOSIS — I10 ESSENTIAL HYPERTENSION: Primary | ICD-10-CM

## 2019-03-06 DIAGNOSIS — F41.8 MIXED ANXIETY DEPRESSIVE DISORDER: ICD-10-CM

## 2019-03-06 LAB
EXPIRATION DATE: ABNORMAL
GLUCOSE BLDC GLUCOMTR-MCNC: 138 MG/DL (ref 70–130)
HBA1C MFR BLD: 6.7 %
Lab: ABNORMAL

## 2019-03-06 PROCEDURE — 83036 HEMOGLOBIN GLYCOSYLATED A1C: CPT | Performed by: FAMILY MEDICINE

## 2019-03-06 PROCEDURE — 82962 GLUCOSE BLOOD TEST: CPT | Performed by: FAMILY MEDICINE

## 2019-03-06 PROCEDURE — 99214 OFFICE O/P EST MOD 30 MIN: CPT | Performed by: FAMILY MEDICINE

## 2019-03-06 NOTE — PROGRESS NOTES
"Subjective   Annie Mejia is a 73 y.o. female.     History of Present Illness  Mrs. Mejia presents today for routine f/u.    She has chronic pain due to diffuse OA, DDD. Since last visit she has had good improvement in back/hip pain. More mobile. Using opioid analgesic every few days only. No falls, injuries.     She has controlled type 2 DM for which she is not requiring medication at this time. BG rarely over 180. Fair job of following diabetic diet. Minimal exercise.     She has comorbid HTN, HLP, PAD. Taking meds as rx'd including ASA, statin, ACE-inh. Denies CP, SOA, increased swelling. No claudication.     Has mild renal insufficiency which has been stable. Limiting NSAIDs.    She has GERD with previous esophagitis. Symptoms well controlled currently on omeprazole 20 mg and carafate as needed. No dysphagia, weight loss, blood in stool.     She has anxiety with depression for which she is currently on very low dose xanax, zoloft. No ER visits for anxiety in few months. Denies worsening depression, no SI. Denies side effects from meds. She does endorse worsened sleep pattern over several weeks. Using melatonin with minimal improvement. She tried an over the counter sleep aid which helped \"for a couple hours\". She has also been taking Legatrin at night time for years for leg pain/RLSx symptoms (tylenol and antihistamine).    The following portions of the patient's history were reviewed and updated as appropriate: allergies, current medications, past family history, past medical history, past social history, past surgical history and problem list.    Review of Systems   Constitutional: Positive for fatigue. Negative for appetite change, fever and unexpected weight change.   HENT: Negative for congestion, rhinorrhea, sore throat and trouble swallowing.    Eyes: Negative for visual disturbance.   Respiratory: Negative for cough, shortness of breath and wheezing.    Cardiovascular: Positive for palpitations (when " anxious). Negative for chest pain and leg swelling.   Gastrointestinal: Positive for nausea. Negative for abdominal pain, blood in stool, constipation, diarrhea and vomiting.   Endocrine: Positive for heat intolerance. Negative for polydipsia and polyuria.        Hot flashes   Genitourinary: Negative for dysuria, hematuria and vaginal bleeding.   Musculoskeletal: Positive for arthralgias, back pain and myalgias (located in shins, chronic for years, even as child). Negative for joint swelling and neck pain.   Skin: Negative for rash and wound.   Neurological: Positive for dizziness (occ), tremors and weakness (generalized). Negative for syncope, speech difficulty and headaches.   Hematological: Negative for adenopathy. Does not bruise/bleed easily.   Psychiatric/Behavioral: Positive for sleep disturbance (due to pain). Negative for confusion and dysphoric mood. The patient is nervous/anxious.        Objective    Vitals:    03/06/19 0927   BP: 122/78   Pulse: 87   SpO2: 97%     Body mass index is 30.85 kg/m².      03/06/19 0927   Weight: 89.4 kg (197 lb)       Physical Exam   Constitutional: She is oriented to person, place, and time. She appears well-developed and well-nourished. She is cooperative. She does not appear ill. No distress.   Obese    HENT:   Head: Normocephalic and atraumatic.   Mouth/Throat: Oropharynx is clear and moist and mucous membranes are normal. Mucous membranes are not dry. No oral lesions.   Eyes: Conjunctivae and lids are normal.   Neck: Phonation normal. Neck supple. Normal carotid pulses present. No thyromegaly present.   Cardiovascular: Normal rate, regular rhythm and intact distal pulses.  No extrasystoles are present. Exam reveals distant heart sounds. Exam reveals no gallop.   No murmur heard.  Pulmonary/Chest: Effort normal and breath sounds normal.     Vascular Status -  Her right foot exhibits no edema. Her left foot exhibits no edema.  Lymphadenopathy:     She has no cervical  adenopathy.   Neurological: She is alert and oriented to person, place, and time. She has normal strength. She displays no tremor. Gait (antalgic, not using cane or walker today) abnormal.   Skin: Skin is warm and dry. No bruising and no rash noted.   Psychiatric: She has a normal mood and affect. Her behavior is normal. Cognition and memory are normal.   Good eye contact. Answers questions appropriately. Good personal hygiene and grooming.   Nursing note and vitals reviewed.    Lab Results   Component Value Date    HGBA1C 6.7 03/06/2019     Lab Results   Component Value Date    WBC 5.25 11/13/2018    HGB 12.7 11/13/2018    HCT 40.2 11/13/2018    MCV 92.8 11/13/2018     11/13/2018     Lab Results   Component Value Date    GLUCOSE 126 (H) 11/13/2018    BUN 21 (H) 11/13/2018    CREATININE 1.20 11/13/2018    EGFRIFNONA 44 (L) 11/13/2018    EGFRIFAFRI 66 08/30/2018    BCR 17.5 11/13/2018    K 4.4 11/13/2018    CO2 31.0 (H) 11/13/2018    CALCIUM 10.2 11/13/2018    PROTENTOTREF 7.2 08/30/2018    ALBUMIN 4.40 11/13/2018    LABIL2 1.7 08/30/2018    AST 30 11/13/2018    ALT 35 11/13/2018     Lab Results   Component Value Date    CHLPL 150 03/02/2018    TRIG 151 (H) 03/02/2018    HDL 52 03/02/2018    LDL 68 03/02/2018     Lab Results   Component Value Date    TSH 2.11 03/26/2018     Assessment/Plan   Annie was seen today for diabetes, hyperlipidemia and hypertension.    Diagnoses and all orders for this visit:    Essential hypertension    Mixed hyperlipidemia    Peripheral vascular disease (CMS/HCC)    Well controlled diabetes mellitus (CMS/HCC)  -     POC Glycated Hemoglobin, Total  -     POC Glucose    Mixed anxiety depressive disorder    Chronic pain syndrome    Gastroesophageal reflux disease with esophagitis    Abnormal chest x-ray  -     XR Chest PA & Lateral; Future  -     XR Chest PA & Lateral       HTN controlled. Cont zestoretic.  HLP near goal. Continue statin, ASA.  NIDDM controlled without meds at this  time. Patient encouraged to follow diabetic appropriate diet, exercise daily, perform feet check daily, and have yearly diabetic eye exams.  PAD without symptoms of critical ischemia. Continue statin, ASA.  Anx/depression generally stable with worsening sleep quality. Advised to d/c Legatrin, continue melatonin. Sleep hygiene reviewed.  Stable chronic pain.  GERD stable, continue ppi as needed.  F/u chest xray due to insure resolution of CAP end of 2018.  Routine f/u in 3 months, sooner as needed.  Patient was encouraged to keep me informed of any acute changes, lack of improvement, or any new concerning symptoms.  Pt is aware of reasons to seek emergent care.  Patient voiced understanding of all instructions and denied further questions.
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
## 2019-03-25 DIAGNOSIS — S92.354A CLOSED NONDISPLACED FRACTURE OF FIFTH METATARSAL BONE OF RIGHT FOOT, INITIAL ENCOUNTER: Primary | ICD-10-CM

## 2019-03-26 ENCOUNTER — OFFICE VISIT (OUTPATIENT)
Dept: ORTHOPEDIC SURGERY | Facility: CLINIC | Age: 74
End: 2019-03-26

## 2019-03-26 VITALS — BODY MASS INDEX: 31.23 KG/M2 | RESPIRATION RATE: 18 BRPM | HEIGHT: 67 IN | WEIGHT: 199 LBS

## 2019-03-26 DIAGNOSIS — S92.901K NONUNION OF FOOT FRACTURE, RIGHT: ICD-10-CM

## 2019-03-26 DIAGNOSIS — S92.354A CLOSED NONDISPLACED FRACTURE OF FIFTH METATARSAL BONE OF RIGHT FOOT, INITIAL ENCOUNTER: ICD-10-CM

## 2019-03-26 DIAGNOSIS — M25.571 ARTHRALGIA OF RIGHT FOOT: Primary | ICD-10-CM

## 2019-03-26 PROCEDURE — 99213 OFFICE O/P EST LOW 20 MIN: CPT | Performed by: PODIATRIST
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
## 2019-04-11 RX ORDER — SODIUM, POTASSIUM,MAG SULFATES 17.5-3.13G
SOLUTION, RECONSTITUTED, ORAL ORAL
Qty: 2 BOTTLE | Refills: 0 | Status: SHIPPED | OUTPATIENT
Start: 2019-04-11 | End: 2019-06-03

## 2019-04-16 DIAGNOSIS — F41.8 MIXED ANXIETY DEPRESSIVE DISORDER: ICD-10-CM

## 2019-04-16 RX ORDER — ALPRAZOLAM 0.25 MG/1
TABLET ORAL
Qty: 90 TABLET | Refills: 0 | Status: SHIPPED | OUTPATIENT
Start: 2019-04-16 | End: 2019-07-11 | Stop reason: SDUPTHER

## 2019-04-18 ENCOUNTER — LAB REQUISITION (OUTPATIENT)
Dept: LAB | Facility: HOSPITAL | Age: 74
End: 2019-04-18

## 2019-04-18 ENCOUNTER — OUTSIDE FACILITY SERVICE (OUTPATIENT)
Dept: GASTROENTEROLOGY | Facility: CLINIC | Age: 74
End: 2019-04-18

## 2019-04-18 DIAGNOSIS — Z12.11 ENCOUNTER FOR SCREENING FOR MALIGNANT NEOPLASM OF COLON: ICD-10-CM

## 2019-04-18 PROCEDURE — 45385 COLONOSCOPY W/LESION REMOVAL: CPT | Performed by: INTERNAL MEDICINE

## 2019-04-18 PROCEDURE — 88305 TISSUE EXAM BY PATHOLOGIST: CPT | Performed by: INTERNAL MEDICINE

## 2019-04-19 ENCOUNTER — TELEPHONE (OUTPATIENT)
Dept: FAMILY MEDICINE CLINIC | Facility: CLINIC | Age: 74
End: 2019-04-19

## 2019-04-19 LAB
CYTO UR: NORMAL
LAB AP CASE REPORT: NORMAL
LAB AP CLINICAL INFORMATION: NORMAL
PATH REPORT.FINAL DX SPEC: NORMAL
PATH REPORT.GROSS SPEC: NORMAL

## 2019-04-19 NOTE — TELEPHONE ENCOUNTER
Confirmed with [facility] they received it on 4/16/19 and had it on hold and would get it ready for her.  I informed patient.

## 2019-04-19 NOTE — TELEPHONE ENCOUNTER
Pt called sts she went to pharmacy and alprazolam was not sent. Needs rx resent to [facility].

## 2019-05-03 DIAGNOSIS — M25.571 ARTHRALGIA OF RIGHT FOOT: Primary | ICD-10-CM

## 2019-05-06 ENCOUNTER — OFFICE VISIT (OUTPATIENT)
Dept: ORTHOPEDIC SURGERY | Facility: CLINIC | Age: 74
End: 2019-05-06

## 2019-05-06 VITALS — RESPIRATION RATE: 18 BRPM | WEIGHT: 199 LBS | HEIGHT: 67 IN | BODY MASS INDEX: 31.23 KG/M2

## 2019-05-06 DIAGNOSIS — M25.571 ARTHRALGIA OF RIGHT FOOT: ICD-10-CM

## 2019-05-06 DIAGNOSIS — S92.354A CLOSED NONDISPLACED FRACTURE OF FIFTH METATARSAL BONE OF RIGHT FOOT, INITIAL ENCOUNTER: Primary | ICD-10-CM

## 2019-05-06 DIAGNOSIS — M47.818 ARTHRITIS OF SACROILIAC JOINT: ICD-10-CM

## 2019-05-06 DIAGNOSIS — S92.901K NONUNION OF FOOT FRACTURE, RIGHT: ICD-10-CM

## 2019-05-06 PROCEDURE — 99213 OFFICE O/P EST LOW 20 MIN: CPT | Performed by: PODIATRIST
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
## 2019-05-10 ENCOUNTER — TELEPHONE (OUTPATIENT)
Dept: FAMILY MEDICINE CLINIC | Facility: CLINIC | Age: 74
End: 2019-05-10

## 2019-05-10 DIAGNOSIS — M19.90 ARTHRITIS: ICD-10-CM

## 2019-05-10 RX ORDER — HYDROCODONE BITARTRATE AND ACETAMINOPHEN 7.5; 325 MG/1; MG/1
1 TABLET ORAL EVERY 6 HOURS PRN
Qty: 120 TABLET | Refills: 0 | Status: SHIPPED | OUTPATIENT
Start: 2019-05-10 | End: 2020-08-13

## 2019-05-20 DIAGNOSIS — R42 VERTIGO: ICD-10-CM

## 2019-05-20 RX ORDER — MECLIZINE HYDROCHLORIDE 25 MG/1
TABLET ORAL
Qty: 30 TABLET | Refills: 0 | Status: SHIPPED | OUTPATIENT
Start: 2019-05-20 | End: 2022-03-17 | Stop reason: SDUPTHER

## 2019-06-03 ENCOUNTER — OFFICE VISIT (OUTPATIENT)
Dept: FAMILY MEDICINE CLINIC | Facility: CLINIC | Age: 74
End: 2019-06-03

## 2019-06-03 VITALS
WEIGHT: 201 LBS | HEIGHT: 67 IN | OXYGEN SATURATION: 97 % | SYSTOLIC BLOOD PRESSURE: 122 MMHG | BODY MASS INDEX: 31.55 KG/M2 | HEART RATE: 96 BPM | DIASTOLIC BLOOD PRESSURE: 80 MMHG

## 2019-06-03 DIAGNOSIS — I10 ESSENTIAL HYPERTENSION: ICD-10-CM

## 2019-06-03 DIAGNOSIS — D64.9 CHRONIC ANEMIA: ICD-10-CM

## 2019-06-03 DIAGNOSIS — K21.00 GASTROESOPHAGEAL REFLUX DISEASE WITH ESOPHAGITIS: ICD-10-CM

## 2019-06-03 DIAGNOSIS — E11.9 WELL CONTROLLED DIABETES MELLITUS (HCC): Primary | ICD-10-CM

## 2019-06-03 DIAGNOSIS — N28.9 RENAL INSUFFICIENCY: ICD-10-CM

## 2019-06-03 DIAGNOSIS — E78.2 MIXED HYPERLIPIDEMIA: ICD-10-CM

## 2019-06-03 DIAGNOSIS — G89.4 CHRONIC PAIN SYNDROME: ICD-10-CM

## 2019-06-03 DIAGNOSIS — F41.8 MIXED ANXIETY DEPRESSIVE DISORDER: ICD-10-CM

## 2019-06-03 DIAGNOSIS — I95.1 ORTHOSTASIS: ICD-10-CM

## 2019-06-03 PROCEDURE — 99214 OFFICE O/P EST MOD 30 MIN: CPT | Performed by: FAMILY MEDICINE

## 2019-06-03 NOTE — PROGRESS NOTES
"Subjective   Annie Mejia is a 73 y.o. female.     History of Present Illness  Mrs. Mejia presents today for routine f/u.     She has chronic pain due to diffuse OA, DDD. No recent exacerbations in pain. no injury. Using opioid analgesic very intermittently.      She has controlled type 2 DM for which she is not requiring medication at this time. BG never over 200. Fair job of following diabetic diet. Minimal exercise.     She has comorbid HTN, HLP, PAD. Taking meds as rx'd including ASA, statin, ACE-inh. Denies CP, SOA, increased swelling. No claudication.     Has mild renal insufficiency which has been stable. Limiting NSAIDs.     She has GERD with previous esophagitis. Symptoms well controlled currently on omeprazole 20 mg and carafate as needed. No dysphagia, weight loss, blood in stool.     She has anxiety with depression for which she is currently on very low dose xanax, along with daily zoloft. No ER visits for anxiety in several months. Denies worsening depression, no SI. Denies side effects from meds.     Does c/o dizziness if she \"stands up too fast\". Staying well hydrated. Spinning sensation. Lasts few seconds. Worse x 2 weeks. No head injury.      The following portions of the patient's history were reviewed and updated as appropriate: allergies, current medications, past family history, past medical history, past social history, past surgical history and problem list.    Review of Systems   Constitutional: Positive for fatigue. Negative for fever and unexpected weight change.   HENT: Negative for congestion, rhinorrhea, sore throat and trouble swallowing.    Eyes: Negative for visual disturbance.   Respiratory: Negative for cough, shortness of breath and wheezing.    Cardiovascular: Positive for palpitations (when anxious). Negative for chest pain and leg swelling.   Gastrointestinal: Positive for nausea. Negative for abdominal pain, blood in stool, constipation, diarrhea and vomiting.   Endocrine: " Positive for heat intolerance. Negative for polydipsia and polyuria.        Hot flashes   Genitourinary: Negative for dysuria and hematuria.   Musculoskeletal: Positive for arthralgias, back pain, gait problem and myalgias (located in shins, chronic for years, even as child). Negative for joint swelling.   Skin: Negative for rash and wound.   Neurological: Positive for dizziness (occ), tremors and weakness (generalized). Negative for syncope, speech difficulty and headaches.   Hematological: Negative for adenopathy. Bruises/bleeds easily.   Psychiatric/Behavioral: Positive for sleep disturbance (due to pain). Negative for confusion and dysphoric mood. The patient is nervous/anxious.        Objective    Vitals:    06/03/19 0955   BP: 122/80   Pulse: 96   SpO2: 97%     Body mass index is 31.48 kg/m².      06/03/19  0955   Weight: 91.2 kg (201 lb)     On Orthostatic eval, BP drops to 90s when standing with HR >110.    Physical Exam   Constitutional: She is oriented to person, place, and time. She appears well-developed and well-nourished. She is cooperative. She does not appear ill. No distress.   Obese    HENT:   Head: Normocephalic and atraumatic.   Mouth/Throat: Mucous membranes are normal. Mucous membranes are not dry.   Eyes: Conjunctivae and lids are normal.   Neck: Phonation normal. Neck supple. Normal carotid pulses present. Carotid bruit is not present. No thyromegaly present.   Cardiovascular: Normal rate, regular rhythm and intact distal pulses. Exam reveals distant heart sounds. Exam reveals no gallop.   No murmur heard.  Pulmonary/Chest: Effort normal and breath sounds normal.     Vascular Status -  Her right foot exhibits no edema. Her left foot exhibits no edema.  Lymphadenopathy:     She has no cervical adenopathy.   Neurological: She is alert and oriented to person, place, and time. She has normal strength. She displays no tremor. No sensory deficit. Gait (antalgic, not using cane or walker today)  abnormal. Coordination normal.   Skin: Skin is warm and dry. No bruising and no rash noted.   Psychiatric: She has a normal mood and affect. Her speech is normal and behavior is normal. Judgment and thought content normal. Cognition and memory are normal.   Good eye contact. Answers questions appropriately. Good personal hygiene and grooming.   Nursing note and vitals reviewed.    Lab Results   Component Value Date    WBC 5.25 11/13/2018    HGB 12.7 11/13/2018    HCT 40.2 11/13/2018    MCV 92.8 11/13/2018     11/13/2018       Lab Results   Component Value Date    GLUCOSE 126 (H) 11/13/2018    BUN 21 (H) 11/13/2018    CREATININE 1.20 11/13/2018    EGFRIFNONA 44 (L) 11/13/2018    EGFRIFAFRI 66 08/30/2018    BCR 17.5 11/13/2018    K 4.4 11/13/2018    CO2 31.0 (H) 11/13/2018    CALCIUM 10.2 11/13/2018    PROTENTOTREF 7.2 08/30/2018    ALBUMIN 4.40 11/13/2018    LABIL2 1.7 08/30/2018    AST 30 11/13/2018    ALT 35 11/13/2018       Lab Results   Component Value Date    CHLPL 150 03/02/2018    TRIG 151 (H) 03/02/2018    HDL 52 03/02/2018    LDL 68 03/02/2018       Lab Results   Component Value Date    HGBA1C 6.7 03/06/2019       Lab Results   Component Value Date    TSH 2.11 03/26/2018     Assessment/Plan   Annie was seen today for anxiety, depression, diabetes, hyperlipidemia, hypertension and dizziness.    Diagnoses and all orders for this visit:    Well controlled diabetes mellitus (CMS/HCC)  -     Comprehensive Metabolic Panel; Future  -     Hemoglobin A1c; Future  -     Microalbumin / Creatinine Urine Ratio - Urine, Clean Catch; Future    Mixed hyperlipidemia  -     Comprehensive Metabolic Panel; Future  -     Lipid Panel; Future    Essential hypertension  -     CBC (No Diff); Future  -     Comprehensive Metabolic Panel; Future  -     Microalbumin / Creatinine Urine Ratio - Urine, Clean Catch; Future    Mixed anxiety depressive disorder    Renal insufficiency  -     Comprehensive Metabolic Panel;  Future    Orthostasis    Gastroesophageal reflux disease with esophagitis    Chronic pain syndrome    Chronic anemia  -     CBC (No Diff); Future       NIDDM controlled. Encouraged to follow diabetic appropriate diet, have yearly eye exams, perform daily foot checks.    HLP near goal. Continue statin.    HTN controlled. Mild orthostasis today. Plenty of fluids, fall precuations. Monitor BP at home.    Renal insufficiency stable. Avoid NSAIds.    GERD stable. Continue PPI    Stable/improved chronic pain. Continue opioid analgesic for severe pain only. Using very intermittently.    Moods stable. Continue zoloft and very low dose xanax for panic symptoms.    Routine f/u in 3 months, sooner as needed.  Return fasting for labs at earliest convenience.  Patient was encouraged to keep me informed of any acute changes, or any new concerning symptoms.  Pt is aware of reasons to seek emergent care.  Patient voiced understanding of all instructions and denied further questions.

## 2019-06-04 ENCOUNTER — RESULTS ENCOUNTER (OUTPATIENT)
Dept: FAMILY MEDICINE CLINIC | Facility: CLINIC | Age: 74
End: 2019-06-04

## 2019-06-04 DIAGNOSIS — E78.2 MIXED HYPERLIPIDEMIA: ICD-10-CM

## 2019-06-04 DIAGNOSIS — E11.9 WELL CONTROLLED DIABETES MELLITUS (HCC): ICD-10-CM

## 2019-06-04 DIAGNOSIS — D64.9 CHRONIC ANEMIA: ICD-10-CM

## 2019-06-04 DIAGNOSIS — N28.9 RENAL INSUFFICIENCY: ICD-10-CM

## 2019-06-04 DIAGNOSIS — I10 ESSENTIAL HYPERTENSION: ICD-10-CM

## 2019-06-04 PROBLEM — J18.9 COMMUNITY ACQUIRED PNEUMONIA OF RIGHT UPPER LOBE OF LUNG: Status: RESOLVED | Noted: 2019-01-11 | Resolved: 2019-06-04

## 2019-06-11 ENCOUNTER — OFFICE VISIT (OUTPATIENT)
Dept: ORTHOPEDIC SURGERY | Facility: CLINIC | Age: 74
End: 2019-06-11

## 2019-06-11 VITALS — WEIGHT: 174 LBS | HEIGHT: 67 IN | BODY MASS INDEX: 27.31 KG/M2 | RESPIRATION RATE: 18 BRPM

## 2019-06-11 DIAGNOSIS — M17.0 PRIMARY OSTEOARTHRITIS OF BOTH KNEES: ICD-10-CM

## 2019-06-11 DIAGNOSIS — M25.562 ARTHRALGIA OF BOTH KNEES: Primary | ICD-10-CM

## 2019-06-11 DIAGNOSIS — M25.561 ARTHRALGIA OF BOTH KNEES: Primary | ICD-10-CM

## 2019-06-11 PROCEDURE — 20610 DRAIN/INJ JOINT/BURSA W/O US: CPT | Performed by: ORTHOPAEDIC SURGERY

## 2019-06-11 PROCEDURE — 99213 OFFICE O/P EST LOW 20 MIN: CPT | Performed by: ORTHOPAEDIC SURGERY

## 2019-06-11 RX ORDER — LIDOCAINE HYDROCHLORIDE 10 MG/ML
2 INJECTION, SOLUTION INFILTRATION; PERINEURAL
Status: COMPLETED | OUTPATIENT
Start: 2019-06-11 | End: 2019-06-11

## 2019-06-11 RX ORDER — TRIAMCINOLONE ACETONIDE 40 MG/ML
40 INJECTION, SUSPENSION INTRA-ARTICULAR; INTRAMUSCULAR
Status: COMPLETED | OUTPATIENT
Start: 2019-06-11 | End: 2019-06-11

## 2019-06-11 RX ADMIN — LIDOCAINE HYDROCHLORIDE 2 ML: 10 INJECTION, SOLUTION INFILTRATION; PERINEURAL at 14:04

## 2019-06-11 RX ADMIN — LIDOCAINE HYDROCHLORIDE 2 ML: 10 INJECTION, SOLUTION INFILTRATION; PERINEURAL at 14:03

## 2019-06-11 RX ADMIN — TRIAMCINOLONE ACETONIDE 40 MG: 40 INJECTION, SUSPENSION INTRA-ARTICULAR; INTRAMUSCULAR at 14:04

## 2019-06-11 RX ADMIN — TRIAMCINOLONE ACETONIDE 40 MG: 40 INJECTION, SUSPENSION INTRA-ARTICULAR; INTRAMUSCULAR at 14:03
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
## 2019-06-17 DIAGNOSIS — I10 ESSENTIAL HYPERTENSION: ICD-10-CM

## 2019-06-17 DIAGNOSIS — F41.8 MIXED ANXIETY DEPRESSIVE DISORDER: ICD-10-CM

## 2019-06-18 RX ORDER — SERTRALINE HYDROCHLORIDE 100 MG/1
TABLET, FILM COATED ORAL
Qty: 90 TABLET | Refills: 1 | Status: SHIPPED | OUTPATIENT
Start: 2019-06-18 | End: 2019-09-06 | Stop reason: SDUPTHER

## 2019-06-18 RX ORDER — LISINOPRIL AND HYDROCHLOROTHIAZIDE 25; 20 MG/1; MG/1
1 TABLET ORAL
Qty: 90 TABLET | Refills: 3 | Status: SHIPPED | OUTPATIENT
Start: 2019-06-18 | End: 2019-09-06 | Stop reason: SDUPTHER

## 2019-07-11 DIAGNOSIS — F41.8 MIXED ANXIETY DEPRESSIVE DISORDER: ICD-10-CM

## 2019-07-11 RX ORDER — ALPRAZOLAM 0.25 MG/1
TABLET ORAL
Qty: 90 TABLET | Refills: 0 | Status: SHIPPED | OUTPATIENT
Start: 2019-07-11 | End: 2019-10-11 | Stop reason: SDUPTHER

## 2019-07-11 NOTE — TELEPHONE ENCOUNTER
LILIAM reviewed. Refill approved and printed for . Pt to have annual UDS at time of . Pt to keep f/u apt as scheduled.

## 2019-07-15 ENCOUNTER — CLINICAL SUPPORT (OUTPATIENT)
Dept: FAMILY MEDICINE CLINIC | Facility: CLINIC | Age: 74
End: 2019-07-15

## 2019-07-22 DIAGNOSIS — Z51.81 MEDICATION MONITORING ENCOUNTER: ICD-10-CM

## 2019-07-22 DIAGNOSIS — Z51.81 MEDICATION MONITORING ENCOUNTER: Primary | ICD-10-CM

## 2019-08-02 ENCOUNTER — TRANSCRIBE ORDERS (OUTPATIENT)
Dept: MRI IMAGING | Facility: HOSPITAL | Age: 74
End: 2019-08-02

## 2019-08-02 DIAGNOSIS — M96.1 POST LAMINECTOMY SYNDROME: Primary | ICD-10-CM

## 2019-08-12 ENCOUNTER — OFFICE VISIT (OUTPATIENT)
Dept: FAMILY MEDICINE CLINIC | Facility: CLINIC | Age: 74
End: 2019-08-12

## 2019-08-12 VITALS
HEART RATE: 76 BPM | OXYGEN SATURATION: 98 % | BODY MASS INDEX: 32.11 KG/M2 | SYSTOLIC BLOOD PRESSURE: 130 MMHG | WEIGHT: 205 LBS | DIASTOLIC BLOOD PRESSURE: 88 MMHG

## 2019-08-12 DIAGNOSIS — R05.9 COUGH: Primary | ICD-10-CM

## 2019-08-12 DIAGNOSIS — J02.9 SORE THROAT: ICD-10-CM

## 2019-08-12 PROBLEM — M47.817 LUMBOSACRAL SPONDYLOSIS WITHOUT MYELOPATHY: Status: ACTIVE | Noted: 2019-07-31

## 2019-08-12 PROBLEM — M51.9 INTERVERTEBRAL DISC DISORDER: Status: ACTIVE | Noted: 2019-07-31

## 2019-08-12 PROCEDURE — 99214 OFFICE O/P EST MOD 30 MIN: CPT | Performed by: FAMILY MEDICINE

## 2019-08-12 RX ORDER — PREDNISONE 10 MG/1
TABLET ORAL
Qty: 15 TABLET | Refills: 0 | Status: SHIPPED | OUTPATIENT
Start: 2019-08-12 | End: 2019-08-20

## 2019-08-12 RX ORDER — BENZONATATE 200 MG/1
200 CAPSULE ORAL 3 TIMES DAILY PRN
Qty: 30 CAPSULE | Refills: 0 | Status: SHIPPED | OUTPATIENT
Start: 2019-08-12 | End: 2019-11-25

## 2019-08-12 RX ORDER — CETIRIZINE HYDROCHLORIDE 10 MG/1
10 TABLET ORAL DAILY
Qty: 14 TABLET | Refills: 0 | Status: SHIPPED | OUTPATIENT
Start: 2019-08-12 | End: 2020-06-29

## 2019-08-12 NOTE — PROGRESS NOTES
"Subjective   Annie Mejia is a 73 y.o. female.     She c/o sore throat      Sore Throat    This is a new problem. The current episode started 1 to 4 weeks ago (2 weeks ago, noted after having a \"bad cold\"). The problem has been waxing and waning. The pain is worse on the left side. There has been no fever. The pain is mild. Associated symptoms include congestion, coughing (dry, triggered by \"tickle in throat\"), a hoarse voice (mildly), neck pain (mild, anterior) and trouble swallowing (occ, mild, chronic). Pertinent negatives include no abdominal pain, diarrhea, ear discharge, ear pain, headaches, shortness of breath, stridor, swollen glands or vomiting. Exposure to: no known ill contacts. Treatments tried: throat lozenges. The treatment provided moderate relief.     Mainly concerned due to upcoming neuro/ortho procedure and did not want to have \"residual infection\".    The following portions of the patient's history were reviewed and updated as appropriate: allergies, current medications, past family history, past medical history, past social history, past surgical history and problem list.    Review of Systems   Constitutional: Positive for fatigue. Negative for fever.   HENT: Positive for congestion, hoarse voice (mildly), sore throat and trouble swallowing (occ, mild, chronic). Negative for ear discharge, ear pain, mouth sores, nosebleeds, postnasal drip, rhinorrhea and sinus pain.    Eyes: Negative for pain, discharge, redness and visual disturbance.   Respiratory: Positive for cough (dry, triggered by \"tickle in throat\"). Negative for shortness of breath, wheezing and stridor.    Cardiovascular: Negative for chest pain.   Gastrointestinal: Negative for abdominal pain, diarrhea and vomiting.   Musculoskeletal: Positive for neck pain (mild, anterior).   Skin: Negative for rash.   Neurological: Negative for weakness and headaches.   Hematological: Negative for adenopathy.   Psychiatric/Behavioral: Negative for " confusion.       Objective    Vitals:    08/12/19 1009   BP: 130/88   Pulse: 76   SpO2: 98%     Body mass index is 32.11 kg/m².      08/12/19  1009   Weight: 93 kg (205 lb)       Physical Exam   Constitutional: She is oriented to person, place, and time. She appears well-developed and well-nourished. She is cooperative. She does not appear ill. No distress.   obese   HENT:   Head: Normocephalic and atraumatic.   Right Ear: Tympanic membrane, external ear and ear canal normal.   Left Ear: Tympanic membrane, external ear and ear canal normal.   Nose: Mucosal edema present. No rhinorrhea.   Mouth/Throat: Mucous membranes are normal. No oral lesions. Posterior oropharyngeal erythema (mildly) present. No oropharyngeal exudate.   Eyes: Conjunctivae and lids are normal.   Neck: Neck supple.   Cardiovascular: Normal rate, regular rhythm, normal heart sounds and intact distal pulses.   Pulmonary/Chest: Effort normal and breath sounds normal.     Vascular Status -  Her right foot exhibits no edema. Her left foot exhibits no edema.  Lymphadenopathy:        Head (right side): No submental, no submandibular and no tonsillar adenopathy present.        Head (left side): No submental, no submandibular and no tonsillar adenopathy present.     She has no cervical adenopathy.   Neurological: She is alert and oriented to person, place, and time.   Skin: Skin is warm and dry. No rash noted.   Psychiatric: She has a normal mood and affect. Her behavior is normal. Cognition and memory are normal.   Nursing note and vitals reviewed.    Lab Results   Component Value Date    WBC 5.25 11/13/2018    HGB 12.7 11/13/2018    HCT 40.2 11/13/2018    MCV 92.8 11/13/2018     11/13/2018     Lab Results   Component Value Date    GLUCOSE 126 (H) 11/13/2018    BUN 21 (H) 11/13/2018    CREATININE 1.20 11/13/2018    EGFRIFNONA 44 (L) 11/13/2018    EGFRIFAFRI 66 08/30/2018    BCR 17.5 11/13/2018    K 4.4 11/13/2018    CO2 31.0 (H) 11/13/2018     CALCIUM 10.2 11/13/2018    PROTENTOTREF 7.2 08/30/2018    ALBUMIN 4.40 11/13/2018    LABIL2 1.7 08/30/2018    AST 30 11/13/2018    ALT 35 11/13/2018       Assessment/Plan   Annie was seen today for cough and nasal congestion.    Diagnoses and all orders for this visit:    Cough    Sore throat    Other orders  -     predniSONE (DELTASONE) 10 MG tablet; 5 po day 1, then decrease by 1 tablet each day until gone (5,4,3,2,1)  -     cetirizine (zyrTEC) 10 MG tablet; Take 1 tablet by mouth Daily.  -     benzonatate (TESSALON) 200 MG capsule; Take 1 capsule by mouth 3 (Three) Times a Day As Needed for Cough.       Unclear etiology of persistent dry cough and sore throat following URI 2 weeks ago.   Suspect most likely due to allergic triggers, persistent postnasal drainage etc.  Do not feel antibiotics necessary at this time.  Reviewed risk/benefits and potential side effects of various treatment options.  She is amenable to a short course of prednisone, nonsedating antihistamine and Tessalon Perles as above.  She will keep me informed of any lack of improvement with the next 48 to 72 hours, any acute worsening.  Consider antibiotics at that time, imaging as indicated etc.    She will keep her routine follow-up appointment in approximately 1 month, follow-up sooner as needed.  Patient was encouraged to keep me informed of any acute changes, lack of improvement, or any new concerning symptoms.  Pt is aware of reasons to seek emergent care.  Patient voiced understanding of all instructions and denied further questions.          Please note that portions of this note may have been completed with a voice recognition program. Efforts were made to edit the dictations, but occasionally words are mistranscribed.

## 2019-08-14 ENCOUNTER — HOSPITAL ENCOUNTER (OUTPATIENT)
Dept: MRI IMAGING | Facility: HOSPITAL | Age: 74
Discharge: HOME | End: 2019-08-14
Admitting: NURSE PRACTITIONER

## 2019-08-14 DIAGNOSIS — M96.1 POST LAMINECTOMY SYNDROME: ICD-10-CM

## 2019-08-14 PROCEDURE — 72146 MRI CHEST SPINE W/O DYE: CPT

## 2019-08-20 ENCOUNTER — OFFICE VISIT (OUTPATIENT)
Dept: FAMILY MEDICINE CLINIC | Facility: CLINIC | Age: 74
End: 2019-08-20

## 2019-08-20 VITALS
HEART RATE: 71 BPM | SYSTOLIC BLOOD PRESSURE: 136 MMHG | WEIGHT: 203 LBS | DIASTOLIC BLOOD PRESSURE: 84 MMHG | OXYGEN SATURATION: 93 % | BODY MASS INDEX: 31.79 KG/M2

## 2019-08-20 DIAGNOSIS — R30.0 DYSURIA: ICD-10-CM

## 2019-08-20 DIAGNOSIS — N30.00 ACUTE CYSTITIS WITHOUT HEMATURIA: Primary | ICD-10-CM

## 2019-08-20 LAB
BILIRUB BLD-MCNC: NEGATIVE MG/DL
CLARITY, POC: ABNORMAL
COLOR UR: ABNORMAL
GLUCOSE UR STRIP-MCNC: NEGATIVE MG/DL
KETONES UR QL: NEGATIVE
LEUKOCYTE EST, POC: ABNORMAL
NITRITE UR-MCNC: POSITIVE MG/ML
PH UR: 6.5 [PH] (ref 5–8)
PROT UR STRIP-MCNC: ABNORMAL MG/DL
RBC # UR STRIP: ABNORMAL /UL
SP GR UR: 1.02 (ref 1–1.03)
UROBILINOGEN UR QL: NORMAL

## 2019-08-20 PROCEDURE — 96372 THER/PROPH/DIAG INJ SC/IM: CPT | Performed by: FAMILY MEDICINE

## 2019-08-20 PROCEDURE — 99214 OFFICE O/P EST MOD 30 MIN: CPT | Performed by: FAMILY MEDICINE

## 2019-08-20 PROCEDURE — 81003 URINALYSIS AUTO W/O SCOPE: CPT | Performed by: FAMILY MEDICINE

## 2019-08-20 RX ORDER — CEFDINIR 300 MG/1
300 CAPSULE ORAL 2 TIMES DAILY
Qty: 20 CAPSULE | Refills: 0 | Status: SHIPPED | OUTPATIENT
Start: 2019-08-20 | End: 2019-08-27

## 2019-08-20 RX ORDER — PHENAZOPYRIDINE HYDROCHLORIDE 200 MG/1
200 TABLET, FILM COATED ORAL 3 TIMES DAILY PRN
Qty: 9 TABLET | Refills: 2 | Status: SHIPPED | OUTPATIENT
Start: 2019-08-20 | End: 2019-08-23

## 2019-08-20 RX ORDER — CEFTRIAXONE 1 G/1
1 INJECTION, POWDER, FOR SOLUTION INTRAMUSCULAR; INTRAVENOUS ONCE
Status: COMPLETED | OUTPATIENT
Start: 2019-08-20 | End: 2019-08-20

## 2019-08-20 RX ADMIN — CEFTRIAXONE 1 G: 1 INJECTION, POWDER, FOR SOLUTION INTRAMUSCULAR; INTRAVENOUS at 11:54

## 2019-08-20 NOTE — PROGRESS NOTES
Subjective   Annie Mejia is a 73 y.o. female.     She c/o burning with urination and suprapubic pain      Urinary Tract Infection    This is a new problem. The current episode started yesterday. The problem occurs every urination. The problem has been rapidly worsening. The quality of the pain is described as burning. The pain is severe. There has been no fever. There is no history of pyelonephritis. Associated symptoms include frequency, hesitancy, nausea and urgency. Pertinent negatives include no chills, discharge, flank pain, hematuria, sweats or vomiting. Associated symptoms comments: Diarrhea starting today. She has tried nothing for the symptoms. Her past medical history is significant for recurrent UTIs.     The following portions of the patient's history were reviewed and updated as appropriate: allergies, current medications, past family history, past medical history, past social history, past surgical history and problem list.    Review of Systems   Constitutional: Positive for fatigue. Negative for chills and fever.   HENT: Negative for sore throat.    Eyes: Negative for pain, discharge and redness.   Respiratory: Negative for cough, shortness of breath and wheezing.    Cardiovascular: Negative for chest pain and leg swelling.   Gastrointestinal: Positive for abdominal pain, diarrhea and nausea. Negative for blood in stool and vomiting.   Endocrine: Positive for heat intolerance.   Genitourinary: Positive for difficulty urinating, dysuria, frequency, hesitancy and urgency. Negative for flank pain, hematuria and vaginal bleeding.   Musculoskeletal: Positive for arthralgias and myalgias.   Skin: Negative for rash.   Neurological: Positive for dizziness and weakness. Negative for headaches.   Psychiatric/Behavioral: Positive for sleep disturbance. Negative for confusion. The patient is nervous/anxious.    Pt's previous ROS reviewed and updated as indicated.     Objective    Vitals:    08/20/19 1051    BP: 136/84   Pulse: 71   SpO2: 93%     Body mass index is 31.79 kg/m².      08/20/19  1051   Weight: 92.1 kg (203 lb)       Physical Exam   Constitutional: She is oriented to person, place, and time. She appears well-developed and well-nourished. She is cooperative. She appears ill (mildly). She appears distressed (mildly due to pain).   obese   HENT:   Head: Normocephalic and atraumatic.   Mouth/Throat: Mucous membranes are normal. Mucous membranes are not dry.   Eyes: Conjunctivae and lids are normal.   Cardiovascular: Normal rate, regular rhythm, normal heart sounds and intact distal pulses.   Pulmonary/Chest: Effort normal and breath sounds normal.   Abdominal: Soft. Bowel sounds are normal. She exhibits no distension. There is tenderness in the suprapubic area. There is no rigidity, no rebound, no guarding and no CVA tenderness.     Vascular Status -  Her right foot exhibits no edema. Her left foot exhibits no edema.  Neurological: She is alert and oriented to person, place, and time. Gait (mildly antalgic) abnormal.   Skin: Skin is warm and dry. No bruising and no rash noted.   Psychiatric: Her behavior is normal. Her mood appears anxious. Cognition and memory are normal.   Nursing note and vitals reviewed.    Recent Results (from the past 24 hour(s))   POCT urinalysis dipstick, automated    Collection Time: 08/20/19 10:58 AM   Result Value Ref Range    Color Straw Yellow, Straw, Dark Yellow, Rukhsana    Clarity, UA Cloudy (A) Clear    Specific Gravity  1.020 1.005 - 1.030    pH, Urine 6.5 5.0 - 8.0    Leukocytes 500 Mariano/ul (A) Negative    Nitrite, UA Positive (A) Negative    Protein,  mg/dL (A) Negative mg/dL    Glucose, UA Negative Negative, 1000 mg/dL (3+) mg/dL    Ketones, UA Negative Negative    Urobilinogen, UA Normal Normal    Bilirubin Negative Negative    Blood,  Babatunde/ul (A) Negative     Assessment/Plan   Annie was seen today for difficulty urinating and urinary frequency.    Diagnoses and  all orders for this visit:    Acute cystitis without hematuria  -     Urine Culture - Urine, Urine, Clean Catch  -     cefTRIAXone (ROCEPHIN) injection 1 g    Dysuria  -     POCT urinalysis dipstick, automated    Other orders  -     phenazopyridine (PYRIDIUM) 200 MG tablet; Take 1 tablet by mouth 3 (Three) Times a Day As Needed for bladder spasms for up to 3 days.  -     cefdinir (OMNICEF) 300 MG capsule; Take 1 capsule by mouth 2 (Two) Times a Day for 7 days.       Symptomatic mgnt reviewed.  I will contact patient regarding test results and provide instructions regarding any necessary changes in plan of care.  Routine f/u, sooner as needed.  Patient was encouraged to keep me informed of any acute changes, lack of improvement, or any new concerning symptoms.  Pt is aware of reasons to seek emergent care.  Patient voiced understanding of all instructions and denied further questions.

## 2019-08-23 LAB
BACTERIA UR CULT: ABNORMAL
BACTERIA UR CULT: ABNORMAL
OTHER ANTIBIOTIC SUSC ISLT: ABNORMAL

## 2019-09-06 ENCOUNTER — TELEPHONE (OUTPATIENT)
Dept: FAMILY MEDICINE CLINIC | Facility: CLINIC | Age: 74
End: 2019-09-06

## 2019-09-06 DIAGNOSIS — I10 ESSENTIAL HYPERTENSION: ICD-10-CM

## 2019-09-06 DIAGNOSIS — F41.8 MIXED ANXIETY DEPRESSIVE DISORDER: ICD-10-CM

## 2019-09-06 RX ORDER — LISINOPRIL AND HYDROCHLOROTHIAZIDE 25; 20 MG/1; MG/1
1 TABLET ORAL
Qty: 90 TABLET | Refills: 3 | Status: SHIPPED | OUTPATIENT
Start: 2019-09-06 | End: 2020-12-04

## 2019-09-06 RX ORDER — OMEPRAZOLE 20 MG/1
20 CAPSULE, DELAYED RELEASE ORAL DAILY
Qty: 90 CAPSULE | Refills: 1 | Status: SHIPPED | OUTPATIENT
Start: 2019-09-06 | End: 2020-01-20

## 2019-09-06 RX ORDER — SERTRALINE HYDROCHLORIDE 100 MG/1
TABLET, FILM COATED ORAL
Qty: 90 TABLET | Refills: 1 | Status: SHIPPED | OUTPATIENT
Start: 2019-09-06 | End: 2019-12-10 | Stop reason: SINTOL

## 2019-09-06 NOTE — TELEPHONE ENCOUNTER
Pt called req refill on sertraline, omeprazole, lisinopril-hctz to [facility]. She also needs refill on phenergan to [facility]. Please advise.

## 2019-09-11 ENCOUNTER — TELEPHONE (OUTPATIENT)
Dept: FAMILY MEDICINE CLINIC | Facility: CLINIC | Age: 74
End: 2019-09-11

## 2019-09-11 ENCOUNTER — APPOINTMENT (OUTPATIENT)
Dept: LAB | Facility: HOSPITAL | Age: 74
End: 2019-09-11

## 2019-09-11 ENCOUNTER — TRANSCRIBE ORDERS (OUTPATIENT)
Dept: ADMINISTRATIVE | Facility: HOSPITAL | Age: 74
End: 2019-09-11

## 2019-09-11 DIAGNOSIS — M96.1 POSTLAMINECTOMY SYNDROME, NOT ELSEWHERE CLASSIFIED: Primary | ICD-10-CM

## 2019-09-11 LAB — HBA1C MFR BLD: 7.61 % (ref 4.8–5.6)

## 2019-09-11 PROCEDURE — 87081 CULTURE SCREEN ONLY: CPT | Performed by: NURSE PRACTITIONER

## 2019-09-11 PROCEDURE — 36415 COLL VENOUS BLD VENIPUNCTURE: CPT | Performed by: NURSE PRACTITIONER

## 2019-09-11 PROCEDURE — 83036 HEMOGLOBIN GLYCOSYLATED A1C: CPT | Performed by: NURSE PRACTITIONER

## 2019-09-11 NOTE — TELEPHONE ENCOUNTER
Pt called [place] disabled parking permit completed. Hers is now . Please call pt when ready to pick.

## 2019-09-12 LAB — MRSA SPEC QL CULT: NORMAL

## 2019-09-23 DIAGNOSIS — K21.00 GASTROESOPHAGEAL REFLUX DISEASE WITH ESOPHAGITIS: ICD-10-CM

## 2019-09-23 RX ORDER — PROMETHAZINE HYDROCHLORIDE 25 MG/1
25 TABLET ORAL EVERY 8 HOURS PRN
Qty: 90 TABLET | Refills: 2 | Status: SHIPPED | OUTPATIENT
Start: 2019-09-23 | End: 2020-11-25

## 2019-10-11 DIAGNOSIS — F41.8 MIXED ANXIETY DEPRESSIVE DISORDER: ICD-10-CM

## 2019-10-11 NOTE — TELEPHONE ENCOUNTER
LILIAM in chart from 7/11/19, CSA signed 6/3/19, last visit 8/20/19, does not have follow up scheduled.

## 2019-10-13 RX ORDER — ALPRAZOLAM 0.25 MG/1
TABLET ORAL
Qty: 90 TABLET | Refills: 1 | Status: SHIPPED | OUTPATIENT
Start: 2019-10-13 | End: 2020-03-11 | Stop reason: SDUPTHER

## 2019-10-21 ENCOUNTER — OFFICE VISIT (OUTPATIENT)
Dept: FAMILY MEDICINE CLINIC | Facility: CLINIC | Age: 74
End: 2019-10-21

## 2019-10-21 VITALS
SYSTOLIC BLOOD PRESSURE: 118 MMHG | DIASTOLIC BLOOD PRESSURE: 80 MMHG | BODY MASS INDEX: 32.49 KG/M2 | TEMPERATURE: 98.1 F | HEART RATE: 101 BPM | OXYGEN SATURATION: 95 % | HEIGHT: 67 IN | WEIGHT: 207 LBS

## 2019-10-21 DIAGNOSIS — J98.01 BRONCHOSPASM: ICD-10-CM

## 2019-10-21 DIAGNOSIS — J01.00 ACUTE NON-RECURRENT MAXILLARY SINUSITIS: ICD-10-CM

## 2019-10-21 DIAGNOSIS — J20.9 ACUTE BRONCHITIS, UNSPECIFIED ORGANISM: Primary | ICD-10-CM

## 2019-10-21 DIAGNOSIS — R53.81 MALAISE: ICD-10-CM

## 2019-10-21 DIAGNOSIS — R05.9 COUGH: ICD-10-CM

## 2019-10-21 LAB
EXPIRATION DATE: NORMAL
FLUAV AG NPH QL: NEGATIVE
FLUBV AG NPH QL: NEGATIVE
INTERNAL CONTROL: NORMAL
Lab: NORMAL

## 2019-10-21 PROCEDURE — 87804 INFLUENZA ASSAY W/OPTIC: CPT | Performed by: FAMILY MEDICINE

## 2019-10-21 PROCEDURE — 99214 OFFICE O/P EST MOD 30 MIN: CPT | Performed by: FAMILY MEDICINE

## 2019-10-21 PROCEDURE — 96372 THER/PROPH/DIAG INJ SC/IM: CPT | Performed by: FAMILY MEDICINE

## 2019-10-21 RX ORDER — CEFTRIAXONE 1 G/1
1 INJECTION, POWDER, FOR SOLUTION INTRAMUSCULAR; INTRAVENOUS ONCE
Status: COMPLETED | OUTPATIENT
Start: 2019-10-21 | End: 2019-10-21

## 2019-10-21 RX ADMIN — CEFTRIAXONE 1 G: 1 INJECTION, POWDER, FOR SOLUTION INTRAMUSCULAR; INTRAVENOUS at 11:04

## 2019-10-21 NOTE — PROGRESS NOTES
"Subjective   Annie Mejia is a 73 y.o. female.     She c/o cough/congestion.      Cough   This is a new problem. The current episode started in the past 7 days. The problem has been rapidly worsening. The problem occurs every few minutes. The cough is productive of purulent sputum. Associated symptoms include chest pain (anterior with cough), headaches, myalgias, nasal congestion, postnasal drip, rhinorrhea, shortness of breath and wheezing. Pertinent negatives include no chills, ear pain, eye redness, fever, hemoptysis, rash or sore throat. The symptoms are aggravated by lying down and exercise. She has tried prescription cough suppressant for the symptoms. The treatment provided no relief. Her past medical history is significant for bronchitis and pneumonia.     She has upcoming procedure planned per Pain mgnt. They wanted to make sure she was \"clear of any infection\" prior to her having spinal cord stim test.    The following portions of the patient's history were reviewed and updated as appropriate: allergies, current medications, past family history, past medical history, past social history, past surgical history and problem list.    Review of Systems   Constitutional: Positive for fatigue. Negative for chills and fever.   HENT: Positive for postnasal drip, rhinorrhea and sinus pressure. Negative for ear pain, mouth sores and sore throat.    Eyes: Negative for pain, discharge and redness.   Respiratory: Positive for cough, shortness of breath and wheezing. Negative for hemoptysis.    Cardiovascular: Positive for chest pain (anterior with cough). Negative for palpitations and leg swelling.   Gastrointestinal: Positive for nausea. Negative for diarrhea and vomiting.   Genitourinary: Negative for dysuria and hematuria.   Musculoskeletal: Positive for arthralgias, back pain and myalgias.   Skin: Negative for rash.   Neurological: Positive for dizziness, weakness (generalized) and headaches. Negative for " syncope.   Hematological: Negative for adenopathy. Bruises/bleeds easily.   Psychiatric/Behavioral: Positive for sleep disturbance (due to cough). Negative for confusion.   Pt's previous ROS reviewed and updated as indicated.       Objective    Vitals:    10/21/19 1000   BP: 118/80   Pulse: 101   Temp: 98.1 °F (36.7 °C)   SpO2: 95%     Body mass index is 32.42 kg/m².      10/21/19  1000   Weight: 93.9 kg (207 lb)       Physical Exam   Constitutional: She is oriented to person, place, and time. She appears well-developed and well-nourished. She is cooperative. She appears ill. No distress.   HENT:   Head: Normocephalic and atraumatic.   Right Ear: Tympanic membrane, external ear and ear canal normal.   Left Ear: Tympanic membrane, external ear and ear canal normal.   Nose: Mucosal edema and rhinorrhea (thick, mucoid) present. Right sinus exhibits maxillary sinus tenderness. Left sinus exhibits maxillary sinus tenderness.   Mouth/Throat: Oropharynx is clear and moist and mucous membranes are normal. Mucous membranes are not dry. No oral lesions.   Eyes: Conjunctivae and lids are normal.   Neck: Neck supple.   Cardiovascular: Normal rate, regular rhythm, normal heart sounds and intact distal pulses.   Pulmonary/Chest: Effort normal. No respiratory distress (deep inspiration trigger cough). She has decreased breath sounds. She has no wheezes. She has no rhonchi. She has no rales.     Vascular Status -  Her right foot exhibits no edema. Her left foot exhibits no edema.  Lymphadenopathy:     She has no cervical adenopathy.   Neurological: She is alert and oriented to person, place, and time.   Skin: Skin is warm and dry. No rash noted.   Psychiatric: She has a normal mood and affect. Her behavior is normal. Cognition and memory are normal.   Nursing note and vitals reviewed.    Recent Results (from the past 24 hour(s))   POC Influenza A / B    Collection Time: 10/21/19 11:05 AM   Result Value Ref Range    Rapid Influenza  A Ag Negative Negative    Rapid Influenza B Ag Negative Negative    Internal Control Passed Passed    Lot Number [id number]     Expiration Date 10,222,021      Assessment/Plan   Annie was seen today for cough, sinus problem and headache.    Diagnoses and all orders for this visit:    Acute bronchitis, unspecified organism  -     cefTRIAXone (ROCEPHIN) injection 1 g    Bronchospasm    Cough    Malaise  -     POC Influenza A / B    Acute non-recurrent maxillary sinusitis    Other orders   HYDROcod Polst-CPM Polst ER (TUSSIONEX PENNKINETIC ER) 10-8 MG/5ML ER suspension; Take 5 mL by mouth Every 12 (Twelve) Hours As Needed for Cough or Rhinitis.       Symptomatic tx reviewed.  Mucinex OTC with adequate fluid intake.  Ventolin as needed; she has inhaler at home already.  abx as above.  Anti-tussive as above.  Return for repeat rocephin 48 hrs.   May have to reschedule procedure for pain mgnt.    Keep routine f/u, f/u sooner as needed.  Patient was encouraged to keep me informed of any acute changes, lack of improvement, or any new concerning symptoms.  Pt is aware of reasons to seek emergent care.  Patient voiced understanding of all instructions and denied further questions.  As part of patient's treatment plan I am prescribing a controlled substance.  The patient has been made aware of the appropriate use of such medications, including potential risk of somnolence, limited ability to drive and/or work safely, and potential for dependence and/or overdose.  It has also been made clear that these medications are for use by this patient only, without concomitant use of alcohol or other substances, unless prescribed.  LILIAM report reviewed.

## 2019-10-21 NOTE — TELEPHONE ENCOUNTER
[facility] called stating that they do not carry cough syrup. Spoke to pt, requested sent to [facility] in [place], verified with pharmacy that medication is available, informed medication would be resent.

## 2019-10-23 ENCOUNTER — CLINICAL SUPPORT (OUTPATIENT)
Dept: FAMILY MEDICINE CLINIC | Facility: CLINIC | Age: 74
End: 2019-10-23

## 2019-10-23 DIAGNOSIS — J06.9 UPPER RESPIRATORY TRACT INFECTION, UNSPECIFIED TYPE: Primary | ICD-10-CM

## 2019-10-23 PROCEDURE — 96372 THER/PROPH/DIAG INJ SC/IM: CPT | Performed by: FAMILY MEDICINE

## 2019-10-23 RX ORDER — CEFTRIAXONE 1 G/1
1 INJECTION, POWDER, FOR SOLUTION INTRAMUSCULAR; INTRAVENOUS ONCE
Status: COMPLETED | OUTPATIENT
Start: 2019-10-23 | End: 2019-10-23

## 2019-10-23 RX ADMIN — CEFTRIAXONE 1 G: 1 INJECTION, POWDER, FOR SOLUTION INTRAMUSCULAR; INTRAVENOUS at 11:13

## 2019-11-18 ENCOUNTER — OFFICE VISIT (OUTPATIENT)
Dept: ORTHOPEDIC SURGERY | Facility: CLINIC | Age: 74
End: 2019-11-18

## 2019-11-18 VITALS — HEIGHT: 67 IN | WEIGHT: 207 LBS | RESPIRATION RATE: 18 BRPM | BODY MASS INDEX: 32.49 KG/M2

## 2019-11-18 DIAGNOSIS — M17.0 PRIMARY OSTEOARTHRITIS OF BOTH KNEES: Primary | ICD-10-CM

## 2019-11-18 PROCEDURE — 20610 DRAIN/INJ JOINT/BURSA W/O US: CPT | Performed by: ORTHOPAEDIC SURGERY

## 2019-11-18 RX ORDER — TRIAMCINOLONE ACETONIDE 40 MG/ML
40 INJECTION, SUSPENSION INTRA-ARTICULAR; INTRAMUSCULAR
Status: COMPLETED | OUTPATIENT
Start: 2019-11-18 | End: 2019-11-18

## 2019-11-18 RX ORDER — LIDOCAINE HYDROCHLORIDE 10 MG/ML
2 INJECTION, SOLUTION INFILTRATION; PERINEURAL
Status: COMPLETED | OUTPATIENT
Start: 2019-11-18 | End: 2019-11-18

## 2019-11-18 RX ADMIN — LIDOCAINE HYDROCHLORIDE 2 ML: 10 INJECTION, SOLUTION INFILTRATION; PERINEURAL at 09:49

## 2019-11-18 RX ADMIN — TRIAMCINOLONE ACETONIDE 40 MG: 40 INJECTION, SUSPENSION INTRA-ARTICULAR; INTRAMUSCULAR at 09:49
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
## 2019-11-25 ENCOUNTER — OFFICE VISIT (OUTPATIENT)
Dept: FAMILY MEDICINE CLINIC | Facility: CLINIC | Age: 74
End: 2019-11-25

## 2019-11-25 VITALS
HEIGHT: 67 IN | DIASTOLIC BLOOD PRESSURE: 80 MMHG | WEIGHT: 211 LBS | OXYGEN SATURATION: 96 % | SYSTOLIC BLOOD PRESSURE: 120 MMHG | HEART RATE: 85 BPM | TEMPERATURE: 97.1 F | BODY MASS INDEX: 33.12 KG/M2

## 2019-11-25 DIAGNOSIS — H81.13 BENIGN PAROXYSMAL POSITIONAL VERTIGO DUE TO BILATERAL VESTIBULAR DISORDER: Primary | ICD-10-CM

## 2019-11-25 DIAGNOSIS — Z23 NEED FOR VACCINATION: ICD-10-CM

## 2019-11-25 PROCEDURE — 99213 OFFICE O/P EST LOW 20 MIN: CPT | Performed by: NURSE PRACTITIONER

## 2019-11-25 PROCEDURE — G0008 ADMIN INFLUENZA VIRUS VAC: HCPCS | Performed by: NURSE PRACTITIONER

## 2019-11-25 PROCEDURE — 90653 IIV ADJUVANT VACCINE IM: CPT | Performed by: NURSE PRACTITIONER

## 2019-11-25 NOTE — PATIENT INSTRUCTIONS
How to Perform the Epley Maneuver  The Epley maneuver is an exercise that relieves symptoms of vertigo. Vertigo is the feeling that you or your surroundings are moving when they are not. When you feel vertigo, you may feel like the room is spinning and have trouble walking. Dizziness is a little different than vertigo. When you are dizzy, you may feel unsteady or light-headed.  You can do this maneuver at home whenever you have symptoms of vertigo. You can do it up to 3 times a day until your symptoms go away.  Even though the Epley maneuver may relieve your vertigo for a few weeks, it is possible that your symptoms will return. This maneuver relieves vertigo, but it does not relieve dizziness.  What are the risks?  If it is done correctly, the Epley maneuver is considered safe. Sometimes it can lead to dizziness or nausea that goes away after a short time. If you develop other symptoms, such as changes in vision, weakness, or numbness, stop doing the maneuver and call your health care provider.  How to perform the Epley maneuver  1. Sit on the edge of a bed or table with your back straight and your legs extended or hanging over the edge of the bed or table.  2. Turn your head long term toward the affected ear or side.  3. Lie backward quickly with your head turned until you are lying flat on your back. You may want to position a pillow under your shoulders.  4. Hold this position for 30 seconds. You may experience an attack of vertigo. This is normal.  5. Turn your head to the opposite direction until your unaffected ear is facing the floor.  6. Hold this position for 30 seconds. You may experience an attack of vertigo. This is normal. Hold this position until the vertigo stops.  7. Turn your whole body to the same side as your head. Hold for another 30 seconds.  8. Sit back up.  You can repeat this exercise up to 3 times a day.  Follow these instructions at home:  · After doing the Epley maneuver, you can return to  your normal activities.  · Ask your health care provider if there is anything you should do at home to prevent vertigo. He or she may recommend that you:  ? Keep your head raised (elevated) with two or more pillows while you sleep.  ? Do not sleep on the side of your affected ear.  ? Get up slowly from bed.  ? Avoid sudden movements during the day.  ? Avoid extreme head movement, like looking up or bending over.  Contact a health care provider if:  · Your vertigo gets worse.  · You have other symptoms, including:  ? Nausea.  ? Vomiting.  ? Headache.  Get help right away if:  · You have vision changes.  · You have a severe or worsening headache or neck pain.  · You cannot stop vomiting.  · You have new numbness or weakness in any part of your body.  Summary  · Vertigo is the feeling that you or your surroundings are moving when they are not.  · The Epley maneuver is an exercise that relieves symptoms of vertigo.  · If the Epley maneuver is done correctly, it is considered safe. You can do it up to 3 times a day.  This information is not intended to replace advice given to you by your health care provider. Make sure you discuss any questions you have with your health care provider.  Document Released: 12/23/2014 Document Revised: 11/07/2017 Document Reviewed: 11/07/2017  Elsevier Interactive Patient Education © 2019 [employer]      Benign Positional Vertigo  Vertigo is the feeling that you or your surroundings are moving when they are not. Benign positional vertigo is the most common form of vertigo. This is usually a harmless condition (benign). This condition is positional. This means that symptoms are triggered by certain movements and positions.  This condition can be dangerous if it occurs while you are doing something that could cause harm to you or others. This includes activities such as driving or operating machinery.  What are the causes?  In many cases, the cause of this condition is not known. It may be  caused by a disturbance in an area of the inner ear that helps your brain to sense movement and balance. This disturbance can be caused by:  · Viral infection (labyrinthitis).  · Head injury.  · Repetitive motion, such as jumping, dancing, or running.  What increases the risk?  You are more likely to develop this condition if:  · You are a woman.  · You are 50 years of age or older.  What are the signs or symptoms?  Symptoms of this condition usually happen when you move your head or your eyes in different directions. Symptoms may start suddenly, and usually last for less than a minute. They include:  · Loss of balance and falling.  · Feeling like you are spinning or moving.  · Feeling like your surroundings are spinning or moving.  · Nausea and vomiting.  · Blurred vision.  · Dizziness.  · Involuntary eye movement (nystagmus).  Symptoms can be mild and cause only minor problems, or they can be severe and interfere with daily life. Episodes of benign positional vertigo may return (recur) over time. Symptoms may improve over time.  How is this diagnosed?  This condition may be diagnosed based on:  · Your medical history.  · Physical exam of the head, neck, and ears.  · Tests, such as:  ? MRI.  ? CT scan.  ? Eye movement tests. Your health care provider may ask you to change positions quickly while he or she watches you for symptoms of benign positional vertigo, such as nystagmus. Eye movement may be tested with a variety of exams that are designed to evaluate or stimulate vertigo.  ? An electroencephalogram (EEG). This records electrical activity in your brain.  ? Hearing tests.  You may be referred to a health care provider who specializes in ear, nose, and throat (ENT) problems (otolaryngologist) or a provider who specializes in disorders of the nervous system (neurologist).  How is this treated?    This condition may be treated in a session in which your health care provider moves your head in specific positions  "to adjust your inner ear back to normal. Treatment for this condition may take several sessions. Surgery may be needed in severe cases, but this is rare.   In some cases, benign positional vertigo may resolve on its own in 2-4 weeks.  Follow these instructions at home:  Safety  · Move slowly. Avoid sudden body or head movements or certain positions, as told by your health care provider.  · Avoid driving until your health care provider says it is safe for you to do so.  · Avoid operating heavy machinery until your health care provider says it is safe for you to do so.  · Avoid doing any tasks that would be dangerous to you or others if vertigo occurs.  · If you have trouble walking or keeping your balance, try using a cane for stability. If you feel dizzy or unstable, sit down right away.  · Return to your normal activities as told by your health care provider. Ask your health care provider what activities are safe for you.  General instructions  · Take over-the-counter and prescription medicines only as told by your health care provider.  · Drink enough fluid to keep your urine pale yellow.  · Keep all follow-up visits as told by your health care provider. This is important.  Contact a health care provider if:  · You have a fever.  · Your condition gets worse or you develop new symptoms.  · Your family or friends notice any behavioral changes.  · You have nausea or vomiting that gets worse.  · You have numbness or a \"pins and needles\" sensation.  Get help right away if you:  · Have difficulty speaking or moving.  · Are always dizzy.  · Faint.  · Develop severe headaches.  · Have weakness in your legs or arms.  · Have changes in your hearing or vision.  · Develop a stiff neck.  · Develop sensitivity to light.  Summary  · Vertigo is the feeling that you or your surroundings are moving when they are not. Benign positional vertigo is the most common form of vertigo.  · The cause of this condition is not known. It may be " caused by a disturbance in an area of the inner ear that helps your brain to sense movement and balance.  · Symptoms include loss of balance and falling, feeling that you or your surroundings are moving, nausea and vomiting, and blurred vision.  · This condition can be diagnosed based on symptoms, physical exam, and other tests, such as MRI, CT scan, eye movement tests, and hearing tests.  · Follow safety instructions as told by your health care provider. You will also be told when to contact your health care provider in case of problems.  This information is not intended to replace advice given to you by your health care provider. Make sure you discuss any questions you have with your health care provider.  Document Released: 09/25/2007 Document Revised: 05/29/2019 Document Reviewed: 05/29/2019  [employer] Interactive Patient Education © 2019 [employer]

## 2019-11-25 NOTE — PROGRESS NOTES
Subjective     Chief Complaint:    Chief Complaint   Patient presents with   • Dizziness     pt states she's having dizzy spells, pt states she's been taking meclizine doesnt think it's helping. Pt states when she lays down and gets up is when she is the dizziest if she gets up to fast she states she will fall.       History of Present Illness:   1 month long history of intermittent spinning sensation. Worse when she lays down. Last a few seconds. Notes when she sits on the side of the bed she has a spinning sensation. Will clear within a few seconds. When she is sitting on the couch and looks upward she will have the spinning sensation again. Does not happen when she goes from sitting to standing. No dizziness when walking through her house. Does have a bad back which limits her mobility. Has been taking meclizine intermittently but is not really helping much    No nausea or vomiting.     Review of Systems   Constitutional: Negative for chills and fever.   Respiratory: Negative for cough and shortness of breath.    Cardiovascular: Negative for chest pain and palpitations.   Gastrointestinal: Negative for abdominal pain and nausea.   Neurological: Negative for headache.        I have reviewed and/or updated the patient's past medical, surgical, family, social history and problem list as appropriate.     Medications:    Current Outpatient Medications:   •  ALPRAZolam (XANAX) 0.25 MG tablet, 1-2 po up to bid as needed for panic/anxiety, Disp: 90 tablet, Rfl: 1  •  aspirin  MG EC tablet, Take  by mouth., Disp: , Rfl:   •  atorvastatin (LIPITOR) 40 MG tablet, Take 1 tablet by mouth Every Night., Disp: 90 tablet, Rfl: 3  •  cetirizine (zyrTEC) 10 MG tablet, Take 1 tablet by mouth Daily., Disp: 14 tablet, Rfl: 0  •  diclofenac (VOLTAREN) 1 % gel gel, Apply 4 g topically to the appropriate area as directed 4 (Four) Times a Day., Disp: 1 tube, Rfl: 3  •  EPINEPHrine (EPIPEN) 0.3 MG/0.3ML solution auto-injector  "injection, EpiPen 2-Lev 0.3 MG/0.3ML Injection Solution Auto-injector; Patient Sig: EpiPen 2-Lev 0.3 MG/0.3ML Injection Solution Auto-injector INJECT 0.3ML INTRAMUSCULARLY AS DIRECTED.; 1; 2; 06-May-2015; Active, Disp: , Rfl:   •  glucose blood (FREESTYLE LITE) test strip, USE ONE STRIP TO CHECK GLUCOSE FASTING IN THE MORNING AND IN THE EVENING, Disp: 100 each, Rfl: 12  •  HYDROcodone-acetaminophen (NORCO) 7.5-325 MG per tablet, Take 1 tablet by mouth Every 6 (Six) Hours As Needed for Severe Pain ., Disp: 120 tablet, Rfl: 0  •  Lancets (FREESTYLE) lancets, CHECK GLUCOSE FASTING IN THE MORNING AND IN THE EVENING,, Disp: 100 each, Rfl: 5  •  lisinopril-hydrochlorothiazide (PRINZIDE,ZESTORETIC) 20-25 MG per tablet, Take 1 tablet by mouth Daily., Disp: 90 tablet, Rfl: 3  •  meclizine (ANTIVERT) 25 MG tablet, Take 1-2 tablets by mouth every 6 (six) to 8 (eight) hours as needed for dizziness., Disp: 30 tablet, Rfl: 0  •  omeprazole (priLOSEC) 20 MG capsule, Take 1 capsule by mouth Daily., Disp: 90 capsule, Rfl: 1  •  polyethylene glycol (MIRALAX) powder, Take 17 g by mouth 2 (Two) Times a Day As Needed (constipation)., Disp: 850 g, Rfl: 0  •  promethazine (PHENERGAN) 25 MG tablet, Take 1 tablet by mouth Every 8 (Eight) Hours As Needed for Nausea or Vomiting., Disp: 90 tablet, Rfl: 2  •  sertraline (ZOLOFT) 100 MG tablet, 1 po daily, Disp: 90 tablet, Rfl: 1  •  VENTOLIN  (90 Base) MCG/ACT inhaler, , Disp: , Rfl:     Allergies:  Allergies   Allergen Reactions   • Oxycodone-Acetaminophen Nausea And Vomiting and Shortness Of Breath   • Azithromycin Nausea And Vomiting   • Erythrityl Tetranitrate Nausea And Vomiting   • Morphine Itching   • Morphine And Related        Objective     Vital Signs:   Vitals:    11/25/19 0952   BP: 120/80   Pulse: 85   Temp: 97.1 °F (36.2 °C)   SpO2: 94%   Weight: 95.7 kg (211 lb)   Height: 170.2 cm (67\")       Physical Exam:    Physical Exam   Constitutional: She is oriented to person, " place, and time. She appears well-developed and well-nourished.   HENT:   Head: Normocephalic and atraumatic.   Eyes: Pupils are equal, round, and reactive to light.   Neck: Neck supple.   Cardiovascular: Normal rate and regular rhythm.   Pulmonary/Chest: Effort normal and breath sounds normal.   Abdominal: Soft. Bowel sounds are normal. She exhibits no distension.   Neurological: She is alert and oriented to person, place, and time.   Positive Erin Halpike maneuver bilaterally   Skin: Skin is warm and dry.   Psychiatric: She has a normal mood and affect. Her behavior is normal.   Nursing note and vitals reviewed.      Assessment / Plan     Assessment/Plan:   Problem List Items Addressed This Visit     None      Visit Diagnoses     Benign paroxysmal positional vertigo due to bilateral vestibular disorder    -  Primary    Need for vaccination        Relevant Orders    Fluad Tri 65yr ([id number]) (Completed)        -- exam consistent with BPPV. Discussed and provided information on Eply Maneuver. Discussed taking meclizine more consistently over the next few days  -- orthostatic BP normal  -- f/u in 1 week if no improvement     Follow up:  Return if symptoms worsen or fail to improve.    Electronically signed by ALEXIA Franco   11/25/2019 10:04 AM      Please note that portions of this note may have been completed with a voice recognition program. Efforts were made to edit the dictations, but occasionally words are mistranscribed.

## 2019-12-10 ENCOUNTER — OFFICE VISIT (OUTPATIENT)
Dept: FAMILY MEDICINE CLINIC | Facility: CLINIC | Age: 74
End: 2019-12-10

## 2019-12-10 VITALS
BODY MASS INDEX: 32.65 KG/M2 | WEIGHT: 208 LBS | DIASTOLIC BLOOD PRESSURE: 82 MMHG | HEART RATE: 98 BPM | HEIGHT: 67 IN | OXYGEN SATURATION: 95 % | TEMPERATURE: 98.3 F | SYSTOLIC BLOOD PRESSURE: 126 MMHG

## 2019-12-10 DIAGNOSIS — N30.00 ACUTE CYSTITIS WITHOUT HEMATURIA: Primary | ICD-10-CM

## 2019-12-10 PROCEDURE — 96372 THER/PROPH/DIAG INJ SC/IM: CPT | Performed by: FAMILY MEDICINE

## 2019-12-10 PROCEDURE — 99213 OFFICE O/P EST LOW 20 MIN: CPT | Performed by: FAMILY MEDICINE

## 2019-12-10 RX ORDER — PHENAZOPYRIDINE HYDROCHLORIDE 200 MG/1
200 TABLET, FILM COATED ORAL 3 TIMES DAILY PRN
Qty: 6 TABLET | Refills: 0 | Status: SHIPPED | OUTPATIENT
Start: 2019-12-10 | End: 2019-12-12

## 2019-12-10 RX ORDER — CEPHALEXIN 500 MG/1
500 CAPSULE ORAL 3 TIMES DAILY
Qty: 21 CAPSULE | Refills: 0 | Status: SHIPPED | OUTPATIENT
Start: 2019-12-10 | End: 2019-12-17

## 2019-12-10 RX ORDER — CEFTRIAXONE 500 MG/1
1000 INJECTION, POWDER, FOR SOLUTION INTRAMUSCULAR; INTRAVENOUS ONCE
Status: COMPLETED | OUTPATIENT
Start: 2019-12-10 | End: 2019-12-10

## 2019-12-10 RX ADMIN — CEFTRIAXONE 1000 MG: 500 INJECTION, POWDER, FOR SOLUTION INTRAMUSCULAR; INTRAVENOUS at 10:25

## 2019-12-13 LAB
BACTERIA UR CULT: ABNORMAL
BACTERIA UR CULT: ABNORMAL
OTHER ANTIBIOTIC SUSC ISLT: ABNORMAL

## 2020-01-07 ENCOUNTER — TELEPHONE (OUTPATIENT)
Dept: FAMILY MEDICINE CLINIC | Facility: CLINIC | Age: 75
End: 2020-01-07

## 2020-01-07 ENCOUNTER — OFFICE VISIT (OUTPATIENT)
Dept: FAMILY MEDICINE CLINIC | Facility: CLINIC | Age: 75
End: 2020-01-07

## 2020-01-07 VITALS
DIASTOLIC BLOOD PRESSURE: 84 MMHG | HEART RATE: 97 BPM | BODY MASS INDEX: 33.9 KG/M2 | TEMPERATURE: 98.1 F | OXYGEN SATURATION: 96 % | WEIGHT: 216 LBS | SYSTOLIC BLOOD PRESSURE: 128 MMHG | HEIGHT: 67 IN

## 2020-01-07 DIAGNOSIS — E11.9 WELL CONTROLLED DIABETES MELLITUS (HCC): Primary | ICD-10-CM

## 2020-01-07 DIAGNOSIS — B07.0 PLANTAR WART, RIGHT FOOT: ICD-10-CM

## 2020-01-07 LAB
GLUCOSE BLDC GLUCOMTR-MCNC: 157 MG/DL (ref 70–130)
HBA1C MFR BLD: 6.5 %

## 2020-01-07 PROCEDURE — 83036 HEMOGLOBIN GLYCOSYLATED A1C: CPT | Performed by: FAMILY MEDICINE

## 2020-01-07 PROCEDURE — 82962 GLUCOSE BLOOD TEST: CPT | Performed by: FAMILY MEDICINE

## 2020-01-07 PROCEDURE — 99213 OFFICE O/P EST LOW 20 MIN: CPT | Performed by: FAMILY MEDICINE

## 2020-01-07 PROCEDURE — 17110 DESTRUCTION B9 LES UP TO 14: CPT | Performed by: FAMILY MEDICINE

## 2020-01-07 NOTE — TELEPHONE ENCOUNTER
Patient called in because during todays appointment discussed a medication to prevent bladder infections. Patient spoke with surgeon and was advised that the prescription could be taken. Requesting it to be sent over to pharmacy    Pharmacy confirmed      Please advise.

## 2020-01-07 NOTE — PROGRESS NOTES
"Subjective   Annie Mejia is a 74 y.o. female.     History of Present Illness  Mrs. Calles presents today with 2 concerns.    She had diet controlled DM. Concerned about sugar \"running too high\". As low as 120s in AM but usually 130-150s. She has not been checking postprandial. Not currently on medications. Does not follow strict diabetic diet. Minimal exercise.    She also c/o wart on bottom right 3rd toe for several months, enlarging. OTC \"wart pads\" has helped some.    The following portions of the patient's history were reviewed and updated as appropriate: allergies, current medications, past family history, past medical history, past social history, past surgical history and problem list.    Review of Systems   Constitutional: Positive for fatigue. Negative for chills and fever.   HENT: Negative for congestion, rhinorrhea and sore throat.    Respiratory: Negative for cough, shortness of breath and wheezing.    Cardiovascular: Negative for chest pain, palpitations and leg swelling.   Gastrointestinal: Positive for nausea. Negative for diarrhea and vomiting.   Genitourinary: Negative for dysuria and hematuria.   Musculoskeletal: Positive for arthralgias, back pain, gait problem and myalgias.   Skin: Negative for rash.        As per HPI   Neurological: Negative for headaches.   Hematological: Negative for adenopathy. Bruises/bleeds easily.   Psychiatric/Behavioral: Negative for confusion. The patient is nervous/anxious.        Objective    Vitals:    01/07/20 1115   BP: 128/84   Pulse: 97   Temp: 98.1 °F (36.7 °C)   SpO2: 96%     Body mass index is 33.83 kg/m².      01/07/20  1115   Weight: 98 kg (216 lb)     Physical Exam   Constitutional: She is oriented to person, place, and time. She appears well-developed and well-nourished. She is cooperative. She does not appear ill. No distress.   obese   HENT:   Mouth/Throat: Mucous membranes are normal. Mucous membranes are not dry.   Cardiovascular: Intact distal " pulses.     Vascular Status -  Her right foot exhibits no edema. Her left foot exhibits no edema.  Neurological: She is alert and oriented to person, place, and time. Gait (antalgic) abnormal.   Skin: Skin is warm and dry. Lesion (common wart plantar surface right 3rd toe, proximally) noted.   Psychiatric: She has a normal mood and affect. Her behavior is normal. Cognition and memory are normal.   Nursing note and vitals reviewed.    Lab Results   Component Value Date    HGBA1C 6.5 01/07/2020    HGBA1C 7.61 (H) 09/11/2019    HGBA1C 6.7 03/06/2019     Lab Results   Component Value Date    MICROALBUR <3.0 09/06/2017    CREATININE 1.20 11/13/2018       Assessment/Plan   Annie was seen today for blood sugar problem and foot problem.    Diagnoses and all orders for this visit:    Well controlled diabetes mellitus (CMS/[facility])  -     POC Glycosylated Hemoglobin (Hb A1C)  -     POC Glucose    Plantar wart, right foot      NIDDM controlled. Patient encouraged to follow diabetic appropriate diet, exercise daily, perform feet check daily, and have yearly diabetic eye exams.     I have reviewed risks/benefits and potential side effects of various treatment options for plantar wart. Pt voices understanding and wishes to proceed with cryotherapy.    Routine fu in 2 months as scheduled, f/u sooner as needed.  Patient was encouraged to keep me informed of any acute changes, lack of improvement, or any new concerning symptoms.  Pt is aware of reasons to seek emergent care.  Patient voiced understanding of all instructions and denied further questions.          PROCEDURE NOTE  Procedure: destruction benign lesion right foot  PEx findings: verrucous lesion plantar surface right 3rd toe, proximally, mildly TTP but without evidence of secondary infection.  Informed consent obtained.  Area prepped with isopropyl alcohol.  Lesion treated with 3 applications of liquid nitrogen, 10 second freeze each.  Sterile dressing applied.  Post procedure  skin care reviewed. She voiced understanding and denied further questions.                immune

## 2020-01-08 RX ORDER — SULFAMETHOXAZOLE AND TRIMETHOPRIM 400; 80 MG/1; MG/1
1 TABLET ORAL DAILY
Qty: 30 TABLET | Refills: 1 | Status: SHIPPED | OUTPATIENT
Start: 2020-01-08 | End: 2020-03-11

## 2020-01-08 NOTE — TELEPHONE ENCOUNTER
Pt called again in regards to a preventative antibiotic that she was told she could take prior to surgery.  She doesn't know the name of the prescription.       Please call patient at: [phone]

## 2020-01-14 ENCOUNTER — TELEPHONE (OUTPATIENT)
Dept: FAMILY MEDICINE CLINIC | Facility: CLINIC | Age: 75
End: 2020-01-14

## 2020-01-14 RX ORDER — RANITIDINE 150 MG/1
150 CAPSULE ORAL 2 TIMES DAILY
Qty: 180 CAPSULE | Refills: 0 | Status: SHIPPED | OUTPATIENT
Start: 2020-01-14 | End: 2020-02-03 | Stop reason: RX

## 2020-01-15 RX ORDER — ATORVASTATIN CALCIUM 40 MG/1
TABLET, FILM COATED ORAL
Qty: 90 TABLET | Refills: 0 | Status: SHIPPED | OUTPATIENT
Start: 2020-01-15 | End: 2020-04-03

## 2020-01-20 RX ORDER — OMEPRAZOLE 20 MG/1
CAPSULE, DELAYED RELEASE ORAL
Qty: 90 CAPSULE | Refills: 1 | Status: SHIPPED | OUTPATIENT
Start: 2020-01-20 | End: 2020-05-27

## 2020-02-03 ENCOUNTER — TELEPHONE (OUTPATIENT)
Dept: FAMILY MEDICINE CLINIC | Facility: CLINIC | Age: 75
End: 2020-02-03

## 2020-02-03 RX ORDER — FAMOTIDINE 40 MG/1
40 TABLET, FILM COATED ORAL 2 TIMES DAILY PRN
Qty: 180 TABLET | Refills: 0 | Status: SHIPPED | OUTPATIENT
Start: 2020-02-03 | End: 2020-04-07

## 2020-02-03 NOTE — TELEPHONE ENCOUNTER
Pt called and said that her Rx for raNITIdine (ZANTAC) 150 MG capsule. Pt said that she was told by mail order that the Rx is on back order. Pt said it was marketed that the meds possibly cause cancer. Pt wanted to know if there is anything similar to this med that she can take.       Please contact pt and advise @ [phone]    [facility] mail pharmacy confirmed

## 2020-02-20 ENCOUNTER — OFFICE VISIT (OUTPATIENT)
Dept: ORTHOPEDIC SURGERY | Facility: CLINIC | Age: 75
End: 2020-02-20

## 2020-02-20 VITALS — BODY MASS INDEX: 33.9 KG/M2 | HEIGHT: 67 IN | RESPIRATION RATE: 18 BRPM | WEIGHT: 216 LBS

## 2020-02-20 DIAGNOSIS — M17.0 PRIMARY OSTEOARTHRITIS OF BOTH KNEES: Primary | ICD-10-CM

## 2020-02-20 PROCEDURE — 20610 DRAIN/INJ JOINT/BURSA W/O US: CPT | Performed by: ORTHOPAEDIC SURGERY

## 2020-02-20 RX ORDER — LIDOCAINE HYDROCHLORIDE 10 MG/ML
2 INJECTION, SOLUTION INFILTRATION; PERINEURAL
Status: COMPLETED | OUTPATIENT
Start: 2020-02-20 | End: 2020-02-20

## 2020-02-20 RX ORDER — TRIAMCINOLONE ACETONIDE 40 MG/ML
40 INJECTION, SUSPENSION INTRA-ARTICULAR; INTRAMUSCULAR
Status: COMPLETED | OUTPATIENT
Start: 2020-02-20 | End: 2020-02-20

## 2020-02-20 RX ADMIN — LIDOCAINE HYDROCHLORIDE 2 ML: 10 INJECTION, SOLUTION INFILTRATION; PERINEURAL at 13:01

## 2020-02-20 RX ADMIN — TRIAMCINOLONE ACETONIDE 40 MG: 40 INJECTION, SUSPENSION INTRA-ARTICULAR; INTRAMUSCULAR at 13:01
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
## 2020-03-03 RX ORDER — SULFAMETHOXAZOLE AND TRIMETHOPRIM 400; 80 MG/1; MG/1
TABLET ORAL
Qty: 30 TABLET | Refills: 0 | OUTPATIENT
Start: 2020-03-03

## 2020-03-11 ENCOUNTER — OFFICE VISIT (OUTPATIENT)
Dept: FAMILY MEDICINE CLINIC | Facility: CLINIC | Age: 75
End: 2020-03-11

## 2020-03-11 VITALS
DIASTOLIC BLOOD PRESSURE: 82 MMHG | HEIGHT: 67 IN | WEIGHT: 214 LBS | SYSTOLIC BLOOD PRESSURE: 124 MMHG | BODY MASS INDEX: 33.59 KG/M2 | TEMPERATURE: 97.5 F | HEART RATE: 88 BPM | OXYGEN SATURATION: 95 %

## 2020-03-11 DIAGNOSIS — F41.8 MIXED ANXIETY DEPRESSIVE DISORDER: ICD-10-CM

## 2020-03-11 DIAGNOSIS — N28.9 RENAL INSUFFICIENCY: ICD-10-CM

## 2020-03-11 DIAGNOSIS — E11.9 WELL CONTROLLED DIABETES MELLITUS (HCC): Primary | ICD-10-CM

## 2020-03-11 DIAGNOSIS — Z78.0 POSTMENOPAUSAL: ICD-10-CM

## 2020-03-11 DIAGNOSIS — Z12.31 ENCOUNTER FOR SCREENING MAMMOGRAM FOR BREAST CANCER: ICD-10-CM

## 2020-03-11 DIAGNOSIS — I10 ESSENTIAL HYPERTENSION: ICD-10-CM

## 2020-03-11 DIAGNOSIS — E78.2 MIXED HYPERLIPIDEMIA: ICD-10-CM

## 2020-03-11 DIAGNOSIS — Z23 NEED FOR 23-POLYVALENT PNEUMOCOCCAL POLYSACCHARIDE VACCINE: ICD-10-CM

## 2020-03-11 DIAGNOSIS — M81.0 OSTEOPOROSIS WITHOUT CURRENT PATHOLOGICAL FRACTURE, UNSPECIFIED OSTEOPOROSIS TYPE: ICD-10-CM

## 2020-03-11 DIAGNOSIS — Z51.81 MEDICATION MONITORING ENCOUNTER: ICD-10-CM

## 2020-03-11 DIAGNOSIS — B07.0 PLANTAR WART: ICD-10-CM

## 2020-03-11 PROCEDURE — 17110 DESTRUCTION B9 LES UP TO 14: CPT | Performed by: FAMILY MEDICINE

## 2020-03-11 PROCEDURE — 99214 OFFICE O/P EST MOD 30 MIN: CPT | Performed by: FAMILY MEDICINE

## 2020-03-11 RX ORDER — ALPRAZOLAM 0.25 MG/1
TABLET ORAL
Qty: 90 TABLET | Refills: 1 | Status: SHIPPED | OUTPATIENT
Start: 2020-03-11 | End: 2020-08-23

## 2020-03-11 NOTE — PROGRESS NOTES
"Subjective   Annie Mejia is a 74 y.o. female.     History of Present Illness   Mrs. Mejia presents today with her  for routine f/u on several chronic med problems.     She has chronic pain due to diffuse OA, DDD. No recent exacerbations in pain. no injury. Using opioid analgesic very intermittently. followed by pain mgnt. Considering spinal cord stimulator.     She has controlled type 2 DM for which she is not requiring medication at this time. BG never over 200. Fair job of following diabetic diet. Minimal exercise.     She has comorbid HTN, HLP, PAD. Taking meds as rx'd including ASA, statin, ACE-inh. Denies CP, SOA, increased swelling. No claudication.     Has mild renal insufficiency which has been stable. Limiting NSAIDs.     She has GERD with previous esophagitis. Symptoms with varying control. Taking omeprazole 20 mg once daily but frequently taking pepcid or 2nd does of PPI qhs.  using carafate as needed. No dysphagia, weight loss, blood in stool.     She has anxiety with depression for which she is currently on very low dose xanax. Had to d/c zoloft due to side effects. No ER visits for anxiety in several months. Denies worsening depression, no SI. Denies side effects from meds.      Would like \"Freezing of wart\" on foot again.     Pt's previous HPI reviewed and updated as indicated.     The following portions of the patient's history were reviewed and updated as appropriate: allergies, current medications, past family history, past medical history, past social history, past surgical history and problem list.    Review of Systems   Constitutional: Positive for fatigue. Negative for chills and fever.   HENT: Negative for congestion, mouth sores, nosebleeds, rhinorrhea and sore throat.    Eyes: Positive for visual disturbance (chronic).   Respiratory: Negative for cough, shortness of breath and wheezing.    Cardiovascular: Negative for chest pain, palpitations and leg swelling.   Gastrointestinal: " Positive for nausea. Negative for abdominal pain, blood in stool, diarrhea and vomiting.        Heartburn   Genitourinary: Negative for dysuria and hematuria.   Musculoskeletal: Positive for arthralgias, back pain, gait problem and myalgias.   Skin: Negative for rash.        Wart on foot   Neurological: Negative for syncope, weakness and headaches.   Hematological: Negative for adenopathy. Bruises/bleeds easily.   Psychiatric/Behavioral: Positive for sleep disturbance. Negative for confusion. The patient is nervous/anxious.    Pt's previous ROS reviewed and updated as indicated.       Objective    Vitals:    03/11/20 0936   BP: 124/82   Pulse: 88   Temp: 97.5 °F (36.4 °C)   SpO2: 95%     Body mass index is 33.52 kg/m².      03/11/20 0936   Weight: 97.1 kg (214 lb)       Physical Exam   Constitutional: She is oriented to person, place, and time. She appears well-developed and well-nourished. She is cooperative. She does not appear ill. No distress.   obese   HENT:   Head: Normocephalic and atraumatic.   Mouth/Throat: Mucous membranes are normal. Mucous membranes are not dry.   Eyes: No scleral icterus. Right eye exhibits no nystagmus. Left eye exhibits no nystagmus.   Neck: Phonation normal. Neck supple. Normal carotid pulses present. No thyromegaly present.   Cardiovascular: Normal rate, regular rhythm, normal heart sounds and intact distal pulses.   Pulmonary/Chest: Effort normal and breath sounds normal.   Abdominal: Soft. She exhibits no distension and no mass. Bowel sounds are increased. There is no tenderness.     Vascular Status -  Her right foot exhibits no edema. Her left foot exhibits no edema.  Lymphadenopathy:     She has no cervical adenopathy.   Neurological: She is alert and oriented to person, place, and time. Gait (mildly antalgic) abnormal.   Skin: Skin is warm and dry. Lesion (common wart plantar surface right 3rd toe, proximally) noted.   Psychiatric: She has a normal mood and affect. Her  behavior is normal. Cognition and memory are normal.   Nursing note and vitals reviewed.  Pt's previous physical exam reviewed and updated as indicated.    Lab Results   Component Value Date    HGBA1C 6.5 01/07/2020    HGBA1C 7.61 (H) 09/11/2019    HGBA1C 6.7 03/06/2019     Lab Results   Component Value Date    MICROALBUR <3.0 09/06/2017    CREATININE 1.20 11/13/2018     Assessment/Plan   Annie was seen today for diabetes, plantar warts, heartburn and nasal congestion.    Diagnoses and all orders for this visit:    Well controlled diabetes mellitus (CMS/HCC)  -     Microalbumin / Creatinine Urine Ratio - Urine, Clean Catch  -     Comprehensive Metabolic Panel    Mixed anxiety depressive disorder  -     ALPRAZolam (XANAX) 0.25 MG tablet; 1-2 po up to bid as needed for panic/anxiety    Renal insufficiency  -     Comprehensive Metabolic Panel    Essential hypertension  -     Microalbumin / Creatinine Urine Ratio - Urine, Clean Catch  -     CBC (No Diff)  -     Comprehensive Metabolic Panel    Mixed hyperlipidemia  -     Comprehensive Metabolic Panel  -     Lipid Panel    Medication monitoring encounter  -     CBC (No Diff)  -     Comprehensive Metabolic Panel    Plantar wart    Osteoporosis without current pathological fracture, unspecified osteoporosis type  -     DEXA Bone Density Axial; Future    Postmenopausal  -     DEXA Bone Density Axial; Future    Encounter for screening mammogram for breast cancer  -     Mammo Screening Digital Tomosynthesis Bilateral With CAD; Future    Need for 23-polyvalent pneumococcal polysaccharide vaccine  -     Pneumococcal Polysaccharide Vaccine 23-Valent Greater Than or Equal To 1yo Subcutaneous / IM; Future       NIDDM controlled. Patient encouraged to follow diabetic appropriate diet, exercise daily, perform feet check daily, and have yearly diabetic eye exams.    Anxiety stable. Continue low dose alprazolam as needed. No aberrant behavior.    reasses status of renal insufficeincy.  Avoid NSAIDS.    HTN controlled on zestoretic.    HLP with good tolerance of statin.    Advance Care Planning   ACP discussion was held with the patient during this visit. Patient does not have an advance directive, information provided.    I have reviewed risks/benefits and potential side effects of various treatment options for plantar wart. Pt voices understanding and wishes to proceed with cryotherapy (see procedure note below).    Routine f/u in 3 months, sooner as needed/instructed.  I will contact patient regarding test results and provide instructions regarding any necessary changes in plan of care.  Patient was encouraged to keep me informed of any acute changes, lack of improvement, or any new concerning symptoms.  Pt is aware of reasons to seek emergent care.  Patient voiced understanding of all instructions and denied further questions.  As part of patient's treatment plan I am prescribing a controlled substance.  The patient has been made aware of the appropriate use of such medications, including potential risk of somnolence, limited ability to drive and/or work safely, and potential for dependence and/or overdose.  It has also been made clear that these medications are for use by this patient only, without concomitant use of alcohol or other substances, unless prescribed.  Update LILIAM report requested.  History and physical exam exhibit continued safe and appropriate use of controlled substance.  Patient has completed a prescribing agreement detailing terms of continued prescribing of controlled substances, including monitoring LILIAM reports, urine drug screening, and pill counts if necessary.  Patient is aware that inappropriate use will result in cessation of prescribing such medications.    PROCEDURE NOTE  Procedure: destruction benign lesion right foot  PEx findings: verrucous lesion plantar surface right 3rd toe, proximally, mildly TTP but without evidence of secondary infection.  Informed  consent obtained.  Area prepped with isopropyl alcohol.  Lesion treated with 3 applications of liquid nitrogen, 10 second freeze each.  Sterile dressing applied.  Post procedure skin care reviewed. She voiced understanding and denied further questions.

## 2020-03-12 ENCOUNTER — TELEPHONE (OUTPATIENT)
Dept: FAMILY MEDICINE CLINIC | Facility: CLINIC | Age: 75
End: 2020-03-12

## 2020-03-12 LAB
ALBUMIN SERPL-MCNC: 4.2 G/DL (ref 3.5–5.2)
ALBUMIN/CREAT UR: 9 MG/G CREAT (ref 0–29)
ALBUMIN/GLOB SERPL: 1.6 G/DL
ALP SERPL-CCNC: 100 U/L (ref 39–117)
ALT SERPL-CCNC: 15 U/L (ref 1–33)
AST SERPL-CCNC: 13 U/L (ref 1–32)
BILIRUB SERPL-MCNC: 0.2 MG/DL (ref 0.2–1.2)
BUN SERPL-MCNC: 21 MG/DL (ref 8–23)
BUN/CREAT SERPL: 17.6 (ref 7–25)
CALCIUM SERPL-MCNC: 9.8 MG/DL (ref 8.6–10.5)
CHLORIDE SERPL-SCNC: 100 MMOL/L (ref 98–107)
CHOLEST SERPL-MCNC: 137 MG/DL (ref 0–200)
CO2 SERPL-SCNC: 26.5 MMOL/L (ref 22–29)
CREAT SERPL-MCNC: 1.19 MG/DL (ref 0.57–1)
CREAT UR-MCNC: 324.8 MG/DL
ERYTHROCYTE [DISTWIDTH] IN BLOOD BY AUTOMATED COUNT: 14.1 % (ref 12.3–15.4)
GLOBULIN SER CALC-MCNC: 2.6 GM/DL
GLUCOSE SERPL-MCNC: 156 MG/DL (ref 65–99)
HCT VFR BLD AUTO: 39.2 % (ref 34–46.6)
HDLC SERPL-MCNC: 47 MG/DL (ref 40–60)
HGB BLD-MCNC: 12.7 G/DL (ref 12–15.9)
LDLC SERPL CALC-MCNC: 64 MG/DL (ref 0–100)
MCH RBC QN AUTO: 29.1 PG (ref 26.6–33)
MCHC RBC AUTO-ENTMCNC: 32.4 G/DL (ref 31.5–35.7)
MCV RBC AUTO: 89.7 FL (ref 79–97)
MICROALBUMIN UR-MCNC: 30.6 UG/ML
PLATELET # BLD AUTO: 227 10*3/MM3 (ref 140–450)
POTASSIUM SERPL-SCNC: 4.7 MMOL/L (ref 3.5–5.2)
PROT SERPL-MCNC: 6.8 G/DL (ref 6–8.5)
RBC # BLD AUTO: 4.37 10*6/MM3 (ref 3.77–5.28)
SODIUM SERPL-SCNC: 139 MMOL/L (ref 136–145)
TRIGL SERPL-MCNC: 129 MG/DL (ref 0–150)
VLDLC SERPL CALC-MCNC: 25.8 MG/DL
WBC # BLD AUTO: 6.9 10*3/MM3 (ref 3.4–10.8)

## 2020-03-12 NOTE — TELEPHONE ENCOUNTER
I intended for pt to have updated pneumovax at yesterday's visit but I believe she left without getting it. Please have her drop by at her convenience.    Order in chart.

## 2020-03-13 ENCOUNTER — HOSPITAL ENCOUNTER (OUTPATIENT)
Dept: MAMMOGRAPHY | Facility: HOSPITAL | Age: 75
Discharge: HOME | End: 2020-03-13
Admitting: FAMILY MEDICINE

## 2020-03-13 ENCOUNTER — APPOINTMENT (OUTPATIENT)
Dept: BONE DENSITY | Facility: HOSPITAL | Age: 75
End: 2020-03-13

## 2020-03-13 DIAGNOSIS — Z78.0 POSTMENOPAUSAL: ICD-10-CM

## 2020-03-13 DIAGNOSIS — Z12.31 ENCOUNTER FOR SCREENING MAMMOGRAM FOR BREAST CANCER: ICD-10-CM

## 2020-03-13 DIAGNOSIS — M81.0 OSTEOPOROSIS WITHOUT CURRENT PATHOLOGICAL FRACTURE, UNSPECIFIED OSTEOPOROSIS TYPE: ICD-10-CM

## 2020-03-13 PROCEDURE — 77063 BREAST TOMOSYNTHESIS BI: CPT

## 2020-03-13 PROCEDURE — 77080 DXA BONE DENSITY AXIAL: CPT

## 2020-03-13 PROCEDURE — 77067 SCR MAMMO BI INCL CAD: CPT

## 2020-03-18 ENCOUNTER — CLINICAL SUPPORT (OUTPATIENT)
Dept: FAMILY MEDICINE CLINIC | Facility: CLINIC | Age: 75
End: 2020-03-18

## 2020-03-18 DIAGNOSIS — Z23 NEED FOR 23-POLYVALENT PNEUMOCOCCAL POLYSACCHARIDE VACCINE: ICD-10-CM

## 2020-04-02 RX ORDER — PROPRANOLOL HYDROCHLORIDE 10 MG/1
TABLET ORAL
Qty: 30 TABLET | Refills: 0 | OUTPATIENT
Start: 2020-04-02

## 2020-04-03 RX ORDER — ATORVASTATIN CALCIUM 40 MG/1
TABLET, FILM COATED ORAL
Qty: 90 TABLET | Refills: 0 | Status: SHIPPED | OUTPATIENT
Start: 2020-04-03 | End: 2020-06-29 | Stop reason: SDUPTHER

## 2020-04-07 RX ORDER — FAMOTIDINE 40 MG/1
TABLET, FILM COATED ORAL
Qty: 180 TABLET | Refills: 0 | Status: SHIPPED | OUTPATIENT
Start: 2020-04-07 | End: 2020-06-02

## 2020-04-09 ENCOUNTER — TELEPHONE (OUTPATIENT)
Dept: FAMILY MEDICINE CLINIC | Facility: CLINIC | Age: 75
End: 2020-04-09

## 2020-04-09 NOTE — TELEPHONE ENCOUNTER
Patient called stating she would like to request medications for her heart that was prescribed but hasnt been used since 2018  propanolol 10 mg .  Patient would like this sent through mail order   Please advise  [phone]

## 2020-04-10 NOTE — TELEPHONE ENCOUNTER
Please advise.    Would you be ok with the patient going back on this medication?    I do not see this on her current medication list.

## 2020-04-13 RX ORDER — PROPRANOLOL HYDROCHLORIDE 10 MG/1
TABLET ORAL
Qty: 90 TABLET | Refills: 0 | Status: SHIPPED | OUTPATIENT
Start: 2020-04-13 | End: 2020-05-04

## 2020-05-04 RX ORDER — PROPRANOLOL HYDROCHLORIDE 10 MG/1
TABLET ORAL
Qty: 90 TABLET | Refills: 0 | Status: SHIPPED | OUTPATIENT
Start: 2020-05-04 | End: 2020-05-26

## 2020-05-26 RX ORDER — PROPRANOLOL HYDROCHLORIDE 10 MG/1
TABLET ORAL
Qty: 90 TABLET | Refills: 0 | Status: SHIPPED | OUTPATIENT
Start: 2020-05-26 | End: 2021-01-15

## 2020-05-27 RX ORDER — OMEPRAZOLE 20 MG/1
CAPSULE, DELAYED RELEASE ORAL
Qty: 90 CAPSULE | Refills: 1 | Status: SHIPPED | OUTPATIENT
Start: 2020-05-27 | End: 2020-12-04

## 2020-06-02 RX ORDER — FAMOTIDINE 40 MG/1
TABLET, FILM COATED ORAL
Qty: 180 TABLET | Refills: 0 | Status: SHIPPED | OUTPATIENT
Start: 2020-06-02 | End: 2020-06-29 | Stop reason: ALTCHOICE

## 2020-06-22 ENCOUNTER — TRANSCRIBE ORDERS (OUTPATIENT)
Dept: LAB | Facility: HOSPITAL | Age: 75
End: 2020-06-22

## 2020-06-22 ENCOUNTER — LAB (OUTPATIENT)
Dept: LAB | Facility: HOSPITAL | Age: 75
End: 2020-06-22

## 2020-06-22 DIAGNOSIS — Z01.818 PRE-OP TESTING: Primary | ICD-10-CM

## 2020-06-22 DIAGNOSIS — Z01.818 PRE-OP TESTING: ICD-10-CM

## 2020-06-22 PROCEDURE — U0004 COV-19 TEST NON-CDC HGH THRU: HCPCS

## 2020-06-22 PROCEDURE — U0002 COVID-19 LAB TEST NON-CDC: HCPCS

## 2020-06-22 PROCEDURE — C9803 HOPD COVID-19 SPEC COLLECT: HCPCS

## 2020-06-23 LAB
REF LAB TEST METHOD: NORMAL
SARS-COV-2 RNA RESP QL NAA+PROBE: NOT DETECTED

## 2020-06-25 ENCOUNTER — OUTSIDE FACILITY SERVICE (OUTPATIENT)
Dept: GASTROENTEROLOGY | Facility: CLINIC | Age: 75
End: 2020-06-25

## 2020-06-25 ENCOUNTER — LAB REQUISITION (OUTPATIENT)
Dept: LAB | Facility: HOSPITAL | Age: 75
End: 2020-06-25

## 2020-06-25 DIAGNOSIS — Z86.010 PERSONAL HISTORY OF COLONIC POLYPS: ICD-10-CM

## 2020-06-25 DIAGNOSIS — Z12.11 ENCOUNTER FOR SCREENING FOR MALIGNANT NEOPLASM OF COLON: ICD-10-CM

## 2020-06-25 PROCEDURE — 88305 TISSUE EXAM BY PATHOLOGIST: CPT | Performed by: INTERNAL MEDICINE

## 2020-06-25 PROCEDURE — 45385 COLONOSCOPY W/LESION REMOVAL: CPT | Performed by: INTERNAL MEDICINE

## 2020-06-29 ENCOUNTER — OFFICE VISIT (OUTPATIENT)
Dept: FAMILY MEDICINE CLINIC | Facility: CLINIC | Age: 75
End: 2020-06-29

## 2020-06-29 VITALS
HEIGHT: 67 IN | HEART RATE: 97 BPM | DIASTOLIC BLOOD PRESSURE: 78 MMHG | SYSTOLIC BLOOD PRESSURE: 120 MMHG | RESPIRATION RATE: 14 BRPM | TEMPERATURE: 97.7 F | BODY MASS INDEX: 34.91 KG/M2 | OXYGEN SATURATION: 100 % | WEIGHT: 222.4 LBS

## 2020-06-29 DIAGNOSIS — L98.9 FOOT LESION: ICD-10-CM

## 2020-06-29 DIAGNOSIS — G89.4 CHRONIC PAIN SYNDROME: ICD-10-CM

## 2020-06-29 DIAGNOSIS — N28.9 RENAL INSUFFICIENCY: ICD-10-CM

## 2020-06-29 DIAGNOSIS — I10 ESSENTIAL HYPERTENSION: ICD-10-CM

## 2020-06-29 DIAGNOSIS — E11.9 TYPE 2 DIABETES MELLITUS WITHOUT COMPLICATION, WITHOUT LONG-TERM CURRENT USE OF INSULIN (HCC): Primary | ICD-10-CM

## 2020-06-29 DIAGNOSIS — R03.0 ELEVATED BLOOD PRESSURE READING: ICD-10-CM

## 2020-06-29 DIAGNOSIS — K21.00 GASTROESOPHAGEAL REFLUX DISEASE WITH ESOPHAGITIS: ICD-10-CM

## 2020-06-29 DIAGNOSIS — E78.2 MIXED HYPERLIPIDEMIA: ICD-10-CM

## 2020-06-29 DIAGNOSIS — F41.8 MIXED ANXIETY DEPRESSIVE DISORDER: ICD-10-CM

## 2020-06-29 LAB — HBA1C MFR BLD: 7.6 %

## 2020-06-29 PROCEDURE — 99214 OFFICE O/P EST MOD 30 MIN: CPT | Performed by: FAMILY MEDICINE

## 2020-06-29 PROCEDURE — 83036 HEMOGLOBIN GLYCOSYLATED A1C: CPT | Performed by: FAMILY MEDICINE

## 2020-06-29 RX ORDER — CLONIDINE HYDROCHLORIDE 0.1 MG/1
0.1 TABLET ORAL ONCE
Status: COMPLETED | OUTPATIENT
Start: 2020-06-29 | End: 2020-06-29

## 2020-06-29 RX ORDER — ATORVASTATIN CALCIUM 40 MG/1
40 TABLET, FILM COATED ORAL NIGHTLY
Qty: 90 TABLET | Refills: 3 | Status: SHIPPED | OUTPATIENT
Start: 2020-06-29 | End: 2020-09-29 | Stop reason: SDUPTHER

## 2020-06-29 RX ORDER — CLONIDINE HYDROCHLORIDE 0.1 MG/1
0.1 TABLET ORAL EVERY 12 HOURS SCHEDULED
Status: DISCONTINUED | OUTPATIENT
Start: 2020-06-29 | End: 2020-06-29

## 2020-06-29 RX ADMIN — CLONIDINE HYDROCHLORIDE 0.1 MG: 0.1 TABLET ORAL at 11:23

## 2020-06-29 NOTE — PROGRESS NOTES
Subjective   Annie Mejia is a 74 y.o. female.     History of Present Illness   Mrs. Mejia presents today for routine f/u on several chronic med problems.     She has chronic pain due to diffuse OA, DDD. No recent exacerbations in pain. no injury. Using opioid analgesic very intermittently. followed by pain mgnt.      She has controlled type 2 DM for which she is not requiring medication at this time. BG never over 200. Fair job of following diabetic diet. No regular exercise as back pain worst with standing, walking.     She has comorbid HTN, HLP, PAD. Taking meds as rx'd including ASA, statin, ACE-inh. Denies CP, SOA, increased swelling. No claudication.     Has mild renal insufficiency which has been stable. Limiting NSAIDs. Due for recheck.     She has GERD with previous esophagitis. Symptoms with varying control. Taking omeprazole 20 mg once daily but frequently taking pepcid or 2nd does of PPI qhs. using carafate as needed. No dysphagia, weight loss, blood in stool.     She has anxiety with depression for which she is currently on very low dose xanax. Had to d/c zoloft due to side effects. No ER visits for anxiety in several months. Denies worsening depression, no SI. Denies side effects from meds.      She had wart frozen on toe of right foot 3 months ago. Decreased some in size but still quite painful. Persistent toe pain, worse when bare-footed    Since last visit she has had cataract surgery. Also had colonoscopy - 3 polyps, f/u in 3 to 5 years- she is not sure.    Pt's previous HPI reviewed and updated as indicated.     The following portions of the patient's history were reviewed and updated as appropriate: allergies, current medications, past family history, past medical history, past social history, past surgical history and problem list.    Review of Systems   Constitutional: Positive for fatigue. Negative for chills and fever.   HENT: Negative for congestion, mouth sores, nosebleeds, rhinorrhea  and sore throat.    Eyes: Positive for visual disturbance (chronic).   Respiratory: Negative for cough, shortness of breath and wheezing.    Cardiovascular: Negative for chest pain, palpitations and leg swelling.   Gastrointestinal: Positive for nausea. Negative for abdominal pain, blood in stool, diarrhea and vomiting.        Heartburn   Genitourinary: Negative for dysuria and hematuria.   Musculoskeletal: Positive for arthralgias, back pain, gait problem and myalgias.   Skin: Negative for rash.        Wart on foot   Neurological: Negative for syncope, weakness and headaches.   Hematological: Negative for adenopathy. Bruises/bleeds easily.   Psychiatric/Behavioral: Positive for sleep disturbance. Negative for confusion. The patient is nervous/anxious.    Pt's previous ROS reviewed and updated as indicated.       Objective    Vitals:    06/29/20 0849   BP: 120/78   Pulse: 97   Resp: 14   Temp: 97.7 °F (36.5 °C)   SpO2: 100%     Body mass index is 34.83 kg/m².      06/29/20 0849   Weight: 101 kg (222 lb 6.4 oz)       Physical Exam   Constitutional: She is oriented to person, place, and time. She appears well-developed and well-nourished. She is cooperative. She does not appear ill. No distress.   obese   HENT:   Head: Normocephalic and atraumatic.   Mouth/Throat: Mucous membranes are normal. Mucous membranes are not dry.   Eyes: Conjunctivae are normal. No scleral icterus. Right eye exhibits no nystagmus. Left eye exhibits no nystagmus.   Neck: Phonation normal. Neck supple. Normal carotid pulses present. Carotid bruit is not present. No thyromegaly present.   Cardiovascular: Normal rate, regular rhythm, normal heart sounds and intact distal pulses.   Pulmonary/Chest: Effort normal and breath sounds normal.     Vascular Status -  Her right foot exhibits no edema. Her left foot exhibits no edema.  Lymphadenopathy:     She has no cervical adenopathy.   Neurological: She is alert and oriented to person, place, and  time. Gait (mildly antalgic) abnormal.   Skin: Skin is warm and dry. Lesion (common wart plantar surface right 3rd toe, proximally) noted.   Psychiatric: Her speech is normal and behavior is normal. Judgment and thought content normal. Her mood appears anxious (mildly). Cognition and memory are normal.   Nursing note and vitals reviewed.  Pt's previous physical exam reviewed and updated as indicated.        Lab Results   Component Value Date    HGBA1C 7.6 06/29/2020    HGBA1C 6.5 01/07/2020    HGBA1C 7.61 (H) 09/11/2019     Lab Results   Component Value Date    MICROALBUR 30.6 03/11/2020    CREATININE 1.39 (H) 06/29/2020     Lab Results   Component Value Date    WBC 6.90 03/11/2020    HGB 12.7 03/11/2020    HCT 39.2 03/11/2020    MCV 89.7 03/11/2020     03/11/2020       Lab Results   Component Value Date    GLUCOSE 126 (H) 11/13/2018    BUN 19 06/29/2020    CREATININE 1.39 (H) 06/29/2020    EGFRIFNONA 37 (L) 06/29/2020    EGFRIFAFRI 45 (L) 06/29/2020    BCR 13.7 06/29/2020    K 4.9 06/29/2020    CO2 29.5 (H) 06/29/2020    CALCIUM 9.5 06/29/2020    PROTENTOTREF 6.8 03/11/2020    ALBUMIN 4.20 03/11/2020    LABIL2 1.6 03/11/2020    AST 13 03/11/2020    ALT 15 03/11/2020       Lab Results   Component Value Date    CHLPL 137 03/11/2020    TRIG 129 03/11/2020    HDL 47 03/11/2020    LDL 64 03/11/2020       Lab Results   Component Value Date    TSH 2.11 03/26/2018           Assessment/Plan   Annie was seen today for diabetes, hyperlipidemia, hypertension and sore toe on right foot.    Diagnoses and all orders for this visit:    Type 2 diabetes mellitus without complication, without long-term current use of insulin (CMS/[facility])  -     POC Glycosylated Hemoglobin (Hb A1C)    Mixed hyperlipidemia  -     atorvastatin (LIPITOR) 40 MG tablet; Take 1 tablet by mouth Every Night.    Renal insufficiency  -     Basic Metabolic Panel    Foot lesion  -     Ambulatory Referral to Podiatry    Essential hypertension    Mixed anxiety  depressive disorder    Chronic pain syndrome    Gastroesophageal reflux disease with esophagitis       NIDDM previously diet controlled.  Slight increase in A1c noted today.  She is reminded of the basic dietary recommendations and feels she can make changes to improve her blood sugar without having to begin medication.  We will continue monitoring at this time.    Hyperlipidemia with good tolerance of statin.  Encouraged heart healthy eating.    Is a status of renal insufficiency, address as indicated.  She is aware she should be avoiding NSAIDs.    Refer to podiatry for further eval/treatment of plantar wart.    Hypertension controlled, continue Zestoretic.    Mixed anxiety/depressive disorder stable.  Continue low-dose alprazolam as needed.  No aberrant behavior.    Follow-up with pain management regarding multifactorial chronic pain.    Continue PPI as needed for GERD with esophagitis.  No red flag symptoms at this time.    Routine follow-up in 3 months, sooner as needed/instructed.  I will contact patient regarding test results and provide instructions regarding any necessary changes in plan of care.  Patient was encouraged to keep me informed of any acute changes, lack of improvement, or any new concerning symptoms.  Pt is aware of reasons to seek emergent care.  Patient voiced understanding of all instructions and denied further questions.  As part of patient's treatment plan I am prescribing a controlled substance.  The patient has been made aware of the appropriate use of such medications, including potential risk of somnolence, limited ability to drive and/or work safely, and potential for dependence and/or overdose.  It has also been made clear that these medications are for use by this patient only, without concomitant use of alcohol or other substances, unless prescribed.  LILIAM report pending  History and physical exam exhibit continued safe and appropriate use of controlled substance.  Patient has  completed a prescribing agreement detailing terms of continued prescribing of controlled substances, including monitoring LILIAM reports, urine drug screening, and pill counts if necessary.  Patient is aware that inappropriate use will result in cessation of prescribing such medications.    (of note, pt not given dose of clonidine during apt today- this was documented in error)        Please note that portions of this note may have been completed with a voice recognition program. Efforts were made to edit the dictations, but occasionally words are mistranscribed.

## 2020-06-30 ENCOUNTER — RESULTS ENCOUNTER (OUTPATIENT)
Dept: FAMILY MEDICINE CLINIC | Facility: CLINIC | Age: 75
End: 2020-06-30

## 2020-06-30 DIAGNOSIS — N18.30 STAGE 3 CHRONIC KIDNEY DISEASE (HCC): ICD-10-CM

## 2020-06-30 DIAGNOSIS — N18.30 STAGE 3 CHRONIC KIDNEY DISEASE (HCC): Primary | ICD-10-CM

## 2020-06-30 LAB
BUN SERPL-MCNC: 19 MG/DL (ref 8–23)
BUN/CREAT SERPL: 13.7 (ref 7–25)
CALCIUM SERPL-MCNC: 9.5 MG/DL (ref 8.6–10.5)
CHLORIDE SERPL-SCNC: 102 MMOL/L (ref 98–107)
CO2 SERPL-SCNC: 29.5 MMOL/L (ref 22–29)
CREAT SERPL-MCNC: 1.39 MG/DL (ref 0.57–1)
GLUCOSE SERPL-MCNC: 167 MG/DL (ref 65–99)
POTASSIUM SERPL-SCNC: 4.9 MMOL/L (ref 3.5–5.2)
SODIUM SERPL-SCNC: 141 MMOL/L (ref 136–145)

## 2020-06-30 NOTE — PROGRESS NOTES
Please inform pt her recheck labs show her kidney function has declined even further. For this reason she should have US of kidneys as well as further eval urine. Orders on chart. Pending review of results she may need consultation with nephology.

## 2020-07-01 ENCOUNTER — TRANSCRIBE ORDERS (OUTPATIENT)
Dept: MRI IMAGING | Facility: HOSPITAL | Age: 75
End: 2020-07-01

## 2020-07-01 DIAGNOSIS — M51.36 DEGENERATION OF LUMBAR INTERVERTEBRAL DISC: Primary | ICD-10-CM

## 2020-07-15 ENCOUNTER — HOSPITAL ENCOUNTER (OUTPATIENT)
Dept: ULTRASOUND IMAGING | Facility: HOSPITAL | Age: 75
Discharge: HOME | End: 2020-07-15

## 2020-07-15 ENCOUNTER — HOSPITAL ENCOUNTER (OUTPATIENT)
Dept: MRI IMAGING | Facility: HOSPITAL | Age: 75
Discharge: HOME | End: 2020-07-15
Admitting: ANESTHESIOLOGY

## 2020-07-15 DIAGNOSIS — M51.36 DEGENERATION OF LUMBAR INTERVERTEBRAL DISC: ICD-10-CM

## 2020-07-15 DIAGNOSIS — N18.30 STAGE 3 CHRONIC KIDNEY DISEASE (HCC): ICD-10-CM

## 2020-07-15 PROCEDURE — 76775 US EXAM ABDO BACK WALL LIM: CPT

## 2020-07-16 NOTE — PROGRESS NOTES
Please inform pt her ultrasound of kidneys showed only simple cysts, no new concerns. She needs to come in for urine studies; orders already on chart.

## 2020-07-17 ENCOUNTER — HOSPITAL ENCOUNTER (OUTPATIENT)
Dept: MRI IMAGING | Facility: HOSPITAL | Age: 75
Discharge: HOME | End: 2020-07-17
Admitting: ANESTHESIOLOGY

## 2020-07-17 PROCEDURE — 72148 MRI LUMBAR SPINE W/O DYE: CPT

## 2020-07-20 ENCOUNTER — TELEPHONE (OUTPATIENT)
Dept: FAMILY MEDICINE CLINIC | Facility: CLINIC | Age: 75
End: 2020-07-20

## 2020-07-20 RX ORDER — PHENAZOPYRIDINE HYDROCHLORIDE 100 MG/1
TABLET, FILM COATED ORAL
Qty: 6 TABLET | Refills: 0 | Status: SHIPPED | OUTPATIENT
Start: 2020-07-20 | End: 2020-07-22

## 2020-07-20 NOTE — TELEPHONE ENCOUNTER
Spoke with patient, she went to UC yesterday was given Rocephin injection there and rx for Cefuroxime sent to pharmacy. She is requesting something to help with the pain as they would not give her anything at UC.

## 2020-07-20 NOTE — TELEPHONE ENCOUNTER
Is she asking for pyridium for bladder pain or for refill of her hydrocodone which she uses very sparingly?

## 2020-07-20 NOTE — TELEPHONE ENCOUNTER
PT CALLED STATING THAT SHE WENT TO URGENT CARE DUE TO A UTI AND WAS GIVEN AN ANTIBIOTIC     PT STATED SHE WAS TOLD TO COME IN FOR A URINALYSIS AND WOULD LIKE TO KNOW IF IT IS OKAY TO DO THAT WITH THE ANTIBIOTICS IN HER SYSTEM    PLEASE ADVISE AT: [phone]

## 2020-07-22 ENCOUNTER — TELEPHONE (OUTPATIENT)
Dept: URGENT CARE | Facility: CLINIC | Age: 75
End: 2020-07-22

## 2020-07-22 DIAGNOSIS — R31.9 URINARY TRACT INFECTION WITH HEMATURIA, SITE UNSPECIFIED: Primary | ICD-10-CM

## 2020-07-22 DIAGNOSIS — R11.0 NAUSEA: ICD-10-CM

## 2020-07-22 DIAGNOSIS — N39.0 URINARY TRACT INFECTION WITH HEMATURIA, SITE UNSPECIFIED: Primary | ICD-10-CM

## 2020-07-22 RX ORDER — ONDANSETRON 4 MG/1
4 TABLET, ORALLY DISINTEGRATING ORAL EVERY 8 HOURS PRN
Qty: 15 TABLET | Refills: 0 | Status: SHIPPED | OUTPATIENT
Start: 2020-07-22 | End: 2020-11-21 | Stop reason: HOSPADM

## 2020-07-22 RX ORDER — CIPROFLOXACIN 250 MG/1
250 TABLET, FILM COATED ORAL 2 TIMES DAILY
Qty: 14 TABLET | Refills: 0 | Status: SHIPPED | OUTPATIENT
Start: 2020-07-22 | End: 2020-08-05

## 2020-08-04 ENCOUNTER — TELEPHONE (OUTPATIENT)
Dept: FAMILY MEDICINE CLINIC | Facility: CLINIC | Age: 75
End: 2020-08-04

## 2020-08-04 NOTE — TELEPHONE ENCOUNTER
Pt called sts she had an US that was normal and was instructed to stop by office and give UA sample?

## 2020-08-05 DIAGNOSIS — R39.9 URINARY SYMPTOM OR SIGN: Primary | ICD-10-CM

## 2020-08-05 LAB
BILIRUB BLD-MCNC: NEGATIVE MG/DL
CLARITY, POC: CLEAR
COLOR UR: YELLOW
GLUCOSE UR STRIP-MCNC: NEGATIVE MG/DL
KETONES UR QL: NEGATIVE
LEUKOCYTE EST, POC: NEGATIVE
NITRITE UR-MCNC: NEGATIVE MG/ML
PH UR: 6.5 [PH] (ref 5–8)
PROT UR STRIP-MCNC: NEGATIVE MG/DL
RBC # UR STRIP: NEGATIVE /UL
SP GR UR: 1.01 (ref 1–1.03)
UROBILINOGEN UR QL: NORMAL

## 2020-08-05 PROCEDURE — 81003 URINALYSIS AUTO W/O SCOPE: CPT | Performed by: FAMILY MEDICINE

## 2020-08-05 NOTE — PROGRESS NOTES
Please inform pt urine studies were normal. US also. We will continue to closely monitor her kidney function with labwork at her next visit.

## 2020-08-13 ENCOUNTER — OFFICE VISIT (OUTPATIENT)
Dept: ORTHOPEDIC SURGERY | Facility: CLINIC | Age: 75
End: 2020-08-13

## 2020-08-13 VITALS — HEIGHT: 66 IN | RESPIRATION RATE: 18 BRPM | BODY MASS INDEX: 33.75 KG/M2 | WEIGHT: 210 LBS

## 2020-08-13 DIAGNOSIS — M17.11 PRIMARY OSTEOARTHRITIS OF RIGHT KNEE: ICD-10-CM

## 2020-08-13 DIAGNOSIS — M17.0 PRIMARY OSTEOARTHRITIS OF BOTH KNEES: Primary | ICD-10-CM

## 2020-08-13 DIAGNOSIS — M17.12 PRIMARY OSTEOARTHRITIS OF LEFT KNEE: ICD-10-CM

## 2020-08-13 PROCEDURE — 20610 DRAIN/INJ JOINT/BURSA W/O US: CPT | Performed by: ORTHOPAEDIC SURGERY

## 2020-08-13 RX ORDER — TRIAMCINOLONE ACETONIDE 40 MG/ML
40 INJECTION, SUSPENSION INTRA-ARTICULAR; INTRAMUSCULAR
Status: COMPLETED | OUTPATIENT
Start: 2020-08-13 | End: 2020-08-13

## 2020-08-13 RX ORDER — LIDOCAINE HYDROCHLORIDE 10 MG/ML
2 INJECTION, SOLUTION INFILTRATION; PERINEURAL
Status: COMPLETED | OUTPATIENT
Start: 2020-08-13 | End: 2020-08-13

## 2020-08-13 RX ADMIN — LIDOCAINE HYDROCHLORIDE 2 ML: 10 INJECTION, SOLUTION INFILTRATION; PERINEURAL at 09:45

## 2020-08-13 RX ADMIN — LIDOCAINE HYDROCHLORIDE 2 ML: 10 INJECTION, SOLUTION INFILTRATION; PERINEURAL at 09:46

## 2020-08-13 RX ADMIN — TRIAMCINOLONE ACETONIDE 40 MG: 40 INJECTION, SUSPENSION INTRA-ARTICULAR; INTRAMUSCULAR at 09:45

## 2020-08-13 RX ADMIN — TRIAMCINOLONE ACETONIDE 40 MG: 40 INJECTION, SUSPENSION INTRA-ARTICULAR; INTRAMUSCULAR at 09:46
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
## 2020-08-20 DIAGNOSIS — F41.8 MIXED ANXIETY DEPRESSIVE DISORDER: ICD-10-CM

## 2020-08-23 RX ORDER — ALPRAZOLAM 0.25 MG/1
TABLET ORAL
Qty: 90 TABLET | Refills: 2 | Status: SHIPPED | OUTPATIENT
Start: 2020-08-23 | End: 2021-06-30

## 2020-08-24 ENCOUNTER — TELEPHONE (OUTPATIENT)
Dept: FAMILY MEDICINE CLINIC | Facility: CLINIC | Age: 75
End: 2020-08-24

## 2020-08-24 ENCOUNTER — OFFICE VISIT (OUTPATIENT)
Dept: FAMILY MEDICINE CLINIC | Facility: CLINIC | Age: 75
End: 2020-08-24

## 2020-08-24 VITALS
HEART RATE: 86 BPM | SYSTOLIC BLOOD PRESSURE: 112 MMHG | TEMPERATURE: 96.8 F | OXYGEN SATURATION: 100 % | DIASTOLIC BLOOD PRESSURE: 60 MMHG | HEIGHT: 66 IN | BODY MASS INDEX: 33.59 KG/M2 | WEIGHT: 209 LBS

## 2020-08-24 DIAGNOSIS — R10.32 ACUTE LEFT LOWER QUADRANT PAIN: Primary | ICD-10-CM

## 2020-08-24 PROCEDURE — 99213 OFFICE O/P EST LOW 20 MIN: CPT | Performed by: FAMILY MEDICINE

## 2020-08-24 RX ORDER — CIPROFLOXACIN 500 MG/1
500 TABLET, FILM COATED ORAL 2 TIMES DAILY
Qty: 10 TABLET | Refills: 0 | Status: SHIPPED | OUTPATIENT
Start: 2020-08-24 | End: 2020-08-29

## 2020-08-24 RX ORDER — METRONIDAZOLE 500 MG/1
500 TABLET ORAL 2 TIMES DAILY
Qty: 10 TABLET | Refills: 0 | Status: SHIPPED | OUTPATIENT
Start: 2020-08-24 | End: 2020-08-29

## 2020-08-24 NOTE — PROGRESS NOTES
"Subjective   Annie Mejia is a 74 y.o. female.     History of Present Illness   Mrs. Mejia presents today with c/o abd cramping, nausea x 3 weeks. occ vomiting. Pain became severe over weekend but she was \"to afraid to go to ER due to COVID\". Took some bentyl from a friend which relieved the \"spasms\" in her abdomen. Also having frequent loose stools; tx'd with pepto and \"that has slowed down\".  She denies change in diet other than to d/c nuts, fried foods. No fever, no blood in stool. She has had bouts of diverticulitis in past and feels these symptoms are \"just like that\".     The following portions of the patient's history were reviewed and updated as appropriate: allergies, current medications, past family history, past medical history, past social history, past surgical history and problem list.    Review of Systems   Constitutional: Positive for fatigue. Negative for chills and fever.   HENT: Negative for congestion, mouth sores, nosebleeds, rhinorrhea and sore throat.    Eyes: Positive for visual disturbance (chronic).   Respiratory: Negative for cough, shortness of breath and wheezing.    Cardiovascular: Negative for chest pain, palpitations and leg swelling.   Gastrointestinal: Positive for abdominal pain, diarrhea, nausea and vomiting. Negative for blood in stool.        Heartburn   Genitourinary: Negative for dysuria and hematuria.   Musculoskeletal: Positive for arthralgias, back pain, gait problem and myalgias.   Skin: Negative for rash.   Neurological: Negative for syncope, weakness and headaches.   Hematological: Negative for adenopathy. Bruises/bleeds easily.   Psychiatric/Behavioral: Positive for sleep disturbance. Negative for confusion. The patient is nervous/anxious.    Pt's previous ROS reviewed and updated as indicated.       Objective    Vitals:    08/24/20 1407   BP: 112/60   Pulse: 86   Temp: 96.8 °F (36 °C)   SpO2: 100%     Body mass index is 33.73 kg/m².      08/24/20  1407   Weight: " 94.8 kg (209 lb)       Physical Exam   Constitutional: She is oriented to person, place, and time. She appears well-developed and well-nourished. She is cooperative. She appears ill (mildly). No distress.   obese   HENT:   Head: Normocephalic and atraumatic.   Mouth/Throat: Mucous membranes are not dry.   Eyes: Conjunctivae are normal. No scleral icterus.   Cardiovascular: Normal rate, regular rhythm, normal heart sounds and intact distal pulses.   Pulmonary/Chest: Effort normal and breath sounds normal.   Abdominal: Soft. Bowel sounds are normal. She exhibits no distension and no mass (exam limited by body habitus). There is no hepatosplenomegaly (exam limited by body habitus). There is tenderness (moderate LLQ, mild suprapubic) in the suprapubic area and left lower quadrant. There is no rigidity, no rebound, no guarding and no CVA tenderness.     Vascular Status -  Her right foot exhibits no edema. Her left foot exhibits no edema.  Neurological: She is alert and oriented to person, place, and time. Gait (antalgic) abnormal.   Skin: Skin is warm and dry. No rash noted.   No jaundice   Psychiatric: She has a normal mood and affect. Her behavior is normal. Cognition and memory are normal.   Nursing note and vitals reviewed.      Assessment/Plan   Annie was seen today for abdominal cramping, vomiting and hot flashes.    Diagnoses and all orders for this visit:    Acute left lower quadrant pain  -     Cancel: POCT urinalysis dipstick, automated    Other orders  -     ciprofloxacin (Cipro) 500 MG tablet; Take 1 tablet by mouth 2 (Two) Times a Day for 5 days.  -     metroNIDAZOLE (Flagyl) 500 MG tablet; Take 1 tablet by mouth 2 (Two) Times a Day for 5 days.       Acute LLQ pain with symptoms consistent with possible diverticulitis. nonacute abd on exam. We have discussed other potential etiologies, diagnostic/treatment options. As this is similar to her previous episodes she wishes to tx empirically for with abx at this  time. She declines CT for now. Of note, she does have h/o C dif and is aware that she is high risk for current infection. She is to report any persist loose stools.    She was not able to provide urine sample.    She will keep routine f/ua s scheduled, f/u sooner as needed.  Patient was encouraged to keep me informed of any acute changes, lack of improvement, or any new concerning symptoms.  Pt is aware of reasons to seek emergent care.  Patient voiced understanding of all instructions and denied further questions.

## 2020-08-24 NOTE — TELEPHONE ENCOUNTER
PT CALLED STATING THAT DR LOMBARDI WAS SUPPOSE TO CALL  BENTYL IN FOR HER TODAY.  PT CALLED TO REMIND HER.    PT CONTACT [phone]     PHARMACY VERIFIED [facility] [address] [place] [address] [phone] [facility] [phone] FX

## 2020-08-25 RX ORDER — DICYCLOMINE HCL 20 MG
20 TABLET ORAL EVERY 6 HOURS PRN
Qty: 90 TABLET | Refills: 0 | Status: SHIPPED | OUTPATIENT
Start: 2020-08-25 | End: 2021-02-08

## 2020-09-29 ENCOUNTER — OFFICE VISIT (OUTPATIENT)
Dept: FAMILY MEDICINE CLINIC | Facility: CLINIC | Age: 75
End: 2020-09-29

## 2020-09-29 VITALS
DIASTOLIC BLOOD PRESSURE: 76 MMHG | SYSTOLIC BLOOD PRESSURE: 122 MMHG | WEIGHT: 216 LBS | RESPIRATION RATE: 14 BRPM | HEIGHT: 66 IN | TEMPERATURE: 97.1 F | OXYGEN SATURATION: 95 % | HEART RATE: 84 BPM | BODY MASS INDEX: 34.72 KG/M2

## 2020-09-29 DIAGNOSIS — N28.9 RENAL INSUFFICIENCY: ICD-10-CM

## 2020-09-29 DIAGNOSIS — E78.2 MIXED HYPERLIPIDEMIA: ICD-10-CM

## 2020-09-29 DIAGNOSIS — I10 ESSENTIAL HYPERTENSION: ICD-10-CM

## 2020-09-29 DIAGNOSIS — Z23 NEED FOR VACCINATION: ICD-10-CM

## 2020-09-29 DIAGNOSIS — E11.9 WELL CONTROLLED DIABETES MELLITUS (HCC): Primary | ICD-10-CM

## 2020-09-29 DIAGNOSIS — R39.9 URINARY SYMPTOM OR SIGN: ICD-10-CM

## 2020-09-29 DIAGNOSIS — Z51.81 MEDICATION MONITORING ENCOUNTER: ICD-10-CM

## 2020-09-29 DIAGNOSIS — K21.00 GASTROESOPHAGEAL REFLUX DISEASE WITH ESOPHAGITIS: ICD-10-CM

## 2020-09-29 DIAGNOSIS — N30.00 ACUTE CYSTITIS WITHOUT HEMATURIA: ICD-10-CM

## 2020-09-29 DIAGNOSIS — R82.90 ABNORMAL URINALYSIS: ICD-10-CM

## 2020-09-29 DIAGNOSIS — F41.8 MIXED ANXIETY DEPRESSIVE DISORDER: ICD-10-CM

## 2020-09-29 LAB
BILIRUB BLD-MCNC: ABNORMAL MG/DL
CLARITY, POC: ABNORMAL
COLOR UR: ABNORMAL
GLUCOSE UR STRIP-MCNC: NEGATIVE MG/DL
KETONES UR QL: NEGATIVE
LEUKOCYTE EST, POC: ABNORMAL
NITRITE UR-MCNC: POSITIVE MG/ML
PH UR: 6 [PH] (ref 5–8)
PROT UR STRIP-MCNC: ABNORMAL MG/DL
RBC # UR STRIP: NEGATIVE /UL
SP GR UR: 1.01 (ref 1–1.03)
UROBILINOGEN UR QL: NORMAL

## 2020-09-29 PROCEDURE — G0008 ADMIN INFLUENZA VIRUS VAC: HCPCS | Performed by: FAMILY MEDICINE

## 2020-09-29 PROCEDURE — 90694 VACC AIIV4 NO PRSRV 0.5ML IM: CPT | Performed by: FAMILY MEDICINE

## 2020-09-29 PROCEDURE — 81003 URINALYSIS AUTO W/O SCOPE: CPT | Performed by: FAMILY MEDICINE

## 2020-09-29 PROCEDURE — 99214 OFFICE O/P EST MOD 30 MIN: CPT | Performed by: FAMILY MEDICINE

## 2020-09-29 RX ORDER — PHENAZOPYRIDINE HYDROCHLORIDE 200 MG/1
200 TABLET, FILM COATED ORAL 3 TIMES DAILY PRN
Qty: 6 TABLET | Refills: 0 | Status: SHIPPED | OUTPATIENT
Start: 2020-09-29 | End: 2020-10-01

## 2020-09-29 RX ORDER — CEFDINIR 300 MG/1
300 CAPSULE ORAL 2 TIMES DAILY
Qty: 10 CAPSULE | Refills: 0 | Status: SHIPPED | OUTPATIENT
Start: 2020-09-29 | End: 2020-10-04

## 2020-09-29 RX ORDER — ATORVASTATIN CALCIUM 40 MG/1
40 TABLET, FILM COATED ORAL NIGHTLY
Qty: 90 TABLET | Refills: 3 | Status: SHIPPED | OUTPATIENT
Start: 2020-09-29 | End: 2021-10-01 | Stop reason: SDUPTHER

## 2020-09-29 NOTE — PROGRESS NOTES
Subjective   Annie Mejia is a 74 y.o. female.     History of Present Illness   Mrs. Mejia presents today for routine f/u on several chronic med problems.     She has chronic pain due to diffuse OA, DDD. No recent exacerbations in pain. no injury. Using opioid analgesic very intermittently. followed by pain mgnt.  Has had 2 injections since her last visit.  Did not receive prolonged relief.     She has controlled type 2 DM for which she is not requiring medication at this time. BG never over 200.  Not following diabetic diet. No regular exercise as back pain worst with standing, walking.     She has comorbid HTN, HLP, PAD. Taking meds as rx'd including ASA, statin, ACE-inh. Denies CP, SOA, increased swelling. No claudication.     Has renal insufficiency which was found to be worsened at her last visit.  Due for recheck.  Limiting NSAIDs.     She has GERD with previous esophagitis. Symptoms with varying control. Taking omeprazole 20 mg once daily but frequently taking pepcid or 2nd does of PPI qhs. using carafate as needed. No dysphagia, weight loss, blood in stool.     She has anxiety with depression for which she is currently on very low dose xanax. Had to d/c zoloft due to side effects. No ER visits for anxiety and associated GI symptoms in several months. Denies worsening depression, no SI. Denies side effects from meds.      She complains of dysuria beginning approximately 3 days ago.  Took Azo, last dose yesterday morning.  No fever.  No hematuria.  No vaginal bleeding or discharge.      Pt's previous HPI reviewed and updated as indicated.       The following portions of the patient's history were reviewed and updated as appropriate: allergies, current medications, past family history, past medical history, past social history, past surgical history and problem list.    Review of Systems   Constitutional: Positive for fatigue and unexpected weight change. Negative for chills and fever.   HENT: Negative for  congestion, mouth sores, nosebleeds, rhinorrhea and sore throat.    Eyes: Positive for visual disturbance (chronic).   Respiratory: Negative for cough, shortness of breath and wheezing.    Cardiovascular: Negative for chest pain, palpitations and leg swelling.   Gastrointestinal: Positive for nausea. Negative for abdominal pain, blood in stool, diarrhea and vomiting.        Heartburn   Genitourinary: Positive for dysuria, frequency and urgency. Negative for flank pain and hematuria.   Musculoskeletal: Positive for arthralgias, back pain, gait problem and myalgias.   Skin: Negative for rash.   Neurological: Negative for syncope, weakness and headaches.   Hematological: Negative for adenopathy. Bruises/bleeds easily.   Psychiatric/Behavioral: Positive for sleep disturbance. Negative for confusion. The patient is nervous/anxious.    Pt's previous ROS reviewed and updated as indicated.       Objective    Vitals:    09/29/20 0854   BP: 122/76   Pulse: 84   Resp: 14   Temp: 97.1 °F (36.2 °C)   SpO2: 95%     Body mass index is 34.86 kg/m².      09/29/20  0854   Weight: 98 kg (216 lb)       Physical Exam  Vitals signs and nursing note reviewed.   Constitutional:       General: She is not in acute distress.     Appearance: She is well-developed and well-groomed. She is obese. She is not ill-appearing.   HENT:      Head: Normocephalic and atraumatic.   Eyes:      General: No scleral icterus.     Extraocular Movements: Extraocular movements intact.      Conjunctiva/sclera: Conjunctivae normal.   Neck:      Thyroid: No thyroid mass.      Vascular: Normal carotid pulses. No carotid bruit.   Cardiovascular:      Rate and Rhythm: Normal rate and regular rhythm.      Pulses: Normal pulses. No decreased pulses.      Heart sounds: Normal heart sounds.   Pulmonary:      Effort: Pulmonary effort is normal.      Breath sounds: Normal breath sounds and air entry.   Musculoskeletal:      Right lower leg: No edema.      Left lower leg: No  edema.   Lymphadenopathy:      Cervical: No cervical adenopathy.   Skin:     General: Skin is warm and dry.      Coloration: Skin is not jaundiced or pale.      Findings: No ecchymosis or rash.   Neurological:      Mental Status: She is alert and oriented to person, place, and time.      Gait: Gait abnormal (antalgic).   Psychiatric:         Mood and Affect: Mood and affect normal.         Speech: Speech normal.         Behavior: Behavior normal. Behavior is cooperative.         Thought Content: Thought content normal.         Cognition and Memory: Cognition and memory normal.         Judgment: Judgment normal.         Brief Urine Lab Results  (Last result in the past 365 days)      Color   Clarity   Blood   Leuk Est   Nitrite   Protein   CREAT   Urine HCG        09/29/20 0922 Orange Cloudy Negative Large (3+) Positive 1+             Mri Lumbar Spine Without Contrast  Result Date: 7/17/2020  Multilevel degenerative change as discussed above.  This report was finalized on 7/17/2020 11:37 AM by Devorah Palomino M.D..    Us Renal Bilateral  Result Date: 7/15/2020  Small left renal cyst with no evidence of a solid renal mass or hydronephrosis.  This report was finalized on 7/15/2020 9:38 AM by Devorah Palomino M.D..      Assessment/Plan   Annie was seen today for hyperlipidemia, hypertension, anxiety and urinary urgency.    Diagnoses and all orders for this visit:    Well controlled diabetes mellitus (CMS/[facility])  -     Comprehensive Metabolic Panel  -     Hemoglobin A1c    Mixed hyperlipidemia  -     atorvastatin (LIPITOR) 40 MG tablet; Take 1 tablet by mouth Every Night.  -     Comprehensive Metabolic Panel  -     Lipid Panel    Urinary symptom or sign  -     POCT urinalysis dipstick, automated    Need for vaccination  -     Fluad Quad 65+ yrs ([id number])    Essential hypertension  -     CBC (No Diff)  -     Comprehensive Metabolic Panel    Mixed anxiety depressive disorder    Renal insufficiency  -     CBC (No  Diff)  -     Comprehensive Metabolic Panel    Medication monitoring encounter  -     CBC (No Diff)  -     Comprehensive Metabolic Panel    Abnormal urinalysis  -     Urine Culture - Urine, Urine, Clean Catch    Acute cystitis without hematuria  -     Urine Culture - Urine, Urine, Clean Catch  -     cefdinir (OMNICEF) 300 MG capsule; Take 1 capsule by mouth 2 (Two) Times a Day for 5 days.  -     phenazopyridine (Pyridium) 200 MG tablet; Take 1 tablet by mouth 3 (Three) Times a Day As Needed for Bladder Spasms for up to 2 days.    Gastroesophageal reflux disease with esophagitis       Empiric coverage for acute cystitis.  Culture pending.    Hyperlipidemia with good tolerance of statin.  Hypertension controlled, continue Zestoretic. Pt advised to eat a heart healthy diet and get regular aerobic exercise.    Previously controlled non-insulin-dependent diabetes mellitus.  A1c as above, adjust treatment as indicated.  She is encouraged to follow diabetic appropriate diet, perform foot checks daily, have yearly diabetic eye exam.    Anxiety/depression stable.    Renal insufficiency worsened at last eval.  Recheck as above and refer as indicated.  Recent renal ultrasound normal.    GERD/esophagitis stable.  Continue PPI, H2 blocker as needed.    Routine f/u in 3 months, sooner as needed/instructed.  I will contact patient regarding test results and provide instructions regarding any necessary changes in plan of care.  Patient was encouraged to keep me informed of any acute changes, lack of improvement, or any new concerning symptoms.  Pt is aware of reasons to seek emergent care.  Patient voiced understanding of all instructions and denied further questions.            Please note that portions of this note may have been completed with a voice recognition program. Efforts were made to edit the dictations, but occasionally words are mistranscribed.

## 2020-09-30 LAB
ALBUMIN SERPL-MCNC: 4.5 G/DL (ref 3.5–5.2)
ALBUMIN/GLOB SERPL: 2.1 G/DL
ALP SERPL-CCNC: 77 U/L (ref 39–117)
ALT SERPL-CCNC: 19 U/L (ref 1–33)
AST SERPL-CCNC: 18 U/L (ref 1–32)
BILIRUB SERPL-MCNC: 0.5 MG/DL (ref 0–1.2)
BUN SERPL-MCNC: 23 MG/DL (ref 8–23)
BUN/CREAT SERPL: 19.8 (ref 7–25)
CALCIUM SERPL-MCNC: 9.3 MG/DL (ref 8.6–10.5)
CHLORIDE SERPL-SCNC: 96 MMOL/L (ref 98–107)
CHOLEST SERPL-MCNC: 169 MG/DL (ref 0–200)
CO2 SERPL-SCNC: 27.1 MMOL/L (ref 22–29)
CREAT SERPL-MCNC: 1.16 MG/DL (ref 0.57–1)
ERYTHROCYTE [DISTWIDTH] IN BLOOD BY AUTOMATED COUNT: 14.1 % (ref 12.3–15.4)
GLOBULIN SER CALC-MCNC: 2.1 GM/DL
GLUCOSE SERPL-MCNC: 176 MG/DL (ref 65–99)
HBA1C MFR BLD: 8.8 % (ref 4.8–5.6)
HCT VFR BLD AUTO: 39.3 % (ref 34–46.6)
HDLC SERPL-MCNC: 66 MG/DL (ref 40–60)
HGB BLD-MCNC: 13.1 G/DL (ref 12–15.9)
LDLC SERPL CALC-MCNC: 74 MG/DL (ref 0–100)
MCH RBC QN AUTO: 30.6 PG (ref 26.6–33)
MCHC RBC AUTO-ENTMCNC: 33.3 G/DL (ref 31.5–35.7)
MCV RBC AUTO: 91.8 FL (ref 79–97)
PLATELET # BLD AUTO: 178 10*3/MM3 (ref 140–450)
POTASSIUM SERPL-SCNC: 4.6 MMOL/L (ref 3.5–5.2)
PROT SERPL-MCNC: 6.6 G/DL (ref 6–8.5)
RBC # BLD AUTO: 4.28 10*6/MM3 (ref 3.77–5.28)
SODIUM SERPL-SCNC: 133 MMOL/L (ref 136–145)
TRIGL SERPL-MCNC: 144 MG/DL (ref 0–150)
VLDLC SERPL CALC-MCNC: 28.8 MG/DL
WBC # BLD AUTO: 6.51 10*3/MM3 (ref 3.4–10.8)

## 2020-09-30 NOTE — PROGRESS NOTES
Please inform pt her recent labs are stable other than her A1c which has jumped to 8.8. she needs to begin medication for her diabetes. What meds has she been on previously? Any side effects?

## 2020-10-01 LAB
BACTERIA UR CULT: NORMAL
BACTERIA UR CULT: NORMAL

## 2020-11-11 ENCOUNTER — TELEPHONE (OUTPATIENT)
Dept: FAMILY MEDICINE CLINIC | Facility: CLINIC | Age: 75
End: 2020-11-11

## 2020-11-11 NOTE — TELEPHONE ENCOUNTER
PATIENT CALLED AND STATED THAT SHE HAS A HEAD COLD OR SOMETHING THAT SHE'S HAD SINCE Saturday.  SHE SAID SHE'S CONGESTED, RUNNY NOSE, LOOSE, NON-PRODUCTIVE COUGH, BUT NO OTHER SYMPTOMS THAT SHE CAN THINK OF.  SHE HAS BEEN TAKING DAY-QUIL TO HELP WITH THE COUGH, BUT CAN'T SEEM TO SHAKE IT.      SHE WANTS TO KNOW IF THERE'S SOMETHING THAT YOU CAN PRESCRIBE TO HELP HER OUT.    [facility] [address] [place] [address] [phone] [facility] [phone] FX     PLEASE CONTACT PATIENT TO ADVISE.    CALLBACK:  [phone]

## 2020-11-12 ENCOUNTER — TELEPHONE (OUTPATIENT)
Dept: FAMILY MEDICINE CLINIC | Facility: CLINIC | Age: 75
End: 2020-11-12

## 2020-11-12 RX ORDER — BENZONATATE 200 MG/1
200 CAPSULE ORAL 3 TIMES DAILY PRN
Qty: 30 CAPSULE | Refills: 0 | Status: SHIPPED | OUTPATIENT
Start: 2020-11-12 | End: 2020-12-08

## 2020-11-12 NOTE — TELEPHONE ENCOUNTER
PATIENT HAS BEEN NOTIFIED THAT SHE CAN GO TO [facility] IN [place], [facility], AND [facility] [address] THRU TESTING.      PATIENT ALSO INFORMED ME THAT SHE WAS TOLD BY THE [facility] AND [facility] DOES NOT DO COVID TESTING. I APOLOGIZED TO THE PATIENT FOR THE MISS INFORMATION, BUT THAT [facility] DOES DO COVID TESTING BUT THAT SHE WOULD NEED TO CALL THEM FIRST.      PATIENT VERBALIZED UNDERSTAND.

## 2020-11-12 NOTE — TELEPHONE ENCOUNTER
I have sent in rx for tessalon perles. She may also want to use benadryl short term for runny nose and night-time cough.

## 2020-11-12 NOTE — TELEPHONE ENCOUNTER
PATIENT CALLED AND STATED THAT SHE AND HER  ARE BOTH EXPERIENCING COVID SYMPTOMS.  SHE WANTS TO KNOW WHERE THEY CAN GO TO GET TESTED FOR COVID.    PLEASE CONTACT PATIENT TO ADVISE.    CALLBACK:  [phone]

## 2020-11-13 ENCOUNTER — APPOINTMENT (OUTPATIENT)
Dept: GENERAL RADIOLOGY | Facility: HOSPITAL | Age: 75
End: 2020-11-13

## 2020-11-13 ENCOUNTER — TELEPHONE (OUTPATIENT)
Dept: FAMILY MEDICINE CLINIC | Facility: CLINIC | Age: 75
End: 2020-11-13

## 2020-11-13 ENCOUNTER — HOSPITAL ENCOUNTER (EMERGENCY)
Facility: HOSPITAL | Age: 75
Discharge: HOME | End: 2020-11-13
Attending: EMERGENCY MEDICINE | Admitting: EMERGENCY MEDICINE

## 2020-11-13 VITALS
OXYGEN SATURATION: 93 % | DIASTOLIC BLOOD PRESSURE: 94 MMHG | WEIGHT: 210 LBS | TEMPERATURE: 98.4 F | HEIGHT: 67 IN | BODY MASS INDEX: 32.96 KG/M2 | HEART RATE: 100 BPM | RESPIRATION RATE: 20 BRPM | SYSTOLIC BLOOD PRESSURE: 140 MMHG

## 2020-11-13 DIAGNOSIS — U07.1 COVID-19 VIRUS INFECTION: Primary | ICD-10-CM

## 2020-11-13 LAB
ALBUMIN SERPL-MCNC: 4 G/DL (ref 3.5–5.2)
ALBUMIN/GLOB SERPL: 1.5 G/DL
ALP SERPL-CCNC: 85 U/L (ref 39–117)
ALT SERPL W P-5'-P-CCNC: 25 U/L (ref 1–33)
ANION GAP SERPL CALCULATED.3IONS-SCNC: 13.9 MMOL/L (ref 5–15)
AST SERPL-CCNC: 29 U/L (ref 1–32)
BASOPHILS # BLD AUTO: 0 10*3/MM3 (ref 0–0.2)
BASOPHILS NFR BLD AUTO: 0 % (ref 0–1.5)
BILIRUB SERPL-MCNC: 0.5 MG/DL (ref 0–1.2)
BUN SERPL-MCNC: 9 MG/DL (ref 8–23)
BUN/CREAT SERPL: 9 (ref 7–25)
CALCIUM SPEC-SCNC: 10.2 MG/DL (ref 8.6–10.5)
CHLORIDE SERPL-SCNC: 99 MMOL/L (ref 98–107)
CLUMPED PLATELETS: PRESENT
CO2 SERPL-SCNC: 24.1 MMOL/L (ref 22–29)
CREAT SERPL-MCNC: 1 MG/DL (ref 0.57–1)
DEPRECATED RDW RBC AUTO: 52.1 FL (ref 37–54)
EOSINOPHIL # BLD AUTO: 0.35 10*3/MM3 (ref 0–0.4)
EOSINOPHIL NFR BLD AUTO: 12.1 % (ref 0.3–6.2)
ERYTHROCYTE [DISTWIDTH] IN BLOOD BY AUTOMATED COUNT: 14.9 % (ref 12.3–15.4)
FLUAV AG NPH QL: NEGATIVE
FLUBV AG NPH QL IA: NEGATIVE
GFR SERPL CREATININE-BSD FRML MDRD: 54 ML/MIN/1.73
GLOBULIN UR ELPH-MCNC: 2.7 GM/DL
GLUCOSE SERPL-MCNC: 213 MG/DL (ref 65–99)
HCT VFR BLD AUTO: 38.5 % (ref 34–46.6)
HGB BLD-MCNC: 12.6 G/DL (ref 12–15.9)
HOLD SPECIMEN: NORMAL
HOLD SPECIMEN: NORMAL
IMM GRANULOCYTES # BLD AUTO: 0.02 10*3/MM3 (ref 0–0.05)
IMM GRANULOCYTES NFR BLD AUTO: 0.7 % (ref 0–0.5)
LYMPHOCYTES # BLD AUTO: 1.01 10*3/MM3 (ref 0.7–3.1)
LYMPHOCYTES NFR BLD AUTO: 34.9 % (ref 19.6–45.3)
MCH RBC QN AUTO: 31 PG (ref 26.6–33)
MCHC RBC AUTO-ENTMCNC: 32.7 G/DL (ref 31.5–35.7)
MCV RBC AUTO: 94.6 FL (ref 79–97)
MONOCYTES # BLD AUTO: 0.16 10*3/MM3 (ref 0.1–0.9)
MONOCYTES NFR BLD AUTO: 5.5 % (ref 5–12)
NEUTROPHILS NFR BLD AUTO: 1.35 10*3/MM3 (ref 1.7–7)
NEUTROPHILS NFR BLD AUTO: 46.8 % (ref 42.7–76)
NRBC BLD AUTO-RTO: 0 /100 WBC (ref 0–0.2)
NT-PROBNP SERPL-MCNC: 492.8 PG/ML (ref 0–1800)
PLATELET # BLD AUTO: 130 10*3/MM3 (ref 140–450)
PMV BLD AUTO: 10.9 FL (ref 6–12)
POTASSIUM SERPL-SCNC: 3.4 MMOL/L (ref 3.5–5.2)
PROT SERPL-MCNC: 6.7 G/DL (ref 6–8.5)
RBC # BLD AUTO: 4.07 10*6/MM3 (ref 3.77–5.28)
RBC MORPH BLD: NORMAL
RBC MORPH BLD: NORMAL
SARS-COV-2 RNA PNL SPEC NAA+PROBE: DETECTED
SMALL PLATELETS BLD QL SMEAR: NORMAL
SODIUM SERPL-SCNC: 137 MMOL/L (ref 136–145)
TROPONIN T SERPL-MCNC: <0.01 NG/ML (ref 0–0.03)
WBC # BLD AUTO: 2.89 10*3/MM3 (ref 3.4–10.8)
WBC MORPH BLD: NORMAL
WBC MORPH BLD: NORMAL
WHOLE BLOOD HOLD SPECIMEN: NORMAL
WHOLE BLOOD HOLD SPECIMEN: NORMAL

## 2020-11-13 PROCEDURE — 80053 COMPREHEN METABOLIC PANEL: CPT | Performed by: EMERGENCY MEDICINE

## 2020-11-13 PROCEDURE — 93005 ELECTROCARDIOGRAM TRACING: CPT | Performed by: EMERGENCY MEDICINE

## 2020-11-13 PROCEDURE — 87804 INFLUENZA ASSAY W/OPTIC: CPT | Performed by: EMERGENCY MEDICINE

## 2020-11-13 PROCEDURE — 99284 EMERGENCY DEPT VISIT MOD MDM: CPT

## 2020-11-13 PROCEDURE — 96374 THER/PROPH/DIAG INJ IV PUSH: CPT

## 2020-11-13 PROCEDURE — 71045 X-RAY EXAM CHEST 1 VIEW: CPT

## 2020-11-13 PROCEDURE — 85007 BL SMEAR W/DIFF WBC COUNT: CPT | Performed by: EMERGENCY MEDICINE

## 2020-11-13 PROCEDURE — 83880 ASSAY OF NATRIURETIC PEPTIDE: CPT | Performed by: EMERGENCY MEDICINE

## 2020-11-13 PROCEDURE — 84484 ASSAY OF TROPONIN QUANT: CPT | Performed by: EMERGENCY MEDICINE

## 2020-11-13 PROCEDURE — 85025 COMPLETE CBC W/AUTO DIFF WBC: CPT | Performed by: EMERGENCY MEDICINE

## 2020-11-13 PROCEDURE — 87635 SARS-COV-2 COVID-19 AMP PRB: CPT | Performed by: EMERGENCY MEDICINE

## 2020-11-13 PROCEDURE — 25010000002 LORAZEPAM PER 2 MG: Performed by: EMERGENCY MEDICINE

## 2020-11-13 PROCEDURE — 96361 HYDRATE IV INFUSION ADD-ON: CPT

## 2020-11-13 RX ORDER — ONDANSETRON 4 MG/1
4 TABLET, ORALLY DISINTEGRATING ORAL EVERY 8 HOURS PRN
Qty: 12 TABLET | Refills: 0 | Status: SHIPPED | OUTPATIENT
Start: 2020-11-13 | End: 2020-11-13 | Stop reason: SDUPTHER

## 2020-11-13 RX ORDER — LORAZEPAM 2 MG/ML
0.5 INJECTION INTRAMUSCULAR ONCE
Status: COMPLETED | OUTPATIENT
Start: 2020-11-13 | End: 2020-11-13

## 2020-11-13 RX ORDER — SODIUM CHLORIDE 0.9 % (FLUSH) 0.9 %
10 SYRINGE (ML) INJECTION AS NEEDED
Status: DISCONTINUED | OUTPATIENT
Start: 2020-11-13 | End: 2020-11-13 | Stop reason: HOSPADM

## 2020-11-13 RX ORDER — ASPIRIN 325 MG
325 TABLET ORAL ONCE
Status: COMPLETED | OUTPATIENT
Start: 2020-11-13 | End: 2020-11-13

## 2020-11-13 RX ORDER — GUAIFENESIN AND CODEINE PHOSPHATE 100; 10 MG/5ML; MG/5ML
5 SOLUTION ORAL 4 TIMES DAILY PRN
Qty: 120 ML | Refills: 0 | Status: SHIPPED | OUTPATIENT
Start: 2020-11-13 | End: 2020-11-25

## 2020-11-13 RX ORDER — ONDANSETRON 4 MG/1
4 TABLET, ORALLY DISINTEGRATING ORAL EVERY 8 HOURS PRN
Qty: 12 TABLET | Refills: 0 | Status: SHIPPED | OUTPATIENT
Start: 2020-11-13 | End: 2021-02-08

## 2020-11-13 RX ORDER — GUAIFENESIN AND CODEINE PHOSPHATE 100; 10 MG/5ML; MG/5ML
5 SOLUTION ORAL 4 TIMES DAILY PRN
Qty: 120 ML | Refills: 0 | Status: SHIPPED | OUTPATIENT
Start: 2020-11-13 | End: 2020-11-13 | Stop reason: SDUPTHER

## 2020-11-13 RX ADMIN — LORAZEPAM 0.5 MG: 2 INJECTION, SOLUTION INTRAMUSCULAR; INTRAVENOUS at 15:01

## 2020-11-13 RX ADMIN — SODIUM CHLORIDE 1000 ML: 9 INJECTION, SOLUTION INTRAVENOUS at 15:01

## 2020-11-13 RX ADMIN — ASPIRIN 325 MG ORAL TABLET 325 MG: 325 PILL ORAL at 15:01

## 2020-11-13 NOTE — TELEPHONE ENCOUNTER
Patient is calling and stating that she recevied medication for cough and now her nose is stopped up and she can't breath and would like to now if there is anything she can take or get to help with her stopped up nose because she can't sleep.  Please advise      Annie Mejia call back [phone]

## 2020-11-13 NOTE — TELEPHONE ENCOUNTER
Patient was given tessalon pearls for cough yesterday but is now experiencing nasal congestion.  She is requesting something be sent in for that.  Please advise.

## 2020-11-13 NOTE — TELEPHONE ENCOUNTER
She can utilize over-the-counter Zyrtec, or Claritin.  I recommend saline nasal spray to help with nasal congestion

## 2020-11-14 ENCOUNTER — READMISSION MANAGEMENT (OUTPATIENT)
Dept: CALL CENTER | Facility: HOSPITAL | Age: 75
End: 2020-11-14

## 2020-11-14 NOTE — OUTREACH NOTE
COVID-19 Week 1 Survey      Responses   [facility] patient discharged from?  [place]   Does the patient have one of the following disease processes/diagnoses(primary or secondary)?  COVID-19   COVID-19 underlying condition?  None   Call Number  Call 1   Week 1 Call successful?  No   Discharge diagnosis  positive covid test          Leeroy Maurer RN

## 2020-11-14 NOTE — OUTREACH NOTE
Prep Survey      Responses   [facility] facility patient discharged from?  [place]   Is LACE score < 7 ?  No   Eligibility  [facility]   Discharge diagnosis  positive covid test   Does the patient have one of the following disease processes/diagnoses(primary or secondary)?  COVID-19   Does the patient have Home health ordered?  No   Is there a DME ordered?  Yes   What DME was ordered?  sent home with pulse oximeter   General alerts for this patient  ED visit - sent home with pulse oximeter   Prep survey completed?  Yes          Shakila Villar RN

## 2020-11-15 ENCOUNTER — READMISSION MANAGEMENT (OUTPATIENT)
Dept: CALL CENTER | Facility: HOSPITAL | Age: 75
End: 2020-11-15

## 2020-11-15 NOTE — OUTREACH NOTE
COVID-19 Week 1 Survey      Responses   [facility] patient discharged from?  [facility]   Does the patient have one of the following disease processes/diagnoses(primary or secondary)?  COVID-19   COVID-19 underlying condition?  None   Call Number  Call 2   Week 1 Call successful?  No   Discharge diagnosis  positive covid test          Kasey Dover LPN

## 2020-11-16 ENCOUNTER — HOSPITAL ENCOUNTER (INPATIENT)
Facility: HOSPITAL | Age: 75
LOS: 5 days | Discharge: HOME HEALTH | End: 2020-11-21
Attending: EMERGENCY MEDICINE | Admitting: INTERNAL MEDICINE

## 2020-11-16 ENCOUNTER — APPOINTMENT (OUTPATIENT)
Dept: GENERAL RADIOLOGY | Facility: HOSPITAL | Age: 75
End: 2020-11-16

## 2020-11-16 ENCOUNTER — READMISSION MANAGEMENT (OUTPATIENT)
Dept: CALL CENTER | Facility: HOSPITAL | Age: 75
End: 2020-11-16

## 2020-11-16 DIAGNOSIS — R06.89 ACUTE RESPIRATORY INSUFFICIENCY: ICD-10-CM

## 2020-11-16 DIAGNOSIS — I10 ESSENTIAL HYPERTENSION: ICD-10-CM

## 2020-11-16 DIAGNOSIS — R11.2 NAUSEA AND VOMITING, INTRACTABILITY OF VOMITING NOT SPECIFIED, UNSPECIFIED VOMITING TYPE: ICD-10-CM

## 2020-11-16 DIAGNOSIS — E11.65 TYPE 2 DIABETES MELLITUS WITH HYPERGLYCEMIA, WITHOUT LONG-TERM CURRENT USE OF INSULIN (HCC): ICD-10-CM

## 2020-11-16 DIAGNOSIS — N39.0 ACUTE URINARY TRACT INFECTION: ICD-10-CM

## 2020-11-16 DIAGNOSIS — U07.1 COVID-19: Primary | ICD-10-CM

## 2020-11-16 PROBLEM — J96.01 ACUTE RESPIRATORY FAILURE WITH HYPOXIA: Status: ACTIVE | Noted: 2020-11-16

## 2020-11-16 LAB
ALBUMIN SERPL-MCNC: 3.4 G/DL (ref 3.5–5.2)
ALBUMIN/GLOB SERPL: 1.4 G/DL
ALP SERPL-CCNC: 89 U/L (ref 39–117)
ALT SERPL W P-5'-P-CCNC: 27 U/L (ref 1–33)
ANION GAP SERPL CALCULATED.3IONS-SCNC: 13.9 MMOL/L (ref 5–15)
AST SERPL-CCNC: 37 U/L (ref 1–32)
BACTERIA UR QL AUTO: ABNORMAL /HPF
BASOPHILS # BLD AUTO: 0.01 10*3/MM3 (ref 0–0.2)
BASOPHILS NFR BLD AUTO: 0.3 % (ref 0–1.5)
BILIRUB SERPL-MCNC: 0.6 MG/DL (ref 0–1.2)
BILIRUB UR QL STRIP: ABNORMAL
BUN SERPL-MCNC: 11 MG/DL (ref 8–23)
BUN/CREAT SERPL: 10.2 (ref 7–25)
CALCIUM SPEC-SCNC: 8.9 MG/DL (ref 8.6–10.5)
CHLORIDE SERPL-SCNC: 101 MMOL/L (ref 98–107)
CLARITY UR: CLEAR
CO2 SERPL-SCNC: 23.1 MMOL/L (ref 22–29)
COLOR UR: ABNORMAL
CREAT SERPL-MCNC: 1.08 MG/DL (ref 0.57–1)
D-LACTATE SERPL-SCNC: 2 MMOL/L (ref 0.5–2)
DEPRECATED RDW RBC AUTO: 53.3 FL (ref 37–54)
EOSINOPHIL # BLD AUTO: 0 10*3/MM3 (ref 0–0.4)
EOSINOPHIL NFR BLD AUTO: 0 % (ref 0.3–6.2)
ERYTHROCYTE [DISTWIDTH] IN BLOOD BY AUTOMATED COUNT: 15.4 % (ref 12.3–15.4)
GFR SERPL CREATININE-BSD FRML MDRD: 49 ML/MIN/1.73
GLOBULIN UR ELPH-MCNC: 2.4 GM/DL
GLUCOSE BLDC GLUCOMTR-MCNC: 221 MG/DL (ref 70–130)
GLUCOSE BLDC GLUCOMTR-MCNC: 254 MG/DL (ref 70–130)
GLUCOSE SERPL-MCNC: 194 MG/DL (ref 65–99)
GLUCOSE UR STRIP-MCNC: NEGATIVE MG/DL
HCT VFR BLD AUTO: 37.5 % (ref 34–46.6)
HGB BLD-MCNC: 12.4 G/DL (ref 12–15.9)
HGB UR QL STRIP.AUTO: NEGATIVE
HOLD SPECIMEN: NORMAL
HOLD SPECIMEN: NORMAL
HYALINE CASTS UR QL AUTO: ABNORMAL /LPF
IMM GRANULOCYTES # BLD AUTO: 0.01 10*3/MM3 (ref 0–0.05)
IMM GRANULOCYTES NFR BLD AUTO: 0.3 % (ref 0–0.5)
KETONES UR QL STRIP: NEGATIVE
LEUKOCYTE ESTERASE UR QL STRIP.AUTO: ABNORMAL
LIPASE SERPL-CCNC: 43 U/L (ref 13–60)
LYMPHOCYTES # BLD AUTO: 1.3 10*3/MM3 (ref 0.7–3.1)
LYMPHOCYTES NFR BLD AUTO: 41.8 % (ref 19.6–45.3)
MCH RBC QN AUTO: 31.3 PG (ref 26.6–33)
MCHC RBC AUTO-ENTMCNC: 33.1 G/DL (ref 31.5–35.7)
MCV RBC AUTO: 94.7 FL (ref 79–97)
MONOCYTES # BLD AUTO: 0.15 10*3/MM3 (ref 0.1–0.9)
MONOCYTES NFR BLD AUTO: 4.8 % (ref 5–12)
NEUTROPHILS NFR BLD AUTO: 1.64 10*3/MM3 (ref 1.7–7)
NEUTROPHILS NFR BLD AUTO: 52.8 % (ref 42.7–76)
NITRITE UR QL STRIP: POSITIVE
NRBC BLD AUTO-RTO: 0 /100 WBC (ref 0–0.2)
PH UR STRIP.AUTO: 6.5 [PH] (ref 5–8)
PLATELET # BLD AUTO: 160 10*3/MM3 (ref 140–450)
PMV BLD AUTO: 11.8 FL (ref 6–12)
POTASSIUM SERPL-SCNC: 3.4 MMOL/L (ref 3.5–5.2)
PROT SERPL-MCNC: 5.8 G/DL (ref 6–8.5)
PROT UR QL STRIP: ABNORMAL
RBC # BLD AUTO: 3.96 10*6/MM3 (ref 3.77–5.28)
RBC # UR: ABNORMAL /HPF
REF LAB TEST METHOD: ABNORMAL
SODIUM SERPL-SCNC: 138 MMOL/L (ref 136–145)
SP GR UR STRIP: 1.01 (ref 1–1.03)
SQUAMOUS #/AREA URNS HPF: ABNORMAL /HPF
UROBILINOGEN UR QL STRIP: ABNORMAL
WBC # BLD AUTO: 3.11 10*3/MM3 (ref 3.4–10.8)
WBC UR QL AUTO: ABNORMAL /HPF
WHOLE BLOOD HOLD SPECIMEN: NORMAL
WHOLE BLOOD HOLD SPECIMEN: NORMAL

## 2020-11-16 PROCEDURE — XW033E5 INTRODUCTION OF REMDESIVIR ANTI-INFECTIVE INTO PERIPHERAL VEIN, PERCUTANEOUS APPROACH, NEW TECHNOLOGY GROUP 5: ICD-10-PCS | Performed by: INTERNAL MEDICINE

## 2020-11-16 PROCEDURE — 82962 GLUCOSE BLOOD TEST: CPT

## 2020-11-16 PROCEDURE — 85025 COMPLETE CBC W/AUTO DIFF WBC: CPT | Performed by: EMERGENCY MEDICINE

## 2020-11-16 PROCEDURE — 99284 EMERGENCY DEPT VISIT MOD MDM: CPT

## 2020-11-16 PROCEDURE — 87186 SC STD MICRODIL/AGAR DIL: CPT | Performed by: INTERNAL MEDICINE

## 2020-11-16 PROCEDURE — 25010000002 DEXAMETHASONE PER 1 MG: Performed by: EMERGENCY MEDICINE

## 2020-11-16 PROCEDURE — 99223 1ST HOSP IP/OBS HIGH 75: CPT | Performed by: INTERNAL MEDICINE

## 2020-11-16 PROCEDURE — 25010000002 ONDANSETRON PER 1 MG: Performed by: INTERNAL MEDICINE

## 2020-11-16 PROCEDURE — 83605 ASSAY OF LACTIC ACID: CPT | Performed by: EMERGENCY MEDICINE

## 2020-11-16 PROCEDURE — 81001 URINALYSIS AUTO W/SCOPE: CPT | Performed by: EMERGENCY MEDICINE

## 2020-11-16 PROCEDURE — 25010000002 LORAZEPAM PER 2 MG: Performed by: EMERGENCY MEDICINE

## 2020-11-16 PROCEDURE — 25010000002 CEFTRIAXONE: Performed by: INTERNAL MEDICINE

## 2020-11-16 PROCEDURE — 80053 COMPREHEN METABOLIC PANEL: CPT | Performed by: EMERGENCY MEDICINE

## 2020-11-16 PROCEDURE — 25010000002 ONDANSETRON PER 1 MG: Performed by: EMERGENCY MEDICINE

## 2020-11-16 PROCEDURE — 63710000001 INSULIN ASPART PER 5 UNITS: Performed by: INTERNAL MEDICINE

## 2020-11-16 PROCEDURE — 83690 ASSAY OF LIPASE: CPT | Performed by: EMERGENCY MEDICINE

## 2020-11-16 PROCEDURE — 93005 ELECTROCARDIOGRAM TRACING: CPT | Performed by: EMERGENCY MEDICINE

## 2020-11-16 PROCEDURE — 25010000002 ENOXAPARIN PER 10 MG: Performed by: INTERNAL MEDICINE

## 2020-11-16 PROCEDURE — 71045 X-RAY EXAM CHEST 1 VIEW: CPT

## 2020-11-16 PROCEDURE — 87086 URINE CULTURE/COLONY COUNT: CPT | Performed by: INTERNAL MEDICINE

## 2020-11-16 PROCEDURE — 25010000002 ONDANSETRON PER 1 MG

## 2020-11-16 PROCEDURE — 87077 CULTURE AEROBIC IDENTIFY: CPT | Performed by: INTERNAL MEDICINE

## 2020-11-16 PROCEDURE — 63710000001 INSULIN DETEMIR PER 5 UNITS: Performed by: INTERNAL MEDICINE

## 2020-11-16 RX ORDER — DEXAMETHASONE SODIUM PHOSPHATE 4 MG/ML
6 INJECTION, SOLUTION INTRA-ARTICULAR; INTRALESIONAL; INTRAMUSCULAR; INTRAVENOUS; SOFT TISSUE DAILY
Status: COMPLETED | OUTPATIENT
Start: 2020-11-17 | End: 2020-11-20

## 2020-11-16 RX ORDER — ONDANSETRON 2 MG/ML
INJECTION INTRAMUSCULAR; INTRAVENOUS
Status: COMPLETED
Start: 2020-11-16 | End: 2020-11-16

## 2020-11-16 RX ORDER — DEXTROSE MONOHYDRATE 25 G/50ML
25 INJECTION, SOLUTION INTRAVENOUS
Status: DISCONTINUED | OUTPATIENT
Start: 2020-11-16 | End: 2020-11-21 | Stop reason: HOSPADM

## 2020-11-16 RX ORDER — NICOTINE POLACRILEX 4 MG
1 LOZENGE BUCCAL
Status: DISCONTINUED | OUTPATIENT
Start: 2020-11-16 | End: 2020-11-21 | Stop reason: HOSPADM

## 2020-11-16 RX ORDER — PROMETHAZINE HYDROCHLORIDE 25 MG/1
25 TABLET ORAL EVERY 8 HOURS PRN
Status: DISCONTINUED | OUTPATIENT
Start: 2020-11-16 | End: 2020-11-21 | Stop reason: HOSPADM

## 2020-11-16 RX ORDER — ATORVASTATIN CALCIUM 40 MG/1
40 TABLET, FILM COATED ORAL NIGHTLY
Status: DISCONTINUED | OUTPATIENT
Start: 2020-11-16 | End: 2020-11-21 | Stop reason: HOSPADM

## 2020-11-16 RX ORDER — SODIUM CHLORIDE 0.9 % (FLUSH) 0.9 %
10 SYRINGE (ML) INJECTION AS NEEDED
Status: DISCONTINUED | OUTPATIENT
Start: 2020-11-16 | End: 2020-11-21 | Stop reason: HOSPADM

## 2020-11-16 RX ORDER — ACETAMINOPHEN 325 MG/1
650 TABLET ORAL EVERY 4 HOURS PRN
Status: DISCONTINUED | OUTPATIENT
Start: 2020-11-16 | End: 2020-11-21 | Stop reason: HOSPADM

## 2020-11-16 RX ORDER — PANTOPRAZOLE SODIUM 40 MG/1
40 TABLET, DELAYED RELEASE ORAL EVERY MORNING
Status: DISCONTINUED | OUTPATIENT
Start: 2020-11-17 | End: 2020-11-21 | Stop reason: HOSPADM

## 2020-11-16 RX ORDER — ALBUTEROL SULFATE 90 UG/1
2 AEROSOL, METERED RESPIRATORY (INHALATION)
Status: DISCONTINUED | OUTPATIENT
Start: 2020-11-17 | End: 2020-11-21 | Stop reason: HOSPADM

## 2020-11-16 RX ORDER — ACETAMINOPHEN 650 MG/1
650 SUPPOSITORY RECTAL EVERY 4 HOURS PRN
Status: DISCONTINUED | OUTPATIENT
Start: 2020-11-16 | End: 2020-11-21 | Stop reason: HOSPADM

## 2020-11-16 RX ORDER — ALUMINA, MAGNESIA, AND SIMETHICONE 2400; 2400; 240 MG/30ML; MG/30ML; MG/30ML
15 SUSPENSION ORAL EVERY 6 HOURS PRN
Status: DISCONTINUED | OUTPATIENT
Start: 2020-11-16 | End: 2020-11-21 | Stop reason: HOSPADM

## 2020-11-16 RX ORDER — ASCORBIC ACID 500 MG
500 TABLET ORAL DAILY
Status: DISCONTINUED | OUTPATIENT
Start: 2020-11-16 | End: 2020-11-21 | Stop reason: HOSPADM

## 2020-11-16 RX ORDER — MULTIPLE VITAMINS W/ MINERALS TAB 9MG-400MCG
1 TAB ORAL DAILY
Status: DISCONTINUED | OUTPATIENT
Start: 2020-11-16 | End: 2020-11-21 | Stop reason: HOSPADM

## 2020-11-16 RX ORDER — ONDANSETRON 2 MG/ML
4 INJECTION INTRAMUSCULAR; INTRAVENOUS ONCE
Status: COMPLETED | OUTPATIENT
Start: 2020-11-16 | End: 2020-11-16

## 2020-11-16 RX ORDER — BENZONATATE 100 MG/1
200 CAPSULE ORAL 3 TIMES DAILY PRN
Status: DISCONTINUED | OUTPATIENT
Start: 2020-11-16 | End: 2020-11-21 | Stop reason: HOSPADM

## 2020-11-16 RX ORDER — ALBUTEROL SULFATE 90 UG/1
2 AEROSOL, METERED RESPIRATORY (INHALATION) EVERY 4 HOURS PRN
Status: DISCONTINUED | OUTPATIENT
Start: 2020-11-16 | End: 2020-11-21 | Stop reason: HOSPADM

## 2020-11-16 RX ORDER — DICYCLOMINE HYDROCHLORIDE 10 MG/1
10 CAPSULE ORAL 3 TIMES DAILY PRN
Status: DISCONTINUED | OUTPATIENT
Start: 2020-11-16 | End: 2020-11-21 | Stop reason: HOSPADM

## 2020-11-16 RX ORDER — LORAZEPAM 2 MG/ML
1 INJECTION INTRAMUSCULAR ONCE
Status: COMPLETED | OUTPATIENT
Start: 2020-11-16 | End: 2020-11-16

## 2020-11-16 RX ORDER — DEXAMETHASONE SODIUM PHOSPHATE 4 MG/ML
6 INJECTION, SOLUTION INTRA-ARTICULAR; INTRALESIONAL; INTRAMUSCULAR; INTRAVENOUS; SOFT TISSUE ONCE
Status: COMPLETED | OUTPATIENT
Start: 2020-11-16 | End: 2020-11-16

## 2020-11-16 RX ORDER — ACETAMINOPHEN 160 MG/5ML
650 SOLUTION ORAL EVERY 4 HOURS PRN
Status: DISCONTINUED | OUTPATIENT
Start: 2020-11-16 | End: 2020-11-21 | Stop reason: HOSPADM

## 2020-11-16 RX ORDER — ONDANSETRON 2 MG/ML
8 INJECTION INTRAMUSCULAR; INTRAVENOUS ONCE
Status: COMPLETED | OUTPATIENT
Start: 2020-11-16 | End: 2020-11-16

## 2020-11-16 RX ORDER — SODIUM CHLORIDE 0.9 % (FLUSH) 0.9 %
10 SYRINGE (ML) INJECTION EVERY 12 HOURS SCHEDULED
Status: DISCONTINUED | OUTPATIENT
Start: 2020-11-16 | End: 2020-11-21 | Stop reason: HOSPADM

## 2020-11-16 RX ORDER — ALPRAZOLAM 0.5 MG/1
0.5 TABLET ORAL 2 TIMES DAILY PRN
Status: DISCONTINUED | OUTPATIENT
Start: 2020-11-16 | End: 2020-11-21 | Stop reason: HOSPADM

## 2020-11-16 RX ORDER — ONDANSETRON 2 MG/ML
4 INJECTION INTRAMUSCULAR; INTRAVENOUS EVERY 6 HOURS PRN
Status: DISCONTINUED | OUTPATIENT
Start: 2020-11-16 | End: 2020-11-21 | Stop reason: HOSPADM

## 2020-11-16 RX ORDER — L.ACID,PARA/B.BIFIDUM/S.THERM 8B CELL
1 CAPSULE ORAL 2 TIMES DAILY
Status: DISCONTINUED | OUTPATIENT
Start: 2020-11-16 | End: 2020-11-21 | Stop reason: HOSPADM

## 2020-11-16 RX ADMIN — MULTIPLE VITAMINS W/ MINERALS TAB 1 TABLET: TAB at 14:27

## 2020-11-16 RX ADMIN — CEFTRIAXONE 1 G: 1 INJECTION, POWDER, FOR SOLUTION INTRAMUSCULAR; INTRAVENOUS at 18:04

## 2020-11-16 RX ADMIN — ALBUTEROL SULFATE 2 PUFF: 90 AEROSOL, METERED RESPIRATORY (INHALATION) at 23:45

## 2020-11-16 RX ADMIN — BENZONATATE 200 MG: 100 CAPSULE ORAL at 14:44

## 2020-11-16 RX ADMIN — ENOXAPARIN SODIUM 40 MG: 40 INJECTION SUBCUTANEOUS at 14:27

## 2020-11-16 RX ADMIN — LORAZEPAM 1 MG: 2 INJECTION, SOLUTION INTRAMUSCULAR; INTRAVENOUS at 08:36

## 2020-11-16 RX ADMIN — REMDESIVIR 200 MG: 100 INJECTION, POWDER, LYOPHILIZED, FOR SOLUTION INTRAVENOUS at 14:41

## 2020-11-16 RX ADMIN — Medication 1 CAPSULE: at 21:09

## 2020-11-16 RX ADMIN — BENZONATATE 200 MG: 100 CAPSULE ORAL at 22:43

## 2020-11-16 RX ADMIN — DICYCLOMINE HYDROCHLORIDE 10 MG: 10 CAPSULE ORAL at 22:42

## 2020-11-16 RX ADMIN — ONDANSETRON 8 MG: 2 INJECTION, SOLUTION INTRAMUSCULAR; INTRAVENOUS at 11:10

## 2020-11-16 RX ADMIN — ONDANSETRON 4 MG: 2 INJECTION, SOLUTION INTRAMUSCULAR; INTRAVENOUS at 08:36

## 2020-11-16 RX ADMIN — ONDANSETRON 8 MG: 2 INJECTION INTRAMUSCULAR; INTRAVENOUS at 11:10

## 2020-11-16 RX ADMIN — INSULIN ASPART 2 UNITS: 100 INJECTION, SOLUTION INTRAVENOUS; SUBCUTANEOUS at 18:14

## 2020-11-16 RX ADMIN — ONDANSETRON 4 MG: 2 INJECTION INTRAMUSCULAR; INTRAVENOUS at 14:27

## 2020-11-16 RX ADMIN — ATORVASTATIN CALCIUM 40 MG: 40 TABLET, FILM COATED ORAL at 21:09

## 2020-11-16 RX ADMIN — DEXAMETHASONE SODIUM PHOSPHATE 6 MG: 4 INJECTION, SOLUTION INTRAMUSCULAR; INTRAVENOUS at 09:31

## 2020-11-16 RX ADMIN — ALUMINUM HYDROXIDE, MAGNESIUM HYDROXIDE, AND DIMETHICONE 15 ML: 400; 400; 40 SUSPENSION ORAL at 22:43

## 2020-11-16 RX ADMIN — SODIUM CHLORIDE 1000 ML: 9 INJECTION, SOLUTION INTRAVENOUS at 08:35

## 2020-11-16 RX ADMIN — INSULIN DETEMIR 10 UNITS: 100 INJECTION, SOLUTION SUBCUTANEOUS at 21:15

## 2020-11-16 RX ADMIN — DICYCLOMINE HYDROCHLORIDE 10 MG: 10 CAPSULE ORAL at 14:27

## 2020-11-16 RX ADMIN — OXYCODONE HYDROCHLORIDE AND ACETAMINOPHEN 500 MG: 500 TABLET ORAL at 14:27

## 2020-11-16 NOTE — OUTREACH NOTE
COVID-19 Week 1 Survey      Responses   [facility] patient discharged from?  [facility]   Does the patient have one of the following disease processes/diagnoses(primary or secondary)?  COVID-19   COVID-19 underlying condition?  None   Call Number  Call 3   Week 1 Call successful?  No [Patient in ED]   Discharge diagnosis  positive covid test          Kasey Dover LPN

## 2020-11-17 ENCOUNTER — READMISSION MANAGEMENT (OUTPATIENT)
Dept: CALL CENTER | Facility: HOSPITAL | Age: 75
End: 2020-11-17

## 2020-11-17 ENCOUNTER — TELEPHONE (OUTPATIENT)
Dept: FAMILY MEDICINE CLINIC | Facility: CLINIC | Age: 75
End: 2020-11-17

## 2020-11-17 LAB
ALBUMIN SERPL-MCNC: 3.4 G/DL (ref 3.5–5.2)
ALBUMIN/GLOB SERPL: 1.3 G/DL
ALP SERPL-CCNC: 85 U/L (ref 39–117)
ALT SERPL W P-5'-P-CCNC: 23 U/L (ref 1–33)
ANION GAP SERPL CALCULATED.3IONS-SCNC: 10.7 MMOL/L (ref 5–15)
ANISOCYTOSIS BLD QL: NORMAL
AST SERPL-CCNC: 27 U/L (ref 1–32)
BASOPHILS # BLD AUTO: 0.01 10*3/MM3 (ref 0–0.2)
BASOPHILS NFR BLD AUTO: 0.3 % (ref 0–1.5)
BILIRUB SERPL-MCNC: 0.6 MG/DL (ref 0–1.2)
BUN SERPL-MCNC: 17 MG/DL (ref 8–23)
BUN/CREAT SERPL: 18.3 (ref 7–25)
CALCIUM SPEC-SCNC: 8.6 MG/DL (ref 8.6–10.5)
CHLORIDE SERPL-SCNC: 106 MMOL/L (ref 98–107)
CO2 SERPL-SCNC: 24.3 MMOL/L (ref 22–29)
CREAT SERPL-MCNC: 0.93 MG/DL (ref 0.57–1)
DEPRECATED RDW RBC AUTO: 54.9 FL (ref 37–54)
EOSINOPHIL # BLD AUTO: 0 10*3/MM3 (ref 0–0.4)
EOSINOPHIL NFR BLD AUTO: 0 % (ref 0.3–6.2)
ERYTHROCYTE [DISTWIDTH] IN BLOOD BY AUTOMATED COUNT: 17.5 % (ref 12.3–15.4)
GFR SERPL CREATININE-BSD FRML MDRD: 59 ML/MIN/1.73
GLOBULIN UR ELPH-MCNC: 2.6 GM/DL
GLUCOSE BLDC GLUCOMTR-MCNC: 165 MG/DL (ref 70–130)
GLUCOSE BLDC GLUCOMTR-MCNC: 276 MG/DL (ref 70–130)
GLUCOSE SERPL-MCNC: 167 MG/DL (ref 65–99)
HCT VFR BLD AUTO: 34.1 % (ref 34–46.6)
HGB BLD-MCNC: 11.5 G/DL (ref 12–15.9)
IMM GRANULOCYTES # BLD AUTO: 0.01 10*3/MM3 (ref 0–0.05)
IMM GRANULOCYTES NFR BLD AUTO: 0.3 % (ref 0–0.5)
LYMPHOCYTES # BLD AUTO: 1.65 10*3/MM3 (ref 0.7–3.1)
LYMPHOCYTES NFR BLD AUTO: 48.5 % (ref 19.6–45.3)
MACROCYTES BLD QL SMEAR: NORMAL
MCH RBC QN AUTO: 33.7 PG (ref 26.6–33)
MCHC RBC AUTO-ENTMCNC: 33.7 G/DL (ref 31.5–35.7)
MCV RBC AUTO: 100 FL (ref 79–97)
MONOCYTES # BLD AUTO: 0.22 10*3/MM3 (ref 0.1–0.9)
MONOCYTES NFR BLD AUTO: 6.5 % (ref 5–12)
NEUTROPHILS NFR BLD AUTO: 1.51 10*3/MM3 (ref 1.7–7)
NEUTROPHILS NFR BLD AUTO: 44.4 % (ref 42.7–76)
NRBC BLD AUTO-RTO: 0 /100 WBC (ref 0–0.2)
PLATELET # BLD AUTO: 173 10*3/MM3 (ref 140–450)
PMV BLD AUTO: 11.6 FL (ref 6–12)
POIKILOCYTOSIS BLD QL SMEAR: NORMAL
POTASSIUM SERPL-SCNC: 4.2 MMOL/L (ref 3.5–5.2)
PROCALCITONIN SERPL-MCNC: 0.05 NG/ML (ref 0–0.25)
PROT SERPL-MCNC: 6 G/DL (ref 6–8.5)
RBC # BLD AUTO: 3.41 10*6/MM3 (ref 3.77–5.28)
SMALL PLATELETS BLD QL SMEAR: ADEQUATE
SODIUM SERPL-SCNC: 141 MMOL/L (ref 136–145)
WBC # BLD AUTO: 3.4 10*3/MM3 (ref 3.4–10.8)
WBC MORPH BLD: NORMAL

## 2020-11-17 PROCEDURE — 85025 COMPLETE CBC W/AUTO DIFF WBC: CPT | Performed by: INTERNAL MEDICINE

## 2020-11-17 PROCEDURE — 80053 COMPREHEN METABOLIC PANEL: CPT | Performed by: INTERNAL MEDICINE

## 2020-11-17 PROCEDURE — 82962 GLUCOSE BLOOD TEST: CPT

## 2020-11-17 PROCEDURE — 84145 PROCALCITONIN (PCT): CPT | Performed by: INTERNAL MEDICINE

## 2020-11-17 PROCEDURE — 25010000002 ENOXAPARIN PER 10 MG: Performed by: INTERNAL MEDICINE

## 2020-11-17 PROCEDURE — 85007 BL SMEAR W/DIFF WBC COUNT: CPT | Performed by: INTERNAL MEDICINE

## 2020-11-17 PROCEDURE — 99232 SBSQ HOSP IP/OBS MODERATE 35: CPT | Performed by: INTERNAL MEDICINE

## 2020-11-17 PROCEDURE — 63710000001 INSULIN ASPART PER 5 UNITS: Performed by: INTERNAL MEDICINE

## 2020-11-17 PROCEDURE — 25010000002 CEFTRIAXONE: Performed by: INTERNAL MEDICINE

## 2020-11-17 PROCEDURE — 25010000002 DEXAMETHASONE PER 1 MG: Performed by: INTERNAL MEDICINE

## 2020-11-17 PROCEDURE — 63710000001 INSULIN DETEMIR PER 5 UNITS: Performed by: INTERNAL MEDICINE

## 2020-11-17 RX ORDER — AMINO ACIDS/PROTEIN HYDROLYS 15G-100/30
30 LIQUID (ML) ORAL 2 TIMES DAILY
Status: DISCONTINUED | OUTPATIENT
Start: 2020-11-17 | End: 2020-11-21 | Stop reason: HOSPADM

## 2020-11-17 RX ADMIN — SODIUM CHLORIDE, PRESERVATIVE FREE 10 ML: 5 INJECTION INTRAVENOUS at 20:24

## 2020-11-17 RX ADMIN — ALBUTEROL SULFATE 2 PUFF: 90 AEROSOL, METERED RESPIRATORY (INHALATION) at 20:26

## 2020-11-17 RX ADMIN — ATORVASTATIN CALCIUM 40 MG: 40 TABLET, FILM COATED ORAL at 20:22

## 2020-11-17 RX ADMIN — INSULIN DETEMIR 20 UNITS: 100 INJECTION, SOLUTION SUBCUTANEOUS at 20:27

## 2020-11-17 RX ADMIN — CEFTRIAXONE 1 G: 1 INJECTION, POWDER, FOR SOLUTION INTRAMUSCULAR; INTRAVENOUS at 18:04

## 2020-11-17 RX ADMIN — ALBUTEROL SULFATE 2 PUFF: 90 AEROSOL, METERED RESPIRATORY (INHALATION) at 18:04

## 2020-11-17 RX ADMIN — INSULIN ASPART 4 UNITS: 100 INJECTION, SOLUTION INTRAVENOUS; SUBCUTANEOUS at 12:34

## 2020-11-17 RX ADMIN — ENOXAPARIN SODIUM 40 MG: 40 INJECTION SUBCUTANEOUS at 14:56

## 2020-11-17 RX ADMIN — DEXAMETHASONE SODIUM PHOSPHATE 6 MG: 4 INJECTION, SOLUTION INTRA-ARTICULAR; INTRALESIONAL; INTRAMUSCULAR; INTRAVENOUS; SOFT TISSUE at 08:11

## 2020-11-17 RX ADMIN — MULTIPLE VITAMINS W/ MINERALS TAB 1 TABLET: TAB at 08:11

## 2020-11-17 RX ADMIN — ALBUTEROL SULFATE 2 PUFF: 90 AEROSOL, METERED RESPIRATORY (INHALATION) at 08:11

## 2020-11-17 RX ADMIN — Medication 30 ML: at 20:21

## 2020-11-17 RX ADMIN — OXYCODONE HYDROCHLORIDE AND ACETAMINOPHEN 500 MG: 500 TABLET ORAL at 08:11

## 2020-11-17 RX ADMIN — ALUMINUM HYDROXIDE, MAGNESIUM HYDROXIDE, AND DIMETHICONE 15 ML: 400; 400; 40 SUSPENSION ORAL at 06:55

## 2020-11-17 RX ADMIN — SODIUM CHLORIDE, PRESERVATIVE FREE 10 ML: 5 INJECTION INTRAVENOUS at 08:12

## 2020-11-17 RX ADMIN — Medication 1 CAPSULE: at 08:11

## 2020-11-17 RX ADMIN — HYDROCHLOROTHIAZIDE: 25 TABLET ORAL at 08:11

## 2020-11-17 RX ADMIN — REMDESIVIR 100 MG: 100 INJECTION, POWDER, LYOPHILIZED, FOR SOLUTION INTRAVENOUS at 14:51

## 2020-11-17 RX ADMIN — ALBUTEROL SULFATE 2 PUFF: 90 AEROSOL, METERED RESPIRATORY (INHALATION) at 12:34

## 2020-11-17 RX ADMIN — Medication 1 CAPSULE: at 20:22

## 2020-11-17 RX ADMIN — INSULIN ASPART 2 UNITS: 100 INJECTION, SOLUTION INTRAVENOUS; SUBCUTANEOUS at 06:56

## 2020-11-17 RX ADMIN — INSULIN ASPART 3 UNITS: 100 INJECTION, SOLUTION INTRAVENOUS; SUBCUTANEOUS at 18:04

## 2020-11-17 RX ADMIN — PANTOPRAZOLE SODIUM 40 MG: 40 TABLET, DELAYED RELEASE ORAL at 06:55

## 2020-11-17 RX ADMIN — BENZONATATE 200 MG: 100 CAPSULE ORAL at 06:55

## 2020-11-17 NOTE — OUTREACH NOTE
COVID-19 Week 1 Survey      Responses   [facility] patient discharged from?  [facility]   Does the patient have one of the following disease processes/diagnoses(primary or secondary)?  COVID-19   COVID-19 underlying condition?  None   Call Number  Call 3   Week 1 Call successful?  No   Revoke  Readmitted          Kristen Ortega RN

## 2020-11-17 NOTE — TELEPHONE ENCOUNTER
PATIENT'S DAUGHTER CALLED TO REPORT THAT PATIENT WAS ADMITTED TO HOSPITAL ON 11/16/2020 AT [facility].

## 2020-11-18 LAB
BACTERIA SPEC AEROBE CULT: ABNORMAL
GLUCOSE BLDC GLUCOMTR-MCNC: 165 MG/DL (ref 70–130)
GLUCOSE BLDC GLUCOMTR-MCNC: 190 MG/DL (ref 70–130)
GLUCOSE BLDC GLUCOMTR-MCNC: 285 MG/DL (ref 70–130)
GLUCOSE BLDC GLUCOMTR-MCNC: 316 MG/DL (ref 70–130)
GLUCOSE BLDC GLUCOMTR-MCNC: 330 MG/DL (ref 70–130)

## 2020-11-18 PROCEDURE — 63710000001 INSULIN DETEMIR PER 5 UNITS: Performed by: INTERNAL MEDICINE

## 2020-11-18 PROCEDURE — 25010000002 ENOXAPARIN PER 10 MG: Performed by: INTERNAL MEDICINE

## 2020-11-18 PROCEDURE — 63710000001 INSULIN ASPART PER 5 UNITS: Performed by: INTERNAL MEDICINE

## 2020-11-18 PROCEDURE — 25010000002 ONDANSETRON PER 1 MG: Performed by: INTERNAL MEDICINE

## 2020-11-18 PROCEDURE — 82962 GLUCOSE BLOOD TEST: CPT

## 2020-11-18 PROCEDURE — 25010000002 DEXAMETHASONE PER 1 MG: Performed by: INTERNAL MEDICINE

## 2020-11-18 PROCEDURE — 25010000002 CEFTRIAXONE: Performed by: INTERNAL MEDICINE

## 2020-11-18 PROCEDURE — 63710000001 PROMETHAZINE PER 25 MG: Performed by: INTERNAL MEDICINE

## 2020-11-18 PROCEDURE — 99232 SBSQ HOSP IP/OBS MODERATE 35: CPT | Performed by: INTERNAL MEDICINE

## 2020-11-18 RX ORDER — PROMETHAZINE HYDROCHLORIDE 25 MG/1
12.5 SUPPOSITORY RECTAL ONCE
Status: COMPLETED | OUTPATIENT
Start: 2020-11-18 | End: 2020-11-18

## 2020-11-18 RX ORDER — PROMETHAZINE HYDROCHLORIDE 12.5 MG/1
12.5 TABLET ORAL ONCE
Status: COMPLETED | OUTPATIENT
Start: 2020-11-18 | End: 2020-11-18

## 2020-11-18 RX ADMIN — ALPRAZOLAM 0.5 MG: 0.5 TABLET ORAL at 21:19

## 2020-11-18 RX ADMIN — INSULIN ASPART 2 UNITS: 100 INJECTION, SOLUTION INTRAVENOUS; SUBCUTANEOUS at 07:03

## 2020-11-18 RX ADMIN — ALBUTEROL SULFATE 2 PUFF: 90 AEROSOL, METERED RESPIRATORY (INHALATION) at 21:20

## 2020-11-18 RX ADMIN — INSULIN ASPART 5 UNITS: 100 INJECTION, SOLUTION INTRAVENOUS; SUBCUTANEOUS at 18:06

## 2020-11-18 RX ADMIN — PROMETHAZINE HYDROCHLORIDE 25 MG: 25 TABLET ORAL at 10:33

## 2020-11-18 RX ADMIN — CEFTRIAXONE 1 G: 1 INJECTION, POWDER, FOR SOLUTION INTRAMUSCULAR; INTRAVENOUS at 18:07

## 2020-11-18 RX ADMIN — OXYCODONE HYDROCHLORIDE AND ACETAMINOPHEN 500 MG: 500 TABLET ORAL at 10:33

## 2020-11-18 RX ADMIN — PROMETHAZINE HYDROCHLORIDE 12.5 MG: 25 SUPPOSITORY RECTAL at 02:45

## 2020-11-18 RX ADMIN — PROMETHAZINE HYDROCHLORIDE 25 MG: 25 TABLET ORAL at 18:06

## 2020-11-18 RX ADMIN — ALUMINUM HYDROXIDE, MAGNESIUM HYDROXIDE, AND DIMETHICONE 15 ML: 400; 400; 40 SUSPENSION ORAL at 02:55

## 2020-11-18 RX ADMIN — REMDESIVIR 100 MG: 100 INJECTION, POWDER, LYOPHILIZED, FOR SOLUTION INTRAVENOUS at 16:04

## 2020-11-18 RX ADMIN — ALBUTEROL SULFATE 2 PUFF: 90 AEROSOL, METERED RESPIRATORY (INHALATION) at 10:34

## 2020-11-18 RX ADMIN — ATORVASTATIN CALCIUM 40 MG: 40 TABLET, FILM COATED ORAL at 21:18

## 2020-11-18 RX ADMIN — INSULIN ASPART 2 UNITS: 100 INJECTION, SOLUTION INTRAVENOUS; SUBCUTANEOUS at 13:02

## 2020-11-18 RX ADMIN — ONDANSETRON 4 MG: 2 INJECTION INTRAMUSCULAR; INTRAVENOUS at 00:04

## 2020-11-18 RX ADMIN — Medication 1 CAPSULE: at 21:19

## 2020-11-18 RX ADMIN — SODIUM CHLORIDE, PRESERVATIVE FREE 10 ML: 5 INJECTION INTRAVENOUS at 10:30

## 2020-11-18 RX ADMIN — Medication 30 ML: at 21:19

## 2020-11-18 RX ADMIN — ENOXAPARIN SODIUM 40 MG: 40 INJECTION SUBCUTANEOUS at 13:01

## 2020-11-18 RX ADMIN — ALBUTEROL SULFATE 2 PUFF: 90 AEROSOL, METERED RESPIRATORY (INHALATION) at 16:05

## 2020-11-18 RX ADMIN — INSULIN DETEMIR 20 UNITS: 100 INJECTION, SOLUTION SUBCUTANEOUS at 21:00

## 2020-11-18 RX ADMIN — ACETAMINOPHEN 650 MG: 325 TABLET, FILM COATED ORAL at 10:31

## 2020-11-18 RX ADMIN — BENZONATATE 200 MG: 100 CAPSULE ORAL at 10:32

## 2020-11-18 RX ADMIN — HYDROCHLOROTHIAZIDE: 25 TABLET ORAL at 10:31

## 2020-11-18 RX ADMIN — DEXAMETHASONE SODIUM PHOSPHATE 6 MG: 4 INJECTION, SOLUTION INTRA-ARTICULAR; INTRALESIONAL; INTRAMUSCULAR; INTRAVENOUS; SOFT TISSUE at 10:31

## 2020-11-18 RX ADMIN — MULTIPLE VITAMINS W/ MINERALS TAB 1 TABLET: TAB at 10:32

## 2020-11-18 RX ADMIN — PANTOPRAZOLE SODIUM 40 MG: 40 TABLET, DELAYED RELEASE ORAL at 07:03

## 2020-11-18 RX ADMIN — SODIUM CHLORIDE, PRESERVATIVE FREE 10 ML: 5 INJECTION INTRAVENOUS at 21:20

## 2020-11-18 RX ADMIN — ALBUTEROL SULFATE 2 PUFF: 90 AEROSOL, METERED RESPIRATORY (INHALATION) at 13:02

## 2020-11-18 RX ADMIN — Medication 1 CAPSULE: at 10:32

## 2020-11-19 LAB
ALBUMIN SERPL-MCNC: 3.4 G/DL (ref 3.5–5.2)
ALBUMIN/GLOB SERPL: 1.4 G/DL
ALP SERPL-CCNC: 84 U/L (ref 39–117)
ALT SERPL W P-5'-P-CCNC: 22 U/L (ref 1–33)
ANION GAP SERPL CALCULATED.3IONS-SCNC: 8.8 MMOL/L (ref 5–15)
AST SERPL-CCNC: 24 U/L (ref 1–32)
BILIRUB SERPL-MCNC: 0.5 MG/DL (ref 0–1.2)
BUN SERPL-MCNC: 27 MG/DL (ref 8–23)
BUN/CREAT SERPL: 31.4 (ref 7–25)
CALCIUM SPEC-SCNC: 8.7 MG/DL (ref 8.6–10.5)
CHLORIDE SERPL-SCNC: 103 MMOL/L (ref 98–107)
CO2 SERPL-SCNC: 26.2 MMOL/L (ref 22–29)
CREAT SERPL-MCNC: 0.86 MG/DL (ref 0.57–1)
DEPRECATED RDW RBC AUTO: 57 FL (ref 37–54)
ERYTHROCYTE [DISTWIDTH] IN BLOOD BY AUTOMATED COUNT: 16.3 % (ref 12.3–15.4)
GFR SERPL CREATININE-BSD FRML MDRD: 64 ML/MIN/1.73
GLOBULIN UR ELPH-MCNC: 2.5 GM/DL
GLUCOSE BLDC GLUCOMTR-MCNC: 163 MG/DL (ref 70–130)
GLUCOSE BLDC GLUCOMTR-MCNC: 220 MG/DL (ref 70–130)
GLUCOSE BLDC GLUCOMTR-MCNC: 311 MG/DL (ref 70–130)
GLUCOSE BLDC GLUCOMTR-MCNC: 355 MG/DL (ref 70–130)
GLUCOSE SERPL-MCNC: 242 MG/DL (ref 65–99)
HCT VFR BLD AUTO: 36.1 % (ref 34–46.6)
HGB BLD-MCNC: 11.8 G/DL (ref 12–15.9)
MCH RBC QN AUTO: 31.2 PG (ref 26.6–33)
MCHC RBC AUTO-ENTMCNC: 32.7 G/DL (ref 31.5–35.7)
MCV RBC AUTO: 95.5 FL (ref 79–97)
PLATELET # BLD AUTO: 196 10*3/MM3 (ref 140–450)
PMV BLD AUTO: 12.3 FL (ref 6–12)
POTASSIUM SERPL-SCNC: 3.8 MMOL/L (ref 3.5–5.2)
PROCALCITONIN SERPL-MCNC: 0.05 NG/ML (ref 0–0.25)
PROT SERPL-MCNC: 5.9 G/DL (ref 6–8.5)
RBC # BLD AUTO: 3.78 10*6/MM3 (ref 3.77–5.28)
SODIUM SERPL-SCNC: 138 MMOL/L (ref 136–145)
WBC # BLD AUTO: 5.59 10*3/MM3 (ref 3.4–10.8)

## 2020-11-19 PROCEDURE — 63710000001 INSULIN DETEMIR PER 5 UNITS: Performed by: INTERNAL MEDICINE

## 2020-11-19 PROCEDURE — 82962 GLUCOSE BLOOD TEST: CPT

## 2020-11-19 PROCEDURE — 84145 PROCALCITONIN (PCT): CPT | Performed by: INTERNAL MEDICINE

## 2020-11-19 PROCEDURE — 85027 COMPLETE CBC AUTOMATED: CPT | Performed by: INTERNAL MEDICINE

## 2020-11-19 PROCEDURE — 25010000002 ENOXAPARIN PER 10 MG: Performed by: INTERNAL MEDICINE

## 2020-11-19 PROCEDURE — 63710000001 INSULIN ASPART PER 5 UNITS: Performed by: INTERNAL MEDICINE

## 2020-11-19 PROCEDURE — 99232 SBSQ HOSP IP/OBS MODERATE 35: CPT | Performed by: INTERNAL MEDICINE

## 2020-11-19 PROCEDURE — 80053 COMPREHEN METABOLIC PANEL: CPT | Performed by: INTERNAL MEDICINE

## 2020-11-19 PROCEDURE — 25010000002 DEXAMETHASONE PER 1 MG: Performed by: INTERNAL MEDICINE

## 2020-11-19 RX ADMIN — ALBUTEROL SULFATE 2 PUFF: 90 AEROSOL, METERED RESPIRATORY (INHALATION) at 12:31

## 2020-11-19 RX ADMIN — ENOXAPARIN SODIUM 40 MG: 40 INJECTION SUBCUTANEOUS at 15:03

## 2020-11-19 RX ADMIN — Medication 30 ML: at 22:15

## 2020-11-19 RX ADMIN — SODIUM CHLORIDE, PRESERVATIVE FREE 10 ML: 5 INJECTION INTRAVENOUS at 10:09

## 2020-11-19 RX ADMIN — INSULIN ASPART 6 UNITS: 100 INJECTION, SOLUTION INTRAVENOUS; SUBCUTANEOUS at 17:27

## 2020-11-19 RX ADMIN — OXYCODONE HYDROCHLORIDE AND ACETAMINOPHEN 500 MG: 500 TABLET ORAL at 10:08

## 2020-11-19 RX ADMIN — SODIUM CHLORIDE, PRESERVATIVE FREE 10 ML: 5 INJECTION INTRAVENOUS at 22:16

## 2020-11-19 RX ADMIN — INSULIN ASPART 3 UNITS: 100 INJECTION, SOLUTION INTRAVENOUS; SUBCUTANEOUS at 06:30

## 2020-11-19 RX ADMIN — HYDROCHLOROTHIAZIDE: 25 TABLET ORAL at 10:08

## 2020-11-19 RX ADMIN — MULTIPLE VITAMINS W/ MINERALS TAB 1 TABLET: TAB at 10:08

## 2020-11-19 RX ADMIN — ALBUTEROL SULFATE 2 PUFF: 90 AEROSOL, METERED RESPIRATORY (INHALATION) at 10:09

## 2020-11-19 RX ADMIN — ALBUTEROL SULFATE 2 PUFF: 90 AEROSOL, METERED RESPIRATORY (INHALATION) at 20:30

## 2020-11-19 RX ADMIN — Medication 1 CAPSULE: at 10:08

## 2020-11-19 RX ADMIN — ALBUTEROL SULFATE 2 PUFF: 90 AEROSOL, METERED RESPIRATORY (INHALATION) at 17:28

## 2020-11-19 RX ADMIN — DEXAMETHASONE SODIUM PHOSPHATE 6 MG: 4 INJECTION, SOLUTION INTRA-ARTICULAR; INTRALESIONAL; INTRAMUSCULAR; INTRAVENOUS; SOFT TISSUE at 10:08

## 2020-11-19 RX ADMIN — INSULIN DETEMIR 20 UNITS: 100 INJECTION, SOLUTION SUBCUTANEOUS at 21:30

## 2020-11-19 RX ADMIN — Medication 1 CAPSULE: at 22:15

## 2020-11-19 RX ADMIN — PANTOPRAZOLE SODIUM 40 MG: 40 TABLET, DELAYED RELEASE ORAL at 06:16

## 2020-11-19 RX ADMIN — ATORVASTATIN CALCIUM 40 MG: 40 TABLET, FILM COATED ORAL at 22:15

## 2020-11-19 RX ADMIN — SODIUM CHLORIDE, PRESERVATIVE FREE 10 ML: 5 INJECTION INTRAVENOUS at 15:04

## 2020-11-19 RX ADMIN — BENZONATATE 200 MG: 100 CAPSULE ORAL at 22:15

## 2020-11-19 RX ADMIN — INSULIN ASPART 2 UNITS: 100 INJECTION, SOLUTION INTRAVENOUS; SUBCUTANEOUS at 12:31

## 2020-11-19 RX ADMIN — Medication 30 ML: at 12:31

## 2020-11-19 RX ADMIN — ACETAMINOPHEN 650 MG: 325 TABLET, FILM COATED ORAL at 10:11

## 2020-11-19 RX ADMIN — REMDESIVIR 100 MG: 100 INJECTION, POWDER, LYOPHILIZED, FOR SOLUTION INTRAVENOUS at 15:03

## 2020-11-20 LAB
GLUCOSE BLDC GLUCOMTR-MCNC: 185 MG/DL (ref 70–130)
GLUCOSE BLDC GLUCOMTR-MCNC: 241 MG/DL (ref 70–130)
GLUCOSE BLDC GLUCOMTR-MCNC: 272 MG/DL (ref 70–130)
GLUCOSE BLDC GLUCOMTR-MCNC: 279 MG/DL (ref 70–130)

## 2020-11-20 PROCEDURE — 63710000001 INSULIN ASPART PER 5 UNITS: Performed by: INTERNAL MEDICINE

## 2020-11-20 PROCEDURE — 25010000002 ENOXAPARIN PER 10 MG: Performed by: INTERNAL MEDICINE

## 2020-11-20 PROCEDURE — 63710000001 PROMETHAZINE PER 25 MG: Performed by: INTERNAL MEDICINE

## 2020-11-20 PROCEDURE — 99232 SBSQ HOSP IP/OBS MODERATE 35: CPT | Performed by: INTERNAL MEDICINE

## 2020-11-20 PROCEDURE — 25010000002 DEXAMETHASONE PER 1 MG: Performed by: INTERNAL MEDICINE

## 2020-11-20 PROCEDURE — 63710000001 INSULIN DETEMIR PER 5 UNITS: Performed by: INTERNAL MEDICINE

## 2020-11-20 PROCEDURE — 25010000002 ONDANSETRON PER 1 MG: Performed by: INTERNAL MEDICINE

## 2020-11-20 PROCEDURE — 82962 GLUCOSE BLOOD TEST: CPT

## 2020-11-20 RX ORDER — CALCIUM CARBONATE 200(500)MG
1 TABLET,CHEWABLE ORAL 3 TIMES DAILY PRN
Status: DISCONTINUED | OUTPATIENT
Start: 2020-11-20 | End: 2020-11-21 | Stop reason: HOSPADM

## 2020-11-20 RX ADMIN — PROMETHAZINE HYDROCHLORIDE 25 MG: 25 TABLET ORAL at 10:40

## 2020-11-20 RX ADMIN — REMDESIVIR 100 MG: 100 INJECTION, POWDER, LYOPHILIZED, FOR SOLUTION INTRAVENOUS at 16:03

## 2020-11-20 RX ADMIN — PANTOPRAZOLE SODIUM 40 MG: 40 TABLET, DELAYED RELEASE ORAL at 06:55

## 2020-11-20 RX ADMIN — INSULIN DETEMIR 10 UNITS: 100 INJECTION, SOLUTION SUBCUTANEOUS at 10:40

## 2020-11-20 RX ADMIN — ALPRAZOLAM 0.5 MG: 0.5 TABLET ORAL at 20:27

## 2020-11-20 RX ADMIN — ATORVASTATIN CALCIUM 40 MG: 40 TABLET, FILM COATED ORAL at 20:28

## 2020-11-20 RX ADMIN — ALBUTEROL SULFATE 2 PUFF: 90 AEROSOL, METERED RESPIRATORY (INHALATION) at 12:29

## 2020-11-20 RX ADMIN — DEXAMETHASONE SODIUM PHOSPHATE 6 MG: 4 INJECTION, SOLUTION INTRA-ARTICULAR; INTRALESIONAL; INTRAMUSCULAR; INTRAVENOUS; SOFT TISSUE at 09:37

## 2020-11-20 RX ADMIN — SODIUM CHLORIDE, PRESERVATIVE FREE 10 ML: 5 INJECTION INTRAVENOUS at 20:26

## 2020-11-20 RX ADMIN — INSULIN DETEMIR 20 UNITS: 100 INJECTION, SOLUTION SUBCUTANEOUS at 20:39

## 2020-11-20 RX ADMIN — Medication 1 CAPSULE: at 20:26

## 2020-11-20 RX ADMIN — ALPRAZOLAM 0.5 MG: 0.5 TABLET ORAL at 04:12

## 2020-11-20 RX ADMIN — ALBUTEROL SULFATE 2 PUFF: 90 AEROSOL, METERED RESPIRATORY (INHALATION) at 20:25

## 2020-11-20 RX ADMIN — ENOXAPARIN SODIUM 40 MG: 40 INJECTION SUBCUTANEOUS at 16:02

## 2020-11-20 RX ADMIN — ALBUTEROL SULFATE 2 PUFF: 90 AEROSOL, METERED RESPIRATORY (INHALATION) at 09:39

## 2020-11-20 RX ADMIN — Medication 30 ML: at 09:38

## 2020-11-20 RX ADMIN — ALUMINUM HYDROXIDE, MAGNESIUM HYDROXIDE, AND DIMETHICONE 15 ML: 400; 400; 40 SUSPENSION ORAL at 10:40

## 2020-11-20 RX ADMIN — CALCIUM CARBONATE 1 TABLET: 500 TABLET, CHEWABLE ORAL at 12:29

## 2020-11-20 RX ADMIN — HYDROCHLOROTHIAZIDE: 25 TABLET ORAL at 09:38

## 2020-11-20 RX ADMIN — INSULIN ASPART 4 UNITS: 100 INJECTION, SOLUTION INTRAVENOUS; SUBCUTANEOUS at 18:03

## 2020-11-20 RX ADMIN — SODIUM CHLORIDE, PRESERVATIVE FREE 10 ML: 5 INJECTION INTRAVENOUS at 09:40

## 2020-11-20 RX ADMIN — INSULIN ASPART 3 UNITS: 100 INJECTION, SOLUTION INTRAVENOUS; SUBCUTANEOUS at 12:28

## 2020-11-20 RX ADMIN — INSULIN ASPART 2 UNITS: 100 INJECTION, SOLUTION INTRAVENOUS; SUBCUTANEOUS at 06:55

## 2020-11-20 RX ADMIN — PROMETHAZINE HYDROCHLORIDE 25 MG: 25 TABLET ORAL at 20:27

## 2020-11-20 RX ADMIN — ONDANSETRON 4 MG: 2 INJECTION INTRAMUSCULAR; INTRAVENOUS at 04:12

## 2020-11-20 RX ADMIN — ACETAMINOPHEN 650 MG: 325 TABLET, FILM COATED ORAL at 20:28

## 2020-11-20 RX ADMIN — Medication 30 ML: at 20:30

## 2020-11-20 RX ADMIN — Medication 1 CAPSULE: at 09:37

## 2020-11-20 RX ADMIN — OXYCODONE HYDROCHLORIDE AND ACETAMINOPHEN 500 MG: 500 TABLET ORAL at 09:38

## 2020-11-20 RX ADMIN — MULTIPLE VITAMINS W/ MINERALS TAB 1 TABLET: TAB at 09:39

## 2020-11-20 RX ADMIN — ALBUTEROL SULFATE 2 PUFF: 90 AEROSOL, METERED RESPIRATORY (INHALATION) at 16:03

## 2020-11-21 ENCOUNTER — READMISSION MANAGEMENT (OUTPATIENT)
Dept: CALL CENTER | Facility: HOSPITAL | Age: 75
End: 2020-11-21

## 2020-11-21 VITALS
SYSTOLIC BLOOD PRESSURE: 104 MMHG | RESPIRATION RATE: 18 BRPM | HEART RATE: 103 BPM | BODY MASS INDEX: 32.96 KG/M2 | TEMPERATURE: 97.1 F | HEIGHT: 67 IN | OXYGEN SATURATION: 94 % | WEIGHT: 210 LBS | DIASTOLIC BLOOD PRESSURE: 67 MMHG

## 2020-11-21 LAB
ALBUMIN SERPL-MCNC: 3.4 G/DL (ref 3.5–5.2)
ALBUMIN/GLOB SERPL: 1.2 G/DL
ALP SERPL-CCNC: 83 U/L (ref 39–117)
ALT SERPL W P-5'-P-CCNC: 21 U/L (ref 1–33)
ANION GAP SERPL CALCULATED.3IONS-SCNC: 7.7 MMOL/L (ref 5–15)
AST SERPL-CCNC: 19 U/L (ref 1–32)
BILIRUB SERPL-MCNC: 0.4 MG/DL (ref 0–1.2)
BUN SERPL-MCNC: 28 MG/DL (ref 8–23)
BUN/CREAT SERPL: 28.9 (ref 7–25)
CALCIUM SPEC-SCNC: 8.9 MG/DL (ref 8.6–10.5)
CHLORIDE SERPL-SCNC: 102 MMOL/L (ref 98–107)
CO2 SERPL-SCNC: 26.3 MMOL/L (ref 22–29)
CREAT SERPL-MCNC: 0.97 MG/DL (ref 0.57–1)
DEPRECATED RDW RBC AUTO: 58.2 FL (ref 37–54)
ERYTHROCYTE [DISTWIDTH] IN BLOOD BY AUTOMATED COUNT: 21.6 % (ref 12.3–15.4)
GFR SERPL CREATININE-BSD FRML MDRD: 56 ML/MIN/1.73
GLOBULIN UR ELPH-MCNC: 2.9 GM/DL
GLUCOSE BLDC GLUCOMTR-MCNC: 200 MG/DL (ref 70–130)
GLUCOSE BLDC GLUCOMTR-MCNC: 314 MG/DL (ref 70–130)
GLUCOSE SERPL-MCNC: 126 MG/DL (ref 65–99)
HCT VFR BLD AUTO: 34.9 % (ref 34–46.6)
HGB BLD-MCNC: 12.3 G/DL (ref 12–15.9)
MCH RBC QN AUTO: 37.5 PG (ref 26.6–33)
MCHC RBC AUTO-ENTMCNC: 35.2 G/DL (ref 31.5–35.7)
MCV RBC AUTO: 106.4 FL (ref 79–97)
PLATELET # BLD AUTO: 194 10*3/MM3 (ref 140–450)
PMV BLD AUTO: 11.2 FL (ref 6–12)
POTASSIUM SERPL-SCNC: 3.4 MMOL/L (ref 3.5–5.2)
PROT SERPL-MCNC: 6.3 G/DL (ref 6–8.5)
RBC # BLD AUTO: 3.28 10*6/MM3 (ref 3.77–5.28)
SODIUM SERPL-SCNC: 136 MMOL/L (ref 136–145)
WBC # BLD AUTO: 5.83 10*3/MM3 (ref 3.4–10.8)

## 2020-11-21 PROCEDURE — 63710000001 INSULIN ASPART PER 5 UNITS: Performed by: INTERNAL MEDICINE

## 2020-11-21 PROCEDURE — 25010000002 ONDANSETRON PER 1 MG: Performed by: INTERNAL MEDICINE

## 2020-11-21 PROCEDURE — 99238 HOSP IP/OBS DSCHRG MGMT 30/<: CPT | Performed by: INTERNAL MEDICINE

## 2020-11-21 PROCEDURE — 82962 GLUCOSE BLOOD TEST: CPT

## 2020-11-21 PROCEDURE — 80053 COMPREHEN METABOLIC PANEL: CPT | Performed by: INTERNAL MEDICINE

## 2020-11-21 PROCEDURE — 85027 COMPLETE CBC AUTOMATED: CPT | Performed by: INTERNAL MEDICINE

## 2020-11-21 RX ORDER — INSULIN DETEMIR 100 [IU]/ML
10 INJECTION, SOLUTION SUBCUTANEOUS NIGHTLY
Qty: 5 ML | Refills: 0 | Status: ON HOLD | OUTPATIENT
Start: 2020-11-21 | End: 2021-01-17

## 2020-11-21 RX ORDER — ASCORBIC ACID 500 MG
500 TABLET ORAL DAILY
Qty: 30 TABLET | Refills: 0 | Status: SHIPPED | OUTPATIENT
Start: 2020-11-22 | End: 2021-04-19

## 2020-11-21 RX ADMIN — OXYCODONE HYDROCHLORIDE AND ACETAMINOPHEN 500 MG: 500 TABLET ORAL at 09:20

## 2020-11-21 RX ADMIN — INSULIN ASPART 5 UNITS: 100 INJECTION, SOLUTION INTRAVENOUS; SUBCUTANEOUS at 12:16

## 2020-11-21 RX ADMIN — Medication 30 ML: at 09:20

## 2020-11-21 RX ADMIN — ALBUTEROL SULFATE 2 PUFF: 90 AEROSOL, METERED RESPIRATORY (INHALATION) at 12:16

## 2020-11-21 RX ADMIN — SODIUM CHLORIDE, PRESERVATIVE FREE 10 ML: 5 INJECTION INTRAVENOUS at 09:20

## 2020-11-21 RX ADMIN — BENZONATATE 200 MG: 100 CAPSULE ORAL at 00:26

## 2020-11-21 RX ADMIN — Medication 1 CAPSULE: at 09:20

## 2020-11-21 RX ADMIN — INSULIN ASPART 3 UNITS: 100 INJECTION, SOLUTION INTRAVENOUS; SUBCUTANEOUS at 06:32

## 2020-11-21 RX ADMIN — PANTOPRAZOLE SODIUM 40 MG: 40 TABLET, DELAYED RELEASE ORAL at 06:31

## 2020-11-21 RX ADMIN — MULTIPLE VITAMINS W/ MINERALS TAB 1 TABLET: TAB at 09:20

## 2020-11-21 RX ADMIN — HYDROCHLOROTHIAZIDE: 25 TABLET ORAL at 09:20

## 2020-11-21 RX ADMIN — ONDANSETRON 4 MG: 2 INJECTION INTRAMUSCULAR; INTRAVENOUS at 00:24

## 2020-11-21 RX ADMIN — ALBUTEROL SULFATE 2 PUFF: 90 AEROSOL, METERED RESPIRATORY (INHALATION) at 09:22

## 2020-11-21 NOTE — OUTREACH NOTE
Prep Survey      Responses   [facility] facility patient discharged from?  [place]   Is LACE score < 7 ?  No   Eligibility  [facility]   Date of Admission  11/16/20   Date of Discharge  11/21/20   Discharge Disposition  Home-Health Care [facility]   Discharge diagnosis  Covid 19 virus   Does the patient have one of the following disease processes/diagnoses(primary or secondary)?  COVID-19   Does the patient have Home health ordered?  Yes   What is the Home health agency?   [facility]   Is there a DME ordered?  No   Prep survey completed?  Yes          Gloria Winston RN

## 2020-11-22 ENCOUNTER — HOSPITAL ENCOUNTER (EMERGENCY)
Facility: HOSPITAL | Age: 75
Discharge: HOME | End: 2020-11-22
Attending: EMERGENCY MEDICINE | Admitting: EMERGENCY MEDICINE

## 2020-11-22 ENCOUNTER — TRANSITIONAL CARE MANAGEMENT TELEPHONE ENCOUNTER (OUTPATIENT)
Dept: CALL CENTER | Facility: HOSPITAL | Age: 75
End: 2020-11-22

## 2020-11-22 VITALS
HEIGHT: 67 IN | BODY MASS INDEX: 32.96 KG/M2 | RESPIRATION RATE: 18 BRPM | TEMPERATURE: 98.5 F | DIASTOLIC BLOOD PRESSURE: 87 MMHG | OXYGEN SATURATION: 95 % | SYSTOLIC BLOOD PRESSURE: 122 MMHG | WEIGHT: 210 LBS | HEART RATE: 110 BPM

## 2020-11-22 DIAGNOSIS — M79.605 LEFT LEG PAIN: Primary | ICD-10-CM

## 2020-11-22 PROCEDURE — 99283 EMERGENCY DEPT VISIT LOW MDM: CPT

## 2020-11-22 RX ORDER — GABAPENTIN 300 MG/1
300 CAPSULE ORAL 3 TIMES DAILY
Status: DISCONTINUED | OUTPATIENT
Start: 2020-11-22 | End: 2020-11-22 | Stop reason: HOSPADM

## 2020-11-22 RX ORDER — GABAPENTIN 300 MG/1
300 CAPSULE ORAL 3 TIMES DAILY
Qty: 12 CAPSULE | Refills: 0 | Status: SHIPPED | OUTPATIENT
Start: 2020-11-22 | End: 2021-02-23 | Stop reason: SDUPTHER

## 2020-11-22 RX ADMIN — GABAPENTIN 300 MG: 300 CAPSULE ORAL at 10:53

## 2020-11-22 NOTE — OUTREACH NOTE
Call Center TCM Note      Responses   [facility] patient discharged from?  [facility]   Does the patient have one of the following disease processes/diagnoses(primary or secondary)?  COVID-19   COVID-19 underlying condition?  None   TCM attempt successful?  Yes   Call start time  0859   Call end time  0914   Discharge diagnosis  Covid 19 virus   Is patient permission given to speak with other caregiver?  Yes   List who call center can speak with  daughter in law, Nano Mckeon reviewed with patient/caregiver?  Yes   Is the patient having any side effects they believe may be caused by any medication additions or changes?  No   Does the patient have all medications ordered at discharge?  Yes   Is the patient taking all medications as directed (includes completed medication regime)?  Yes   Does the patient have a primary care provider?   Yes   Comments regarding PCP  PCP Dr Olga Pimentel. Telephone visit scheduled for Tuesday 11/24 830am and in office hospital follow up appt 12/2 815am.    Does the patient have an appointment with their PCP or specialist within 7 days of discharge?  Yes   Has the patient kept scheduled appointments due by today?  N/A   What is the Home health agency?   [facility]   Has home health visited the patient within 72 hours of discharge?  Call prior to 72 hours   DME comments  Patient reports using walker this am.    Psychosocial issues?  Yes   Psychosocial comments  Patient reports  is still hospitalized.    Did the patient receive a copy of their discharge instructions?  Yes   Did the patient receive a copy of COVID-19 specific instructions?  Yes   Nursing interventions  Reviewed instructions with patient   What is the patient's perception of their health status since discharge?  New symptoms unrelated to diagnosis [Patient reports left leg with new numbness, coldness and painful. ]   Does the patient have any of the following symptoms?  Cough   Nursing Interventions  Nurse provided  patient education [Advised patient to be seen in the ER this am due to new symptoms with LLE. ]   Pulse Ox monitoring  Intermittent   Pulse Ox device source  Hospital   O2 Sat comments  Patient reports O2 sat 95% on room air this am.    O2 Sat: education provided  Sat levels, Monitoring frequency, When to seek care   Is the patient/caregiver able to teach back steps to recovery at home?  Set small, achievable goals for return to baseline health   Is the patient/caregiver able to teach back the hierarchy of who to call/visit for symptoms/problems? PCP, Specialist, Home health nurse, Urgent Care, ED, 911  Yes [Patient returning to ER this am. ]   TCM call completed?  Yes          Annie Olmstead RN    11/22/2020, 09:15 EST

## 2020-11-23 ENCOUNTER — TELEPHONE (OUTPATIENT)
Dept: FAMILY MEDICINE CLINIC | Facility: CLINIC | Age: 75
End: 2020-11-23

## 2020-11-23 ENCOUNTER — READMISSION MANAGEMENT (OUTPATIENT)
Dept: CALL CENTER | Facility: HOSPITAL | Age: 75
End: 2020-11-23

## 2020-11-23 NOTE — TELEPHONE ENCOUNTER
PT STATED SHE TESTED POSITIVE FOR COVID. PT STATED SHE GOT SENT HOME SAT FROM [facility] IN [place] WITH INSULIN 10MG. PT STATED BLOOD SUGAR . PT STATED SHE WANTED TO LET DR LOMBARDI KNOW HOW MUCH THEY HAVE HER ON.       CALLback- [phone]    PLEASE ADVISE
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
## 2020-11-23 NOTE — OUTREACH NOTE
COVID-19 Week 1 Survey      Responses   [facility] patient discharged from?  [facility]   Does the patient have one of the following disease processes/diagnoses(primary or secondary)?  COVID-19   COVID-19 underlying condition?  None   Call Number  Call 2   Week 1 Call successful?  No [Pt was in ED with leg pain d/c'd this AM]   Discharge diagnosis  Covid 19 virus          Zora Moran RN

## 2020-11-24 ENCOUNTER — TELEPHONE (OUTPATIENT)
Dept: FAMILY MEDICINE CLINIC | Facility: CLINIC | Age: 75
End: 2020-11-24

## 2020-11-24 ENCOUNTER — OFFICE VISIT (OUTPATIENT)
Dept: FAMILY MEDICINE CLINIC | Facility: CLINIC | Age: 75
End: 2020-11-24

## 2020-11-24 DIAGNOSIS — M79.605 LEFT LEG PAIN: ICD-10-CM

## 2020-11-24 DIAGNOSIS — U07.1 COVID-19 VIRUS INFECTION: Primary | ICD-10-CM

## 2020-11-24 DIAGNOSIS — R20.2 LEFT LEG PARESTHESIAS: ICD-10-CM

## 2020-11-24 DIAGNOSIS — D68.69 HYPERCOAGULABLE STATE, SECONDARY (HCC): ICD-10-CM

## 2020-11-24 DIAGNOSIS — E11.65 UNCONTROLLED TYPE 2 DIABETES MELLITUS WITH HYPERGLYCEMIA (HCC): ICD-10-CM

## 2020-11-24 PROCEDURE — G2025 DIS SITE TELE SVCS RHC/FQHC: HCPCS | Performed by: FAMILY MEDICINE

## 2020-11-24 NOTE — TELEPHONE ENCOUNTER
For now as long as her sugars are staying under 200 I think it's fine. She should check her in AM fasting and after eating later in the day.

## 2020-11-24 NOTE — PROGRESS NOTES
"TELEPHONE VISIT  2020      LUPE MEJIA   1945      History of Present Illness   Mrs. Mejia presents today for f/u after hospital d/c. She was admitted to [facility] on , discharged 2020 for COVID Infeciton assoc'd with hypoxic resp failure also received tx'd for Klebsiella UTI.     Return to ER on 2020 with c/o left leg pain/paresthesia and HH nurse concern for possible DVT. Pt was assessed and dc'd home. No venous doppler or d-dimer performed. Since that time left leg has remained painful, \"Odd feeling\", no swelling. No apparent skin changes.    Has DM. Was sent home on insulin. BG highest 300s in hospital generally under 200 since returning home.    Still feeling weak but improving. Some dizziness, dry cough. Using tessalon perles. Checking o2 88% at lowest, Usually in 90s, Using neb, and IS as directed. Appetite still poor.     Has homehealth arranged.     Current outpatient and discharge medications have been reconciled for the patient.  Reviewed by: Olga Pimentel MD    Patient's past medical hx, surgical hx, family hx, social hx reviewed on chart. Medication and allergy lists reviewed on chart. Information updated as indicated.      Review of Systems   Constitutional: Positive for appetite change, fatigue and unexpected weight change. Negative for chills and fever.   HENT: Positive for congestion. Negative for mouth sores, nosebleeds, rhinorrhea and sore throat.    Eyes: Positive for visual disturbance (chronic).   Respiratory: Positive for cough. Negative for shortness of breath and wheezing.    Cardiovascular: Negative for chest pain, palpitations and leg swelling.   Gastrointestinal: Positive for nausea. Negative for abdominal pain, blood in stool, diarrhea and vomiting.        Heartburn   Genitourinary: Negative for dysuria, flank pain, hematuria and urgency.   Musculoskeletal: Positive for arthralgias, back pain, gait problem and myalgias.   Skin: Negative for rash. "   Neurological: Positive for dizziness and weakness. Negative for syncope and headaches.   Hematological: Negative for adenopathy. Bruises/bleeds easily.   Psychiatric/Behavioral: Positive for sleep disturbance. Negative for confusion. The patient is nervous/anxious.    Pt's previous ROS reviewed and updated as indicated.     Lab Results   Component Value Date    GLUCOSE 126 (H) 11/21/2020    BUN 28 (H) 11/21/2020    CREATININE 0.97 11/21/2020    EGFRIFNONA 56 (L) 11/21/2020    EGFRIFAFRI 55 (L) 09/29/2020    BCR 28.9 (H) 11/21/2020    K 3.4 (L) 11/21/2020    CO2 26.3 11/21/2020    CALCIUM 8.9 11/21/2020    PROTENTOTREF 6.6 09/29/2020    ALBUMIN 3.40 (L) 11/21/2020    LABIL2 2.1 09/29/2020    AST 19 11/21/2020    ALT 21 11/21/2020     Lab Results   Component Value Date    WBC 5.83 11/21/2020    HGB 12.3 11/21/2020    HCT 34.9 11/21/2020    .4 (H) 11/21/2020     11/21/2020     Lab Results   Component Value Date    HGBA1C 8.80 (H) 09/29/2020    HGBA1C 7.6 06/29/2020    HGBA1C 6.5 01/07/2020     Lab Results   Component Value Date    MICROALBUR 30.6 03/11/2020    CREATININE 0.97 11/21/2020     Xr Chest 1 View  Result Date: 11/16/2020  No acute cardiopulmonary process.  Continued followup is recommended.  This report was finalized on 11/16/2020 9:46 AM by Devorah Palomino M.D..    Xr Chest 1 View  Result Date: 11/13/2020  Questionable minimal left basilar atelectasis. Other chronic appearing findings. Followup PA and lateral views would be helpful for a more complete evaluation.  This report was finalized on 11/13/2020 2:55 PM by Isaias Hernandez MD.      Diagnoses and all orders for this visit:    COVID-19 virus infection  -     US Venous Doppler Lower Extremity Left (duplex); Future    Left leg pain  -     US Venous Doppler Lower Extremity Left (duplex); Future    Left leg paresthesias  -     US Venous Doppler Lower Extremity Left (duplex); Future    Hypercoagulable state, secondary (CMS/HCC)  -     US  Venous Doppler Lower Extremity Left (duplex); Future    Uncontrolled type 2 diabetes mellitus with hyperglycemia (CMS/HCC)      Slowly recovering from recent COVID illness. Good O2 sats.  Concern for possible DVT, venous doppler as above.  IDDM with improving BG recently. Continue insulin as directed. Patient encouraged to take meds as rx'd, follow diabetic appropriate diet.    Patient will f/u as scheduled, sooner as needed.  I will contact patient regarding test results and provide instructions regarding any necessary changes in plan of care.  Patient was encouraged to keep me informed of any acute changes, lack of improvement, or any new concerning symptoms.  Pt is aware of reasons to seek emergent care.  Patient voiced understanding of all instructions and denied further questions.      This visit has been rescheduled as a phone visit to comply with patient safety concerns in accordance with CDC recommendations. Total time of discussion was 15 minutes.

## 2020-11-24 NOTE — TELEPHONE ENCOUNTER
Pt daughter called in and she wanted to let us know that her mothers sugar are running high, 165, her normal sugars are 100-120 her other sx, dizzy, and tired.  She was wondering if this needs to be something they worry about and if so what do they need to do.     Pt daughter number is [phone]

## 2020-11-25 PROCEDURE — U0004 COV-19 TEST NON-CDC HGH THRU: HCPCS | Performed by: PERSONAL EMERGENCY RESPONSE ATTENDANT

## 2020-11-28 ENCOUNTER — READMISSION MANAGEMENT (OUTPATIENT)
Dept: CALL CENTER | Facility: HOSPITAL | Age: 75
End: 2020-11-28

## 2020-11-28 NOTE — OUTREACH NOTE
COVID-19 Week 2 Survey      Responses   [facility] patient discharged from?  [facility]   Does the patient have one of the following disease processes/diagnoses(primary or secondary)?  COVID-19   COVID-19 underlying condition?  None   Call Number  Call 1   COVID-19 Week 2: Call 1 attempt successful?  Yes   Call start time  1347   Call end time  1354   General alerts for this patient  patient's  in grave condition with COVID in ICU   Discharge diagnosis  Covid 19 virus   Meds reviewed with patient/caregiver?  Yes   Is the patient having any side effects they believe may be caused by any medication additions or changes?  No   Does the patient have all medications ordered at discharge?  Yes   Is the patient taking all medications as directed (includes completed medication regime)?  Yes   Does the patient have a primary care provider?   Yes   Does the patient have an appointment with their PCP or specialist within 7 days of discharge?  Yes   Has the patient kept scheduled appointments due by today?  N/A   What is the Home health agency?   [facility]   Has home health visited the patient within 72 hours of discharge?  Yes   Psychosocial issues?  Yes   Psychosocial comments  Pt sstates that spouse's condition is grave and if he does not improve in next 36-48 hours, prognosis poor for his recovery, pt tearful, states having difficulty with recent news.   Did the patient receive a copy of their discharge instructions?  Yes   Did the patient receive a copy of COVID-19 specific instructions?  Yes   Nursing interventions  Reviewed instructions with patient   What is the patient's perception of their health status since discharge?  Improving   Does the patient have any of the following symptoms?  Cough   Nursing Interventions  Nurse provided patient education   Pulse Ox monitoring  Intermittent   Pulse Ox device source  Patient   O2 Sat comments  90's   O2 Sat: education provided  Sat levels   Is the patient/caregiver able  to teach back steps to recovery at home?  Rest and rebuild strength, gradually increase activity   Is the patient/caregiver able to teach back the hierarchy of who to call/visit for symptoms/problems? PCP, Specialist, Home health nurse, Urgent Care, ED, 911  Yes   COVID-19 call completed?  Yes   Wrap up additional comments  pt is entirely focused on praying for 's recovery with COVID in ICU, prognosis is poor          Patricia Lanier RN

## 2020-11-30 ENCOUNTER — TELEPHONE (OUTPATIENT)
Dept: FAMILY MEDICINE CLINIC | Facility: CLINIC | Age: 75
End: 2020-11-30

## 2020-11-30 ENCOUNTER — TRANSCRIBE ORDERS (OUTPATIENT)
Dept: LAB | Facility: HOSPITAL | Age: 75
End: 2020-11-30

## 2020-11-30 ENCOUNTER — LAB (OUTPATIENT)
Dept: LAB | Facility: HOSPITAL | Age: 75
End: 2020-11-30

## 2020-11-30 DIAGNOSIS — Z01.818 PRE-OP TESTING: Primary | ICD-10-CM

## 2020-11-30 DIAGNOSIS — Z01.818 PRE-OP TESTING: ICD-10-CM

## 2020-11-30 PROCEDURE — U0004 COV-19 TEST NON-CDC HGH THRU: HCPCS

## 2020-11-30 PROCEDURE — C9803 HOPD COVID-19 SPEC COLLECT: HCPCS

## 2020-12-01 ENCOUNTER — READMISSION MANAGEMENT (OUTPATIENT)
Dept: CALL CENTER | Facility: HOSPITAL | Age: 75
End: 2020-12-01

## 2020-12-01 LAB — SARS-COV-2 RNA RESP QL NAA+PROBE: NOT DETECTED

## 2020-12-01 NOTE — OUTREACH NOTE
COVID-19 Week 2 Survey      Responses   [facility] patient discharged from?  [facility]   Does the patient have one of the following disease processes/diagnoses(primary or secondary)?  COVID-19   COVID-19 underlying condition?  None   Call Number  Call 2   COVID-19 Week 2: Call 1 attempt successful?  Yes   Call start time  1304   Call end time  1308   General alerts for this patient  Pt's   of COVID in his sleep in ICU 2020 she reports   Discharge diagnosis  Covid 19 virus   Psychosocial issues?  Yes   Psychosocial comments  Pt reports her  passed away in his sleep in ICU last night from COVID. She is very tearful and upset. They were  31 yrs and dated 7yrs prior she reports.    Comments  Pt is tearful and emotionally upset at the loss of her  today. Her family is with her.    COVID-19 call completed?  Yes          Lani Holguin RN

## 2020-12-02 ENCOUNTER — HOSPITAL ENCOUNTER (OUTPATIENT)
Dept: ULTRASOUND IMAGING | Facility: HOSPITAL | Age: 75
Discharge: HOME | End: 2020-12-02
Admitting: FAMILY MEDICINE

## 2020-12-02 DIAGNOSIS — D68.69 HYPERCOAGULABLE STATE, SECONDARY (HCC): ICD-10-CM

## 2020-12-02 DIAGNOSIS — M79.605 LEFT LEG PAIN: ICD-10-CM

## 2020-12-02 DIAGNOSIS — U07.1 COVID-19 VIRUS INFECTION: ICD-10-CM

## 2020-12-02 DIAGNOSIS — R20.2 LEFT LEG PARESTHESIAS: ICD-10-CM

## 2020-12-02 PROCEDURE — 93971 EXTREMITY STUDY: CPT

## 2020-12-03 DIAGNOSIS — I10 ESSENTIAL HYPERTENSION: ICD-10-CM

## 2020-12-04 RX ORDER — OMEPRAZOLE 20 MG/1
CAPSULE, DELAYED RELEASE ORAL
Qty: 90 CAPSULE | Refills: 1 | Status: SHIPPED | OUTPATIENT
Start: 2020-12-04 | End: 2020-12-10

## 2020-12-04 RX ORDER — LISINOPRIL AND HYDROCHLOROTHIAZIDE 25; 20 MG/1; MG/1
TABLET ORAL
Qty: 90 TABLET | Refills: 3 | Status: SHIPPED | OUTPATIENT
Start: 2020-12-04 | End: 2020-12-15 | Stop reason: SINTOL

## 2020-12-07 ENCOUNTER — TELEPHONE (OUTPATIENT)
Dept: FAMILY MEDICINE CLINIC | Facility: CLINIC | Age: 75
End: 2020-12-07

## 2020-12-08 ENCOUNTER — OFFICE VISIT (OUTPATIENT)
Dept: FAMILY MEDICINE CLINIC | Facility: CLINIC | Age: 75
End: 2020-12-08

## 2020-12-08 ENCOUNTER — READMISSION MANAGEMENT (OUTPATIENT)
Dept: CALL CENTER | Facility: HOSPITAL | Age: 75
End: 2020-12-08

## 2020-12-08 VITALS
HEIGHT: 67 IN | BODY MASS INDEX: 32.33 KG/M2 | WEIGHT: 206 LBS | HEART RATE: 63 BPM | SYSTOLIC BLOOD PRESSURE: 120 MMHG | OXYGEN SATURATION: 95 % | TEMPERATURE: 97.1 F | DIASTOLIC BLOOD PRESSURE: 70 MMHG

## 2020-12-08 DIAGNOSIS — N30.00 ACUTE CYSTITIS WITHOUT HEMATURIA: Primary | ICD-10-CM

## 2020-12-08 DIAGNOSIS — R53.83 MALAISE AND FATIGUE: ICD-10-CM

## 2020-12-08 DIAGNOSIS — E11.9 TYPE 2 DIABETES MELLITUS WITHOUT COMPLICATION, WITH LONG-TERM CURRENT USE OF INSULIN (HCC): ICD-10-CM

## 2020-12-08 DIAGNOSIS — R53.1 WEAKNESS: ICD-10-CM

## 2020-12-08 DIAGNOSIS — Z86.16 HISTORY OF 2019 NOVEL CORONAVIRUS DISEASE (COVID-19): ICD-10-CM

## 2020-12-08 DIAGNOSIS — F43.21 GRIEF REACTION: ICD-10-CM

## 2020-12-08 DIAGNOSIS — R53.81 MALAISE AND FATIGUE: ICD-10-CM

## 2020-12-08 DIAGNOSIS — F41.8 MIXED ANXIETY DEPRESSIVE DISORDER: ICD-10-CM

## 2020-12-08 DIAGNOSIS — Z79.4 TYPE 2 DIABETES MELLITUS WITHOUT COMPLICATION, WITH LONG-TERM CURRENT USE OF INSULIN (HCC): ICD-10-CM

## 2020-12-08 PROBLEM — E11.65 TYPE 2 DIABETES MELLITUS WITH HYPERGLYCEMIA, WITHOUT LONG-TERM CURRENT USE OF INSULIN: Status: RESOLVED | Noted: 2020-11-16 | Resolved: 2020-12-08

## 2020-12-08 PROBLEM — J96.01 ACUTE RESPIRATORY FAILURE WITH HYPOXIA: Status: RESOLVED | Noted: 2020-11-16 | Resolved: 2020-12-08

## 2020-12-08 PROBLEM — N39.0 ACUTE URINARY TRACT INFECTION: Status: RESOLVED | Noted: 2020-11-16 | Resolved: 2020-12-08

## 2020-12-08 PROCEDURE — 99214 OFFICE O/P EST MOD 30 MIN: CPT | Performed by: FAMILY MEDICINE

## 2020-12-08 PROCEDURE — 96372 THER/PROPH/DIAG INJ SC/IM: CPT | Performed by: FAMILY MEDICINE

## 2020-12-08 RX ORDER — CEFTRIAXONE 1 G/1
1 INJECTION, POWDER, FOR SOLUTION INTRAMUSCULAR; INTRAVENOUS ONCE
Status: COMPLETED | OUTPATIENT
Start: 2020-12-08 | End: 2020-12-08

## 2020-12-08 RX ORDER — ESCITALOPRAM OXALATE 10 MG/1
TABLET ORAL
Qty: 30 TABLET | Refills: 2 | Status: SHIPPED | OUTPATIENT
Start: 2020-12-08 | End: 2021-01-16 | Stop reason: SINTOL

## 2020-12-08 RX ADMIN — CEFTRIAXONE 1 G: 1 INJECTION, POWDER, FOR SOLUTION INTRAMUSCULAR; INTRAVENOUS at 10:08

## 2020-12-08 NOTE — PROGRESS NOTES
"Subjective   Annie Mejia is a 75 y.o. female.     History of Present Illness   Mrs. Mejia presents with several concerns. She already had her hospital f/u visit for COVID dx in November. She cont's to have fatigue, slowly improving dry cough. No fever.     Unfortunately her  passed away due to COVID infection and has been acutely grieving the loss. Not sleeping, not eating, acutely worsening anxiety, abd pain, chest pain, palpitations. She has had family staying with her, family supporting her.     She c/o recurrent dysuria. tx'd for UTI in hospital last month.    She generally feels weak, \"run down\". Otherwise believes she has been taking her meds a rx'd.     She has type 2 DM. Placed on insulin during hospital stay. Not able to tolerate oral meds. Using as rx'd. Morning blood sugar < 130. No hypoglycemia. Concerned about cost of medication.    The following portions of the patient's history were reviewed and updated as appropriate: allergies, current medications, past family history, past medical history, past social history, past surgical history and problem list.    Review of Systems   Constitutional: Positive for appetite change, fatigue and unexpected weight change. Negative for chills and fever.   HENT: Positive for congestion and postnasal drip. Negative for mouth sores, nosebleeds, rhinorrhea and sore throat.    Eyes: Positive for visual disturbance (chronic).   Respiratory: Positive for cough (dry, intermittent). Negative for shortness of breath (with anxiety) and wheezing.    Cardiovascular: Positive for chest pain (with anxiety) and palpitations (with anxiety). Negative for leg swelling.   Gastrointestinal: Positive for abdominal pain and nausea. Negative for blood in stool, diarrhea and vomiting.        Heartburn   Genitourinary: Positive for dysuria and flank pain. Negative for hematuria and urgency.   Musculoskeletal: Positive for arthralgias, back pain, gait problem and myalgias.   Skin: " Negative for rash.   Neurological: Positive for dizziness and weakness. Negative for syncope and headaches.   Hematological: Negative for adenopathy. Bruises/bleeds easily.   Psychiatric/Behavioral: Positive for agitation, decreased concentration, dysphoric mood and sleep disturbance. Negative for confusion. The patient is nervous/anxious.    Pt's previous ROS reviewed and updated as indicated.       Objective    Vitals:    12/08/20 0810   BP: 120/70   Pulse: 63   Temp: 97.1 °F (36.2 °C)   SpO2: 95%     Body mass index is 32.26 kg/m².      12/08/20 0810   Weight: 93.4 kg (206 lb)       Physical Exam  Vitals signs and nursing note reviewed.   Constitutional:       General: She is in acute distress (emotional).      Appearance: She is well-developed and well-groomed. She is obese. She is ill-appearing (mildly).   HENT:      Head: Normocephalic and atraumatic.      Mouth/Throat:      Mouth: Mucous membranes are moist.   Eyes:      General: Lids are normal. No scleral icterus.     Extraocular Movements: Extraocular movements intact.   Cardiovascular:      Rate and Rhythm: Normal rate and regular rhythm.      Pulses: Normal pulses.      Heart sounds: Normal heart sounds.   Pulmonary:      Effort: Pulmonary effort is normal.      Breath sounds: Normal breath sounds.   Abdominal:      General: Bowel sounds are normal.      Palpations: Abdomen is soft.      Tenderness: There is generalized abdominal tenderness (mild). There is no right CVA tenderness, left CVA tenderness, guarding or rebound.   Musculoskeletal:      Right lower leg: No edema.      Left lower leg: No edema.   Skin:     General: Skin is warm and dry.      Coloration: Skin is pale (mild). Skin is not jaundiced.      Findings: No rash.   Neurological:      Mental Status: She is alert and oriented to person, place, and time. Mental status is at baseline.      Motor: Motor function is intact.      Gait: Gait abnormal (antalgic).   Psychiatric:         Mood and  Affect: Mood is anxious and depressed.         Speech: Speech normal.         Behavior: Behavior normal. Behavior is cooperative.         Thought Content: Thought content normal. Thought content is not paranoid. Thought content does not include suicidal ideation.         Cognition and Memory: Cognition normal.         Us Venous Doppler Lower Extremity Left (duplex)  Result Date: 12/2/2020  No evidence of deep venous thrombosis.  This report was finalized on 12/2/2020 10:50 AM by Devorah Palomino M.D..    Assessment/Plan   Diagnoses and all orders for this visit:    1. Acute cystitis without hematuria (Primary)  -     cefTRIAXone (ROCEPHIN) injection 1 g  -     CBC (No Diff)    2. Malaise and fatigue  -     CBC (No Diff)  -     Comprehensive Metabolic Panel  -     TSH Rfx On Abnormal To Free T4    3. Weakness  -     CBC (No Diff)  -     Comprehensive Metabolic Panel  -     TSH Rfx On Abnormal To Free T4    4. Grief reaction    5. Type 2 diabetes mellitus without complication, with long-term current use of insulin (CMS/[facility])  -     Comprehensive Metabolic Panel  -     TSH Rfx On Abnormal To Free T4  -     Hemoglobin A1c    6. Mixed anxiety depressive disorder  -     escitalopram (LEXAPRO) 10 MG tablet; 1/2 po daily x 7 days, then increase to 1 po daily  Dispense: 30 tablet; Refill: 2    7. History of 2019 novel coronavirus disease (COVID-19)       Empiric coverage of possible UTI (she is not able to provide sample today) with POC rocephin as above. She would not likely tolerate oral abx at this time.    Lab eval as above.    She declines referral for counseling services at this time.    IDDM with improving control- a1c as above.    I have reviewed risks/benefits and potential side effects of various treatment options for anx/dep. Pt voices understanding and wishes to proceed with lexapro. She will continue lowest dose alprazolam as needed.    F/u as scheduled in 3-4 weeks, sooner as needed/instructed.  I will contact  patient regarding test results and provide instructions regarding any necessary changes in plan of care.  Patient was encouraged to keep me informed of any acute changes, lack of improvement, or any new concerning symptoms.  Pt is aware of reasons to seek emergent care.  Patient voiced understanding of all instructions and denied further questions.

## 2020-12-08 NOTE — OUTREACH NOTE
"COVID-19 Week 3 Survey      Responses   [facility] patient discharged from?  [facility]   Does the patient have one of the following disease processes/diagnoses(primary or secondary)?  COVID-19   COVID-19 underlying condition?  None   Call Number  Call 1   COVID-19 Week 3: Call 1 attempt successful?  Yes   Call start time  1006   Call end time  1014   General alerts for this patient  Pt's   of COVID in his sleep in ICU 2020 she reports   Discharge diagnosis  Covid 19 virus   Is the patient taking all medications as directed (includes completed medication regime)?  Yes   Medication comments  Pt may talk with her PCP about antidepressants and appetite enhancers as she states alexi has no appetite   Has the patient kept scheduled appointments due by today?  Yes   Home health comments  HH coming in   Psychosocial issues?  Yes   Psychosocial comments  Pt asking for grief counseling and PCP per [facility] is working on this   Comments  Pt is tearful and emotionally upset at the loss of her  today. Her family is with her. Pt has 4 children and DIL who are very supportive.   What is the patient's perception of their health status since discharge?  Improving   Does the patient have any of the following symptoms?  Cough   Nursing Interventions  Nurse provided patient education   Pulse Ox monitoring  Intermittent   Pulse Ox device source  Patient   O2 Sat comments  \"running good\"   Is the patient/caregiver able to teach back steps to recovery at home?  Rest and rebuild strength, gradually increase activity, Eat a well-balance diet   Is the patient/caregiver able to teach back the hierarchy of who to call/visit for symptoms/problems? PCP, Specialist, Home health nurse, Urgent Care, ED, 911  Yes   COVID-19 call completed?  Yes          Zora Moran RN  "

## 2020-12-09 LAB
ALBUMIN SERPL-MCNC: 3.6 G/DL (ref 3.5–5.2)
ALBUMIN/GLOB SERPL: 1.2 G/DL
ALP SERPL-CCNC: 112 U/L (ref 39–117)
ALT SERPL-CCNC: 20 U/L (ref 1–33)
AST SERPL-CCNC: 20 U/L (ref 1–32)
BILIRUB SERPL-MCNC: 0.4 MG/DL (ref 0–1.2)
BUN SERPL-MCNC: 15 MG/DL (ref 8–23)
BUN/CREAT SERPL: 14.7 (ref 7–25)
CALCIUM SERPL-MCNC: 9.6 MG/DL (ref 8.6–10.5)
CHLORIDE SERPL-SCNC: 89 MMOL/L (ref 98–107)
CO2 SERPL-SCNC: 23.3 MMOL/L (ref 22–29)
CREAT SERPL-MCNC: 1.02 MG/DL (ref 0.57–1)
ERYTHROCYTE [DISTWIDTH] IN BLOOD BY AUTOMATED COUNT: 14.5 % (ref 12.3–15.4)
GLOBULIN SER CALC-MCNC: 3.1 GM/DL
GLUCOSE SERPL-MCNC: 159 MG/DL (ref 65–99)
HBA1C MFR BLD: 7.4 % (ref 4.8–5.6)
HCT VFR BLD AUTO: 36.1 % (ref 34–46.6)
HGB BLD-MCNC: 12.3 G/DL (ref 12–15.9)
MCH RBC QN AUTO: 31.4 PG (ref 26.6–33)
MCHC RBC AUTO-ENTMCNC: 34.1 G/DL (ref 31.5–35.7)
MCV RBC AUTO: 92.1 FL (ref 79–97)
PLATELET # BLD AUTO: 190 10*3/MM3 (ref 140–450)
POTASSIUM SERPL-SCNC: 3.8 MMOL/L (ref 3.5–5.2)
PROT SERPL-MCNC: 6.7 G/DL (ref 6–8.5)
RBC # BLD AUTO: 3.92 10*6/MM3 (ref 3.77–5.28)
SODIUM SERPL-SCNC: 127 MMOL/L (ref 136–145)
TSH SERPL DL<=0.005 MIU/L-ACNC: 2.26 UIU/ML (ref 0.27–4.2)
WBC # BLD AUTO: 5.2 10*3/MM3 (ref 3.4–10.8)

## 2020-12-10 ENCOUNTER — RESULTS ENCOUNTER (OUTPATIENT)
Dept: FAMILY MEDICINE CLINIC | Facility: CLINIC | Age: 75
End: 2020-12-10

## 2020-12-10 DIAGNOSIS — E87.1 HYPONATREMIA: Primary | ICD-10-CM

## 2020-12-10 DIAGNOSIS — E87.1 HYPONATREMIA: ICD-10-CM

## 2020-12-10 RX ORDER — DEXLANSOPRAZOLE 60 MG/1
CAPSULE, DELAYED RELEASE ORAL
Qty: 30 CAPSULE | Refills: 2 | Status: SHIPPED | OUTPATIENT
Start: 2020-12-10 | End: 2021-02-08

## 2020-12-10 NOTE — PROGRESS NOTES
Please inform pt recent labs are fine other than her sodium which is quite low. Needs recheck to confirm. Order in chart.

## 2020-12-11 ENCOUNTER — LAB (OUTPATIENT)
Dept: FAMILY MEDICINE CLINIC | Facility: CLINIC | Age: 75
End: 2020-12-11

## 2020-12-11 ENCOUNTER — TELEPHONE (OUTPATIENT)
Dept: FAMILY MEDICINE CLINIC | Facility: CLINIC | Age: 75
End: 2020-12-11

## 2020-12-11 NOTE — TELEPHONE ENCOUNTER
PATIENT HAD A NON INJURY FALL IN THE BATHROOM TODAY.    PATIENT HAD GOTTEN DIZZY WHILE GOING TO BATHROOM AND FELL.    PATIENT RECENTLY LOST  TO COVID AND HAS NOT HAD AN APPETITE.  PATIENT IS REQUESTING SOMETHING THAT COULD STIMULATE HER APPETITE.    PLEASE ADVISE    HOME HEALTH NURSE CALL BACK NUMBER IS   [phone]

## 2020-12-12 LAB
BUN SERPL-MCNC: 19 MG/DL (ref 8–23)
BUN/CREAT SERPL: 16.7 (ref 7–25)
CALCIUM SERPL-MCNC: 9.9 MG/DL (ref 8.6–10.5)
CHLORIDE SERPL-SCNC: 88 MMOL/L (ref 98–107)
CO2 SERPL-SCNC: 23.2 MMOL/L (ref 22–29)
CREAT SERPL-MCNC: 1.14 MG/DL (ref 0.57–1)
CREAT UR-MCNC: 149.5 MG/DL
GLUCOSE SERPL-MCNC: 130 MG/DL (ref 65–99)
POTASSIUM SERPL-SCNC: 3.9 MMOL/L (ref 3.5–5.2)
SODIUM 24H UR-SCNC: 24 MMOL/L
SODIUM SERPL-SCNC: 125 MMOL/L (ref 136–145)

## 2020-12-14 ENCOUNTER — TELEPHONE (OUTPATIENT)
Dept: FAMILY MEDICINE CLINIC | Facility: CLINIC | Age: 75
End: 2020-12-14

## 2020-12-14 ENCOUNTER — TELEPHONE (OUTPATIENT)
Dept: GASTROENTEROLOGY | Facility: CLINIC | Age: 75
End: 2020-12-14

## 2020-12-14 NOTE — TELEPHONE ENCOUNTER
Which medicine is hurting her stomach? The lexapro she recently started?    She can cut the dose of insulin in 1/2. Keep monitoring blood sugar

## 2020-12-14 NOTE — TELEPHONE ENCOUNTER
Received a voicemail from Zoe at [facility] regarding Ms. Elidia Will not being covered.  I will check and see if it needs a pa or if it just isn't covered and let Ms. Mejia know.

## 2020-12-14 NOTE — TELEPHONE ENCOUNTER
There is nothing I can give her to stimulate appetite. I recommend she stay hydrated and also use meal replacement shakes if she does not feel like eating.     She may benefit from remeron as this can help sleep, appetite and depressed mood. Is she willing to try it?

## 2020-12-14 NOTE — TELEPHONE ENCOUNTER
Patient states that she is taking 10 units of Levimir.  Her blood sugar is 91 today.  She is only eating about two to three bites a day because the medicine is hurting her stomach.  For the last week, her blood sugar has been  109-119  Does she still need to take that much medicine or can it be decreased?  She can be reached at [phone]

## 2020-12-15 ENCOUNTER — READMISSION MANAGEMENT (OUTPATIENT)
Dept: CALL CENTER | Facility: HOSPITAL | Age: 75
End: 2020-12-15

## 2020-12-15 DIAGNOSIS — E11.9 DIABETES MELLITUS, STABLE (HCC): ICD-10-CM

## 2020-12-15 RX ORDER — BLOOD-GLUCOSE METER
KIT MISCELLANEOUS
Qty: 100 EACH | Refills: 12 | Status: SHIPPED | OUTPATIENT
Start: 2020-12-15 | End: 2022-07-13 | Stop reason: SDUPTHER

## 2020-12-15 RX ORDER — LISINOPRIL 20 MG/1
20 TABLET ORAL DAILY
Qty: 90 TABLET | Refills: 3 | Status: SHIPPED | OUTPATIENT
Start: 2020-12-15 | End: 2021-01-22 | Stop reason: HOSPADM

## 2020-12-15 RX ORDER — LANCETS 28 GAUGE
EACH MISCELLANEOUS
Qty: 100 EACH | Refills: 5 | Status: SHIPPED | OUTPATIENT
Start: 2020-12-15 | End: 2022-07-13 | Stop reason: SDUPTHER

## 2020-12-15 NOTE — TELEPHONE ENCOUNTER
Patient notified that she can take 1/2 her insulin, but to be sure to monitor her blood sugar and report it to us.  She said her stomach issues started shortly after starting lexapro.  She said she gets very nauseated after eating even a small amount

## 2020-12-15 NOTE — PROGRESS NOTES
Please inform pt her low sodium is most likely due to the diuretic portion of her blood pressure medicine. She is currently on zestoretic which contains HCTZ. She needs to d/c and start plain lisinopril. It will take a while for her sodium to correct.

## 2020-12-15 NOTE — OUTREACH NOTE
COVID-19 Week 4 Survey      Responses   [facility] patient discharged from?  [facility]   Does the patient have one of the following disease processes/diagnoses(primary or secondary)?  COVID-19   COVID-19 underlying condition?  None   Call Number  Call 1   COVID-19 Week 4: Call 1 attempt successful?  Yes   Call start time  0839   Call end time  0846   Discharge diagnosis  Covid 19 virus   Meds reviewed with patient/caregiver?  Yes   Is the patient having any side effects they believe may be caused by any medication additions or changes?  No   Does the patient have all medications ordered at discharge?  Yes   Is the patient taking all medications as directed (includes completed medication regime)?  Yes   Medication comments  States she has been nauseated and has been unable to eat. Advised to take her Zofran   Has the patient kept scheduled appointments due by today?  Yes   Is the patient still receiving Home Health Services?  Yes   Home health comments  HH coming in   DME comments  Still using a walker   What is the patient's perception of their health status since discharge?  Same [states because she can't eat and is having diarrhea]   Does the patient have any of the following symptoms?  None   Nursing Interventions  Nurse provided patient education   Pulse Ox monitoring  Intermittent   Pulse Ox device source  Patient   O2 Sat comments  95% this morning   O2 Sat: education provided  Monitoring frequency, When to seek care, Sat levels   O2 Sat education comments  drops below 90% and stays call EMS   Is the patient/caregiver able to teach back steps to recovery at home?  Set small, achievable goals for return to baseline health, Rest and rebuild strength, gradually increase activity, Eat a well-balance diet   If the patient is a current smoker, are they able to teach back resources for cessation?  Not a smoker   Is the patient/caregiver able to teach back the hierarchy of who to call/visit for symptoms/problems?  PCP, Specialist, Home health nurse, Urgent Care, ED, 911  Yes   Is the patient interested in additional calls from an ambulatory ?  NOTE:  applies to high risk patients requiring additional follow-up.  No   Did the patient feel the follow up calls were helpful during their recovery period?  Yes   Was the number of calls appropriate?  Yes   Interested in COVID-19 Plasma Donation?  No   Does the patient have an Advance Directive or Living Will?  Yes   Is the patient/caregiver familiar with Advance Care Planning?  Yes   Would the patient like more information on Advance Care Planning?  No          Annie Chopra RN

## 2020-12-18 ENCOUNTER — TELEPHONE (OUTPATIENT)
Dept: FAMILY MEDICINE CLINIC | Facility: CLINIC | Age: 75
End: 2020-12-18

## 2020-12-18 NOTE — TELEPHONE ENCOUNTER
Zoe from [facility] states that her feet are burning in the morning.  She is diabetic and her blood sugars have been in the 70-90 range.  She is not  her insulin because she is not eating and still  grieving from the loss of her .  She can be reached at [phone]

## 2021-01-04 ENCOUNTER — OFFICE VISIT (OUTPATIENT)
Dept: FAMILY MEDICINE CLINIC | Facility: CLINIC | Age: 76
End: 2021-01-04

## 2021-01-04 VITALS
RESPIRATION RATE: 18 BRPM | SYSTOLIC BLOOD PRESSURE: 138 MMHG | TEMPERATURE: 97.1 F | DIASTOLIC BLOOD PRESSURE: 82 MMHG | WEIGHT: 201 LBS | HEIGHT: 67 IN | BODY MASS INDEX: 31.55 KG/M2 | OXYGEN SATURATION: 98 % | HEART RATE: 112 BPM

## 2021-01-04 DIAGNOSIS — N28.9 RENAL INSUFFICIENCY: ICD-10-CM

## 2021-01-04 DIAGNOSIS — F43.21 COMPLICATED GRIEF: Primary | ICD-10-CM

## 2021-01-04 DIAGNOSIS — K21.00 GASTROESOPHAGEAL REFLUX DISEASE WITH ESOPHAGITIS WITHOUT HEMORRHAGE: ICD-10-CM

## 2021-01-04 DIAGNOSIS — I10 ESSENTIAL HYPERTENSION: ICD-10-CM

## 2021-01-04 DIAGNOSIS — E87.1 HYPONATREMIA: ICD-10-CM

## 2021-01-04 DIAGNOSIS — Z79.4 TYPE 2 DIABETES MELLITUS WITHOUT COMPLICATION, WITH LONG-TERM CURRENT USE OF INSULIN (HCC): ICD-10-CM

## 2021-01-04 DIAGNOSIS — E11.9 TYPE 2 DIABETES MELLITUS WITHOUT COMPLICATION, WITH LONG-TERM CURRENT USE OF INSULIN (HCC): ICD-10-CM

## 2021-01-04 DIAGNOSIS — F41.8 MIXED ANXIETY DEPRESSIVE DISORDER: ICD-10-CM

## 2021-01-04 PROCEDURE — 99214 OFFICE O/P EST MOD 30 MIN: CPT | Performed by: FAMILY MEDICINE

## 2021-01-04 NOTE — PROGRESS NOTES
Subjective   Annie Mejia is a 75 y.o. female.     History of Present Illness   Mrs. Mejia presents today for routine f/u on several chronic med problems.     She has chronic pain due to diffuse OA, DDD. No recent exacerbations in pain. no injury. Using opioid analgesic very intermittently. followed by pain mgnt.  having persistent leg numbness, no weakness. Has f/u with specialist this month.     She has previously insulin dep type 2 DM. Recently dc'd insulin due to BG being so well controlled. Not able to tolerate oral meds due to nausea. BG never over 200. A1c 7.4 last visit.  Not following diabetic diet. No regular exercise as back pain worst with standing, walking.     She has comorbid HTN, HLP, PAD. Taking meds as rx'd including ASA, statin, ACE-inh. Denies CP, SOA, increased swelling. No claudication. Stopped HCTZ as directed. Now on lisinopril alone as HCTZ was causing hyponatremia.     Has renal insufficiency which was found to be worsened at her last visit. Due for recheck.  Limiting NSAIDs.     She has GERD with previous esophagitis. Symptoms with varying control. Taking omeprazole 20 mg once daily but frequently taking pepcid or 2nd does of PPI qhs. using carafate as needed. No dysphagia, weight loss, blood in stool. Recently generally stable. Decreased appetite due to grieving loss of her  this fall.     She has anxiety with depression for which she is currently on very low dose xanax. Did start lexapro as rx'd at her last visit. Denies worsening depression, no SI. Denies side effects from meds. does continue to grieve loss of her . Not eating well, poor sleep, not able to function without help from daughters. Willing to see counselor.     Pt's previous HPI reviewed and updated as indicated.     The following portions of the patient's history were reviewed and updated as appropriate: allergies, current medications, past family history, past medical history, past social history, past  surgical history and problem list.    Review of Systems   Constitutional: Positive for appetite change, fatigue and unexpected weight change. Negative for chills and fever.   HENT: Positive for congestion and postnasal drip. Negative for mouth sores, nosebleeds, rhinorrhea and sore throat.    Eyes: Positive for visual disturbance (chronic).   Respiratory: Positive for cough (dry, intermittent) and shortness of breath (with anxiety). Negative for wheezing.    Cardiovascular: Positive for chest pain (with anxiety) and palpitations (with anxiety). Negative for leg swelling.   Gastrointestinal: Positive for abdominal pain and nausea. Negative for blood in stool, diarrhea and vomiting.        Heartburn   Genitourinary: Negative for dysuria, hematuria and urgency.   Musculoskeletal: Positive for arthralgias, back pain, gait problem and myalgias.   Skin: Negative for rash and wound.   Neurological: Positive for dizziness and weakness. Negative for syncope and headaches.   Hematological: Negative for adenopathy. Bruises/bleeds easily.   Psychiatric/Behavioral: Positive for agitation, decreased concentration, dysphoric mood and sleep disturbance. Negative for confusion. The patient is nervous/anxious.    Pt's previous ROS reviewed and updated as indicated.     Objective    Vitals:    01/04/21 0927   BP: 138/82   Pulse: 112   Resp: 18   Temp: 97.1 °F (36.2 °C)   SpO2: 98%     Body mass index is 31.47 kg/m².      01/04/21 0927   Weight: 91.2 kg (201 lb)         Physical Exam  Vitals signs and nursing note reviewed.   Constitutional:       General: She is not in acute distress.     Appearance: She is well-developed and well-groomed. She is obese. She is not ill-appearing.   HENT:      Head: Normocephalic and atraumatic.   Eyes:      General: No scleral icterus.     Extraocular Movements: Extraocular movements intact.      Conjunctiva/sclera: Conjunctivae normal.   Cardiovascular:      Rate and Rhythm: Regular rhythm.  Tachycardia present.      Pulses: Normal pulses.      Heart sounds: Normal heart sounds.      Comments: HR decreased to 80s with deep breathing  Pulmonary:      Effort: Pulmonary effort is normal.      Breath sounds: Examination of the right-lower field reveals decreased breath sounds. Examination of the left-lower field reveals decreased breath sounds. Decreased breath sounds present. No wheezing, rhonchi or rales.   Musculoskeletal:      Right lower leg: No edema.      Left lower leg: No edema.   Skin:     General: Skin is warm and dry.      Coloration: Skin is not jaundiced or pale.      Findings: No rash.   Neurological:      Mental Status: She is alert and oriented to person, place, and time.      Motor: Motor function is intact.      Gait: Gait abnormal (antalgic).   Psychiatric:         Mood and Affect: Mood is anxious and depressed.         Speech: Speech normal.         Behavior: Behavior normal. Behavior is cooperative.         Thought Content: Thought content normal. Thought content is not paranoid. Thought content does not include suicidal ideation.         Cognition and Memory: Cognition normal.       Lab Results   Component Value Date    WBC 5.20 12/08/2020    HGB 12.3 12/08/2020    HCT 36.1 12/08/2020    MCV 92.1 12/08/2020     12/08/2020       Lab Results   Component Value Date    GLUCOSE 126 (H) 11/21/2020    BUN 19 12/11/2020    CREATININE 1.14 (H) 12/11/2020    EGFRIFNONA 46 (L) 12/11/2020    EGFRIFAFRI 56 (L) 12/11/2020    BCR 16.7 12/11/2020    K 3.9 12/11/2020    CO2 23.2 12/11/2020    CALCIUM 9.9 12/11/2020    PROTENTOTREF 6.7 12/08/2020    ALBUMIN 3.60 12/08/2020    LABIL2 1.2 12/08/2020    AST 20 12/08/2020    ALT 20 12/08/2020       Lab Results   Component Value Date    CHLPL 169 09/29/2020    TRIG 144 09/29/2020    HDL 66 (H) 09/29/2020    LDL 74 09/29/2020       Lab Results   Component Value Date    HGBA1C 7.40 (H) 12/08/2020       Lab Results   Component Value Date    TSH 2.260  12/08/2020       Assessment/Plan   Diagnoses and all orders for this visit:    1. Complicated grief (Primary)  -     Ambulatory Referral to Behavioral Health    2. Type 2 diabetes mellitus without complication, with long-term current use of insulin (CMS/[facility], located [facility])    3. Mixed anxiety depressive disorder    4. Essential hypertension    5. Gastroesophageal reflux disease with esophagitis without hemorrhage    6. Renal insufficiency    7. Hyponatremia       Previous IDDM, now NIDDM controlled. Continue close monitoring. Encouraged diabetic diet, daily walking with fall precautions.    Refer for counseling due to severe chronic anxiety and now complicated grief, dysphoric mood. Does feel lexapro is helping.    HTN controlled. Continue lisinopril. No HCTZ as this caused hyponatremia.    Recheck A1c, sodium at next visit.    Stable kidney function. Avoid NSAIDs.    GERD stable/improved.    Routine f/u in 2 months, sooner as needed.  Patient was encouraged to keep me informed of any acute changes, lack of improvement, or any new concerning symptoms.  Pt is aware of reasons to seek emergent care.  Patient voiced understanding of all instructions and denied further questions.

## 2021-01-15 ENCOUNTER — OFFICE VISIT (OUTPATIENT)
Dept: FAMILY MEDICINE CLINIC | Facility: CLINIC | Age: 76
End: 2021-01-15

## 2021-01-15 VITALS
HEIGHT: 67 IN | TEMPERATURE: 97.3 F | SYSTOLIC BLOOD PRESSURE: 130 MMHG | WEIGHT: 200 LBS | HEART RATE: 102 BPM | DIASTOLIC BLOOD PRESSURE: 84 MMHG | BODY MASS INDEX: 31.39 KG/M2 | OXYGEN SATURATION: 97 %

## 2021-01-15 DIAGNOSIS — F41.1 GENERALIZED ANXIETY DISORDER: ICD-10-CM

## 2021-01-15 DIAGNOSIS — G25.0 ESSENTIAL TREMOR: ICD-10-CM

## 2021-01-15 DIAGNOSIS — F43.21 GRIEVING: Primary | ICD-10-CM

## 2021-01-15 PROCEDURE — 99213 OFFICE O/P EST LOW 20 MIN: CPT | Performed by: FAMILY MEDICINE

## 2021-01-15 RX ORDER — PROPRANOLOL HYDROCHLORIDE 10 MG/1
10 TABLET ORAL 2 TIMES DAILY
Qty: 180 TABLET | Refills: 1 | Status: SHIPPED | OUTPATIENT
Start: 2021-01-15 | End: 2021-02-08

## 2021-01-15 NOTE — PROGRESS NOTES
Established Patient        Chief Complaint:   Chief Complaint   Patient presents with   • Tremors     patient c/o bilateral hands shaking; she had to dc Lexapro due to diarrhea; she requested trying Propanolol (it has been written in the past but not refilled) also c/o skin lesion on right nostril        Annie Mejia is a 75 y.o. female    History of Present Illness:   Here today for evaluation of continued tremulousness to the upper extremities.  Patient has a significant history of recent death of her spouse of greater than 30 years, he  several months ago as a complication of Covid.  She reports continued grieving, although she denies any SI/HI.  Patient reports longstanding history of generalized anxiety disorder, it was certainly worsened, with associated depression, as a result of spousal death.  She gives a history of longstanding tremulousness additionally, nearly always associated with activity, not problematic during rest.  It has affected her ability to drink coffee, as well as eat at times.  It is also impaired her activities of daily living in various ways.  She does have another sibling who had similar symptoms.    Patient was treated with Escitalopram in the past, was unable to tolerate secondary to diarrhea.  She does have listed on her med list propanolol, although patient states she has not been on that at least since last May, of 2020, and she is unsure as to if she actually ever took the medication at that time.  She denies any syncope, vertigo or palpitations.  No chest pain or dyspnea on exertion.  Reports no aspiration or dysphagia.  Reports no falls or injuries.    Subjective     The following portions of the patient's history were reviewed and updated as appropriate: allergies, current medications, past family history, past medical history, past social history, past surgical history and problem list.    Allergies   Allergen Reactions   • Oxycodone-Acetaminophen Nausea And Vomiting  "and Shortness Of Breath   • Azithromycin Nausea And Vomiting   • Erythrityl Tetranitrate Nausea And Vomiting   • Morphine Itching   • Morphine And Related Unknown - Low Severity       Review of Systems  1. Constitutional: Negative for fever. Negative for chills, diaphoresis, fatigue and unexpected weight change.   2. HENT: No dysphagia; no changes to vision/hearing/smell/taste; no epistaxis  3. Eyes: Negative for redness and visual disturbance.   4. Respiratory: negative for shortness of breath. Negative for chest pain . Negative for cough and chest tightness.   5. Cardiovascular: Negative for chest pain and palpitations.   6. Gastrointestinal: Negative for abdominal distention, abdominal pain and blood in stool.   7. Endocrine: Negative for cold intolerance and heat intolerance.   8. Genitourinary: Negative for difficulty urinating, dysuria and frequency.   9. Musculoskeletal: Negative for arthralgias, back pain and myalgias.   10. Skin: Negative for color change, rash and wound.   11. Neurological: Negative for syncope, weakness and headaches.  Tremulousness as per above.  12. Hematological: Negative for adenopathy. Does not bruise/bleed easily.   13. Psychiatric/Behavioral: Negative for confusion. The patient is not nervous/anxious.    Objective     Physical Exam   Vital Signs: /84   Pulse 102   Temp 97.3 °F (36.3 °C)   Ht 170.2 cm (67\")   Wt 90.7 kg (200 lb)   SpO2 97%   BMI 31.32 kg/m²     General Appearance: alert, oriented x 3, no acute distress.  Skin: warm and dry.   HEENT: Atraumatic.  pupils round and reactive to light and accommodation, oral mucosa pink and moist.  Nares patent without epistaxis.  External auditory canals are patent tympanic membranes intact.  Neck: supple, no JVD, trachea midline.  No thyromegaly  Lungs: CTA, unlabored breathing effort.  Heart: RRR, normal S1 and S2, no S3, no rub.  Abdomen: soft, non-tender, no palpable bladder, present bowel sounds to auscultation ×4.  No " guarding or rigidity.  Extremities: no clubbing, cyanosis or edema.  Good range of motion actively and passively.  Symmetric muscle strength and development.  Poor core strength and stability, but is able to ambulate independently and rise from a seated position.  Neuro: normal speech and mental status.  Cranial nerves II through XII intact.  No anosmia. DTR 2+; proprioception intact.  No focal motor/sensory deficits.  Attention tremor.    Advance Care Planning   ACP discussion was held with the patient during this visit. Patient does not have an advance directive, information provided.      Assessment and Plan      Assessment:   Diagnoses and all orders for this visit:    1. Grieving (Primary)    2. Essential tremor  -     propranolol (INDERAL) 10 MG tablet; Take 1 tablet by mouth 2 (Two) Times a Day.  Dispense: 180 tablet; Refill: 1    3. Generalized anxiety disorder  -     propranolol (INDERAL) 10 MG tablet; Take 1 tablet by mouth 2 (Two) Times a Day.  Dispense: 180 tablet; Refill: 1        Plan:  I see no other findings of parkinsonian syndrome.  Her tremor does appear to be attention/essential in nature.  I recommended starting low-dose propanolol, initially 10 mg dosing twice daily.  Should she develop any ill effects to the medication she is asked to contact the office or return to clinic sooner than the scheduled follow-up visit for potential change to her treatment regimen.  Titration of dosing can be used to maximize therapeutic benefit.    Vital signs stable, however patient reports longstanding history of elevated heart rate.  The addition of propanolol should be of benefit to this additionally.    I have asked that she be scheduled in follow-up with her normal primary care provider, if she is unavailable then patient can be seen by me in follow-up.  Patient will need Medicare wellness visit completed, this could be potentially done at her next visit.      Discussion Summary:  I spent 20 minutes caring  for Annie on this date of service. This time includes time spent by me in the following activities:preparing for the visit, obtaining and/or reviewing a separately obtained history, performing a medically appropriate examination and/or evaluation , counseling and educating the patient/family/caregiver, ordering medications, tests, or procedures, documenting information in the medical record and care coordination     Discussed plan of care in detail with pt today; pt verb understanding and agrees.  Follow up:  No follow-ups on file.     There are no Patient Instructions on file for this visit.    Kwame Warner,   01/15/21  12:16 EST          Please note that portions of this note may have been completed with a voice recognition program. Efforts were made to edit the dictations, but occasionally words are mistranscribed.

## 2021-01-17 ENCOUNTER — APPOINTMENT (OUTPATIENT)
Dept: CT IMAGING | Facility: HOSPITAL | Age: 76
End: 2021-01-17

## 2021-01-17 ENCOUNTER — HOSPITAL ENCOUNTER (EMERGENCY)
Facility: HOSPITAL | Age: 76
Discharge: ANOTHER HOSPITAL | End: 2021-01-17
Attending: EMERGENCY MEDICINE | Admitting: EMERGENCY MEDICINE

## 2021-01-17 ENCOUNTER — HOSPITAL ENCOUNTER (INPATIENT)
Facility: HOSPITAL | Age: 76
LOS: 5 days | Discharge: NURSING, REHAB OR LONG-TERM CARE | End: 2021-01-22
Attending: INTERNAL MEDICINE | Admitting: INTERNAL MEDICINE

## 2021-01-17 ENCOUNTER — APPOINTMENT (OUTPATIENT)
Dept: GENERAL RADIOLOGY | Facility: HOSPITAL | Age: 76
End: 2021-01-17

## 2021-01-17 VITALS
WEIGHT: 200 LBS | HEART RATE: 98 BPM | RESPIRATION RATE: 20 BRPM | DIASTOLIC BLOOD PRESSURE: 86 MMHG | TEMPERATURE: 97.8 F | OXYGEN SATURATION: 98 % | HEIGHT: 63 IN | SYSTOLIC BLOOD PRESSURE: 178 MMHG | BODY MASS INDEX: 35.44 KG/M2

## 2021-01-17 DIAGNOSIS — I63.9 ACUTE CVA (CEREBROVASCULAR ACCIDENT) (HCC): Primary | ICD-10-CM

## 2021-01-17 DIAGNOSIS — Z74.09 IMPAIRED FUNCTIONAL MOBILITY, BALANCE, GAIT, AND ENDURANCE: ICD-10-CM

## 2021-01-17 DIAGNOSIS — I63.9 ISCHEMIC CEREBROVASCULAR ACCIDENT (CVA) (HCC): ICD-10-CM

## 2021-01-17 DIAGNOSIS — R47.01 APHASIA: ICD-10-CM

## 2021-01-17 DIAGNOSIS — R47.01 APHASIA: Primary | ICD-10-CM

## 2021-01-17 LAB
ABO GROUP BLD: NORMAL
ABO GROUP BLD: NORMAL
ALBUMIN SERPL-MCNC: 3.7 G/DL (ref 3.5–5.2)
ALBUMIN/GLOB SERPL: 1.4 G/DL
ALP SERPL-CCNC: 65 U/L (ref 39–117)
ALT SERPL W P-5'-P-CCNC: 11 U/L (ref 1–33)
AMPHET+METHAMPHET UR QL: NEGATIVE
AMPHETAMINES UR QL: NEGATIVE
ANION GAP SERPL CALCULATED.3IONS-SCNC: 9.6 MMOL/L (ref 5–15)
APTT PPP: 25.3 SECONDS (ref 24.5–37.2)
AST SERPL-CCNC: 14 U/L (ref 1–32)
BACTERIA UR QL AUTO: ABNORMAL /HPF
BARBITURATES UR QL SCN: NEGATIVE
BASOPHILS # BLD AUTO: 0.02 10*3/MM3 (ref 0–0.2)
BASOPHILS NFR BLD AUTO: 0.5 % (ref 0–1.5)
BENZODIAZ UR QL SCN: NEGATIVE
BILIRUB SERPL-MCNC: 0.5 MG/DL (ref 0–1.2)
BILIRUB UR QL STRIP: NEGATIVE
BLD GP AB SCN SERPL QL: NEGATIVE
BUN SERPL-MCNC: 9 MG/DL (ref 8–23)
BUN/CREAT SERPL: 10.2 (ref 7–25)
BUPRENORPHINE SERPL-MCNC: NEGATIVE NG/ML
CALCIUM SPEC-SCNC: 9 MG/DL (ref 8.6–10.5)
CANNABINOIDS SERPL QL: NEGATIVE
CHLORIDE SERPL-SCNC: 106 MMOL/L (ref 98–107)
CLARITY UR: CLEAR
CO2 SERPL-SCNC: 25.4 MMOL/L (ref 22–29)
COCAINE UR QL: NEGATIVE
COLOR UR: YELLOW
CREAT SERPL-MCNC: 0.88 MG/DL (ref 0.57–1)
DEPRECATED RDW RBC AUTO: 51.8 FL (ref 37–54)
EOSINOPHIL # BLD AUTO: 0.04 10*3/MM3 (ref 0–0.4)
EOSINOPHIL NFR BLD AUTO: 0.9 % (ref 0.3–6.2)
ERYTHROCYTE [DISTWIDTH] IN BLOOD BY AUTOMATED COUNT: 14.5 % (ref 12.3–15.4)
GFR SERPL CREATININE-BSD FRML MDRD: 62 ML/MIN/1.73
GLOBULIN UR ELPH-MCNC: 2.7 GM/DL
GLUCOSE BLDC GLUCOMTR-MCNC: 137 MG/DL (ref 70–130)
GLUCOSE BLDC GLUCOMTR-MCNC: 196 MG/DL (ref 70–130)
GLUCOSE SERPL-MCNC: 130 MG/DL (ref 65–99)
GLUCOSE UR STRIP-MCNC: NEGATIVE MG/DL
HCT VFR BLD AUTO: 34.5 % (ref 34–46.6)
HGB BLD-MCNC: 10.8 G/DL (ref 12–15.9)
HGB UR QL STRIP.AUTO: NEGATIVE
HOLD SPECIMEN: NORMAL
HOLD SPECIMEN: NORMAL
HYALINE CASTS UR QL AUTO: ABNORMAL /LPF
IMM GRANULOCYTES # BLD AUTO: 0 10*3/MM3 (ref 0–0.05)
IMM GRANULOCYTES NFR BLD AUTO: 0 % (ref 0–0.5)
INR PPP: 0.96 (ref 0.9–1.1)
KETONES UR QL STRIP: NEGATIVE
LEUKOCYTE ESTERASE UR QL STRIP.AUTO: ABNORMAL
LYMPHOCYTES # BLD AUTO: 2.33 10*3/MM3 (ref 0.7–3.1)
LYMPHOCYTES NFR BLD AUTO: 54.7 % (ref 19.6–45.3)
MCH RBC QN AUTO: 30.3 PG (ref 26.6–33)
MCHC RBC AUTO-ENTMCNC: 31.3 G/DL (ref 31.5–35.7)
MCV RBC AUTO: 96.6 FL (ref 79–97)
METHADONE UR QL SCN: NEGATIVE
MONOCYTES # BLD AUTO: 0.27 10*3/MM3 (ref 0.1–0.9)
MONOCYTES NFR BLD AUTO: 6.3 % (ref 5–12)
NEUTROPHILS NFR BLD AUTO: 1.6 10*3/MM3 (ref 1.7–7)
NEUTROPHILS NFR BLD AUTO: 37.6 % (ref 42.7–76)
NITRITE UR QL STRIP: NEGATIVE
NRBC BLD AUTO-RTO: 0 /100 WBC (ref 0–0.2)
NT-PROBNP SERPL-MCNC: 229.5 PG/ML (ref 0–1800)
OPIATES UR QL: NEGATIVE
OXYCODONE UR QL SCN: NEGATIVE
PCP UR QL SCN: NEGATIVE
PH UR STRIP.AUTO: 7.5 [PH] (ref 5–8)
PLATELET # BLD AUTO: 153 10*3/MM3 (ref 140–450)
PMV BLD AUTO: 11.5 FL (ref 6–12)
POTASSIUM SERPL-SCNC: 3.5 MMOL/L (ref 3.5–5.2)
PROPOXYPH UR QL: NEGATIVE
PROT SERPL-MCNC: 6.4 G/DL (ref 6–8.5)
PROT UR QL STRIP: NEGATIVE
PROTHROMBIN TIME: 13.1 SECONDS (ref 12–15.1)
RBC # BLD AUTO: 3.57 10*6/MM3 (ref 3.77–5.28)
RBC # UR: ABNORMAL /HPF
REF LAB TEST METHOD: ABNORMAL
RH BLD: POSITIVE
RH BLD: POSITIVE
SODIUM SERPL-SCNC: 141 MMOL/L (ref 136–145)
SP GR UR STRIP: 1.01 (ref 1–1.03)
SQUAMOUS #/AREA URNS HPF: ABNORMAL /HPF
T&S EXPIRATION DATE: NORMAL
TRICYCLICS UR QL SCN: NEGATIVE
TROPONIN T SERPL-MCNC: <0.01 NG/ML (ref 0–0.03)
UROBILINOGEN UR QL STRIP: ABNORMAL
WBC # BLD AUTO: 4.26 10*3/MM3 (ref 3.4–10.8)
WBC UR QL AUTO: ABNORMAL /HPF
WHOLE BLOOD HOLD SPECIMEN: NORMAL
WHOLE BLOOD HOLD SPECIMEN: NORMAL

## 2021-01-17 PROCEDURE — 25010000002 IOPAMIDOL 61 % SOLUTION: Performed by: EMERGENCY MEDICINE

## 2021-01-17 PROCEDURE — 96368 THER/DIAG CONCURRENT INF: CPT

## 2021-01-17 PROCEDURE — 70450 CT HEAD/BRAIN W/O DYE: CPT

## 2021-01-17 PROCEDURE — 86901 BLOOD TYPING SEROLOGIC RH(D): CPT

## 2021-01-17 PROCEDURE — 86850 RBC ANTIBODY SCREEN: CPT

## 2021-01-17 PROCEDURE — 85730 THROMBOPLASTIN TIME PARTIAL: CPT

## 2021-01-17 PROCEDURE — 85025 COMPLETE CBC W/AUTO DIFF WBC: CPT

## 2021-01-17 PROCEDURE — 81001 URINALYSIS AUTO W/SCOPE: CPT | Performed by: EMERGENCY MEDICINE

## 2021-01-17 PROCEDURE — 80306 DRUG TEST PRSMV INSTRMNT: CPT | Performed by: EMERGENCY MEDICINE

## 2021-01-17 PROCEDURE — 70498 CT ANGIOGRAPHY NECK: CPT

## 2021-01-17 PROCEDURE — 25010000002 ALTEPLASE PER 1 MG: Performed by: EMERGENCY MEDICINE

## 2021-01-17 PROCEDURE — 63710000001 INSULIN LISPRO (HUMAN) PER 5 UNITS: Performed by: NURSE PRACTITIONER

## 2021-01-17 PROCEDURE — 85610 PROTHROMBIN TIME: CPT

## 2021-01-17 PROCEDURE — 80053 COMPREHEN METABOLIC PANEL: CPT

## 2021-01-17 PROCEDURE — 0 IOPAMIDOL PER 1 ML: Performed by: INTERNAL MEDICINE

## 2021-01-17 PROCEDURE — 96376 TX/PRO/DX INJ SAME DRUG ADON: CPT

## 2021-01-17 PROCEDURE — 84484 ASSAY OF TROPONIN QUANT: CPT

## 2021-01-17 PROCEDURE — 96365 THER/PROPH/DIAG IV INF INIT: CPT

## 2021-01-17 PROCEDURE — 99291 CRITICAL CARE FIRST HOUR: CPT | Performed by: PSYCHIATRY & NEUROLOGY

## 2021-01-17 PROCEDURE — 86900 BLOOD TYPING SEROLOGIC ABO: CPT

## 2021-01-17 PROCEDURE — 70496 CT ANGIOGRAPHY HEAD: CPT

## 2021-01-17 PROCEDURE — 99223 1ST HOSP IP/OBS HIGH 75: CPT | Performed by: INTERNAL MEDICINE

## 2021-01-17 PROCEDURE — 93005 ELECTROCARDIOGRAM TRACING: CPT | Performed by: NURSE PRACTITIONER

## 2021-01-17 PROCEDURE — 96375 TX/PRO/DX INJ NEW DRUG ADDON: CPT

## 2021-01-17 PROCEDURE — 93005 ELECTROCARDIOGRAM TRACING: CPT

## 2021-01-17 PROCEDURE — 82962 GLUCOSE BLOOD TEST: CPT

## 2021-01-17 PROCEDURE — 93010 ELECTROCARDIOGRAM REPORT: CPT | Performed by: INTERNAL MEDICINE

## 2021-01-17 PROCEDURE — 71045 X-RAY EXAM CHEST 1 VIEW: CPT

## 2021-01-17 PROCEDURE — 99285 EMERGENCY DEPT VISIT HI MDM: CPT

## 2021-01-17 PROCEDURE — 83880 ASSAY OF NATRIURETIC PEPTIDE: CPT | Performed by: EMERGENCY MEDICINE

## 2021-01-17 RX ORDER — LABETALOL HYDROCHLORIDE 5 MG/ML
10 INJECTION, SOLUTION INTRAVENOUS ONCE
Status: COMPLETED | OUTPATIENT
Start: 2021-01-17 | End: 2021-01-17

## 2021-01-17 RX ORDER — DEXMEDETOMIDINE HYDROCHLORIDE 4 UG/ML
.2-1.5 INJECTION, SOLUTION INTRAVENOUS
Status: DISCONTINUED | OUTPATIENT
Start: 2021-01-17 | End: 2021-01-20

## 2021-01-17 RX ORDER — PROMETHAZINE HYDROCHLORIDE 25 MG/1
25 TABLET ORAL EVERY 6 HOURS PRN
COMMUNITY
End: 2021-06-23 | Stop reason: SDUPTHER

## 2021-01-17 RX ORDER — ASPIRIN 300 MG/1
300 SUPPOSITORY RECTAL DAILY
Status: DISCONTINUED | OUTPATIENT
Start: 2021-01-18 | End: 2021-01-22 | Stop reason: HOSPADM

## 2021-01-17 RX ORDER — ASPIRIN 325 MG
325 TABLET ORAL DAILY
Status: DISCONTINUED | OUTPATIENT
Start: 2021-01-18 | End: 2021-01-22 | Stop reason: HOSPADM

## 2021-01-17 RX ORDER — SODIUM CHLORIDE 0.9 % (FLUSH) 0.9 %
10 SYRINGE (ML) INJECTION EVERY 12 HOURS SCHEDULED
Status: DISCONTINUED | OUTPATIENT
Start: 2021-01-17 | End: 2021-01-19

## 2021-01-17 RX ORDER — SODIUM CHLORIDE 0.9 % (FLUSH) 0.9 %
10 SYRINGE (ML) INJECTION AS NEEDED
Status: DISCONTINUED | OUTPATIENT
Start: 2021-01-17 | End: 2021-01-17 | Stop reason: HOSPADM

## 2021-01-17 RX ORDER — POLYETHYLENE GLYCOL 3350 17 G/17G
17 POWDER, FOR SOLUTION ORAL DAILY
COMMUNITY
End: 2021-02-08

## 2021-01-17 RX ORDER — SODIUM CHLORIDE 0.9 % (FLUSH) 0.9 %
10 SYRINGE (ML) INJECTION AS NEEDED
Status: DISCONTINUED | OUTPATIENT
Start: 2021-01-17 | End: 2021-01-19

## 2021-01-17 RX ORDER — SODIUM CHLORIDE 9 MG/ML
100 INJECTION, SOLUTION INTRAVENOUS ONCE
Status: COMPLETED | OUTPATIENT
Start: 2021-01-17 | End: 2021-01-17

## 2021-01-17 RX ORDER — NICOTINE POLACRILEX 4 MG
15 LOZENGE BUCCAL
Status: DISCONTINUED | OUTPATIENT
Start: 2021-01-17 | End: 2021-01-22 | Stop reason: HOSPADM

## 2021-01-17 RX ORDER — PANTOPRAZOLE SODIUM 40 MG/1
40 TABLET, DELAYED RELEASE ORAL DAILY
Status: DISCONTINUED | OUTPATIENT
Start: 2021-01-18 | End: 2021-01-18

## 2021-01-17 RX ORDER — ATORVASTATIN CALCIUM 40 MG/1
80 TABLET, FILM COATED ORAL NIGHTLY
Status: DISCONTINUED | OUTPATIENT
Start: 2021-01-17 | End: 2021-01-22 | Stop reason: HOSPADM

## 2021-01-17 RX ORDER — DEXTROSE MONOHYDRATE 25 G/50ML
25 INJECTION, SOLUTION INTRAVENOUS
Status: DISCONTINUED | OUTPATIENT
Start: 2021-01-17 | End: 2021-01-22 | Stop reason: HOSPADM

## 2021-01-17 RX ADMIN — DEXMEDETOMIDINE HYDROCHLORIDE 0.2 MCG/KG/HR: 4 INJECTION, SOLUTION INTRAVENOUS at 19:21

## 2021-01-17 RX ADMIN — LABETALOL 20 MG/4 ML (5 MG/ML) INTRAVENOUS SYRINGE 10 MG: at 16:56

## 2021-01-17 RX ADMIN — DEXMEDETOMIDINE HYDROCHLORIDE 0.2 MCG/KG/HR: 4 INJECTION, SOLUTION INTRAVENOUS at 20:15

## 2021-01-17 RX ADMIN — SODIUM CHLORIDE 100 ML/HR: 9 INJECTION, SOLUTION INTRAVENOUS at 17:38

## 2021-01-17 RX ADMIN — NICARDIPINE HYDROCHLORIDE 5 MG/HR: 0.1 INJECTION, SOLUTION INTRAVENOUS at 17:18

## 2021-01-17 RX ADMIN — IOPAMIDOL 100 ML: 612 INJECTION, SOLUTION INTRAVENOUS at 16:21

## 2021-01-17 RX ADMIN — ALTEPLASE 73.47 MG: KIT at 17:34

## 2021-01-17 RX ADMIN — INSULIN LISPRO 2 UNITS: 100 INJECTION, SOLUTION INTRAVENOUS; SUBCUTANEOUS at 22:42

## 2021-01-17 RX ADMIN — ALTEPLASE 8.16 MG: KIT at 17:31

## 2021-01-17 RX ADMIN — SODIUM CHLORIDE, PRESERVATIVE FREE 10 ML: 5 INJECTION INTRAVENOUS at 22:00

## 2021-01-17 RX ADMIN — DEXMEDETOMIDINE HYDROCHLORIDE 1 MCG/KG/HR: 4 INJECTION, SOLUTION INTRAVENOUS at 22:13

## 2021-01-17 RX ADMIN — Medication 10 ML: at 22:00

## 2021-01-17 RX ADMIN — IOPAMIDOL 50 ML: 755 INJECTION, SOLUTION INTRAVENOUS at 22:43
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
## 2021-01-17 NOTE — ED NOTES
Contacted [facility] admissions, stated they are aware and are working on a bed assignment.     Radha Everett  01/17/21 2000

## 2021-01-17 NOTE — ED NOTES
Called [facility] at this time for transport to [facility].     Radha Everett  01/17/21 1746

## 2021-01-18 ENCOUNTER — TELEPHONE (OUTPATIENT)
Dept: FAMILY MEDICINE CLINIC | Facility: CLINIC | Age: 76
End: 2021-01-18

## 2021-01-18 ENCOUNTER — APPOINTMENT (OUTPATIENT)
Dept: CARDIOLOGY | Facility: HOSPITAL | Age: 76
End: 2021-01-18

## 2021-01-18 ENCOUNTER — APPOINTMENT (OUTPATIENT)
Dept: GENERAL RADIOLOGY | Facility: HOSPITAL | Age: 76
End: 2021-01-18

## 2021-01-18 ENCOUNTER — APPOINTMENT (OUTPATIENT)
Dept: CT IMAGING | Facility: HOSPITAL | Age: 76
End: 2021-01-18

## 2021-01-18 ENCOUNTER — APPOINTMENT (OUTPATIENT)
Dept: MRI IMAGING | Facility: HOSPITAL | Age: 76
End: 2021-01-18

## 2021-01-18 LAB
ALBUMIN SERPL-MCNC: 4 G/DL (ref 3.5–5.2)
ALBUMIN/GLOB SERPL: 1.4 G/DL
ALP SERPL-CCNC: 71 U/L (ref 39–117)
ALT SERPL W P-5'-P-CCNC: 12 U/L (ref 1–33)
ANION GAP SERPL CALCULATED.3IONS-SCNC: 15 MMOL/L (ref 5–15)
AST SERPL-CCNC: 13 U/L (ref 1–32)
BASOPHILS # BLD AUTO: 0.04 10*3/MM3 (ref 0–0.2)
BASOPHILS NFR BLD AUTO: 0.6 % (ref 0–1.5)
BILIRUB SERPL-MCNC: 0.6 MG/DL (ref 0–1.2)
BUN SERPL-MCNC: 12 MG/DL (ref 8–23)
BUN/CREAT SERPL: 13.6 (ref 7–25)
CALCIUM SPEC-SCNC: 8.9 MG/DL (ref 8.6–10.5)
CHLORIDE SERPL-SCNC: 104 MMOL/L (ref 98–107)
CHOLEST SERPL-MCNC: 151 MG/DL (ref 0–200)
CO2 SERPL-SCNC: 21 MMOL/L (ref 22–29)
CREAT SERPL-MCNC: 0.88 MG/DL (ref 0.57–1)
DEPRECATED RDW RBC AUTO: 56.8 FL (ref 37–54)
EOSINOPHIL # BLD AUTO: 0.02 10*3/MM3 (ref 0–0.4)
EOSINOPHIL NFR BLD AUTO: 0.3 % (ref 0.3–6.2)
ERYTHROCYTE [DISTWIDTH] IN BLOOD BY AUTOMATED COUNT: 14.5 % (ref 12.3–15.4)
FLUAV RNA RESP QL NAA+PROBE: NOT DETECTED
FLUBV RNA RESP QL NAA+PROBE: NOT DETECTED
GFR SERPL CREATININE-BSD FRML MDRD: 62 ML/MIN/1.73
GLOBULIN UR ELPH-MCNC: 2.8 GM/DL
GLUCOSE BLDC GLUCOMTR-MCNC: 136 MG/DL (ref 70–130)
GLUCOSE BLDC GLUCOMTR-MCNC: 164 MG/DL (ref 70–130)
GLUCOSE SERPL-MCNC: 143 MG/DL (ref 65–99)
HBA1C MFR BLD: 6.8 % (ref 4.8–5.6)
HCT VFR BLD AUTO: 46.1 % (ref 34–46.6)
HDLC SERPL-MCNC: 56 MG/DL (ref 40–60)
HGB BLD-MCNC: 13.2 G/DL (ref 12–15.9)
IMM GRANULOCYTES # BLD AUTO: 0.01 10*3/MM3 (ref 0–0.05)
IMM GRANULOCYTES NFR BLD AUTO: 0.2 % (ref 0–0.5)
LDLC SERPL CALC-MCNC: 65 MG/DL (ref 0–100)
LDLC/HDLC SERPL: 1.05 {RATIO}
LYMPHOCYTES # BLD AUTO: 2.18 10*3/MM3 (ref 0.7–3.1)
LYMPHOCYTES NFR BLD AUTO: 33.2 % (ref 19.6–45.3)
MAGNESIUM SERPL-MCNC: 1.9 MG/DL (ref 1.6–2.4)
MCH RBC QN AUTO: 30.2 PG (ref 26.6–33)
MCHC RBC AUTO-ENTMCNC: 28.6 G/DL (ref 31.5–35.7)
MCV RBC AUTO: 105.5 FL (ref 79–97)
MONOCYTES # BLD AUTO: 0.45 10*3/MM3 (ref 0.1–0.9)
MONOCYTES NFR BLD AUTO: 6.8 % (ref 5–12)
NEUTROPHILS NFR BLD AUTO: 3.87 10*3/MM3 (ref 1.7–7)
NEUTROPHILS NFR BLD AUTO: 58.9 % (ref 42.7–76)
NRBC BLD AUTO-RTO: 0 /100 WBC (ref 0–0.2)
PHOSPHATE SERPL-MCNC: 2.1 MG/DL (ref 2.5–4.5)
PHOSPHATE SERPL-MCNC: 2.2 MG/DL (ref 2.5–4.5)
PLATELET # BLD AUTO: 158 10*3/MM3 (ref 140–450)
PMV BLD AUTO: 11.6 FL (ref 6–12)
POTASSIUM SERPL-SCNC: 3.2 MMOL/L (ref 3.5–5.2)
POTASSIUM SERPL-SCNC: 4 MMOL/L (ref 3.5–5.2)
PROT SERPL-MCNC: 6.8 G/DL (ref 6–8.5)
QT INTERVAL: 430 MS
QTC INTERVAL: 486 MS
RBC # BLD AUTO: 4.37 10*6/MM3 (ref 3.77–5.28)
SARS-COV-2 RNA RESP QL NAA+PROBE: NOT DETECTED
SODIUM SERPL-SCNC: 140 MMOL/L (ref 136–145)
TRIGL SERPL-MCNC: 180 MG/DL (ref 0–150)
TSH SERPL DL<=0.05 MIU/L-ACNC: 1.13 UIU/ML (ref 0.27–4.2)
VLDLC SERPL-MCNC: 30 MG/DL (ref 5–40)
WBC # BLD AUTO: 6.57 10*3/MM3 (ref 3.4–10.8)

## 2021-01-18 PROCEDURE — 93306 TTE W/DOPPLER COMPLETE: CPT | Performed by: INTERNAL MEDICINE

## 2021-01-18 PROCEDURE — 25010000003 POTASSIUM CHLORIDE PER 2 MEQ: Performed by: INTERNAL MEDICINE

## 2021-01-18 PROCEDURE — 02HV33Z INSERTION OF INFUSION DEVICE INTO SUPERIOR VENA CAVA, PERCUTANEOUS APPROACH: ICD-10-PCS | Performed by: INTERNAL MEDICINE

## 2021-01-18 PROCEDURE — 92523 SPEECH SOUND LANG COMPREHEN: CPT | Performed by: SPEECH-LANGUAGE PATHOLOGIST

## 2021-01-18 PROCEDURE — 85025 COMPLETE CBC W/AUTO DIFF WBC: CPT | Performed by: NURSE PRACTITIONER

## 2021-01-18 PROCEDURE — 92610 EVALUATE SWALLOWING FUNCTION: CPT | Performed by: SPEECH-LANGUAGE PATHOLOGIST

## 2021-01-18 PROCEDURE — 93306 TTE W/DOPPLER COMPLETE: CPT

## 2021-01-18 PROCEDURE — 80053 COMPREHEN METABOLIC PANEL: CPT | Performed by: NURSE PRACTITIONER

## 2021-01-18 PROCEDURE — 71045 X-RAY EXAM CHEST 1 VIEW: CPT

## 2021-01-18 PROCEDURE — 83036 HEMOGLOBIN GLYCOSYLATED A1C: CPT | Performed by: PSYCHIATRY & NEUROLOGY

## 2021-01-18 PROCEDURE — 70450 CT HEAD/BRAIN W/O DYE: CPT

## 2021-01-18 PROCEDURE — 83735 ASSAY OF MAGNESIUM: CPT | Performed by: NURSE PRACTITIONER

## 2021-01-18 PROCEDURE — 99291 CRITICAL CARE FIRST HOUR: CPT | Performed by: PSYCHIATRY & NEUROLOGY

## 2021-01-18 PROCEDURE — 63710000001 INSULIN REGULAR HUMAN PER 5 UNITS

## 2021-01-18 PROCEDURE — 82962 GLUCOSE BLOOD TEST: CPT

## 2021-01-18 PROCEDURE — 70551 MRI BRAIN STEM W/O DYE: CPT

## 2021-01-18 PROCEDURE — 84100 ASSAY OF PHOSPHORUS: CPT | Performed by: INTERNAL MEDICINE

## 2021-01-18 PROCEDURE — 84132 ASSAY OF SERUM POTASSIUM: CPT | Performed by: INTERNAL MEDICINE

## 2021-01-18 PROCEDURE — 84443 ASSAY THYROID STIM HORMONE: CPT | Performed by: NURSE PRACTITIONER

## 2021-01-18 PROCEDURE — 97162 PT EVAL MOD COMPLEX 30 MIN: CPT

## 2021-01-18 PROCEDURE — C1894 INTRO/SHEATH, NON-LASER: HCPCS

## 2021-01-18 PROCEDURE — 99233 SBSQ HOSP IP/OBS HIGH 50: CPT | Performed by: INTERNAL MEDICINE

## 2021-01-18 PROCEDURE — 84100 ASSAY OF PHOSPHORUS: CPT | Performed by: NURSE PRACTITIONER

## 2021-01-18 PROCEDURE — 87636 SARSCOV2 & INF A&B AMP PRB: CPT | Performed by: NURSE PRACTITIONER

## 2021-01-18 PROCEDURE — 63710000001 INSULIN DETEMIR PER 5 UNITS: Performed by: NURSE PRACTITIONER

## 2021-01-18 PROCEDURE — 97165 OT EVAL LOW COMPLEX 30 MIN: CPT

## 2021-01-18 PROCEDURE — 80061 LIPID PANEL: CPT | Performed by: PSYCHIATRY & NEUROLOGY

## 2021-01-18 PROCEDURE — 25010000003 MAGNESIUM SULFATE 4 GM/100ML SOLUTION: Performed by: INTERNAL MEDICINE

## 2021-01-18 PROCEDURE — C1751 CATH, INF, PER/CENT/MIDLINE: HCPCS

## 2021-01-18 RX ORDER — POTASSIUM CHLORIDE 29.8 MG/ML
20 INJECTION INTRAVENOUS
Status: DISCONTINUED | OUTPATIENT
Start: 2021-01-18 | End: 2021-01-22 | Stop reason: HOSPADM

## 2021-01-18 RX ORDER — SODIUM CHLORIDE 0.9 % (FLUSH) 0.9 %
10 SYRINGE (ML) INJECTION AS NEEDED
Status: DISCONTINUED | OUTPATIENT
Start: 2021-01-18 | End: 2021-01-22 | Stop reason: HOSPADM

## 2021-01-18 RX ORDER — MAGNESIUM SULFATE HEPTAHYDRATE 40 MG/ML
2 INJECTION, SOLUTION INTRAVENOUS AS NEEDED
Status: DISCONTINUED | OUTPATIENT
Start: 2021-01-18 | End: 2021-01-22 | Stop reason: HOSPADM

## 2021-01-18 RX ORDER — PANTOPRAZOLE SODIUM 40 MG/10ML
40 INJECTION, POWDER, LYOPHILIZED, FOR SOLUTION INTRAVENOUS DAILY
Status: DISCONTINUED | OUTPATIENT
Start: 2021-01-18 | End: 2021-01-22 | Stop reason: HOSPADM

## 2021-01-18 RX ORDER — POTASSIUM CHLORIDE 750 MG/1
40 CAPSULE, EXTENDED RELEASE ORAL AS NEEDED
Status: DISCONTINUED | OUTPATIENT
Start: 2021-01-18 | End: 2021-01-22 | Stop reason: HOSPADM

## 2021-01-18 RX ORDER — POTASSIUM CHLORIDE 1.5 G/1.77G
40 POWDER, FOR SOLUTION ORAL AS NEEDED
Status: DISCONTINUED | OUTPATIENT
Start: 2021-01-18 | End: 2021-01-22 | Stop reason: HOSPADM

## 2021-01-18 RX ORDER — SODIUM CHLORIDE 0.9 % (FLUSH) 0.9 %
10 SYRINGE (ML) INJECTION EVERY 12 HOURS SCHEDULED
Status: DISCONTINUED | OUTPATIENT
Start: 2021-01-18 | End: 2021-01-22 | Stop reason: HOSPADM

## 2021-01-18 RX ORDER — MAGNESIUM SULFATE HEPTAHYDRATE 40 MG/ML
4 INJECTION, SOLUTION INTRAVENOUS AS NEEDED
Status: DISCONTINUED | OUTPATIENT
Start: 2021-01-18 | End: 2021-01-22 | Stop reason: HOSPADM

## 2021-01-18 RX ADMIN — SODIUM CHLORIDE, PRESERVATIVE FREE 10 ML: 5 INJECTION INTRAVENOUS at 21:01

## 2021-01-18 RX ADMIN — PANTOPRAZOLE SODIUM 40 MG: 40 INJECTION, POWDER, FOR SOLUTION INTRAVENOUS at 10:18

## 2021-01-18 RX ADMIN — POTASSIUM PHOSPHATE, MONOBASIC AND POTASSIUM PHOSPHATE, DIBASIC 15 MMOL: 224; 236 INJECTION, SOLUTION INTRAVENOUS at 12:49

## 2021-01-18 RX ADMIN — SODIUM CHLORIDE, PRESERVATIVE FREE 10 ML: 5 INJECTION INTRAVENOUS at 21:02

## 2021-01-18 RX ADMIN — INSULIN HUMAN 2 UNITS: 100 INJECTION, SOLUTION PARENTERAL at 12:07

## 2021-01-18 RX ADMIN — INSULIN DETEMIR 10 UNITS: 100 INJECTION, SOLUTION SUBCUTANEOUS at 08:26

## 2021-01-18 RX ADMIN — INSULIN HUMAN 2 UNITS: 100 INJECTION, SOLUTION PARENTERAL at 18:51

## 2021-01-18 RX ADMIN — POTASSIUM CHLORIDE 20 MEQ: 29.8 INJECTION, SOLUTION INTRAVENOUS at 15:07

## 2021-01-18 RX ADMIN — DEXMEDETOMIDINE HYDROCHLORIDE 0.5 MCG/KG/HR: 4 INJECTION, SOLUTION INTRAVENOUS at 17:00

## 2021-01-18 RX ADMIN — MAGNESIUM SULFATE 4 G: 4 INJECTION INTRAVENOUS at 10:21

## 2021-01-18 RX ADMIN — Medication 10 ML: at 21:03

## 2021-01-18 RX ADMIN — POTASSIUM PHOSPHATE, MONOBASIC AND POTASSIUM PHOSPHATE, DIBASIC 15 MMOL: 224; 236 INJECTION, SOLUTION INTRAVENOUS at 23:25

## 2021-01-18 RX ADMIN — DEXMEDETOMIDINE HYDROCHLORIDE 0.4 MCG/KG/HR: 4 INJECTION, SOLUTION INTRAVENOUS at 12:09

## 2021-01-18 RX ADMIN — ASPIRIN 300 MG: 300 SUPPOSITORY RECTAL at 21:27

## 2021-01-18 RX ADMIN — SODIUM CHLORIDE, PRESERVATIVE FREE 10 ML: 5 INJECTION INTRAVENOUS at 08:25

## 2021-01-18 RX ADMIN — POTASSIUM CHLORIDE 20 MEQ: 29.8 INJECTION, SOLUTION INTRAVENOUS at 12:16

## 2021-01-18 NOTE — TELEPHONE ENCOUNTER
Patient's daughter, Carol, states that she was admitted to [facility] with what they think is a stroke.  She just wanted Dr. Pimentel to know.  She can be reached at [phone]

## 2021-01-19 LAB
ANION GAP SERPL CALCULATED.3IONS-SCNC: 11 MMOL/L (ref 5–15)
BASOPHILS # BLD AUTO: 0.01 10*3/MM3 (ref 0–0.2)
BASOPHILS NFR BLD AUTO: 0.2 % (ref 0–1.5)
BUN SERPL-MCNC: 18 MG/DL (ref 8–23)
BUN/CREAT SERPL: 19.4 (ref 7–25)
CALCIUM SPEC-SCNC: 8.5 MG/DL (ref 8.6–10.5)
CHLORIDE SERPL-SCNC: 108 MMOL/L (ref 98–107)
CO2 SERPL-SCNC: 22 MMOL/L (ref 22–29)
CREAT SERPL-MCNC: 0.93 MG/DL (ref 0.57–1)
DEPRECATED RDW RBC AUTO: 52.5 FL (ref 37–54)
EOSINOPHIL # BLD AUTO: 0.04 10*3/MM3 (ref 0–0.4)
EOSINOPHIL NFR BLD AUTO: 0.7 % (ref 0.3–6.2)
ERYTHROCYTE [DISTWIDTH] IN BLOOD BY AUTOMATED COUNT: 14.6 % (ref 12.3–15.4)
GFR SERPL CREATININE-BSD FRML MDRD: 58 ML/MIN/1.73
GLUCOSE BLDC GLUCOMTR-MCNC: 128 MG/DL (ref 70–130)
GLUCOSE BLDC GLUCOMTR-MCNC: 130 MG/DL (ref 70–130)
GLUCOSE BLDC GLUCOMTR-MCNC: 139 MG/DL (ref 70–130)
GLUCOSE SERPL-MCNC: 139 MG/DL (ref 65–99)
HCT VFR BLD AUTO: 40.1 % (ref 34–46.6)
HGB BLD-MCNC: 12.4 G/DL (ref 12–15.9)
IMM GRANULOCYTES # BLD AUTO: 0.01 10*3/MM3 (ref 0–0.05)
IMM GRANULOCYTES NFR BLD AUTO: 0.2 % (ref 0–0.5)
LYMPHOCYTES # BLD AUTO: 2.34 10*3/MM3 (ref 0.7–3.1)
LYMPHOCYTES NFR BLD AUTO: 42.4 % (ref 19.6–45.3)
MAGNESIUM SERPL-MCNC: 2.7 MG/DL (ref 1.6–2.4)
MCH RBC QN AUTO: 30.1 PG (ref 26.6–33)
MCHC RBC AUTO-ENTMCNC: 30.9 G/DL (ref 31.5–35.7)
MCV RBC AUTO: 97.3 FL (ref 79–97)
MONOCYTES # BLD AUTO: 0.48 10*3/MM3 (ref 0.1–0.9)
MONOCYTES NFR BLD AUTO: 8.7 % (ref 5–12)
NEUTROPHILS NFR BLD AUTO: 2.64 10*3/MM3 (ref 1.7–7)
NEUTROPHILS NFR BLD AUTO: 47.8 % (ref 42.7–76)
NRBC BLD AUTO-RTO: 0 /100 WBC (ref 0–0.2)
PHOSPHATE SERPL-MCNC: 3 MG/DL (ref 2.5–4.5)
PLATELET # BLD AUTO: 142 10*3/MM3 (ref 140–450)
PMV BLD AUTO: 12.6 FL (ref 6–12)
POTASSIUM SERPL-SCNC: 3.9 MMOL/L (ref 3.5–5.2)
RBC # BLD AUTO: 4.12 10*6/MM3 (ref 3.77–5.28)
SODIUM SERPL-SCNC: 141 MMOL/L (ref 136–145)
WBC # BLD AUTO: 5.52 10*3/MM3 (ref 3.4–10.8)

## 2021-01-19 PROCEDURE — 80048 BASIC METABOLIC PNL TOTAL CA: CPT | Performed by: INTERNAL MEDICINE

## 2021-01-19 PROCEDURE — 84100 ASSAY OF PHOSPHORUS: CPT | Performed by: NURSE PRACTITIONER

## 2021-01-19 PROCEDURE — 99232 SBSQ HOSP IP/OBS MODERATE 35: CPT | Performed by: INTERNAL MEDICINE

## 2021-01-19 PROCEDURE — 97530 THERAPEUTIC ACTIVITIES: CPT

## 2021-01-19 PROCEDURE — 92610 EVALUATE SWALLOWING FUNCTION: CPT

## 2021-01-19 PROCEDURE — 85025 COMPLETE CBC W/AUTO DIFF WBC: CPT | Performed by: INTERNAL MEDICINE

## 2021-01-19 PROCEDURE — 83735 ASSAY OF MAGNESIUM: CPT | Performed by: INTERNAL MEDICINE

## 2021-01-19 PROCEDURE — 63710000001 INSULIN DETEMIR PER 5 UNITS: Performed by: NURSE PRACTITIONER

## 2021-01-19 PROCEDURE — 82962 GLUCOSE BLOOD TEST: CPT

## 2021-01-19 PROCEDURE — 99233 SBSQ HOSP IP/OBS HIGH 50: CPT | Performed by: PSYCHIATRY & NEUROLOGY

## 2021-01-19 RX ORDER — DEXTROSE MONOHYDRATE 50 MG/ML
50 INJECTION, SOLUTION INTRAVENOUS CONTINUOUS
Status: DISCONTINUED | OUTPATIENT
Start: 2021-01-19 | End: 2021-01-19

## 2021-01-19 RX ORDER — SODIUM CHLORIDE 9 MG/ML
75 INJECTION, SOLUTION INTRAVENOUS CONTINUOUS
Status: DISCONTINUED | OUTPATIENT
Start: 2021-01-19 | End: 2021-01-21

## 2021-01-19 RX ADMIN — SODIUM CHLORIDE 75 ML/HR: 9 INJECTION, SOLUTION INTRAVENOUS at 21:00

## 2021-01-19 RX ADMIN — PANTOPRAZOLE SODIUM 40 MG: 40 INJECTION, POWDER, FOR SOLUTION INTRAVENOUS at 08:18

## 2021-01-19 RX ADMIN — DEXMEDETOMIDINE HYDROCHLORIDE 0.2 MCG/KG/HR: 4 INJECTION, SOLUTION INTRAVENOUS at 10:08

## 2021-01-19 RX ADMIN — SODIUM CHLORIDE 75 ML/HR: 9 INJECTION, SOLUTION INTRAVENOUS at 10:09

## 2021-01-19 RX ADMIN — SODIUM CHLORIDE, PRESERVATIVE FREE 10 ML: 5 INJECTION INTRAVENOUS at 08:18

## 2021-01-19 RX ADMIN — DEXTROSE MONOHYDRATE 50 ML/HR: 50 INJECTION, SOLUTION INTRAVENOUS at 02:52

## 2021-01-20 LAB
GLUCOSE BLDC GLUCOMTR-MCNC: 103 MG/DL (ref 70–130)
GLUCOSE BLDC GLUCOMTR-MCNC: 108 MG/DL (ref 70–130)
GLUCOSE BLDC GLUCOMTR-MCNC: 119 MG/DL (ref 70–130)
GLUCOSE BLDC GLUCOMTR-MCNC: 127 MG/DL (ref 70–130)
GLUCOSE BLDC GLUCOMTR-MCNC: 143 MG/DL (ref 70–130)
GLUCOSE BLDC GLUCOMTR-MCNC: 144 MG/DL (ref 70–130)
GLUCOSE BLDC GLUCOMTR-MCNC: 157 MG/DL (ref 70–130)

## 2021-01-20 PROCEDURE — 97530 THERAPEUTIC ACTIVITIES: CPT

## 2021-01-20 PROCEDURE — 97110 THERAPEUTIC EXERCISES: CPT

## 2021-01-20 PROCEDURE — 99233 SBSQ HOSP IP/OBS HIGH 50: CPT | Performed by: PSYCHIATRY & NEUROLOGY

## 2021-01-20 PROCEDURE — 63710000001 INSULIN DETEMIR PER 5 UNITS: Performed by: NURSE PRACTITIONER

## 2021-01-20 PROCEDURE — 92507 TX SP LANG VOICE COMM INDIV: CPT

## 2021-01-20 PROCEDURE — 25010000002 ONDANSETRON PER 1 MG: Performed by: NURSE PRACTITIONER

## 2021-01-20 PROCEDURE — 82962 GLUCOSE BLOOD TEST: CPT

## 2021-01-20 PROCEDURE — 99232 SBSQ HOSP IP/OBS MODERATE 35: CPT | Performed by: INTERNAL MEDICINE

## 2021-01-20 PROCEDURE — 92610 EVALUATE SWALLOWING FUNCTION: CPT

## 2021-01-20 RX ORDER — LISINOPRIL 20 MG/1
20 TABLET ORAL
Status: DISCONTINUED | OUTPATIENT
Start: 2021-01-20 | End: 2021-01-21

## 2021-01-20 RX ORDER — PROPRANOLOL HYDROCHLORIDE 10 MG/1
10 TABLET ORAL EVERY 12 HOURS SCHEDULED
Status: DISCONTINUED | OUTPATIENT
Start: 2021-01-20 | End: 2021-01-22 | Stop reason: HOSPADM

## 2021-01-20 RX ORDER — ACETAMINOPHEN 325 MG/1
650 TABLET ORAL EVERY 6 HOURS PRN
Status: DISCONTINUED | OUTPATIENT
Start: 2021-01-20 | End: 2021-01-22 | Stop reason: HOSPADM

## 2021-01-20 RX ORDER — GABAPENTIN 100 MG/1
100 CAPSULE ORAL EVERY 8 HOURS SCHEDULED
Status: DISCONTINUED | OUTPATIENT
Start: 2021-01-20 | End: 2021-01-22 | Stop reason: HOSPADM

## 2021-01-20 RX ORDER — ONDANSETRON 2 MG/ML
4 INJECTION INTRAMUSCULAR; INTRAVENOUS EVERY 6 HOURS PRN
Status: DISCONTINUED | OUTPATIENT
Start: 2021-01-20 | End: 2021-01-22 | Stop reason: HOSPADM

## 2021-01-20 RX ADMIN — LISINOPRIL 20 MG: 20 TABLET ORAL at 16:48

## 2021-01-20 RX ADMIN — ATORVASTATIN CALCIUM 80 MG: 40 TABLET, FILM COATED ORAL at 20:19

## 2021-01-20 RX ADMIN — PANTOPRAZOLE SODIUM 40 MG: 40 INJECTION, POWDER, FOR SOLUTION INTRAVENOUS at 09:10

## 2021-01-20 RX ADMIN — ONDANSETRON 4 MG: 2 INJECTION INTRAMUSCULAR; INTRAVENOUS at 08:12

## 2021-01-20 RX ADMIN — SODIUM CHLORIDE, PRESERVATIVE FREE 10 ML: 5 INJECTION INTRAVENOUS at 20:20

## 2021-01-20 RX ADMIN — INSULIN DETEMIR 10 UNITS: 100 INJECTION, SOLUTION SUBCUTANEOUS at 09:10

## 2021-01-20 RX ADMIN — SODIUM CHLORIDE, PRESERVATIVE FREE 10 ML: 5 INJECTION INTRAVENOUS at 08:13

## 2021-01-20 RX ADMIN — ASPIRIN 300 MG: 300 SUPPOSITORY RECTAL at 09:10

## 2021-01-20 RX ADMIN — GABAPENTIN 100 MG: 100 CAPSULE ORAL at 16:48

## 2021-01-20 RX ADMIN — PROPRANOLOL HYDROCHLORIDE 10 MG: 10 TABLET ORAL at 20:20

## 2021-01-20 RX ADMIN — GABAPENTIN 100 MG: 100 CAPSULE ORAL at 22:39

## 2021-01-20 RX ADMIN — SODIUM CHLORIDE 75 ML/HR: 9 INJECTION, SOLUTION INTRAVENOUS at 12:15

## 2021-01-21 LAB
ANION GAP SERPL CALCULATED.3IONS-SCNC: 12 MMOL/L (ref 5–15)
BACTERIA UR QL AUTO: ABNORMAL /HPF
BASOPHILS # BLD AUTO: 0.02 10*3/MM3 (ref 0–0.2)
BASOPHILS NFR BLD AUTO: 0.4 % (ref 0–1.5)
BILIRUB UR QL STRIP: NEGATIVE
BUN SERPL-MCNC: 16 MG/DL (ref 8–23)
BUN/CREAT SERPL: 23.5 (ref 7–25)
CALCIUM SPEC-SCNC: 8.3 MG/DL (ref 8.6–10.5)
CHLORIDE SERPL-SCNC: 111 MMOL/L (ref 98–107)
CLARITY UR: ABNORMAL
CO2 SERPL-SCNC: 19 MMOL/L (ref 22–29)
COD CRY URNS QL: ABNORMAL /HPF
COLOR UR: YELLOW
CREAT SERPL-MCNC: 0.68 MG/DL (ref 0.57–1)
DEPRECATED RDW RBC AUTO: 52.6 FL (ref 37–54)
EOSINOPHIL # BLD AUTO: 0.07 10*3/MM3 (ref 0–0.4)
EOSINOPHIL NFR BLD AUTO: 1.3 % (ref 0.3–6.2)
ERYTHROCYTE [DISTWIDTH] IN BLOOD BY AUTOMATED COUNT: 14.6 % (ref 12.3–15.4)
GFR SERPL CREATININE-BSD FRML MDRD: 84 ML/MIN/1.73
GLUCOSE BLDC GLUCOMTR-MCNC: 131 MG/DL (ref 70–130)
GLUCOSE BLDC GLUCOMTR-MCNC: 139 MG/DL (ref 70–130)
GLUCOSE BLDC GLUCOMTR-MCNC: 184 MG/DL (ref 70–130)
GLUCOSE BLDC GLUCOMTR-MCNC: 88 MG/DL (ref 70–130)
GLUCOSE SERPL-MCNC: 94 MG/DL (ref 65–99)
GLUCOSE UR STRIP-MCNC: ABNORMAL MG/DL
HCT VFR BLD AUTO: 36.2 % (ref 34–46.6)
HGB BLD-MCNC: 10.9 G/DL (ref 12–15.9)
HGB UR QL STRIP.AUTO: ABNORMAL
HYALINE CASTS UR QL AUTO: ABNORMAL /LPF
IMM GRANULOCYTES # BLD AUTO: 0.02 10*3/MM3 (ref 0–0.05)
IMM GRANULOCYTES NFR BLD AUTO: 0.4 % (ref 0–0.5)
KETONES UR QL STRIP: ABNORMAL
LEUKOCYTE ESTERASE UR QL STRIP.AUTO: ABNORMAL
LYMPHOCYTES # BLD AUTO: 2.37 10*3/MM3 (ref 0.7–3.1)
LYMPHOCYTES NFR BLD AUTO: 44.7 % (ref 19.6–45.3)
MAGNESIUM SERPL-MCNC: 1.9 MG/DL (ref 1.6–2.4)
MCH RBC QN AUTO: 29.3 PG (ref 26.6–33)
MCHC RBC AUTO-ENTMCNC: 30.1 G/DL (ref 31.5–35.7)
MCV RBC AUTO: 97.3 FL (ref 79–97)
MONOCYTES # BLD AUTO: 0.45 10*3/MM3 (ref 0.1–0.9)
MONOCYTES NFR BLD AUTO: 8.5 % (ref 5–12)
NEUTROPHILS NFR BLD AUTO: 2.37 10*3/MM3 (ref 1.7–7)
NEUTROPHILS NFR BLD AUTO: 44.7 % (ref 42.7–76)
NITRITE UR QL STRIP: NEGATIVE
NRBC BLD AUTO-RTO: 0 /100 WBC (ref 0–0.2)
PH UR STRIP.AUTO: 5.5 [PH] (ref 5–8)
PLATELET # BLD AUTO: 143 10*3/MM3 (ref 140–450)
PMV BLD AUTO: 12.5 FL (ref 6–12)
POTASSIUM SERPL-SCNC: 3.8 MMOL/L (ref 3.5–5.2)
PROT UR QL STRIP: ABNORMAL
RBC # BLD AUTO: 3.72 10*6/MM3 (ref 3.77–5.28)
RBC # UR: ABNORMAL /HPF
REF LAB TEST METHOD: ABNORMAL
SODIUM SERPL-SCNC: 142 MMOL/L (ref 136–145)
SP GR UR STRIP: 1.02 (ref 1–1.03)
SQUAMOUS #/AREA URNS HPF: ABNORMAL /HPF
UROBILINOGEN UR QL STRIP: ABNORMAL
WBC # BLD AUTO: 5.3 10*3/MM3 (ref 3.4–10.8)
WBC UR QL AUTO: ABNORMAL /HPF

## 2021-01-21 PROCEDURE — 63710000001 INSULIN DETEMIR PER 5 UNITS: Performed by: INTERNAL MEDICINE

## 2021-01-21 PROCEDURE — 97530 THERAPEUTIC ACTIVITIES: CPT

## 2021-01-21 PROCEDURE — 99233 SBSQ HOSP IP/OBS HIGH 50: CPT | Performed by: INTERNAL MEDICINE

## 2021-01-21 PROCEDURE — 97116 GAIT TRAINING THERAPY: CPT

## 2021-01-21 PROCEDURE — 25010000003 MAGNESIUM SULFATE 4 GM/100ML SOLUTION: Performed by: INTERNAL MEDICINE

## 2021-01-21 PROCEDURE — 83735 ASSAY OF MAGNESIUM: CPT | Performed by: INTERNAL MEDICINE

## 2021-01-21 PROCEDURE — 87086 URINE CULTURE/COLONY COUNT: CPT | Performed by: INTERNAL MEDICINE

## 2021-01-21 PROCEDURE — 63710000001 INSULIN LISPRO (HUMAN) PER 5 UNITS: Performed by: INTERNAL MEDICINE

## 2021-01-21 PROCEDURE — 87088 URINE BACTERIA CULTURE: CPT | Performed by: INTERNAL MEDICINE

## 2021-01-21 PROCEDURE — 99233 SBSQ HOSP IP/OBS HIGH 50: CPT | Performed by: PSYCHIATRY & NEUROLOGY

## 2021-01-21 PROCEDURE — 92507 TX SP LANG VOICE COMM INDIV: CPT

## 2021-01-21 PROCEDURE — 85025 COMPLETE CBC W/AUTO DIFF WBC: CPT | Performed by: INTERNAL MEDICINE

## 2021-01-21 PROCEDURE — 81001 URINALYSIS AUTO W/SCOPE: CPT | Performed by: INTERNAL MEDICINE

## 2021-01-21 PROCEDURE — 87186 SC STD MICRODIL/AGAR DIL: CPT | Performed by: INTERNAL MEDICINE

## 2021-01-21 PROCEDURE — 80048 BASIC METABOLIC PNL TOTAL CA: CPT | Performed by: INTERNAL MEDICINE

## 2021-01-21 PROCEDURE — 82962 GLUCOSE BLOOD TEST: CPT

## 2021-01-21 RX ORDER — LISINOPRIL 40 MG/1
40 TABLET ORAL
Status: DISCONTINUED | OUTPATIENT
Start: 2021-01-22 | End: 2021-01-22 | Stop reason: HOSPADM

## 2021-01-21 RX ORDER — CLONIDINE HYDROCHLORIDE 0.1 MG/1
0.1 TABLET ORAL EVERY 8 HOURS PRN
Status: DISCONTINUED | OUTPATIENT
Start: 2021-01-21 | End: 2021-01-22 | Stop reason: HOSPADM

## 2021-01-21 RX ORDER — LISINOPRIL 20 MG/1
20 TABLET ORAL ONCE
Status: COMPLETED | OUTPATIENT
Start: 2021-01-21 | End: 2021-01-21

## 2021-01-21 RX ORDER — TEMAZEPAM 7.5 MG/1
7.5 CAPSULE ORAL NIGHTLY PRN
Status: DISCONTINUED | OUTPATIENT
Start: 2021-01-21 | End: 2021-01-22 | Stop reason: HOSPADM

## 2021-01-21 RX ORDER — AMLODIPINE BESYLATE 5 MG/1
5 TABLET ORAL
Status: DISCONTINUED | OUTPATIENT
Start: 2021-01-21 | End: 2021-01-22 | Stop reason: HOSPADM

## 2021-01-21 RX ADMIN — PROPRANOLOL HYDROCHLORIDE 10 MG: 10 TABLET ORAL at 22:14

## 2021-01-21 RX ADMIN — ATORVASTATIN CALCIUM 80 MG: 40 TABLET, FILM COATED ORAL at 22:12

## 2021-01-21 RX ADMIN — ACETAMINOPHEN 650 MG: 325 TABLET, FILM COATED ORAL at 00:01

## 2021-01-21 RX ADMIN — PROPRANOLOL HYDROCHLORIDE 10 MG: 10 TABLET ORAL at 08:34

## 2021-01-21 RX ADMIN — LISINOPRIL 20 MG: 20 TABLET ORAL at 08:34

## 2021-01-21 RX ADMIN — AMLODIPINE BESYLATE 5 MG: 5 TABLET ORAL at 13:20

## 2021-01-21 RX ADMIN — SODIUM CHLORIDE 75 ML/HR: 9 INJECTION, SOLUTION INTRAVENOUS at 05:39

## 2021-01-21 RX ADMIN — INSULIN LISPRO 2 UNITS: 100 INJECTION, SOLUTION INTRAVENOUS; SUBCUTANEOUS at 17:49

## 2021-01-21 RX ADMIN — TEMAZEPAM 7.5 MG: 7.5 CAPSULE ORAL at 22:12

## 2021-01-21 RX ADMIN — INSULIN DETEMIR 10 UNITS: 100 INJECTION, SOLUTION SUBCUTANEOUS at 08:36

## 2021-01-21 RX ADMIN — MAGNESIUM SULFATE 4 G: 4 INJECTION INTRAVENOUS at 19:32

## 2021-01-21 RX ADMIN — GABAPENTIN 100 MG: 100 CAPSULE ORAL at 05:39

## 2021-01-21 RX ADMIN — LISINOPRIL 20 MG: 20 TABLET ORAL at 13:20

## 2021-01-21 RX ADMIN — GABAPENTIN 100 MG: 100 CAPSULE ORAL at 22:12

## 2021-01-21 RX ADMIN — ASPIRIN 325 MG ORAL TABLET 325 MG: 325 PILL ORAL at 08:34

## 2021-01-21 RX ADMIN — PANTOPRAZOLE SODIUM 40 MG: 40 INJECTION, POWDER, FOR SOLUTION INTRAVENOUS at 08:34

## 2021-01-21 RX ADMIN — GABAPENTIN 100 MG: 100 CAPSULE ORAL at 13:20

## 2021-01-21 RX ADMIN — SODIUM CHLORIDE, PRESERVATIVE FREE 10 ML: 5 INJECTION INTRAVENOUS at 22:14

## 2021-01-21 RX ADMIN — SODIUM CHLORIDE, PRESERVATIVE FREE 10 ML: 5 INJECTION INTRAVENOUS at 08:34

## 2021-01-22 VITALS
HEART RATE: 89 BPM | HEIGHT: 65 IN | TEMPERATURE: 97.8 F | BODY MASS INDEX: 33.15 KG/M2 | RESPIRATION RATE: 16 BRPM | SYSTOLIC BLOOD PRESSURE: 158 MMHG | DIASTOLIC BLOOD PRESSURE: 93 MMHG | OXYGEN SATURATION: 94 % | WEIGHT: 199 LBS

## 2021-01-22 PROBLEM — T83.511A UTI (URINARY TRACT INFECTION) DUE TO URINARY INDWELLING CATHETER (HCC): Status: ACTIVE | Noted: 2021-01-22

## 2021-01-22 PROBLEM — N39.0 UTI (URINARY TRACT INFECTION) DUE TO URINARY INDWELLING CATHETER: Status: ACTIVE | Noted: 2021-01-22

## 2021-01-22 LAB
ASCENDING AORTA: 2.7 CM
BH CV ECHO MEAS - AO MAX PG (FULL): 2.5 MMHG
BH CV ECHO MEAS - AO MAX PG: 6.3 MMHG
BH CV ECHO MEAS - AO MEAN PG (FULL): 2 MMHG
BH CV ECHO MEAS - AO MEAN PG: 4 MMHG
BH CV ECHO MEAS - AO ROOT AREA (BSA CORRECTED): 1.6
BH CV ECHO MEAS - AO ROOT AREA: 8 CM^2
BH CV ECHO MEAS - AO ROOT DIAM: 3.2 CM
BH CV ECHO MEAS - AO V2 MAX: 125 CM/SEC
BH CV ECHO MEAS - AO V2 MEAN: 89.6 CM/SEC
BH CV ECHO MEAS - AO V2 VTI: 22.6 CM
BH CV ECHO MEAS - ASC AORTA: 2.7 CM
BH CV ECHO MEAS - AVA(I,A): 3.1 CM^2
BH CV ECHO MEAS - AVA(I,D): 3.1 CM^2
BH CV ECHO MEAS - AVA(V,A): 2.9 CM^2
BH CV ECHO MEAS - AVA(V,D): 2.9 CM^2
BH CV ECHO MEAS - BSA(HAYCOCK): 2.1 M^2
BH CV ECHO MEAS - BSA: 2 M^2
BH CV ECHO MEAS - BZI_BMI: 32.3 KILOGRAMS/M^2
BH CV ECHO MEAS - BZI_METRIC_HEIGHT: 167.6 CM
BH CV ECHO MEAS - BZI_METRIC_WEIGHT: 90.7 KG
BH CV ECHO MEAS - EDV(CUBED): 50.2 ML
BH CV ECHO MEAS - EDV(MOD-SP2): 35 ML
BH CV ECHO MEAS - EDV(MOD-SP4): 36 ML
BH CV ECHO MEAS - EDV(TEICH): 57.8 ML
BH CV ECHO MEAS - EF(CUBED): 70.7 %
BH CV ECHO MEAS - EF(MOD-BP): 62 %
BH CV ECHO MEAS - EF(MOD-SP2): 60 %
BH CV ECHO MEAS - EF(MOD-SP4): 61.1 %
BH CV ECHO MEAS - EF(TEICH): 63.2 %
BH CV ECHO MEAS - ESV(CUBED): 14.7 ML
BH CV ECHO MEAS - ESV(MOD-SP2): 14 ML
BH CV ECHO MEAS - ESV(MOD-SP4): 14 ML
BH CV ECHO MEAS - ESV(TEICH): 21.2 ML
BH CV ECHO MEAS - FS: 33.6 %
BH CV ECHO MEAS - IVS/LVPW: 1.1
BH CV ECHO MEAS - IVSD: 1.3 CM
BH CV ECHO MEAS - LA DIMENSION: 3.7 CM
BH CV ECHO MEAS - LA/AO: 1.2
BH CV ECHO MEAS - LAD MAJOR: 4.7 CM
BH CV ECHO MEAS - LAT PEAK E' VEL: 11.4 CM/SEC
BH CV ECHO MEAS - LATERAL E/E' RATIO: 4.7
BH CV ECHO MEAS - LV DIASTOLIC VOL/BSA (35-75): 18 ML/M^2
BH CV ECHO MEAS - LV MASS(C)D: 161.4 GRAMS
BH CV ECHO MEAS - LV MASS(C)DI: 80.7 GRAMS/M^2
BH CV ECHO MEAS - LV MAX PG: 3.8 MMHG
BH CV ECHO MEAS - LV MEAN PG: 2 MMHG
BH CV ECHO MEAS - LV SYSTOLIC VOL/BSA (12-30): 7 ML/M^2
BH CV ECHO MEAS - LV V1 MAX: 97 CM/SEC
BH CV ECHO MEAS - LV V1 MEAN: 64.6 CM/SEC
BH CV ECHO MEAS - LV V1 VTI: 18.2 CM
BH CV ECHO MEAS - LVIDD: 3.7 CM
BH CV ECHO MEAS - LVIDS: 2.5 CM
BH CV ECHO MEAS - LVLD AP2: 5.8 CM
BH CV ECHO MEAS - LVLD AP4: 6.1 CM
BH CV ECHO MEAS - LVLS AP2: 5.5 CM
BH CV ECHO MEAS - LVLS AP4: 5.4 CM
BH CV ECHO MEAS - LVOT AREA (M): 3.8 CM^2
BH CV ECHO MEAS - LVOT AREA: 3.8 CM^2
BH CV ECHO MEAS - LVOT DIAM: 2.2 CM
BH CV ECHO MEAS - LVPWD: 1.2 CM
BH CV ECHO MEAS - MED PEAK E' VEL: 8.4 CM/SEC
BH CV ECHO MEAS - MEDIAL E/E' RATIO: 6.4
BH CV ECHO MEAS - MV A MAX VEL: 98.7 CM/SEC
BH CV ECHO MEAS - MV E MAX VEL: 53.3 CM/SEC
BH CV ECHO MEAS - MV E/A: 0.54
BH CV ECHO MEAS - PA ACC SLOPE: 556 CM/SEC^2
BH CV ECHO MEAS - PA ACC TIME: 0.12 SEC
BH CV ECHO MEAS - PA PR(ACCEL): 23.4 MMHG
BH CV ECHO MEAS - SI(AO): 90.9 ML/M^2
BH CV ECHO MEAS - SI(CUBED): 17.8 ML/M^2
BH CV ECHO MEAS - SI(LVOT): 34.6 ML/M^2
BH CV ECHO MEAS - SI(MOD-SP2): 10.5 ML/M^2
BH CV ECHO MEAS - SI(MOD-SP4): 11 ML/M^2
BH CV ECHO MEAS - SI(TEICH): 18.3 ML/M^2
BH CV ECHO MEAS - SV(AO): 181.8 ML
BH CV ECHO MEAS - SV(CUBED): 35.5 ML
BH CV ECHO MEAS - SV(LVOT): 69.2 ML
BH CV ECHO MEAS - SV(MOD-SP2): 21 ML
BH CV ECHO MEAS - SV(MOD-SP4): 22 ML
BH CV ECHO MEAS - SV(TEICH): 36.5 ML
BH CV ECHO MEAS - TAPSE (>1.6): 1.9 CM
BH CV ECHO MEASUREMENTS AVERAGE E/E' RATIO: 5.38
BH CV VAS BP RIGHT ARM: NORMAL MMHG
BH CV XLRA - TDI S': 11.6 CM/SEC
GLUCOSE BLDC GLUCOMTR-MCNC: 150 MG/DL (ref 70–130)
GLUCOSE BLDC GLUCOMTR-MCNC: 152 MG/DL (ref 70–130)
LEFT ATRIUM VOLUME INDEX: 16.5 ML/M^2
LEFT ATRIUM VOLUME: 33 ML
LV EF 2D ECHO EST: 70 %
MAGNESIUM SERPL-MCNC: 2.4 MG/DL (ref 1.6–2.4)
MAXIMAL PREDICTED HEART RATE: 143 BPM
STRESS TARGET HR: 122 BPM

## 2021-01-22 PROCEDURE — 99233 SBSQ HOSP IP/OBS HIGH 50: CPT | Performed by: PSYCHIATRY & NEUROLOGY

## 2021-01-22 PROCEDURE — 63710000001 INSULIN DETEMIR PER 5 UNITS: Performed by: INTERNAL MEDICINE

## 2021-01-22 PROCEDURE — 82962 GLUCOSE BLOOD TEST: CPT

## 2021-01-22 PROCEDURE — 63710000001 INSULIN LISPRO (HUMAN) PER 5 UNITS: Performed by: INTERNAL MEDICINE

## 2021-01-22 PROCEDURE — 99239 HOSP IP/OBS DSCHRG MGMT >30: CPT | Performed by: INTERNAL MEDICINE

## 2021-01-22 PROCEDURE — 83735 ASSAY OF MAGNESIUM: CPT | Performed by: INTERNAL MEDICINE

## 2021-01-22 RX ORDER — BISACODYL 5 MG/1
10 TABLET, DELAYED RELEASE ORAL DAILY PRN
Status: DISCONTINUED | OUTPATIENT
Start: 2021-01-22 | End: 2021-01-22 | Stop reason: HOSPADM

## 2021-01-22 RX ORDER — AMLODIPINE BESYLATE 5 MG/1
5 TABLET ORAL
Start: 2021-01-23 | End: 2021-02-08 | Stop reason: SDUPTHER

## 2021-01-22 RX ORDER — CEFUROXIME AXETIL 250 MG/1
250 TABLET ORAL EVERY 12 HOURS SCHEDULED
Status: DISCONTINUED | OUTPATIENT
Start: 2021-01-22 | End: 2021-01-22 | Stop reason: HOSPADM

## 2021-01-22 RX ORDER — LISINOPRIL 40 MG/1
40 TABLET ORAL
Start: 2021-01-23 | End: 2021-02-08 | Stop reason: SDUPTHER

## 2021-01-22 RX ORDER — CEFUROXIME AXETIL 250 MG/1
500 TABLET ORAL 2 TIMES DAILY
Qty: 16 TABLET | Refills: 0
Start: 2021-01-22 | End: 2021-01-26

## 2021-01-22 RX ADMIN — GABAPENTIN 100 MG: 100 CAPSULE ORAL at 13:54

## 2021-01-22 RX ADMIN — LISINOPRIL 40 MG: 40 TABLET ORAL at 09:04

## 2021-01-22 RX ADMIN — SODIUM CHLORIDE, PRESERVATIVE FREE 10 ML: 5 INJECTION INTRAVENOUS at 09:05

## 2021-01-22 RX ADMIN — GABAPENTIN 100 MG: 100 CAPSULE ORAL at 06:03

## 2021-01-22 RX ADMIN — INSULIN LISPRO 2 UNITS: 100 INJECTION, SOLUTION INTRAVENOUS; SUBCUTANEOUS at 12:37

## 2021-01-22 RX ADMIN — INSULIN DETEMIR 10 UNITS: 100 INJECTION, SOLUTION SUBCUTANEOUS at 09:16

## 2021-01-22 RX ADMIN — ASPIRIN 325 MG ORAL TABLET 325 MG: 325 PILL ORAL at 09:04

## 2021-01-22 RX ADMIN — AMLODIPINE BESYLATE 5 MG: 5 TABLET ORAL at 09:04

## 2021-01-22 RX ADMIN — PROPRANOLOL HYDROCHLORIDE 10 MG: 10 TABLET ORAL at 09:16

## 2021-01-22 RX ADMIN — PANTOPRAZOLE SODIUM 40 MG: 40 INJECTION, POWDER, FOR SOLUTION INTRAVENOUS at 09:05

## 2021-01-22 RX ADMIN — CEFUROXIME AXETIL 250 MG: 250 TABLET ORAL at 12:42

## 2021-01-22 RX ADMIN — INSULIN LISPRO 2 UNITS: 100 INJECTION, SOLUTION INTRAVENOUS; SUBCUTANEOUS at 09:05

## 2021-01-23 LAB — BACTERIA SPEC AEROBE CULT: ABNORMAL

## 2021-02-01 ENCOUNTER — TELEPHONE (OUTPATIENT)
Dept: FAMILY MEDICINE CLINIC | Facility: CLINIC | Age: 76
End: 2021-02-01

## 2021-02-01 NOTE — TELEPHONE ENCOUNTER
WOLFGANG WITH [facility] WOULD LIKE A VERBAL FOR SPEECH THERAPY CONSULT DUE TO RECENT STROKE.    PLEASE ADVISE    OWLFGANG   [phone]

## 2021-02-04 PROCEDURE — G0180 MD CERTIFICATION HHA PATIENT: HCPCS | Performed by: FAMILY MEDICINE

## 2021-02-07 ENCOUNTER — APPOINTMENT (OUTPATIENT)
Dept: CT IMAGING | Facility: HOSPITAL | Age: 76
End: 2021-02-07

## 2021-02-07 ENCOUNTER — APPOINTMENT (OUTPATIENT)
Dept: GENERAL RADIOLOGY | Facility: HOSPITAL | Age: 76
End: 2021-02-07

## 2021-02-07 ENCOUNTER — HOSPITAL ENCOUNTER (EMERGENCY)
Facility: HOSPITAL | Age: 76
Discharge: HOME | End: 2021-02-07
Attending: EMERGENCY MEDICINE | Admitting: EMERGENCY MEDICINE

## 2021-02-07 VITALS
HEART RATE: 97 BPM | RESPIRATION RATE: 19 BRPM | BODY MASS INDEX: 32.85 KG/M2 | TEMPERATURE: 98.9 F | DIASTOLIC BLOOD PRESSURE: 68 MMHG | SYSTOLIC BLOOD PRESSURE: 128 MMHG | HEIGHT: 66 IN | OXYGEN SATURATION: 97 % | WEIGHT: 204.4 LBS

## 2021-02-07 DIAGNOSIS — K52.9 ENTERITIS: Primary | ICD-10-CM

## 2021-02-07 LAB
ALBUMIN SERPL-MCNC: 4.1 G/DL (ref 3.5–5.2)
ALBUMIN/GLOB SERPL: 1.3 G/DL
ALP SERPL-CCNC: 87 U/L (ref 39–117)
ALT SERPL W P-5'-P-CCNC: 17 U/L (ref 1–33)
ANION GAP SERPL CALCULATED.3IONS-SCNC: 9.4 MMOL/L (ref 5–15)
AST SERPL-CCNC: 17 U/L (ref 1–32)
BILIRUB SERPL-MCNC: 0.2 MG/DL (ref 0–1.2)
BILIRUB UR QL STRIP: NEGATIVE
BUN SERPL-MCNC: 27 MG/DL (ref 8–23)
BUN/CREAT SERPL: 25.7 (ref 7–25)
CALCIUM SPEC-SCNC: 9.6 MG/DL (ref 8.6–10.5)
CHLORIDE SERPL-SCNC: 105 MMOL/L (ref 98–107)
CLARITY UR: CLEAR
CO2 SERPL-SCNC: 25.6 MMOL/L (ref 22–29)
COLOR UR: YELLOW
CREAT SERPL-MCNC: 1.05 MG/DL (ref 0.57–1)
DEPRECATED RDW RBC AUTO: 53.6 FL (ref 37–54)
ERYTHROCYTE [DISTWIDTH] IN BLOOD BY AUTOMATED COUNT: 15 % (ref 12.3–15.4)
GFR SERPL CREATININE-BSD FRML MDRD: 51 ML/MIN/1.73
GLOBULIN UR ELPH-MCNC: 3.2 GM/DL
GLUCOSE SERPL-MCNC: 167 MG/DL (ref 65–99)
GLUCOSE UR STRIP-MCNC: NEGATIVE MG/DL
HCT VFR BLD AUTO: 43.9 % (ref 34–46.6)
HGB BLD-MCNC: 13.5 G/DL (ref 12–15.9)
HGB UR QL STRIP.AUTO: NEGATIVE
HOLD SPECIMEN: NORMAL
KETONES UR QL STRIP: NEGATIVE
LEUKOCYTE ESTERASE UR QL STRIP.AUTO: NEGATIVE
LIPASE SERPL-CCNC: 80 U/L (ref 13–60)
LYMPHOCYTES # BLD MANUAL: 1.71 10*3/MM3 (ref 0.7–3.1)
LYMPHOCYTES NFR BLD MANUAL: 15 % (ref 19.6–45.3)
LYMPHOCYTES NFR BLD MANUAL: 3 % (ref 5–12)
MCH RBC QN AUTO: 29.7 PG (ref 26.6–33)
MCHC RBC AUTO-ENTMCNC: 30.8 G/DL (ref 31.5–35.7)
MCV RBC AUTO: 96.7 FL (ref 79–97)
METAMYELOCYTES NFR BLD MANUAL: 1 % (ref 0–0)
MONOCYTES # BLD AUTO: 0.34 10*3/MM3 (ref 0.1–0.9)
MYELOCYTES NFR BLD MANUAL: 2 % (ref 0–0)
NEUTROPHILS # BLD AUTO: 8.99 10*3/MM3 (ref 1.7–7)
NEUTROPHILS NFR BLD MANUAL: 49 % (ref 42.7–76)
NEUTS BAND NFR BLD MANUAL: 30 % (ref 0–5)
NITRITE UR QL STRIP: NEGATIVE
PH UR STRIP.AUTO: <=5 [PH] (ref 5–8)
PLATELET # BLD AUTO: 226 10*3/MM3 (ref 140–450)
PMV BLD AUTO: 11.5 FL (ref 6–12)
POTASSIUM SERPL-SCNC: 4 MMOL/L (ref 3.5–5.2)
PROT SERPL-MCNC: 7.3 G/DL (ref 6–8.5)
PROT UR QL STRIP: NEGATIVE
RBC # BLD AUTO: 4.54 10*6/MM3 (ref 3.77–5.28)
RBC MORPH BLD: NORMAL
SCAN SLIDE: NORMAL
SMALL PLATELETS BLD QL SMEAR: ADEQUATE
SODIUM SERPL-SCNC: 140 MMOL/L (ref 136–145)
SP GR UR STRIP: 1.02 (ref 1–1.03)
TROPONIN T SERPL-MCNC: <0.01 NG/ML (ref 0–0.03)
UROBILINOGEN UR QL STRIP: NORMAL
WBC # BLD AUTO: 11.38 10*3/MM3 (ref 3.4–10.8)
WBC MORPH BLD: NORMAL
WHOLE BLOOD HOLD SPECIMEN: NORMAL
WHOLE BLOOD HOLD SPECIMEN: NORMAL

## 2021-02-07 PROCEDURE — 80053 COMPREHEN METABOLIC PANEL: CPT | Performed by: EMERGENCY MEDICINE

## 2021-02-07 PROCEDURE — 83690 ASSAY OF LIPASE: CPT | Performed by: EMERGENCY MEDICINE

## 2021-02-07 PROCEDURE — 85007 BL SMEAR W/DIFF WBC COUNT: CPT | Performed by: EMERGENCY MEDICINE

## 2021-02-07 PROCEDURE — 74176 CT ABD & PELVIS W/O CONTRAST: CPT

## 2021-02-07 PROCEDURE — 96374 THER/PROPH/DIAG INJ IV PUSH: CPT

## 2021-02-07 PROCEDURE — 99284 EMERGENCY DEPT VISIT MOD MDM: CPT

## 2021-02-07 PROCEDURE — 25010000002 LORAZEPAM PER 2 MG: Performed by: EMERGENCY MEDICINE

## 2021-02-07 PROCEDURE — 96375 TX/PRO/DX INJ NEW DRUG ADDON: CPT

## 2021-02-07 PROCEDURE — 85025 COMPLETE CBC W/AUTO DIFF WBC: CPT | Performed by: EMERGENCY MEDICINE

## 2021-02-07 PROCEDURE — 25010000002 ONDANSETRON PER 1 MG: Performed by: EMERGENCY MEDICINE

## 2021-02-07 PROCEDURE — 81003 URINALYSIS AUTO W/O SCOPE: CPT | Performed by: EMERGENCY MEDICINE

## 2021-02-07 PROCEDURE — 93005 ELECTROCARDIOGRAM TRACING: CPT | Performed by: EMERGENCY MEDICINE

## 2021-02-07 PROCEDURE — 71045 X-RAY EXAM CHEST 1 VIEW: CPT

## 2021-02-07 PROCEDURE — 84484 ASSAY OF TROPONIN QUANT: CPT | Performed by: EMERGENCY MEDICINE

## 2021-02-07 RX ORDER — SODIUM CHLORIDE 0.9 % (FLUSH) 0.9 %
10 SYRINGE (ML) INJECTION AS NEEDED
Status: DISCONTINUED | OUTPATIENT
Start: 2021-02-07 | End: 2021-02-07 | Stop reason: HOSPADM

## 2021-02-07 RX ORDER — LORAZEPAM 2 MG/ML
1 INJECTION INTRAMUSCULAR ONCE
Status: COMPLETED | OUTPATIENT
Start: 2021-02-07 | End: 2021-02-07

## 2021-02-07 RX ORDER — FAMOTIDINE 10 MG/ML
20 INJECTION, SOLUTION INTRAVENOUS ONCE
Status: COMPLETED | OUTPATIENT
Start: 2021-02-07 | End: 2021-02-07

## 2021-02-07 RX ORDER — ONDANSETRON 2 MG/ML
4 INJECTION INTRAMUSCULAR; INTRAVENOUS ONCE
Status: COMPLETED | OUTPATIENT
Start: 2021-02-07 | End: 2021-02-07

## 2021-02-07 RX ORDER — ONDANSETRON 4 MG/1
4 TABLET, ORALLY DISINTEGRATING ORAL 4 TIMES DAILY PRN
Qty: 20 TABLET | Refills: 0 | Status: SHIPPED | OUTPATIENT
Start: 2021-02-07 | End: 2021-02-08

## 2021-02-07 RX ADMIN — SODIUM CHLORIDE 500 ML: 9 INJECTION, SOLUTION INTRAVENOUS at 02:42

## 2021-02-07 RX ADMIN — FAMOTIDINE 20 MG: 10 INJECTION INTRAVENOUS at 03:47

## 2021-02-07 RX ADMIN — ONDANSETRON 4 MG: 2 INJECTION INTRAMUSCULAR; INTRAVENOUS at 02:42

## 2021-02-07 RX ADMIN — LORAZEPAM 1 MG: 2 INJECTION, SOLUTION INTRAMUSCULAR; INTRAVENOUS at 03:46
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
## 2021-02-08 ENCOUNTER — OFFICE VISIT (OUTPATIENT)
Dept: FAMILY MEDICINE CLINIC | Facility: CLINIC | Age: 76
End: 2021-02-08

## 2021-02-08 ENCOUNTER — TELEPHONE (OUTPATIENT)
Dept: FAMILY MEDICINE CLINIC | Facility: CLINIC | Age: 76
End: 2021-02-08

## 2021-02-08 VITALS
SYSTOLIC BLOOD PRESSURE: 120 MMHG | WEIGHT: 199 LBS | TEMPERATURE: 98.1 F | HEART RATE: 70 BPM | HEIGHT: 66 IN | BODY MASS INDEX: 31.98 KG/M2 | OXYGEN SATURATION: 97 % | RESPIRATION RATE: 14 BRPM | DIASTOLIC BLOOD PRESSURE: 72 MMHG

## 2021-02-08 DIAGNOSIS — R39.9 URINARY SYMPTOM OR SIGN: ICD-10-CM

## 2021-02-08 DIAGNOSIS — Z79.4 TYPE 2 DIABETES MELLITUS WITHOUT COMPLICATION, WITH LONG-TERM CURRENT USE OF INSULIN (HCC): ICD-10-CM

## 2021-02-08 DIAGNOSIS — E11.9 TYPE 2 DIABETES MELLITUS WITHOUT COMPLICATION, WITH LONG-TERM CURRENT USE OF INSULIN (HCC): ICD-10-CM

## 2021-02-08 DIAGNOSIS — I10 ESSENTIAL HYPERTENSION: ICD-10-CM

## 2021-02-08 DIAGNOSIS — E78.2 MIXED HYPERLIPIDEMIA: ICD-10-CM

## 2021-02-08 DIAGNOSIS — I73.9 PERIPHERAL VASCULAR DISEASE (HCC): ICD-10-CM

## 2021-02-08 DIAGNOSIS — Z09 HOSPITAL DISCHARGE FOLLOW-UP: Primary | ICD-10-CM

## 2021-02-08 DIAGNOSIS — Z86.73 HISTORY OF STROKE: ICD-10-CM

## 2021-02-08 DIAGNOSIS — K52.9 ENTERITIS: ICD-10-CM

## 2021-02-08 LAB
BILIRUB BLD-MCNC: ABNORMAL MG/DL
CLARITY, POC: ABNORMAL
COLOR UR: YELLOW
GLUCOSE UR STRIP-MCNC: NEGATIVE MG/DL
KETONES UR QL: NEGATIVE
LEUKOCYTE EST, POC: NEGATIVE
NITRITE UR-MCNC: NEGATIVE MG/ML
PH UR: 6 [PH] (ref 5–8)
PROT UR STRIP-MCNC: NEGATIVE MG/DL
RBC # UR STRIP: NEGATIVE /UL
SP GR UR: 1.03 (ref 1–1.03)
UROBILINOGEN UR QL: NORMAL

## 2021-02-08 PROCEDURE — 99496 TRANSJ CARE MGMT HIGH F2F 7D: CPT | Performed by: FAMILY MEDICINE

## 2021-02-08 PROCEDURE — 1111F DSCHRG MED/CURRENT MED MERGE: CPT | Performed by: FAMILY MEDICINE

## 2021-02-08 PROCEDURE — 81003 URINALYSIS AUTO W/O SCOPE: CPT | Performed by: FAMILY MEDICINE

## 2021-02-08 RX ORDER — PROPRANOLOL HYDROCHLORIDE 40 MG/1
40 TABLET ORAL 2 TIMES DAILY
COMMUNITY
End: 2021-02-23 | Stop reason: SDUPTHER

## 2021-02-08 RX ORDER — AMLODIPINE BESYLATE 5 MG/1
5 TABLET ORAL
Qty: 90 TABLET | Refills: 3 | Status: SHIPPED | OUTPATIENT
Start: 2021-02-08 | End: 2021-11-17

## 2021-02-08 RX ORDER — OMEPRAZOLE 20 MG/1
20 CAPSULE, DELAYED RELEASE ORAL DAILY
COMMUNITY
End: 2021-04-26 | Stop reason: SDUPTHER

## 2021-02-08 RX ORDER — LISINOPRIL 20 MG/1
20 TABLET ORAL
Qty: 90 TABLET | Refills: 3 | Status: SHIPPED | OUTPATIENT
Start: 2021-02-08 | End: 2021-11-17

## 2021-02-08 NOTE — TELEPHONE ENCOUNTER
SW pt. Sts that she was confused last month when [facility] called her to schedule and declined bc she thought her Medicare would not cover her visits. I advised her that was only for our provider, Tati Maldonado [facility] who is not currently credentialed with Medicare - but that she should be fine to schedule with a counselor @ [facility] Pt requested me to resubmit her referral to them for scheduling.  I have routed referral back to [facility] for contacting pt to schedule.

## 2021-02-08 NOTE — TELEPHONE ENCOUNTER
Patients family member that was in the office today with her, asked if someone can call her about a referral for grief counseling. She stated that someone was suppose to call her a couple months ago to see if patients insur would cover it.

## 2021-02-08 NOTE — PROGRESS NOTES
"Transitional Care Follow Up Visit  Subjective     Annie Mejia is a 75 y.o. female who presents for a transitional care management visit.    Within 48 business hours after discharge our office contacted her via telephone to coordinate her care and needs.      I reviewed and discussed the details of that call along with the discharge summary, hospital problems, inpatient lab results, inpatient diagnostic studies, and consultation reports with Annie.     Current outpatient and discharge medications have been reconciled for the patient.  Reviewed by: Olga Pimentel MD      Date of TCM Phone Call 11/21/2020   [facility]   Date of Admission 11/16/2020   Date of Discharge 11/21/2020   [place]     Risk for Readmission (LACE) No data recorded    History of Present Illness   Course During Hospital Stay:    Mrs. Mejia was seen initially at [facility] ER for suspected stroke. Received TPA and was transferred to [facility] for more advanced care. Due to recurrent delirium and agitation timely imaging was not available. When imaging finally obtained no evidence of stroke on MRI seen. She was dc'd to [facility] rehab on 1/22/2021. dc'd from [facility] 1/29/2021. She was discharged with  services. Now receiving skilled nursing care only.    Studies reviewed included:  Echo -   CT angio  MRI  Labs    She has restarted ASA as instructed. Had previously dc'd. Also has upcoming apt with neuro in March.    Was started on norvasc and lisinopril at time of discharge due to high BP. Has continued but concerned about BP getting \"too low\". Family has brought in record of BG and BP. BP log shows average BP of 110 systolic and 60s diastolic. BG generally in 140s-150s, occ over 200. She has not been using insulin since discharge.    She was tx'd for UTI during hospital stay. Still having some mild dysuria. Would like urine check today.    She was in ER again yesterday for acute n/v. dxd with " enteritis. Pt feels she may have had food poisoning. No blood in stool. No new abd pain. Symptoms essentially resolved.    The following portions of the patient's history were reviewed and updated as appropriate: allergies, current medications, past family history, past medical history, past social history, past surgical history and problem list.    Review of Systems   Constitutional: Positive for appetite change and fatigue. Negative for chills and fever.   HENT: Positive for congestion and postnasal drip. Negative for mouth sores, nosebleeds, rhinorrhea and sore throat.    Eyes: Positive for visual disturbance (chronic).   Respiratory: Positive for cough (dry, intermittent) and shortness of breath (with anxiety). Negative for wheezing.    Cardiovascular: Positive for chest pain (with anxiety) and palpitations (with anxiety). Negative for leg swelling.   Gastrointestinal: Positive for abdominal pain, diarrhea and nausea. Negative for blood in stool and vomiting.        Heartburn   Genitourinary: Negative for dysuria, hematuria and urgency.   Musculoskeletal: Positive for arthralgias, back pain, gait problem and myalgias.   Skin: Negative for rash and wound.   Neurological: Positive for dizziness and weakness. Negative for syncope and headaches.   Hematological: Negative for adenopathy. Bruises/bleeds easily.   Psychiatric/Behavioral: Positive for agitation, decreased concentration, dysphoric mood and sleep disturbance. Negative for confusion. The patient is nervous/anxious.    Pt's previous ROS reviewed and updated as indicated.       Objective    Vitals:    02/08/21 1338   BP: 120/72   Pulse: 70   Resp: 14   Temp: 98.1 °F (36.7 °C)   SpO2: 97%     Body mass index is 32.12 kg/m².      02/08/21  1338   Weight: 90.3 kg (199 lb)     Physical Exam  Vitals signs and nursing note reviewed.   Constitutional:       General: She is not in acute distress.     Appearance: She is well-developed and well-groomed. She is obese.  She is not ill-appearing.   HENT:      Head: Normocephalic and atraumatic.   Eyes:      General: No scleral icterus.     Extraocular Movements: Extraocular movements intact.      Conjunctiva/sclera: Conjunctivae normal.   Neck:      Thyroid: No thyroid mass.   Cardiovascular:      Rate and Rhythm: Normal rate and regular rhythm.      Pulses: Normal pulses.      Heart sounds: Normal heart sounds.   Pulmonary:      Effort: Pulmonary effort is normal.      Breath sounds: Normal breath sounds. No wheezing, rhonchi or rales.   Abdominal:      General: Bowel sounds are normal. There is no distension.      Palpations: Abdomen is soft.      Tenderness: There is generalized abdominal tenderness (mild). There is no right CVA tenderness, left CVA tenderness, guarding or rebound.   Musculoskeletal:      Right lower leg: No edema.      Left lower leg: No edema.   Lymphadenopathy:      Cervical: No cervical adenopathy.   Skin:     General: Skin is warm and dry.      Coloration: Skin is not jaundiced or pale.      Findings: No rash.   Neurological:      Mental Status: She is alert and oriented to person, place, and time.      Motor: Motor function is intact.      Gait: Gait abnormal (antalgic).   Psychiatric:         Mood and Affect: Mood is anxious and depressed.         Speech: Speech normal.         Behavior: Behavior normal. Behavior is cooperative.         Thought Content: Thought content normal. Thought content is not paranoid. Thought content does not include suicidal ideation.         Cognition and Memory: Cognition normal.     Pt's previous physical exam reviewed and updated as indicated.    Brief Urine Lab Results  (Last result in the past 365 days)      Color   Clarity   Blood   Leuk Est   Nitrite   Protein   CREAT   Urine HCG        02/08/21 1500 Yellow Cloudy Negative Negative Negative Negative             Lab Results   Component Value Date    HGBA1C 6.80 (H) 01/18/2021    HGBA1C 7.40 (H) 12/08/2020    HGBA1C 8.80 (H)  09/29/2020     Lab Results   Component Value Date    MICROALBUR 30.6 03/11/2020    CREATININE 1.05 (H) 02/07/2021     Lab Results   Component Value Date    GLUCOSE 167 (H) 02/07/2021    BUN 27 (H) 02/07/2021    CREATININE 1.05 (H) 02/07/2021    EGFRIFNONA 51 (L) 02/07/2021    EGFRIFAFRI 56 (L) 12/11/2020    BCR 25.7 (H) 02/07/2021    K 4.0 02/07/2021    CO2 25.6 02/07/2021    CALCIUM 9.6 02/07/2021    PROTENTOTREF 6.7 12/08/2020    ALBUMIN 4.10 02/07/2021    LABIL2 1.2 12/08/2020    AST 17 02/07/2021    ALT 17 02/07/2021     Lab Results   Component Value Date    WBC 11.38 (H) 02/07/2021    HGB 13.5 02/07/2021    HCT 43.9 02/07/2021    MCV 96.7 02/07/2021     02/07/2021     Lab Results   Component Value Date    TSH 1.130 01/18/2021     Results for orders placed during the hospital encounter of 01/17/21   Adult Transthoracic Echo Complete W/ Cont if Necessary Per Protocol (With Agitated Saline)    Narrative · Left ventricular systolic function is normal. Estimated left ventricular   EF = 70%  · Left ventricular wall thickness is consistent with hypertrophy.   Sigmoid-shaped ventricular septum is present.  · Left ventricular diastolic function is consistent with (grade I)   impaired relaxation.  · The cardiac valves are functioning normal  · Saline test results are negative.        Ct Abdomen Pelvis Without Contrast  Result Date: 2/7/2021  IMPRESSION: 1.  Multiple fluid-filled loops of nondilated large and small bowel.  This can be associated with viral processes.  No evidence of bowel obstruction.  No pericolonic inflammatory changes. 2.  Colonic diverticulosis without evidence of diverticulitis. 3.  Small to moderate hiatal hernia. 4.  Nonobstructing 2 mm right renal calculus. Authenticated by Nazario Richmond MD on 02/07/2021 03:32:27 AM    Ct Head Without Contrast  Result Date: 1/20/2021  Stable atrophy and chronic changes seen within the brain with no acute intracranial abnormality identified. No hemorrhage  status post TPA.  D:  01/18/2021 E:  01/19/2021  This report was finalized on 1/20/2021 4:14 PM by Dr. Camila Vang MD.      Ct Head Without Contrast  Result Date: 1/17/2021  Motion degraded examination. Atrophy and diffuse white matter changes likely the sequela of chronic microvascular disease. No convincing evidence of large vessel infarct with attention to the left MCA distribution. Correlation with outside study may be of benefit. Signer Name: GREG Kirk MD  Signed: 1/17/2021 7:29 PM  Workstation Name: [id number]  [facility]    Ct Angiogram Neck  Result Date: 1/17/2021  No acute intracranial abnormality.  No vessel injury, hemodynamically significant stenosis or major vessel occlusion of the head or neck. Authenticated by Gucci Crawford MD on 01/17/2021 05:34:58 PM    Mri Brain Without Contrast  Result Date: 1/19/2021  Limited diffusion and susceptibility weighted imaging demonstrates no evidence of acute infarct or hemorrhage. There is no gross intracranial mass effect or midline shift. The ventricles appear normal in size and configuration.   This report was finalized on 1/19/2021 8:12 AM by Russ Watts.      Xr Chest 1 View  Result Date: 2/7/2021  No acute cardiopulmonary process.  .  This report was finalized on 2/7/2021 7:43 AM by Rhona Ferreira MD.    Xr Chest 1 View  Result Date: 1/18/2021  Chronic changes seen throughout the lung fields with ill-defined opacification seen in the left lung base concerning for infiltrate.  D:  01/18/2021 E:  01/18/2021  This report was finalized on 1/18/2021 3:45 PM by Dr. Camila Vang MD.      Xr Chest 1 View  Result Date: 1/17/2021  Mild left lung base atelectasis or pneumonia.    This report was finalized on 1/17/2021 5:01 PM by Santo Palacios MD.    Ct Angiogram Head  Result Date: 1/17/2021  No acute intracranial hemorrhage or large acute cortical infarct. Authenticated by Gucci Crawford MD on 01/17/2021 05:36:19  PM    Assessment/Plan   Diagnoses and all orders for this visit:    1. Hospital discharge follow-up (Primary)    2. Urinary symptom or sign  -     POCT urinalysis dipstick, automated    3. Essential hypertension    4. Type 2 diabetes mellitus without complication, with long-term current use of insulin (CMS/[facility])    5. History of stroke    6. Mixed hyperlipidemia    7. Peripheral vascular disease (CMS/[facility])    8. Enteritis    Other orders  -     lisinopril (PRINIVIL,ZESTRIL) 20 MG tablet; Take 1 tablet by mouth Daily.  Dispense: 90 tablet; Refill: 3  -     amLODIPine (NORVASC) 5 MG tablet; Take 1 tablet by mouth Daily.  Dispense: 90 tablet; Refill: 3      Clinically stable after recent hospital discharge. Suspected stroke in setting HTN, HLP, PAD. Hypertension much improved. Continue norvasc, decrease lisinopril to 20 mg. Continue close monitoring with daily BP checks. Continue statin, ASA.    Enteritis appears to have resolved. Slowly advance diet.    Previous IDDM generally well controlled. Advised to hold insulin for now to avoid hypoglycemia. Continue diabetic diet and close monitoring of BG. Encouraged to check PP numbers as well as fasting AM BG.    No sign of UTI at this time.     Continue HH services.    F/u with neurology as scheduled.     F/u in 1 month, sooner as needed.  Patient was encouraged to keep me informed of any acute changes, lack of improvement, or any new concerning symptoms.  Pt is aware of reasons to seek emergent care.  Patient voiced understanding of all instructions and denied further questions.    This TCM required high complexity medical decision making.

## 2021-02-09 ENCOUNTER — OUTSIDE FACILITY SERVICE (OUTPATIENT)
Dept: FAMILY MEDICINE CLINIC | Facility: CLINIC | Age: 76
End: 2021-02-09

## 2021-02-09 ENCOUNTER — TELEPHONE (OUTPATIENT)
Dept: FAMILY MEDICINE CLINIC | Facility: CLINIC | Age: 76
End: 2021-02-09

## 2021-02-09 PROBLEM — T83.511A UTI (URINARY TRACT INFECTION) DUE TO URINARY INDWELLING CATHETER: Status: RESOLVED | Noted: 2021-01-22 | Resolved: 2021-02-09

## 2021-02-09 PROBLEM — N39.0 UTI (URINARY TRACT INFECTION) DUE TO URINARY INDWELLING CATHETER (HCC): Status: RESOLVED | Noted: 2021-01-22 | Resolved: 2021-02-09

## 2021-02-09 PROBLEM — Z86.73 HISTORY OF STROKE: Status: ACTIVE | Noted: 2021-02-09

## 2021-02-09 NOTE — TELEPHONE ENCOUNTER
PATIENT CALLED TO LET DR. LOMBARDI KNOW THAT HER ANKLES ARE SWELLING A LITTLE. HER SOCK MAKE AN INDENTION AND THEY DIDN'T BEFORE.  PATIENT SAID FOR DR. LOMBARDI TO CALL HER IF SHE NEEDED TO CHANGE ANYTHING.    Annie Mejia [phone]

## 2021-02-10 NOTE — TELEPHONE ENCOUNTER
Likely due to the amlodipine. Continue medication. Prop up feet when sitting, compression stockings, etc

## 2021-02-23 ENCOUNTER — OFFICE VISIT (OUTPATIENT)
Dept: ORTHOPEDIC SURGERY | Facility: CLINIC | Age: 76
End: 2021-02-23

## 2021-02-23 VITALS — WEIGHT: 199 LBS | BODY MASS INDEX: 31.98 KG/M2 | RESPIRATION RATE: 18 BRPM | HEIGHT: 66 IN

## 2021-02-23 DIAGNOSIS — M17.0 PRIMARY OSTEOARTHRITIS OF BOTH KNEES: Primary | ICD-10-CM

## 2021-02-23 DIAGNOSIS — M79.605 LEFT LEG PAIN: ICD-10-CM

## 2021-02-23 PROCEDURE — 20610 DRAIN/INJ JOINT/BURSA W/O US: CPT | Performed by: ORTHOPAEDIC SURGERY

## 2021-02-23 RX ORDER — PROPRANOLOL HYDROCHLORIDE 40 MG/1
40 TABLET ORAL 2 TIMES DAILY
Qty: 180 TABLET | Refills: 3 | Status: SHIPPED | OUTPATIENT
Start: 2021-02-23 | End: 2021-11-18

## 2021-02-23 RX ORDER — TRIAMCINOLONE ACETONIDE 40 MG/ML
40 INJECTION, SUSPENSION INTRA-ARTICULAR; INTRAMUSCULAR
Status: COMPLETED | OUTPATIENT
Start: 2021-02-23 | End: 2021-02-23

## 2021-02-23 RX ORDER — LIDOCAINE HYDROCHLORIDE 10 MG/ML
2 INJECTION, SOLUTION INFILTRATION; PERINEURAL
Status: COMPLETED | OUTPATIENT
Start: 2021-02-23 | End: 2021-02-23

## 2021-02-23 RX ORDER — GABAPENTIN 300 MG/1
300 CAPSULE ORAL 3 TIMES DAILY
Qty: 42 CAPSULE | Refills: 0 | Status: SHIPPED | OUTPATIENT
Start: 2021-02-23 | End: 2021-03-08 | Stop reason: SDUPTHER

## 2021-02-23 RX ORDER — GABAPENTIN 300 MG/1
300 CAPSULE ORAL 3 TIMES DAILY
Qty: 90 CAPSULE | Refills: 0 | Status: SHIPPED | OUTPATIENT
Start: 2021-02-23 | End: 2021-04-07 | Stop reason: SDUPTHER

## 2021-02-23 RX ADMIN — TRIAMCINOLONE ACETONIDE 40 MG: 40 INJECTION, SUSPENSION INTRA-ARTICULAR; INTRAMUSCULAR at 11:03

## 2021-02-23 RX ADMIN — LIDOCAINE HYDROCHLORIDE 2 ML: 10 INJECTION, SOLUTION INFILTRATION; PERINEURAL at 11:03
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
## 2021-02-25 ENCOUNTER — TELEPHONE (OUTPATIENT)
Dept: FAMILY MEDICINE CLINIC | Facility: CLINIC | Age: 76
End: 2021-02-25

## 2021-02-25 NOTE — TELEPHONE ENCOUNTER
PT CALLED STATED THAT HER BLOOD SUGAR HAS BEEN RUNNING HIGH:  288  247  247  [phone]  262  PT STATED THAT SHE WAS GIVEN RX LEVEMIR  AND WOULD LIKE TO KNOW IF DR SUGGEST HER TO TAKE 5 TO 10 UNITS OF THE RX AT NIGHT.    PLEASE ADVISE.  CALL BACK:[phone]

## 2021-02-28 ENCOUNTER — APPOINTMENT (OUTPATIENT)
Dept: CT IMAGING | Facility: HOSPITAL | Age: 76
End: 2021-02-28

## 2021-02-28 ENCOUNTER — HOSPITAL ENCOUNTER (EMERGENCY)
Facility: HOSPITAL | Age: 76
Discharge: HOME | End: 2021-03-01
Attending: STUDENT IN AN ORGANIZED HEALTH CARE EDUCATION/TRAINING PROGRAM | Admitting: STUDENT IN AN ORGANIZED HEALTH CARE EDUCATION/TRAINING PROGRAM

## 2021-02-28 DIAGNOSIS — K52.9 ENTEROCOLITIS: Primary | ICD-10-CM

## 2021-02-28 PROCEDURE — 80053 COMPREHEN METABOLIC PANEL: CPT | Performed by: STUDENT IN AN ORGANIZED HEALTH CARE EDUCATION/TRAINING PROGRAM

## 2021-02-28 PROCEDURE — 85025 COMPLETE CBC W/AUTO DIFF WBC: CPT | Performed by: STUDENT IN AN ORGANIZED HEALTH CARE EDUCATION/TRAINING PROGRAM

## 2021-02-28 PROCEDURE — 85007 BL SMEAR W/DIFF WBC COUNT: CPT | Performed by: STUDENT IN AN ORGANIZED HEALTH CARE EDUCATION/TRAINING PROGRAM

## 2021-02-28 PROCEDURE — 96375 TX/PRO/DX INJ NEW DRUG ADDON: CPT

## 2021-02-28 PROCEDURE — 83690 ASSAY OF LIPASE: CPT | Performed by: STUDENT IN AN ORGANIZED HEALTH CARE EDUCATION/TRAINING PROGRAM

## 2021-02-28 PROCEDURE — 99283 EMERGENCY DEPT VISIT LOW MDM: CPT

## 2021-02-28 PROCEDURE — 96374 THER/PROPH/DIAG INJ IV PUSH: CPT

## 2021-02-28 PROCEDURE — 74176 CT ABD & PELVIS W/O CONTRAST: CPT

## 2021-02-28 RX ORDER — SODIUM CHLORIDE 0.9 % (FLUSH) 0.9 %
10 SYRINGE (ML) INJECTION AS NEEDED
Status: DISCONTINUED | OUTPATIENT
Start: 2021-02-28 | End: 2021-03-01 | Stop reason: HOSPADM

## 2021-03-01 VITALS
BODY MASS INDEX: 31.83 KG/M2 | HEART RATE: 86 BPM | WEIGHT: 202.8 LBS | RESPIRATION RATE: 17 BRPM | HEIGHT: 67 IN | DIASTOLIC BLOOD PRESSURE: 79 MMHG | SYSTOLIC BLOOD PRESSURE: 133 MMHG | OXYGEN SATURATION: 97 % | TEMPERATURE: 98.8 F

## 2021-03-01 LAB
ALBUMIN SERPL-MCNC: 3.8 G/DL (ref 3.5–5.2)
ALBUMIN/GLOB SERPL: 1.5 G/DL
ALP SERPL-CCNC: 91 U/L (ref 39–117)
ALT SERPL W P-5'-P-CCNC: 18 U/L (ref 1–33)
ANION GAP SERPL CALCULATED.3IONS-SCNC: 7.5 MMOL/L (ref 5–15)
AST SERPL-CCNC: 17 U/L (ref 1–32)
BILIRUB SERPL-MCNC: 0.2 MG/DL (ref 0–1.2)
BILIRUB UR QL STRIP: NEGATIVE
BUN SERPL-MCNC: 31 MG/DL (ref 8–23)
BUN/CREAT SERPL: 29 (ref 7–25)
CALCIUM SPEC-SCNC: 9.2 MG/DL (ref 8.6–10.5)
CHLORIDE SERPL-SCNC: 106 MMOL/L (ref 98–107)
CLARITY UR: CLEAR
CO2 SERPL-SCNC: 25.5 MMOL/L (ref 22–29)
COLOR UR: YELLOW
CREAT SERPL-MCNC: 1.07 MG/DL (ref 0.57–1)
DEPRECATED RDW RBC AUTO: 49.2 FL (ref 37–54)
EOSINOPHIL # BLD MANUAL: 0.14 10*3/MM3 (ref 0–0.4)
EOSINOPHIL NFR BLD MANUAL: 1 % (ref 0.3–6.2)
ERYTHROCYTE [DISTWIDTH] IN BLOOD BY AUTOMATED COUNT: 14.4 % (ref 12.3–15.4)
GFR SERPL CREATININE-BSD FRML MDRD: 50 ML/MIN/1.73
GLOBULIN UR ELPH-MCNC: 2.6 GM/DL
GLUCOSE SERPL-MCNC: 213 MG/DL (ref 65–99)
GLUCOSE UR STRIP-MCNC: ABNORMAL MG/DL
HCT VFR BLD AUTO: 37.9 % (ref 34–46.6)
HGB BLD-MCNC: 12 G/DL (ref 12–15.9)
HGB UR QL STRIP.AUTO: NEGATIVE
HOLD SPECIMEN: NORMAL
HOLD SPECIMEN: NORMAL
KETONES UR QL STRIP: NEGATIVE
LEUKOCYTE ESTERASE UR QL STRIP.AUTO: NEGATIVE
LIPASE SERPL-CCNC: 59 U/L (ref 13–60)
LYMPHOCYTES # BLD MANUAL: 2.84 10*3/MM3 (ref 0.7–3.1)
LYMPHOCYTES NFR BLD MANUAL: 21 % (ref 19.6–45.3)
LYMPHOCYTES NFR BLD MANUAL: 3 % (ref 5–12)
MCH RBC QN AUTO: 29.5 PG (ref 26.6–33)
MCHC RBC AUTO-ENTMCNC: 31.7 G/DL (ref 31.5–35.7)
MCV RBC AUTO: 93.1 FL (ref 79–97)
METAMYELOCYTES NFR BLD MANUAL: 4 % (ref 0–0)
MONOCYTES # BLD AUTO: 0.41 10*3/MM3 (ref 0.1–0.9)
NEUTROPHILS # BLD AUTO: 9.59 10*3/MM3 (ref 1.7–7)
NEUTROPHILS NFR BLD MANUAL: 57 % (ref 42.7–76)
NEUTS BAND NFR BLD MANUAL: 14 % (ref 0–5)
NITRITE UR QL STRIP: NEGATIVE
NRBC SPEC MANUAL: 1 /100 WBC (ref 0–0.2)
PH UR STRIP.AUTO: 6.5 [PH] (ref 5–8)
PLATELET # BLD AUTO: 185 10*3/MM3 (ref 140–450)
PMV BLD AUTO: 11.9 FL (ref 6–12)
POTASSIUM SERPL-SCNC: 4.1 MMOL/L (ref 3.5–5.2)
PROT SERPL-MCNC: 6.4 G/DL (ref 6–8.5)
PROT UR QL STRIP: NEGATIVE
RBC # BLD AUTO: 4.07 10*6/MM3 (ref 3.77–5.28)
RBC MORPH BLD: NORMAL
SCAN SLIDE: NORMAL
SMALL PLATELETS BLD QL SMEAR: ADEQUATE
SODIUM SERPL-SCNC: 139 MMOL/L (ref 136–145)
SP GR UR STRIP: 1.01 (ref 1–1.03)
UROBILINOGEN UR QL STRIP: ABNORMAL
WBC # BLD AUTO: 13.51 10*3/MM3 (ref 3.4–10.8)
WBC MORPH BLD: NORMAL
WHOLE BLOOD HOLD SPECIMEN: NORMAL
WHOLE BLOOD HOLD SPECIMEN: NORMAL

## 2021-03-01 PROCEDURE — 25010000002 FENTANYL CITRATE (PF) 100 MCG/2ML SOLUTION: Performed by: STUDENT IN AN ORGANIZED HEALTH CARE EDUCATION/TRAINING PROGRAM

## 2021-03-01 PROCEDURE — 25010000002 ONDANSETRON PER 1 MG: Performed by: PHYSICIAN ASSISTANT

## 2021-03-01 PROCEDURE — 96374 THER/PROPH/DIAG INJ IV PUSH: CPT

## 2021-03-01 PROCEDURE — 81003 URINALYSIS AUTO W/O SCOPE: CPT | Performed by: STUDENT IN AN ORGANIZED HEALTH CARE EDUCATION/TRAINING PROGRAM

## 2021-03-01 PROCEDURE — 96375 TX/PRO/DX INJ NEW DRUG ADDON: CPT

## 2021-03-01 RX ORDER — ONDANSETRON 4 MG/1
4 TABLET, ORALLY DISINTEGRATING ORAL EVERY 6 HOURS PRN
Qty: 8 TABLET | Refills: 0 | Status: SHIPPED | OUTPATIENT
Start: 2021-03-01 | End: 2021-03-03

## 2021-03-01 RX ORDER — CIPROFLOXACIN 500 MG/1
500 TABLET, FILM COATED ORAL 2 TIMES DAILY
Qty: 10 TABLET | Refills: 0 | Status: SHIPPED | OUTPATIENT
Start: 2021-03-01 | End: 2021-03-06

## 2021-03-01 RX ORDER — DICYCLOMINE HCL 20 MG
20 TABLET ORAL EVERY 6 HOURS
Qty: 8 TABLET | Refills: 0 | Status: SHIPPED | OUTPATIENT
Start: 2021-03-01 | End: 2021-03-03

## 2021-03-01 RX ORDER — FENTANYL CITRATE 50 UG/ML
50 INJECTION, SOLUTION INTRAMUSCULAR; INTRAVENOUS
Status: DISCONTINUED | OUTPATIENT
Start: 2021-03-01 | End: 2021-03-01 | Stop reason: HOSPADM

## 2021-03-01 RX ORDER — ONDANSETRON 2 MG/ML
4 INJECTION INTRAMUSCULAR; INTRAVENOUS ONCE
Status: COMPLETED | OUTPATIENT
Start: 2021-03-01 | End: 2021-03-01

## 2021-03-01 RX ORDER — CIPROFLOXACIN 500 MG/1
500 TABLET, FILM COATED ORAL ONCE
Status: COMPLETED | OUTPATIENT
Start: 2021-03-01 | End: 2021-03-01

## 2021-03-01 RX ADMIN — SODIUM CHLORIDE 1000 ML: 9 INJECTION, SOLUTION INTRAVENOUS at 00:42

## 2021-03-01 RX ADMIN — ONDANSETRON 4 MG: 2 INJECTION INTRAMUSCULAR; INTRAVENOUS at 00:27

## 2021-03-01 RX ADMIN — CIPROFLOXACIN HYDROCHLORIDE 500 MG: 500 TABLET, FILM COATED ORAL at 01:19

## 2021-03-01 RX ADMIN — FENTANYL CITRATE 50 MCG: 50 INJECTION, SOLUTION INTRAMUSCULAR; INTRAVENOUS at 00:27

## 2021-03-01 NOTE — DISCHARGE INSTRUCTIONS
Take your medications as directed.  Take Cipro to treat any underlying infectious cause of your diarrhea.  Take ondansetron as needed for nausea and vomiting.  Take Bentyl as needed for abdominal cramping.  You will need to establish primary care follow-up in the next few days to ensure your symptoms are improving.  If your diarrhea persist for over 7 days, would likely need a stool study for further evaluation.  Return here to the ER for any change, worsening symptoms, or any additional concerns including but not limited to severe worsening pain, fever greater than 100.4, intractable vomiting.
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
## 2021-03-08 ENCOUNTER — OFFICE VISIT (OUTPATIENT)
Dept: FAMILY MEDICINE CLINIC | Facility: CLINIC | Age: 76
End: 2021-03-08

## 2021-03-08 VITALS
WEIGHT: 202.8 LBS | BODY MASS INDEX: 31.83 KG/M2 | RESPIRATION RATE: 20 BRPM | OXYGEN SATURATION: 97 % | SYSTOLIC BLOOD PRESSURE: 136 MMHG | HEART RATE: 72 BPM | HEIGHT: 67 IN | DIASTOLIC BLOOD PRESSURE: 72 MMHG | TEMPERATURE: 97.1 F

## 2021-03-08 DIAGNOSIS — K52.9 ENTEROCOLITIS: ICD-10-CM

## 2021-03-08 DIAGNOSIS — N28.9 RENAL INSUFFICIENCY: ICD-10-CM

## 2021-03-08 DIAGNOSIS — E11.9 TYPE 2 DIABETES MELLITUS WITHOUT COMPLICATION, WITH LONG-TERM CURRENT USE OF INSULIN (HCC): Primary | ICD-10-CM

## 2021-03-08 DIAGNOSIS — I10 ESSENTIAL HYPERTENSION: ICD-10-CM

## 2021-03-08 DIAGNOSIS — Z79.4 TYPE 2 DIABETES MELLITUS WITHOUT COMPLICATION, WITH LONG-TERM CURRENT USE OF INSULIN (HCC): Primary | ICD-10-CM

## 2021-03-08 PROCEDURE — 99214 OFFICE O/P EST MOD 30 MIN: CPT | Performed by: FAMILY MEDICINE

## 2021-03-11 ENCOUNTER — OFFICE VISIT (OUTPATIENT)
Dept: NEUROLOGY | Facility: CLINIC | Age: 76
End: 2021-03-11

## 2021-03-11 VITALS
HEART RATE: 78 BPM | DIASTOLIC BLOOD PRESSURE: 76 MMHG | OXYGEN SATURATION: 98 % | HEIGHT: 67 IN | BODY MASS INDEX: 31.71 KG/M2 | SYSTOLIC BLOOD PRESSURE: 132 MMHG | WEIGHT: 202 LBS | TEMPERATURE: 97.5 F

## 2021-03-11 DIAGNOSIS — I63.9 CEREBROVASCULAR ACCIDENT (CVA), UNSPECIFIED MECHANISM (HCC): Primary | ICD-10-CM

## 2021-03-11 PROCEDURE — 99214 OFFICE O/P EST MOD 30 MIN: CPT | Performed by: PHYSICIAN ASSISTANT

## 2021-03-11 NOTE — PROGRESS NOTES
"Subjective       Chief Complaint: stroke     History of Present Illness   Annie Mejia is a 75 y.o. female who comes to clinic today following a hospitalization at [facility] in 1/21 for suspected stroke. She presented to [facility] after noting sudden onset expressive and receptive aphasia as well as confusion. There were no associated focal symptoms. She was able to undergo IV tPA while in [place], and then was transferred to [facility]. Workup included an MRI of the brain, which was unremarkable. A CTA of the head and neck as well as an echo were unremarkable for any cardio-embolic source. She was restarted on  (which had been discontinued days before for an upcoming elected procedure) and continued on Lipitor 40mg daily. Since her hospitalization, she and her family feel that her symptoms have resolved. She reports feeling well.     It is noted that she had contracted COVID-19 in 11/20.       I have reviewed and confirmed the past family, social and medical history as accurate on 3/12/21.     Review of Systems   Constitutional: Negative.    HENT: Negative.    Eyes: Negative.    Respiratory: Negative.    Cardiovascular: Negative.    Gastrointestinal: Negative.    Endocrine: Negative.    Genitourinary: Negative.    Musculoskeletal: Negative.    Skin: Negative.    Allergic/Immunologic: Negative.    Hematological: Negative.    Psychiatric/Behavioral: Negative.        Objective     /76   Pulse 78   Temp 97.5 °F (36.4 °C)   Ht 170.2 cm (67.01\")   Wt 91.6 kg (202 lb)   SpO2 98%   BMI 31.63 kg/m²     General appearance today is normal.    Physical Exam  Neurological:      Coordination: Finger-Nose-Finger Test and Heel to Shin Test normal.      Gait: Gait is intact.      Deep Tendon Reflexes: Strength normal.      Reflex Scores:       Patellar reflexes are 2+ on the right side and 2+ on the left side.  Psychiatric:         Speech: Speech normal.          Neurologic Exam     Mental Status   Speech: " speech is normal   Level of consciousness: alert  Normal comprehension.     Cranial Nerves   Cranial nerves II through XII intact.     Motor Exam   Muscle bulk: normal  Overall muscle tone: normal    Strength   Strength 5/5 throughout.     Sensory Exam   Light touch normal.     Gait, Coordination, and Reflexes     Gait  Gait: normal    Coordination   Finger to nose coordination: normal  Heel to shin coordination: normal    Tremor   Resting tremor: absent    Reflexes   Right patellar: 2+  Left patellar: 2+          Assessment/Plan   Diagnoses and all orders for this visit:    1. Cerebrovascular accident (CVA), unspecified mechanism (CMS/HCC) (Primary)          Discussion/Summary   Annie Mejia comes to clinic today with a history of stroke. Her workup has been complete and appropriate. Therefore, I do not have any further recommendations concerning this. After discussing potential treatment options, it was elected to continue on ASA and Lipitor unchanged for secondary stroke prevention. I also discussed the importance of tight blood pressure and glucose control.  She will then follow up in my clinic on an as needed basis.  I spent 25 minutes face to face with the patient and family with 15 minutes spent on discussing diagnosis, prognosis, diagnostic testing, evaluation, current status, driving, treatment options and management as discussed above.       As part of this visit I reviewed prior lab results, reviewed radiology results, reviewed radiology images, obtained additional history from the family which is incorporated in the HPI and reviewed records from prior hospitalizations which is incorporated in the HPI.      Katerine Mojica PA-C

## 2021-04-06 ENCOUNTER — APPOINTMENT (OUTPATIENT)
Dept: GENERAL RADIOLOGY | Facility: HOSPITAL | Age: 76
End: 2021-04-06

## 2021-04-06 ENCOUNTER — HOSPITAL ENCOUNTER (EMERGENCY)
Facility: HOSPITAL | Age: 76
Discharge: HOME | End: 2021-04-06
Attending: EMERGENCY MEDICINE | Admitting: EMERGENCY MEDICINE

## 2021-04-06 ENCOUNTER — APPOINTMENT (OUTPATIENT)
Dept: CT IMAGING | Facility: HOSPITAL | Age: 76
End: 2021-04-06

## 2021-04-06 VITALS
WEIGHT: 210 LBS | HEIGHT: 67 IN | BODY MASS INDEX: 32.96 KG/M2 | DIASTOLIC BLOOD PRESSURE: 75 MMHG | TEMPERATURE: 98.2 F | HEART RATE: 78 BPM | RESPIRATION RATE: 18 BRPM | OXYGEN SATURATION: 96 % | SYSTOLIC BLOOD PRESSURE: 122 MMHG

## 2021-04-06 DIAGNOSIS — R07.9 CHEST PAIN, UNSPECIFIED TYPE: ICD-10-CM

## 2021-04-06 DIAGNOSIS — R11.0 NAUSEA: ICD-10-CM

## 2021-04-06 DIAGNOSIS — I26.93 SINGLE SUBSEGMENTAL PULMONARY EMBOLISM WITHOUT ACUTE COR PULMONALE (HCC): Primary | ICD-10-CM

## 2021-04-06 LAB
ALBUMIN SERPL-MCNC: 4.2 G/DL (ref 3.5–5.2)
ALBUMIN/GLOB SERPL: 1.3 G/DL
ALP SERPL-CCNC: 114 U/L (ref 39–117)
ALT SERPL W P-5'-P-CCNC: 17 U/L (ref 1–33)
ANION GAP SERPL CALCULATED.3IONS-SCNC: 7.1 MMOL/L (ref 5–15)
AST SERPL-CCNC: 23 U/L (ref 1–32)
BASOPHILS # BLD AUTO: 0.01 10*3/MM3 (ref 0–0.2)
BASOPHILS NFR BLD AUTO: 0.2 % (ref 0–1.5)
BILIRUB SERPL-MCNC: 0.3 MG/DL (ref 0–1.2)
BUN SERPL-MCNC: 19 MG/DL (ref 8–23)
BUN/CREAT SERPL: 19.4 (ref 7–25)
CALCIUM SPEC-SCNC: 10.3 MG/DL (ref 8.6–10.5)
CHLORIDE SERPL-SCNC: 102 MMOL/L (ref 98–107)
CO2 SERPL-SCNC: 28.9 MMOL/L (ref 22–29)
CREAT SERPL-MCNC: 0.98 MG/DL (ref 0.57–1)
DEPRECATED RDW RBC AUTO: 48 FL (ref 37–54)
EOSINOPHIL # BLD AUTO: 0.04 10*3/MM3 (ref 0–0.4)
EOSINOPHIL NFR BLD AUTO: 0.7 % (ref 0.3–6.2)
ERYTHROCYTE [DISTWIDTH] IN BLOOD BY AUTOMATED COUNT: 14.4 % (ref 12.3–15.4)
GFR SERPL CREATININE-BSD FRML MDRD: 55 ML/MIN/1.73
GLOBULIN UR ELPH-MCNC: 3.3 GM/DL
GLUCOSE SERPL-MCNC: 165 MG/DL (ref 65–99)
HCT VFR BLD AUTO: 40.8 % (ref 34–46.6)
HGB BLD-MCNC: 12.7 G/DL (ref 12–15.9)
HOLD SPECIMEN: NORMAL
IMM GRANULOCYTES # BLD AUTO: 0.01 10*3/MM3 (ref 0–0.05)
IMM GRANULOCYTES NFR BLD AUTO: 0.2 % (ref 0–0.5)
LIPASE SERPL-CCNC: 53 U/L (ref 13–60)
LYMPHOCYTES # BLD AUTO: 2.78 10*3/MM3 (ref 0.7–3.1)
LYMPHOCYTES NFR BLD AUTO: 47.5 % (ref 19.6–45.3)
MCH RBC QN AUTO: 28.3 PG (ref 26.6–33)
MCHC RBC AUTO-ENTMCNC: 31.1 G/DL (ref 31.5–35.7)
MCV RBC AUTO: 90.9 FL (ref 79–97)
MONOCYTES # BLD AUTO: 0.32 10*3/MM3 (ref 0.1–0.9)
MONOCYTES NFR BLD AUTO: 5.5 % (ref 5–12)
NEUTROPHILS NFR BLD AUTO: 2.69 10*3/MM3 (ref 1.7–7)
NEUTROPHILS NFR BLD AUTO: 45.9 % (ref 42.7–76)
NRBC BLD AUTO-RTO: 0 /100 WBC (ref 0–0.2)
PLATELET # BLD AUTO: 195 10*3/MM3 (ref 140–450)
PMV BLD AUTO: 11.6 FL (ref 6–12)
POTASSIUM SERPL-SCNC: 4.5 MMOL/L (ref 3.5–5.2)
PROT SERPL-MCNC: 7.5 G/DL (ref 6–8.5)
RBC # BLD AUTO: 4.49 10*6/MM3 (ref 3.77–5.28)
SODIUM SERPL-SCNC: 138 MMOL/L (ref 136–145)
TROPONIN T SERPL-MCNC: <0.01 NG/ML (ref 0–0.03)
TROPONIN T SERPL-MCNC: <0.01 NG/ML (ref 0–0.03)
WBC # BLD AUTO: 5.85 10*3/MM3 (ref 3.4–10.8)
WHOLE BLOOD HOLD SPECIMEN: NORMAL
WHOLE BLOOD HOLD SPECIMEN: NORMAL

## 2021-04-06 PROCEDURE — 84484 ASSAY OF TROPONIN QUANT: CPT | Performed by: EMERGENCY MEDICINE

## 2021-04-06 PROCEDURE — 71275 CT ANGIOGRAPHY CHEST: CPT

## 2021-04-06 PROCEDURE — 96375 TX/PRO/DX INJ NEW DRUG ADDON: CPT

## 2021-04-06 PROCEDURE — 74177 CT ABD & PELVIS W/CONTRAST: CPT

## 2021-04-06 PROCEDURE — 83690 ASSAY OF LIPASE: CPT | Performed by: PHYSICIAN ASSISTANT

## 2021-04-06 PROCEDURE — 25010000002 IOPAMIDOL 61 % SOLUTION: Performed by: EMERGENCY MEDICINE

## 2021-04-06 PROCEDURE — 25010000002 METOCLOPRAMIDE PER 10 MG: Performed by: PHYSICIAN ASSISTANT

## 2021-04-06 PROCEDURE — 84484 ASSAY OF TROPONIN QUANT: CPT | Performed by: PHYSICIAN ASSISTANT

## 2021-04-06 PROCEDURE — 93005 ELECTROCARDIOGRAM TRACING: CPT | Performed by: PHYSICIAN ASSISTANT

## 2021-04-06 PROCEDURE — 93005 ELECTROCARDIOGRAM TRACING: CPT | Performed by: EMERGENCY MEDICINE

## 2021-04-06 PROCEDURE — 71045 X-RAY EXAM CHEST 1 VIEW: CPT

## 2021-04-06 PROCEDURE — 96374 THER/PROPH/DIAG INJ IV PUSH: CPT

## 2021-04-06 PROCEDURE — 25010000002 ONDANSETRON PER 1 MG: Performed by: PHYSICIAN ASSISTANT

## 2021-04-06 PROCEDURE — 80053 COMPREHEN METABOLIC PANEL: CPT | Performed by: EMERGENCY MEDICINE

## 2021-04-06 PROCEDURE — 99284 EMERGENCY DEPT VISIT MOD MDM: CPT

## 2021-04-06 PROCEDURE — 85025 COMPLETE CBC W/AUTO DIFF WBC: CPT | Performed by: EMERGENCY MEDICINE

## 2021-04-06 RX ORDER — ONDANSETRON 2 MG/ML
4 INJECTION INTRAMUSCULAR; INTRAVENOUS ONCE
Status: COMPLETED | OUTPATIENT
Start: 2021-04-06 | End: 2021-04-06

## 2021-04-06 RX ORDER — SODIUM CHLORIDE 0.9 % (FLUSH) 0.9 %
10 SYRINGE (ML) INJECTION AS NEEDED
Status: DISCONTINUED | OUTPATIENT
Start: 2021-04-06 | End: 2021-04-06 | Stop reason: HOSPADM

## 2021-04-06 RX ORDER — METOCLOPRAMIDE HYDROCHLORIDE 5 MG/ML
10 INJECTION INTRAMUSCULAR; INTRAVENOUS ONCE
Status: COMPLETED | OUTPATIENT
Start: 2021-04-06 | End: 2021-04-06

## 2021-04-06 RX ORDER — ASPIRIN 325 MG
325 TABLET, DELAYED RELEASE (ENTERIC COATED) ORAL ONCE
Status: DISCONTINUED | OUTPATIENT
Start: 2021-04-06 | End: 2021-04-06 | Stop reason: HOSPADM

## 2021-04-06 RX ADMIN — LIDOCAINE HYDROCHLORIDE: 20 SOLUTION ORAL; TOPICAL at 18:18

## 2021-04-06 RX ADMIN — ONDANSETRON 4 MG: 2 INJECTION INTRAMUSCULAR; INTRAVENOUS at 17:37

## 2021-04-06 RX ADMIN — IOPAMIDOL 100 ML: 612 INJECTION, SOLUTION INTRAVENOUS at 19:03

## 2021-04-06 RX ADMIN — APIXABAN 10 MG: 2.5 TABLET, FILM COATED ORAL at 21:09

## 2021-04-06 RX ADMIN — METOCLOPRAMIDE 10 MG: 5 INJECTION, SOLUTION INTRAMUSCULAR; INTRAVENOUS at 18:18
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
## 2021-04-07 ENCOUNTER — OFFICE VISIT (OUTPATIENT)
Dept: PSYCHIATRY | Facility: CLINIC | Age: 76
End: 2021-04-07

## 2021-04-07 DIAGNOSIS — F33.2 SEVERE EPISODE OF RECURRENT MAJOR DEPRESSIVE DISORDER, WITHOUT PSYCHOTIC FEATURES (HCC): Primary | ICD-10-CM

## 2021-04-07 DIAGNOSIS — M79.605 LEFT LEG PAIN: ICD-10-CM

## 2021-04-07 DIAGNOSIS — F43.21 GRIEF: ICD-10-CM

## 2021-04-07 PROCEDURE — 90791 PSYCH DIAGNOSTIC EVALUATION: CPT | Performed by: SOCIAL WORKER

## 2021-04-07 NOTE — TELEPHONE ENCOUNTER
Caller: Annie Mejia    Relationship: Self    Best call back number: [phone]    Medication needed:   Requested Prescriptions     Pending Prescriptions Disp Refills   • gabapentin (NEURONTIN) 300 MG capsule 90 capsule 0     Sig: Take 1 capsule by mouth 3 (Three) Times a Day.       Does the patient have less than a 3 day supply:  [] Yes  [x] No    What is the patient's preferred pharmacy: [facility] - [facility] [address] - [phone] [facility] [phone] FX

## 2021-04-07 NOTE — DISCHARGE INSTRUCTIONS
You have a pulmonary embolism which could be causing some of your pain and symptoms.  Take Xarelto as directed to help dissolve the clot. Continue other home medications as directed.  Continue to monitor your blood pressure and other vital signs.  Follow-up with your primary care provider in the next 1 to 2 days to reevaluate your symptoms and ensure they are improving.  If needed, can follow-up with our cardiologist and vascular specialist Dr. Bender.  Return here to the ER for any change, worsening symptoms, or any additional concerns including to severe or worsening shortness of breath, severe chest pain, dizziness, syncope.

## 2021-04-08 DIAGNOSIS — I48.91 ATRIAL FIBRILLATION, UNSPECIFIED TYPE (HCC): Primary | ICD-10-CM

## 2021-04-08 RX ORDER — GABAPENTIN 300 MG/1
300 CAPSULE ORAL 3 TIMES DAILY
Qty: 90 CAPSULE | Refills: 0 | Status: SHIPPED | OUTPATIENT
Start: 2021-04-08 | End: 2021-05-10 | Stop reason: SDUPTHER

## 2021-04-08 NOTE — TELEPHONE ENCOUNTER
Pt. To see Dr. Bender on 4-20-21, pt. States Rx is not available @ [facility], New RX called in to [facility] pharmacy with cpn card also.

## 2021-04-13 ENCOUNTER — TELEPHONE (OUTPATIENT)
Dept: CARDIOLOGY | Facility: CLINIC | Age: 76
End: 2021-04-13
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
## 2021-04-13 NOTE — TELEPHONE ENCOUNTER
Patient is concerned about the Eliquis cost $500 per after her Free 30 day supply is gone. I told her once Dr. Bender sees her we could look into her insurance coverage and should be able to help her with the cost.  Pt. States understanding.

## 2021-04-20 ENCOUNTER — OFFICE VISIT (OUTPATIENT)
Dept: CARDIOLOGY | Facility: CLINIC | Age: 76
End: 2021-04-20

## 2021-04-20 VITALS
DIASTOLIC BLOOD PRESSURE: 86 MMHG | BODY MASS INDEX: 31.39 KG/M2 | OXYGEN SATURATION: 98 % | SYSTOLIC BLOOD PRESSURE: 158 MMHG | HEIGHT: 67 IN | HEART RATE: 70 BPM | WEIGHT: 200 LBS | RESPIRATION RATE: 18 BRPM

## 2021-04-20 DIAGNOSIS — I26.94 MULTIPLE SUBSEGMENTAL PULMONARY EMBOLI WITHOUT ACUTE COR PULMONALE (HCC): ICD-10-CM

## 2021-04-20 DIAGNOSIS — R07.9 CHEST PAIN, UNSPECIFIED TYPE: Primary | ICD-10-CM

## 2021-04-20 PROCEDURE — 99204 OFFICE O/P NEW MOD 45 MIN: CPT | Performed by: INTERNAL MEDICINE
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
## 2021-04-21 ENCOUNTER — OFFICE VISIT (OUTPATIENT)
Dept: FAMILY MEDICINE CLINIC | Facility: CLINIC | Age: 76
End: 2021-04-21

## 2021-04-21 VITALS
BODY MASS INDEX: 31.49 KG/M2 | SYSTOLIC BLOOD PRESSURE: 124 MMHG | DIASTOLIC BLOOD PRESSURE: 74 MMHG | TEMPERATURE: 97.1 F | HEIGHT: 67 IN | OXYGEN SATURATION: 98 % | WEIGHT: 200.63 LBS | RESPIRATION RATE: 20 BRPM | HEART RATE: 72 BPM

## 2021-04-21 DIAGNOSIS — Z79.4 TYPE 2 DIABETES MELLITUS WITHOUT COMPLICATION, WITH LONG-TERM CURRENT USE OF INSULIN (HCC): Primary | ICD-10-CM

## 2021-04-21 DIAGNOSIS — E78.2 MIXED HYPERLIPIDEMIA: ICD-10-CM

## 2021-04-21 DIAGNOSIS — E11.9 TYPE 2 DIABETES MELLITUS WITHOUT COMPLICATION, WITH LONG-TERM CURRENT USE OF INSULIN (HCC): Primary | ICD-10-CM

## 2021-04-21 DIAGNOSIS — I10 ESSENTIAL HYPERTENSION: ICD-10-CM

## 2021-04-21 DIAGNOSIS — F41.8 MIXED ANXIETY DEPRESSIVE DISORDER: ICD-10-CM

## 2021-04-21 LAB — HBA1C MFR BLD: 7.7 %

## 2021-04-21 PROCEDURE — 99213 OFFICE O/P EST LOW 20 MIN: CPT | Performed by: FAMILY MEDICINE

## 2021-04-21 PROCEDURE — 83036 HEMOGLOBIN GLYCOSYLATED A1C: CPT | Performed by: FAMILY MEDICINE

## 2021-04-21 NOTE — PROGRESS NOTES
Subjective   Annie Mejia is a 75 y.o. female.     History of Present Illness   Mrs. Mejia presents today for 6 weeks check up on DM, HTN. She has brought home records for BP, BG for my review. BG rarely over 200. BP averaging in 130s/80s. Taking statin, ace-inh, asa as directed.    She denies any new complaints today. Moods improving. Fatigue has improved. She is ambulating more easily. Dealing with grief of 's death.    Taking meds as rx'd. Denies side effects.    The following portions of the patient's history were reviewed and updated as appropriate: allergies, current medications, past family history, past medical history, past social history, past surgical history and problem list.    Review of Systems   Constitutional: Positive for appetite change and fatigue. Negative for chills and fever.   HENT: Positive for congestion and postnasal drip. Negative for mouth sores, nosebleeds, rhinorrhea and sore throat.    Eyes: Positive for visual disturbance (chronic).   Respiratory: Positive for cough (dry, intermittent) and shortness of breath (with anxiety). Negative for wheezing.    Cardiovascular: Positive for chest pain (with anxiety) and palpitations (with anxiety). Negative for leg swelling.   Gastrointestinal: Positive for diarrhea (chronic, stable) and nausea (mild, intermittent). Negative for abdominal pain, blood in stool and vomiting.        Heartburn   Genitourinary: Negative for dysuria, hematuria and urgency.   Musculoskeletal: Positive for arthralgias, back pain, gait problem and myalgias.   Skin: Negative for rash and wound.   Neurological: Positive for dizziness and weakness. Negative for syncope and headaches.   Hematological: Negative for adenopathy. Bruises/bleeds easily.   Psychiatric/Behavioral: Positive for dysphoric mood and sleep disturbance. Negative for confusion. The patient is nervous/anxious.    Pt's previous ROS reviewed and updated as indicated.       Objective    Vitals:     04/21/21 0834   BP: 124/74   Pulse: 72   Resp: 20   Temp: 97.1 °F (36.2 °C)   SpO2: 98%     Body mass index is 31.42 kg/m².      04/21/21 0834   Weight: 91 kg (200 lb 10.1 oz)       Physical Exam  Vitals and nursing note reviewed.   Constitutional:       General: She is not in acute distress.     Appearance: She is well-developed and well-groomed. She is obese. She is not ill-appearing.   HENT:      Head: Normocephalic and atraumatic.   Eyes:      General: No scleral icterus.     Extraocular Movements: Extraocular movements intact.      Conjunctiva/sclera: Conjunctivae normal.   Cardiovascular:      Rate and Rhythm: Normal rate and regular rhythm.      Pulses: Normal pulses.      Heart sounds: Normal heart sounds.   Pulmonary:      Effort: Pulmonary effort is normal.      Breath sounds: Normal breath sounds. No wheezing, rhonchi or rales.   Musculoskeletal:      Right lower leg: No edema.      Left lower leg: No edema.   Skin:     General: Skin is warm and dry.      Coloration: Skin is not jaundiced or pale.      Findings: No rash.   Neurological:      Mental Status: She is alert and oriented to person, place, and time.      Gait: Gait abnormal (antalgic).   Psychiatric:         Mood and Affect: Mood and affect normal.         Behavior: Behavior normal. Behavior is cooperative.         Thought Content: Thought content normal. Thought content is not paranoid. Thought content does not include suicidal ideation.         Cognition and Memory: Cognition normal.     Pt's previous physical exam reviewed and updated as indicated.    Lab Results   Component Value Date    HGBA1C 7.7 04/21/2021    HGBA1C 6.80 (H) 01/18/2021    HGBA1C 7.40 (H) 12/08/2020     Lab Results   Component Value Date    MICROALBUR 30.6 03/11/2020    CREATININE 0.98 04/06/2021     Lab Results   Component Value Date    WBC 5.85 04/06/2021    HGB 12.7 04/06/2021    HCT 40.8 04/06/2021    MCV 90.9 04/06/2021     04/06/2021       Lab Results    Component Value Date    GLUCOSE 165 (H) 04/06/2021    BUN 19 04/06/2021    CREATININE 0.98 04/06/2021    EGFRIFNONA 55 (L) 04/06/2021    EGFRIFAFRI 56 (L) 12/11/2020    BCR 19.4 04/06/2021    K 4.5 04/06/2021    CO2 28.9 04/06/2021    CALCIUM 10.3 04/06/2021    PROTENTOTREF 6.7 12/08/2020    ALBUMIN 4.20 04/06/2021    LABIL2 1.2 12/08/2020    AST 23 04/06/2021    ALT 17 04/06/2021       Lab Results   Component Value Date    CHOL 151 01/18/2021    CHLPL 169 09/29/2020    TRIG 180 (H) 01/18/2021    HDL 56 01/18/2021    LDL 65 01/18/2021       Lab Results   Component Value Date    TSH 1.130 01/18/2021         Assessment/Plan   Diagnoses and all orders for this visit:    1. Type 2 diabetes mellitus without complication, with long-term current use of insulin (CMS/[facility]) (Primary)  -     POC Glycosylated Hemoglobin (Hb A1C)    2. Essential hypertension    3. Mixed hyperlipidemia    4. Mixed anxiety depressive disorder       NIDDM with A1c near goal for age/risk factors. Continue dietary mngt, BG monitoring, ace-inh, asa, statin, diabetic appropriate diet, foot checks, eye exams, etc.    HTN controlled. Continue propranolol, lisinopril, amlodipine. HLP with good tolerance of statin. Pt advised to eat a heart healthy diet and get regular aerobic exercise.    Depression/anxiety improved from last visit.     Routine f/u in 3months, sooner as needed.  Patient was encouraged to keep me informed of any acute changes, lack of improvement, or any new concerning symptoms.  Pt is aware of reasons to seek emergent care.  Patient voiced understanding of all instructions and denied further questions.

## 2021-04-26 RX ORDER — OMEPRAZOLE 20 MG/1
20 CAPSULE, DELAYED RELEASE ORAL DAILY
Qty: 90 CAPSULE | Refills: 1 | Status: SHIPPED | OUTPATIENT
Start: 2021-04-26 | End: 2021-08-10

## 2021-04-26 NOTE — TELEPHONE ENCOUNTER
Caller: Annie Mejia    Relationship: Self    Best call back number:[phone]    Medication needed:   Requested Prescriptions     Pending Prescriptions Disp Refills   • omeprazole (priLOSEC) 20 MG capsule       Sig: Take 1 capsule by mouth Daily.       When do you need the refill by:     What additional details did the patient provide when requesting the medication:     Does the patient have less than a 3 day supply:  [] Yes  [x] No    What is the patient's preferred pharmacy: [facility] - [facility] [address] [facility] [address] - [phone] [facility] [phone]

## 2021-05-09 ENCOUNTER — HOSPITAL ENCOUNTER (EMERGENCY)
Facility: HOSPITAL | Age: 76
Discharge: HOME | End: 2021-05-09
Attending: EMERGENCY MEDICINE | Admitting: EMERGENCY MEDICINE

## 2021-05-09 VITALS
OXYGEN SATURATION: 95 % | RESPIRATION RATE: 18 BRPM | WEIGHT: 202 LBS | BODY MASS INDEX: 31.71 KG/M2 | HEIGHT: 67 IN | HEART RATE: 68 BPM | DIASTOLIC BLOOD PRESSURE: 89 MMHG | TEMPERATURE: 97.6 F | SYSTOLIC BLOOD PRESSURE: 125 MMHG

## 2021-05-09 DIAGNOSIS — R04.0 EPISTAXIS: Primary | ICD-10-CM

## 2021-05-09 PROCEDURE — 99282 EMERGENCY DEPT VISIT SF MDM: CPT

## 2021-05-10 DIAGNOSIS — M79.605 LEFT LEG PAIN: ICD-10-CM

## 2021-05-10 RX ORDER — GABAPENTIN 300 MG/1
300 CAPSULE ORAL 3 TIMES DAILY
Qty: 90 CAPSULE | Refills: 0 | Status: SHIPPED | OUTPATIENT
Start: 2021-05-10 | End: 2021-05-20 | Stop reason: SDUPTHER

## 2021-05-10 NOTE — TELEPHONE ENCOUNTER
Caller: Annie Mejia    Relationship: Self  Best call back number: [phone]    Medication needed:   Requested Prescriptions     Pending Prescriptions Disp Refills   • gabapentin (NEURONTIN) 300 MG capsule 90 capsule 0     Sig: Take 1 capsule by mouth 3 (Three) Times a Day.       When do you need the refill by: 05-13-21    What additional details did the patient provide when requesting the medication: PATIENT STATED THAT SHE HAD 3 DAYS LEFT OF MEDICATION AND WOULD NEED REFILL    Does the patient have less than a 3 day supply:  [x] Yes  [] No    What is the patient's preferred pharmacy: [facility] - [facility] [address] - [phone]  - [phone] FX

## 2021-05-18 ENCOUNTER — APPOINTMENT (OUTPATIENT)
Dept: GENERAL RADIOLOGY | Facility: HOSPITAL | Age: 76
End: 2021-05-18

## 2021-05-18 ENCOUNTER — HOSPITAL ENCOUNTER (EMERGENCY)
Facility: HOSPITAL | Age: 76
Discharge: HOME | End: 2021-05-18
Attending: EMERGENCY MEDICINE | Admitting: EMERGENCY MEDICINE

## 2021-05-18 VITALS
WEIGHT: 202 LBS | HEIGHT: 67 IN | DIASTOLIC BLOOD PRESSURE: 79 MMHG | OXYGEN SATURATION: 97 % | BODY MASS INDEX: 31.71 KG/M2 | HEART RATE: 78 BPM | TEMPERATURE: 97.8 F | SYSTOLIC BLOOD PRESSURE: 145 MMHG | RESPIRATION RATE: 17 BRPM

## 2021-05-18 DIAGNOSIS — M54.50 ACUTE BILATERAL LOW BACK PAIN WITHOUT SCIATICA: ICD-10-CM

## 2021-05-18 DIAGNOSIS — S83.92XA SPRAIN OF LEFT KNEE, UNSPECIFIED LIGAMENT, INITIAL ENCOUNTER: Primary | ICD-10-CM

## 2021-05-18 PROCEDURE — 99283 EMERGENCY DEPT VISIT LOW MDM: CPT

## 2021-05-18 PROCEDURE — 73562 X-RAY EXAM OF KNEE 3: CPT

## 2021-05-18 PROCEDURE — 72100 X-RAY EXAM L-S SPINE 2/3 VWS: CPT

## 2021-05-18 RX ORDER — HYDROCODONE BITARTRATE AND ACETAMINOPHEN 5; 325 MG/1; MG/1
1 TABLET ORAL ONCE
Status: COMPLETED | OUTPATIENT
Start: 2021-05-18 | End: 2021-05-18

## 2021-05-18 RX ORDER — HYDROCODONE BITARTRATE AND ACETAMINOPHEN 5; 325 MG/1; MG/1
1 TABLET ORAL EVERY 12 HOURS PRN
Qty: 6 TABLET | Refills: 0 | Status: SHIPPED | OUTPATIENT
Start: 2021-05-18 | End: 2021-05-21 | Stop reason: SDUPTHER

## 2021-05-18 RX ADMIN — HYDROCODONE BITARTRATE AND ACETAMINOPHEN 1 TABLET: 5; 325 TABLET ORAL at 15:22

## 2021-05-20 DIAGNOSIS — M79.605 LEFT LEG PAIN: ICD-10-CM

## 2021-05-20 RX ORDER — GABAPENTIN 300 MG/1
300 CAPSULE ORAL 3 TIMES DAILY
Qty: 270 CAPSULE | Refills: 0 | Status: SHIPPED | OUTPATIENT
Start: 2021-05-20 | End: 2021-10-01 | Stop reason: SDUPTHER

## 2021-05-21 ENCOUNTER — TELEPHONE (OUTPATIENT)
Dept: FAMILY MEDICINE CLINIC | Facility: CLINIC | Age: 76
End: 2021-05-21

## 2021-05-21 DIAGNOSIS — M54.50 ACUTE BILATERAL LOW BACK PAIN WITHOUT SCIATICA: ICD-10-CM

## 2021-05-21 DIAGNOSIS — S83.92XA SPRAIN OF LEFT KNEE, UNSPECIFIED LIGAMENT, INITIAL ENCOUNTER: ICD-10-CM

## 2021-05-24 RX ORDER — HYDROCODONE BITARTRATE AND ACETAMINOPHEN 5; 325 MG/1; MG/1
1 TABLET ORAL EVERY 8 HOURS PRN
Qty: 15 TABLET | Refills: 0 | Status: SHIPPED | OUTPATIENT
Start: 2021-05-24 | End: 2021-05-27 | Stop reason: SDUPTHER

## 2021-05-24 NOTE — TELEPHONE ENCOUNTER
Patient informed. She states that she is not on any diabetic medications and would like something sent in.

## 2021-05-24 NOTE — TELEPHONE ENCOUNTER
Her A1c jumped up to 7.7. this should improve with taking her meds, eating appropriate diet.    Diclofenac gel is fine    Baclofen is fine but she needs to be aware it may cause dizziness and drop in blood pressure. She may want to use 1/2 dose.    Yes, refill of norco is fine until her pain is back to baseline.

## 2021-05-25 NOTE — TELEPHONE ENCOUNTER
Per Dr. Pimentel, patient doesn't have to start medication if she will follow  Diabetic diet. Informed patient, she is agreeable. Went over diabetic friendly food choices and what to watch out for or cut back on in her meals and snacks.

## 2021-05-27 DIAGNOSIS — M54.50 ACUTE BILATERAL LOW BACK PAIN WITHOUT SCIATICA: ICD-10-CM

## 2021-05-27 DIAGNOSIS — S83.92XA SPRAIN OF LEFT KNEE, UNSPECIFIED LIGAMENT, INITIAL ENCOUNTER: ICD-10-CM

## 2021-05-27 RX ORDER — HYDROCODONE BITARTRATE AND ACETAMINOPHEN 5; 325 MG/1; MG/1
1 TABLET ORAL EVERY 8 HOURS PRN
Qty: 30 TABLET | Refills: 0 | Status: SHIPPED | OUTPATIENT
Start: 2021-05-27 | End: 2021-06-23
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
## 2021-06-01 ENCOUNTER — TRANSCRIBE ORDERS (OUTPATIENT)
Dept: ADMINISTRATIVE | Facility: HOSPITAL | Age: 76
End: 2021-06-01

## 2021-06-01 DIAGNOSIS — M54.50 LUMBAR PAIN: Primary | ICD-10-CM

## 2021-06-03 ENCOUNTER — OFFICE VISIT (OUTPATIENT)
Dept: ORTHOPEDIC SURGERY | Facility: CLINIC | Age: 76
End: 2021-06-03

## 2021-06-03 VITALS — BODY MASS INDEX: 31.71 KG/M2 | HEIGHT: 67 IN | WEIGHT: 202 LBS

## 2021-06-03 DIAGNOSIS — M17.10 ARTHRITIS OF KNEE: Primary | ICD-10-CM

## 2021-06-03 PROCEDURE — 99213 OFFICE O/P EST LOW 20 MIN: CPT | Performed by: ORTHOPAEDIC SURGERY
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
## 2021-06-07 ENCOUNTER — OFFICE VISIT (OUTPATIENT)
Dept: FAMILY MEDICINE CLINIC | Facility: CLINIC | Age: 76
End: 2021-06-07

## 2021-06-07 ENCOUNTER — HOSPITAL ENCOUNTER (OUTPATIENT)
Dept: MRI IMAGING | Facility: HOSPITAL | Age: 76
Discharge: HOME | End: 2021-06-07
Admitting: ORTHOPAEDIC SURGERY

## 2021-06-07 VITALS
WEIGHT: 204 LBS | DIASTOLIC BLOOD PRESSURE: 74 MMHG | OXYGEN SATURATION: 98 % | RESPIRATION RATE: 18 BRPM | BODY MASS INDEX: 31.95 KG/M2 | HEART RATE: 79 BPM | SYSTOLIC BLOOD PRESSURE: 106 MMHG

## 2021-06-07 DIAGNOSIS — M54.50 LUMBAR PAIN: ICD-10-CM

## 2021-06-07 DIAGNOSIS — M19.90 ARTHRITIS: ICD-10-CM
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
## 2021-06-07 PROCEDURE — 99213 OFFICE O/P EST LOW 20 MIN: CPT | Performed by: FAMILY MEDICINE

## 2021-06-07 PROCEDURE — 72148 MRI LUMBAR SPINE W/O DYE: CPT

## 2021-06-07 NOTE — PROGRESS NOTES
Subjective   Annie Mejia is a 75 y.o. female.     History of Present Illness   Mrs. Mejia presents today to discuss possible tx option for arthritis pain. Had recent fall. Being followed by ortho, pain mgnt, etc. Having increased knee and back pain.    Using opioid analgesic sparingly. Avoiding NSAIDs due to h/o GI intolerance. Using topicals.    No new focal neuro symptoms.    The following portions of the patient's history were reviewed and updated as appropriate: allergies, current medications, past family history, past medical history, past social history, past surgical history and problem list.    Review of Systems   Constitutional: Positive for appetite change and fatigue. Negative for chills and fever.   HENT: Positive for congestion and postnasal drip. Negative for mouth sores, nosebleeds, rhinorrhea and sore throat.    Eyes: Positive for visual disturbance (chronic).   Respiratory: Positive for cough (dry, intermittent) and shortness of breath (with anxiety). Negative for wheezing.    Cardiovascular: Positive for chest pain (with anxiety) and palpitations (with anxiety). Negative for leg swelling.   Gastrointestinal: Positive for diarrhea (chronic, stable) and nausea (mild, intermittent). Negative for abdominal pain, blood in stool and vomiting.        Heartburn   Genitourinary: Negative for dysuria, hematuria and urgency.   Musculoskeletal: Positive for arthralgias, back pain, gait problem and myalgias.   Skin: Negative for rash and wound.   Neurological: Positive for dizziness and weakness. Negative for syncope and headaches.   Hematological: Negative for adenopathy. Bruises/bleeds easily.   Psychiatric/Behavioral: Positive for dysphoric mood and sleep disturbance. Negative for confusion. The patient is nervous/anxious.    Pt's previous ROS reviewed and updated as indicated.       Objective    Vitals:    06/07/21 0940   BP: 106/74   Pulse: 79   Resp: 18   SpO2: 98%     Body mass index is 31.95  kg/m².      06/07/21  0940   Weight: 92.5 kg (204 lb)       Physical Exam  Vitals and nursing note reviewed.   Constitutional:       General: She is not in acute distress.     Appearance: She is well-developed, well-groomed and overweight. She is not ill-appearing.   HENT:      Head: Normocephalic and atraumatic.   Pulmonary:      Effort: Pulmonary effort is normal.   Skin:     General: Skin is warm and dry.      Coloration: Skin is not jaundiced or pale.      Findings: No bruising or rash.   Neurological:      Mental Status: She is alert and oriented to person, place, and time.      Gait: Gait abnormal (antalgic, using walker).   Psychiatric:         Mood and Affect: Mood and affect normal.         Behavior: Behavior normal. Behavior is cooperative.         Cognition and Memory: Cognition normal.       XR Spine Lumbar 2 or 3 View    Result Date: 5/18/2021  No acute fracture.  This report was finalized on 5/18/2021 3:32 PM by Devorah Palomino M.D..    XR Knee 3 View Left    Result Date: 5/18/2021  No acute fracture.     This report was finalized on 5/18/2021 3:33 PM by Devorah Palomino M.D..      Assessment/Plan   Diagnoses and all orders for this visit:    1. Fall, subsequent encounter (Primary)    2. Arthritis       Multifactorial chronic pain, recently exacerbated by fall. Seeing appropriate specialists, having imaging etc. I have reviewed risks/benefits and potential side effects of various treatment options for chronic arthritis pain. Pt voices understanding and wishes to proceed with Tylenol arthritis scheduled tid. F/u with ortho, pain mgnt, etc as scheduled. Continue topicals, ice, etc. Encouraged daily exercise.    Keep routine f/u as scheduled, f/u sooner as needed.  Patient was encouraged to keep me informed of any acute changes, lack of improvement, or any new concerning symptoms.  Pt is aware of reasons to seek emergent care.  Patient voiced understanding of all instructions and denied further  questions.    I spent >20 minutes caring for Annie on this date of service. This time includes time spent by me in the following activities:preparing for the visit, reviewing tests, performing a medically appropriate examination and/or evaluation , counseling and educating the patient/family/caregiver, documenting information in the medical record and care coordination.

## 2021-06-17 ENCOUNTER — TELEPHONE (OUTPATIENT)
Dept: FAMILY MEDICINE CLINIC | Facility: CLINIC | Age: 76
End: 2021-06-17

## 2021-06-17 NOTE — TELEPHONE ENCOUNTER
Caller: Annie Mejia    Relationship to patient: Self    Best call back number: [phone]    What is the call regarding:  PATIENT STATES THAT SHE SAW DR LOMBARDI FOR HER KNEE PAIN AND SHE GAVE HER TYLENOL TO TAKE, BUT IT HAS NOT HELPED.  SHE IS IN INTENSE PAIN AND WONDERED IF SHE COULD  PRESCRIBE HER ANOTHER MEDICINE OR SUGGEST WHAT TO DO?

## 2021-06-18 NOTE — TELEPHONE ENCOUNTER
Patient has tried Hydrocodone.  She said it does help.  She doesn't have a lot of hydrocodone so she is using it very sparingly.  She tried using a knee brace.  It seemed to help for a few minutes, but then the pain got even more intense.  She will need a refill soon.

## 2021-06-22 ENCOUNTER — HOSPITAL ENCOUNTER (EMERGENCY)
Facility: HOSPITAL | Age: 76
Discharge: HOME | End: 2021-06-22
Attending: EMERGENCY MEDICINE | Admitting: EMERGENCY MEDICINE

## 2021-06-22 ENCOUNTER — TELEPHONE (OUTPATIENT)
Dept: FAMILY MEDICINE CLINIC | Facility: CLINIC | Age: 76
End: 2021-06-22

## 2021-06-22 ENCOUNTER — APPOINTMENT (OUTPATIENT)
Dept: GENERAL RADIOLOGY | Facility: HOSPITAL | Age: 76
End: 2021-06-22

## 2021-06-22 VITALS
OXYGEN SATURATION: 98 % | SYSTOLIC BLOOD PRESSURE: 137 MMHG | HEART RATE: 110 BPM | TEMPERATURE: 97.8 F | WEIGHT: 193.2 LBS | BODY MASS INDEX: 30.32 KG/M2 | DIASTOLIC BLOOD PRESSURE: 86 MMHG | RESPIRATION RATE: 18 BRPM | HEIGHT: 67 IN

## 2021-06-22 DIAGNOSIS — R11.0 NAUSEA: Primary | ICD-10-CM

## 2021-06-22 LAB
ALBUMIN SERPL-MCNC: 4.1 G/DL (ref 3.5–5.2)
ALBUMIN/GLOB SERPL: 1.4 G/DL
ALP SERPL-CCNC: 143 U/L (ref 39–117)
ALT SERPL W P-5'-P-CCNC: 15 U/L (ref 1–33)
ANION GAP SERPL CALCULATED.3IONS-SCNC: 11.7 MMOL/L (ref 5–15)
AST SERPL-CCNC: 17 U/L (ref 1–32)
BILIRUB SERPL-MCNC: 0.5 MG/DL (ref 0–1.2)
BILIRUB UR QL STRIP: NEGATIVE
BUN SERPL-MCNC: 20 MG/DL (ref 8–23)
BUN/CREAT SERPL: 21.3 (ref 7–25)
CALCIUM SPEC-SCNC: 10 MG/DL (ref 8.6–10.5)
CHLORIDE SERPL-SCNC: 102 MMOL/L (ref 98–107)
CLARITY UR: CLEAR
CO2 SERPL-SCNC: 25.3 MMOL/L (ref 22–29)
COLOR UR: YELLOW
CREAT SERPL-MCNC: 0.94 MG/DL (ref 0.57–1)
DEPRECATED RDW RBC AUTO: 58.2 FL (ref 37–54)
ERYTHROCYTE [DISTWIDTH] IN BLOOD BY AUTOMATED COUNT: 17.4 % (ref 12.3–15.4)
GFR SERPL CREATININE-BSD FRML MDRD: 58 ML/MIN/1.73
GLOBULIN UR ELPH-MCNC: 3 GM/DL
GLUCOSE SERPL-MCNC: 136 MG/DL (ref 65–99)
GLUCOSE UR STRIP-MCNC: NEGATIVE MG/DL
HCT VFR BLD AUTO: 40.6 % (ref 34–46.6)
HGB BLD-MCNC: 12.8 G/DL (ref 12–15.9)
HGB UR QL STRIP.AUTO: NEGATIVE
HOLD SPECIMEN: NORMAL
KETONES UR QL STRIP: ABNORMAL
LEUKOCYTE ESTERASE UR QL STRIP.AUTO: NEGATIVE
LIPASE SERPL-CCNC: 35 U/L (ref 13–60)
LYMPHOCYTES # BLD MANUAL: 2.98 10*3/MM3 (ref 0.7–3.1)
LYMPHOCYTES NFR BLD MANUAL: 44 % (ref 19.6–45.3)
LYMPHOCYTES NFR BLD MANUAL: 9 % (ref 5–12)
MCH RBC QN AUTO: 28.6 PG (ref 26.6–33)
MCHC RBC AUTO-ENTMCNC: 31.5 G/DL (ref 31.5–35.7)
MCV RBC AUTO: 90.8 FL (ref 79–97)
METAMYELOCYTES NFR BLD MANUAL: 1 % (ref 0–0)
MONOCYTES # BLD AUTO: 0.61 10*3/MM3 (ref 0.1–0.9)
NEUTROPHILS # BLD AUTO: 3.12 10*3/MM3 (ref 1.7–7)
NEUTROPHILS NFR BLD MANUAL: 41 % (ref 42.7–76)
NEUTS BAND NFR BLD MANUAL: 5 % (ref 0–5)
NITRITE UR QL STRIP: NEGATIVE
PH UR STRIP.AUTO: 7.5 [PH] (ref 5–8)
PLATELET # BLD AUTO: 149 10*3/MM3 (ref 140–450)
PMV BLD AUTO: 10.9 FL (ref 6–12)
POTASSIUM SERPL-SCNC: 4.1 MMOL/L (ref 3.5–5.2)
PROT SERPL-MCNC: 7.1 G/DL (ref 6–8.5)
PROT UR QL STRIP: NEGATIVE
RBC # BLD AUTO: 4.47 10*6/MM3 (ref 3.77–5.28)
RBC MORPH BLD: NORMAL
SCAN SLIDE: NORMAL
SMALL PLATELETS BLD QL SMEAR: ADEQUATE
SODIUM SERPL-SCNC: 139 MMOL/L (ref 136–145)
SP GR UR STRIP: 1.01 (ref 1–1.03)
TROPONIN T SERPL-MCNC: <0.01 NG/ML (ref 0–0.03)
TROPONIN T SERPL-MCNC: <0.01 NG/ML (ref 0–0.03)
UROBILINOGEN UR QL STRIP: ABNORMAL
WBC # BLD AUTO: 6.78 10*3/MM3 (ref 3.4–10.8)
WBC MORPH BLD: NORMAL
WHOLE BLOOD HOLD SPECIMEN: NORMAL

## 2021-06-22 PROCEDURE — 99283 EMERGENCY DEPT VISIT LOW MDM: CPT

## 2021-06-22 PROCEDURE — 71045 X-RAY EXAM CHEST 1 VIEW: CPT

## 2021-06-22 PROCEDURE — 85007 BL SMEAR W/DIFF WBC COUNT: CPT | Performed by: EMERGENCY MEDICINE

## 2021-06-22 PROCEDURE — 83690 ASSAY OF LIPASE: CPT | Performed by: EMERGENCY MEDICINE

## 2021-06-22 PROCEDURE — 63710000001 PROMETHAZINE PER 12.5 MG: Performed by: EMERGENCY MEDICINE

## 2021-06-22 PROCEDURE — 84484 ASSAY OF TROPONIN QUANT: CPT | Performed by: EMERGENCY MEDICINE

## 2021-06-22 PROCEDURE — 80053 COMPREHEN METABOLIC PANEL: CPT | Performed by: EMERGENCY MEDICINE

## 2021-06-22 PROCEDURE — 81003 URINALYSIS AUTO W/O SCOPE: CPT | Performed by: EMERGENCY MEDICINE

## 2021-06-22 PROCEDURE — 85025 COMPLETE CBC W/AUTO DIFF WBC: CPT | Performed by: EMERGENCY MEDICINE

## 2021-06-22 RX ORDER — PROMETHAZINE HYDROCHLORIDE 12.5 MG/1
12.5 TABLET ORAL EVERY 6 HOURS PRN
Qty: 12 TABLET | Refills: 0 | Status: SHIPPED | OUTPATIENT
Start: 2021-06-22 | End: 2021-06-23

## 2021-06-22 RX ORDER — SODIUM CHLORIDE 0.9 % (FLUSH) 0.9 %
10 SYRINGE (ML) INJECTION AS NEEDED
Status: DISCONTINUED | OUTPATIENT
Start: 2021-06-22 | End: 2021-06-22 | Stop reason: HOSPADM

## 2021-06-22 RX ORDER — PROMETHAZINE HYDROCHLORIDE 12.5 MG/1
12.5 TABLET ORAL ONCE
Status: COMPLETED | OUTPATIENT
Start: 2021-06-22 | End: 2021-06-22

## 2021-06-22 RX ADMIN — LIDOCAINE HYDROCHLORIDE: 20 SOLUTION ORAL; TOPICAL at 10:55

## 2021-06-22 RX ADMIN — PROMETHAZINE HYDROCHLORIDE 12.5 MG: 12.5 TABLET ORAL at 07:43
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
## 2021-06-22 NOTE — TELEPHONE ENCOUNTER
Caller: Annie Mejia    Relationship: Self    Best call back number: [phone]    What is the best time to reach you: ANYTIME    Who are you requesting to speak with (clinical staff, provider,  specific staff member): CLINICAL STAFF    Do you know the name of the person who called:     What was the call regarding: STOMACH PAINS; WAS IN ER AT [facility]    Do you require a callback: YES

## 2021-06-23 ENCOUNTER — OFFICE VISIT (OUTPATIENT)
Dept: FAMILY MEDICINE CLINIC | Facility: CLINIC | Age: 76
End: 2021-06-23

## 2021-06-23 VITALS
WEIGHT: 193 LBS | SYSTOLIC BLOOD PRESSURE: 136 MMHG | TEMPERATURE: 97.3 F | DIASTOLIC BLOOD PRESSURE: 82 MMHG | RESPIRATION RATE: 18 BRPM | OXYGEN SATURATION: 100 % | HEART RATE: 106 BPM | BODY MASS INDEX: 30.23 KG/M2

## 2021-06-23 DIAGNOSIS — R10.13 EPIGASTRIC PAIN: ICD-10-CM

## 2021-06-23 DIAGNOSIS — M25.562 CHRONIC PAIN OF LEFT KNEE: ICD-10-CM

## 2021-06-23 DIAGNOSIS — G89.29 CHRONIC BILATERAL LOW BACK PAIN WITHOUT SCIATICA: ICD-10-CM

## 2021-06-23 DIAGNOSIS — M54.50 CHRONIC BILATERAL LOW BACK PAIN WITHOUT SCIATICA: ICD-10-CM

## 2021-06-23 DIAGNOSIS — G89.29 CHRONIC PAIN OF LEFT KNEE: ICD-10-CM

## 2021-06-23 DIAGNOSIS — R11.0 NAUSEA: Primary | ICD-10-CM

## 2021-06-23 DIAGNOSIS — M17.12 PRIMARY OSTEOARTHRITIS OF LEFT KNEE: ICD-10-CM

## 2021-06-23 PROCEDURE — 99214 OFFICE O/P EST MOD 30 MIN: CPT | Performed by: FAMILY MEDICINE

## 2021-06-23 RX ORDER — HYDROCODONE BITARTRATE AND ACETAMINOPHEN 7.5; 325 MG/1; MG/1
1 TABLET ORAL EVERY 8 HOURS PRN
Qty: 30 TABLET | Refills: 0 | Status: SHIPPED | OUTPATIENT
Start: 2021-06-23 | End: 2023-03-08 | Stop reason: SDUPTHER

## 2021-06-23 RX ORDER — PROMETHAZINE HYDROCHLORIDE 25 MG/1
25 TABLET ORAL EVERY 8 HOURS PRN
Qty: 90 TABLET | Refills: 0 | Status: SHIPPED | OUTPATIENT
Start: 2021-06-23 | End: 2022-01-24 | Stop reason: SDUPTHER

## 2021-06-23 NOTE — PROGRESS NOTES
Subjective   Annie Mejia is a 75 y.o. female.     History of Present Illness   Mrs. Mejia presents today for ER f/u. Seen 2 days ago initially at [facility] ER then subsequently at [facility] ER. Symptoms of severe nausea and epigastric pain began Sunday, worsened until severe Tuesday, prompting her to seek emergent care. At [facility] was given oral low dose phenergan and discharged home. CXR was normal. No abd imaging performed. Lab eval with no acute concerns. Symptoms worsened, sought care at [facility] ER. Given IV phenergan, CT abd/pelvis non acute. Feeling almost back to baseline. No vomiting, no blood in stool, able to eat/drink.    She has had chronic intermittent difficulty with severe nausea, occ with vomiting, dyspepsia. Has had extensive eval, does have HH.    She also c/o worsening Left knee pain. Feels warm. Has apt set up with Dr. Dior later this month. Has Norco on hand she uses intermittently/limited amount for mgnt of L-DDD/DJD pain. Needs refill. No aberrant behavior.    The following portions of the patient's history were reviewed and updated as appropriate: allergies, current medications, past family history, past medical history, past social history, past surgical history and problem list.    Review of Systems   Constitutional: Positive for appetite change and fatigue. Negative for chills and fever.   HENT: Positive for congestion and postnasal drip. Negative for mouth sores, nosebleeds, rhinorrhea and sore throat.    Eyes: Positive for visual disturbance (chronic).   Respiratory: Positive for cough (dry, intermittent) and shortness of breath (with anxiety). Negative for wheezing.    Cardiovascular: Positive for chest pain (with anxiety) and palpitations (with anxiety). Negative for leg swelling.   Gastrointestinal: Positive for abdominal pain, diarrhea (chronic, stable) and nausea. Negative for blood in stool and vomiting.        Heartburn   Genitourinary: Negative for dysuria, hematuria and urgency.    Musculoskeletal: Positive for arthralgias, back pain, gait problem and myalgias.   Skin: Negative for rash and wound.   Neurological: Positive for dizziness and weakness. Negative for syncope and headaches.   Hematological: Negative for adenopathy. Bruises/bleeds easily.   Psychiatric/Behavioral: Positive for dysphoric mood and sleep disturbance. Negative for confusion. The patient is nervous/anxious.    Pt's previous ROS reviewed and updated as indicated.       Objective    Vitals:    06/23/21 1042   BP: 136/82   Pulse: 106   Resp: 18   Temp: 97.3 °F (36.3 °C)   SpO2: 100%     Body mass index is 30.23 kg/m².      06/23/21  1042   Weight: 87.5 kg (193 lb)       Physical Exam  Vitals and nursing note reviewed.   Constitutional:       General: She is not in acute distress.     Appearance: She is well-developed, well-groomed and overweight. She is not ill-appearing.   HENT:      Head: Normocephalic and atraumatic.   Eyes:      General: No scleral icterus.  Cardiovascular:      Rate and Rhythm: Normal rate and regular rhythm.      Pulses: Normal pulses.      Heart sounds: Normal heart sounds.   Pulmonary:      Effort: Pulmonary effort is normal.      Breath sounds: Normal breath sounds.   Abdominal:      General: Bowel sounds are normal. There is no distension.      Palpations: Abdomen is soft. There is no mass.      Tenderness: There is abdominal tenderness (mild) in the epigastric area.   Musculoskeletal:      Right lower leg: No edema.      Left lower leg: No edema.   Skin:     General: Skin is warm and dry.      Coloration: Skin is not jaundiced or pale.      Findings: No bruising or rash.   Neurological:      Mental Status: She is alert and oriented to person, place, and time.      Gait: Gait abnormal (antalgic on left not using cane/walker today).   Psychiatric:         Mood and Affect: Mood and affect normal.         Behavior: Behavior normal. Behavior is cooperative.         Cognition and Memory: Cognition  normal.     Pt's previous physical exam reviewed and updated as indicated.    Recent Results (from the past 168 hour(s))   Comprehensive Metabolic Panel    Collection Time: 06/22/21  7:48 AM    Specimen: Blood   Result Value Ref Range    Glucose 136 (H) 65 - 99 mg/dL    BUN 20 8 - 23 mg/dL    Creatinine 0.94 0.57 - 1.00 mg/dL    Sodium 139 136 - 145 mmol/L    Potassium 4.1 3.5 - 5.2 mmol/L    Chloride 102 98 - 107 mmol/L    CO2 25.3 22.0 - 29.0 mmol/L    Calcium 10.0 8.6 - 10.5 mg/dL    Total Protein 7.1 6.0 - 8.5 g/dL    Albumin 4.10 3.50 - 5.20 g/dL    ALT (SGPT) 15 1 - 33 U/L    AST (SGOT) 17 1 - 32 U/L    Alkaline Phosphatase 143 (H) 39 - 117 U/L    Total Bilirubin 0.5 0.0 - 1.2 mg/dL    eGFR Non African Amer 58 (L) >60 mL/min/1.73    Globulin 3.0 gm/dL    A/G Ratio 1.4 g/dL    BUN/Creatinine Ratio 21.3 7.0 - 25.0    Anion Gap 11.7 5.0 - 15.0 mmol/L   Lipase    Collection Time: 06/22/21  7:48 AM    Specimen: Blood   Result Value Ref Range    Lipase 35 13 - 60 U/L   Green Top (Gel)    Collection Time: 06/22/21  7:48 AM   Result Value Ref Range    Extra Tube Hold for add-ons.    Lavender Top    Collection Time: 06/22/21  7:48 AM   Result Value Ref Range    Extra Tube hold for add-on    CBC Auto Differential    Collection Time: 06/22/21  7:48 AM    Specimen: Blood   Result Value Ref Range    WBC 6.78 3.40 - 10.80 10*3/mm3    RBC 4.47 3.77 - 5.28 10*6/mm3    Hemoglobin 12.8 12.0 - 15.9 g/dL    Hematocrit 40.6 34.0 - 46.6 %    MCV 90.8 79.0 - 97.0 fL    MCH 28.6 26.6 - 33.0 pg    MCHC 31.5 31.5 - 35.7 g/dL    RDW 17.4 (H) 12.3 - 15.4 %    RDW-SD 58.2 (H) 37.0 - 54.0 fl    MPV 10.9 6.0 - 12.0 fL    Platelets 149 140 - 450 10*3/mm3   Troponin    Collection Time: 06/22/21  7:48 AM    Specimen: Blood   Result Value Ref Range    Troponin T <0.010 0.000 - 0.030 ng/mL   Scan Slide    Collection Time: 06/22/21  7:48 AM    Specimen: Blood   Result Value Ref Range    Scan Slide     Manual Differential    Collection Time:  06/22/21  7:48 AM    Specimen: Blood   Result Value Ref Range    Neutrophil % 41.0 (L) 42.7 - 76.0 %    Lymphocyte % 44.0 19.6 - 45.3 %    Monocyte % 9.0 5.0 - 12.0 %    Bands %  5.0 0.0 - 5.0 %    Metamyelocyte % 1.0 (H) 0.0 - 0.0 %    Neutrophils Absolute 3.12 1.70 - 7.00 10*3/mm3    Lymphocytes Absolute 2.98 0.70 - 3.10 10*3/mm3    Monocytes Absolute 0.61 0.10 - 0.90 10*3/mm3    RBC Morphology Normal Normal    WBC Morphology Normal Normal    Platelet Estimate Adequate Normal   Urinalysis With Microscopic If Indicated (No Culture) - Urine, Clean Catch    Collection Time: 06/22/21  9:58 AM    Specimen: Urine, Clean Catch   Result Value Ref Range    Color, UA Yellow Yellow, Straw    Appearance, UA Clear Clear    pH, UA 7.5 5.0 - 8.0    Specific Gravity, UA 1.011 1.005 - 1.030    Glucose, UA Negative Negative    Ketones, UA 15 mg/dL (1+) (A) Negative    Bilirubin, UA Negative Negative    Blood, UA Negative Negative    Protein, UA Negative Negative    Leuk Esterase, UA Negative Negative    Nitrite, UA Negative Negative    Urobilinogen, UA 0.2 E.U./dL 0.2 - 1.0 E.U./dL   Troponin    Collection Time: 06/22/21 10:04 AM    Specimen: Blood   Result Value Ref Range    Troponin T <0.010 0.000 - 0.030 ng/mL     RECORDS FROM [facility] ER NOT AVAILABLE AT THIS TIME.    Assessment/Plan   Diagnoses and all orders for this visit:    1. Nausea (Primary)    2. Epigastric pain    3. Chronic pain of left knee  -     HYDROcodone-acetaminophen (Norco) 7.5-325 MG per tablet; Take 1 tablet by mouth Every 8 (Eight) Hours As Needed for Severe Pain .  Dispense: 30 tablet; Refill: 0    4. Primary osteoarthritis of left knee  -     HYDROcodone-acetaminophen (Norco) 7.5-325 MG per tablet; Take 1 tablet by mouth Every 8 (Eight) Hours As Needed for Severe Pain .  Dispense: 30 tablet; Refill: 0    5. Chronic bilateral low back pain without sciatica    Other orders  -     promethazine (PHENERGAN) 25 MG tablet; Take 1 tablet by mouth Every 8 (Eight) Hours  As Needed for Nausea or Vomiting.  Dispense: 90 tablet; Refill: 0       Recent ER visit for severe nausea with mild/moderate epigastric pain of unclear etiology. Likely related to her HH, possible esophageal spasm, etc. Near baseline now. Reportedly negative imaging, records requested. Continue bland diet, advance as tolerate, continue PPI, adequate fluids.    Chronic left knee pain with OA. F/u with Dr. Dior as scheduled. Continue ice, relative rest, compression, and norco as needed for severe pain (for L-DDD/DJD as well).   As part of patient's treatment plan I am prescribing a controlled substance.  The patient has been made aware of the appropriate use of such medications, including potential risk of somnolence, limited ability to drive and/or work safely, and potential for dependence and/or overdose.  It has also been made clear that these medications are for use by this patient only, without concomitant use of alcohol or other substances, unless prescribed.  History and physical exam exhibit continued safe and appropriate use of controlled substance.  LILIAM reviewed.  Patient has completed a prescribing agreement detailing terms of continued prescribing of controlled substances, including monitoring LILIAM reports, urine drug screening, and pill counts if necessary.  Patient is aware that inappropriate use will result in cessation of prescribing such medications.    Keep routine f/u next month, f/u sooner as needed.  Patient was encouraged to keep me informed of any acute changes, lack of improvement, or any new concerning symptoms.  Pt is aware of reasons to seek emergent care.  Patient voiced understanding of all instructions and denied further questions.

## 2021-06-23 NOTE — ED NOTES
Oziel at [facility] called requesting lab results on patient. Faxed results after receiving consent.      Dyana Gamino  06/22/21 8520

## 2021-06-25 ENCOUNTER — HOSPITAL ENCOUNTER (OUTPATIENT)
Dept: MRI IMAGING | Facility: HOSPITAL | Age: 76
End: 2021-06-25

## 2021-06-28 ENCOUNTER — TELEPHONE (OUTPATIENT)
Dept: GASTROENTEROLOGY | Facility: CLINIC | Age: 76
End: 2021-06-28

## 2021-06-28 NOTE — TELEPHONE ENCOUNTER
Called patient back and confirmed she is still taking Dexilant. Notified her per  we can either have her come in for a office visit or schedule her for a EGD. The patient has decided she would like to move forward with scheduling EGD first. Confirmed she had no additional questions and transferred her to Lauren  to schedule the procedure.

## 2021-06-28 NOTE — TELEPHONE ENCOUNTER
PT CALLED IN, IS HAVING EXTREME NAUSEA AND STATES THAT SHE HAS GONE TO THE EMERGENCY ROOM SEVERAL TIMES OVER THIS ISSUE.     SHE IS WONDERING WHAT WE CAN RECOMMEND FOR HER, BESIDES COMING IN FOR AN APPOINTMENT?  SAYS THAT DR. CORTEZ WROTE HER A SCRIPT LAST TIME THIS HAPPENED AND THAT IT HELPED HER- NOT SURE WHAT RX SHE IS TALKING ABOUT

## 2021-06-29 ENCOUNTER — PATIENT OUTREACH (OUTPATIENT)
Dept: CASE MANAGEMENT | Facility: OTHER | Age: 76
End: 2021-06-29

## 2021-06-29 ENCOUNTER — OFFICE VISIT (OUTPATIENT)
Dept: FAMILY MEDICINE CLINIC | Facility: CLINIC | Age: 76
End: 2021-06-29

## 2021-06-29 VITALS
SYSTOLIC BLOOD PRESSURE: 112 MMHG | HEART RATE: 79 BPM | DIASTOLIC BLOOD PRESSURE: 80 MMHG | TEMPERATURE: 97.3 F | RESPIRATION RATE: 18 BRPM | BODY MASS INDEX: 30.07 KG/M2 | OXYGEN SATURATION: 97 % | WEIGHT: 192 LBS

## 2021-06-29 DIAGNOSIS — F41.8 MIXED ANXIETY DEPRESSIVE DISORDER: ICD-10-CM

## 2021-06-29 DIAGNOSIS — R11.2 NON-INTRACTABLE VOMITING WITH NAUSEA, UNSPECIFIED VOMITING TYPE: Primary | ICD-10-CM

## 2021-06-29 PROCEDURE — 99213 OFFICE O/P EST LOW 20 MIN: CPT | Performed by: FAMILY MEDICINE
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
## 2021-06-29 NOTE — PROGRESS NOTES
Subjective   Annie Mejia is a 75 y.o. female.     History of Present Illness   Mrs. Mejia presents today for ER follow-up.  Was seen once again in [place] earlier this week for intractable nausea, dry heaves.  Had been seen at ER for similar symptoms week prior.  CT scan negative, lab eval essentially normal.  She was given IV Phenergan.  At previous ER visit given IV fluids.  She once again feels generally back to baseline but is concerned about recurring episodes.  She was advised to follow-up with GI.  Is attempting to contact Dr. Martínez's office as she has seen him previously, had extensive GI eval.    Denies any other new complaints today.    The following portions of the patient's history were reviewed and updated as appropriate: allergies, current medications, past family history, past medical history, past social history, past surgical history and problem list.    Review of Systems   Constitutional: Positive for appetite change and fatigue. Negative for chills and fever.   HENT: Positive for congestion and postnasal drip. Negative for mouth sores, nosebleeds, rhinorrhea and sore throat.    Eyes: Positive for visual disturbance (chronic).   Respiratory: Positive for cough (dry, intermittent) and shortness of breath (with anxiety). Negative for wheezing.    Cardiovascular: Positive for chest pain (with anxiety) and palpitations (with anxiety). Negative for leg swelling.   Gastrointestinal: Positive for abdominal pain, diarrhea (chronic, stable) and nausea. Negative for blood in stool and vomiting.        Heartburn   Genitourinary: Negative for dysuria, hematuria and urgency.   Musculoskeletal: Positive for arthralgias, back pain, gait problem and myalgias.   Skin: Negative for rash and wound.   Neurological: Positive for dizziness and weakness. Negative for syncope and headaches.   Hematological: Negative for adenopathy. Bruises/bleeds easily.   Psychiatric/Behavioral: Positive for dysphoric mood and  sleep disturbance. Negative for confusion. The patient is nervous/anxious.    Pt's previous ROS reviewed and updated as indicated.       Objective    Vitals:    06/29/21 0950   BP: 112/80   Pulse: 79   Resp: 18   Temp: 97.3 °F (36.3 °C)   SpO2: 97%     Body mass index is 30.07 kg/m².      06/29/21  0950   Weight: 87.1 kg (192 lb)       Physical Exam  Vitals and nursing note reviewed.   Constitutional:       General: She is not in acute distress.     Appearance: She is well-developed, well-groomed and overweight. She is not ill-appearing.   HENT:      Head: Normocephalic and atraumatic.   Eyes:      General: No scleral icterus.  Cardiovascular:      Rate and Rhythm: Normal rate and regular rhythm.      Pulses: Normal pulses.      Heart sounds: Normal heart sounds.   Pulmonary:      Effort: Pulmonary effort is normal.      Breath sounds: Normal breath sounds.   Abdominal:      General: Bowel sounds are normal. There is no distension.      Palpations: Abdomen is soft. There is no mass.      Tenderness: There is abdominal tenderness (mild) in the epigastric area.   Musculoskeletal:      Right lower leg: No edema.      Left lower leg: No edema.   Skin:     General: Skin is warm and dry.      Coloration: Skin is not jaundiced or pale.      Findings: No bruising or rash.   Neurological:      Mental Status: She is alert and oriented to person, place, and time.      Gait: Gait abnormal (antalgic on left not using cane/walker today).   Psychiatric:         Mood and Affect: Affect normal. Mood is anxious and depressed (mildly).         Speech: Speech normal.         Behavior: Behavior normal. Behavior is cooperative.         Thought Content: Thought content normal. Thought content does not include suicidal ideation.         Cognition and Memory: Cognition normal.         Judgment: Judgment normal.     Pt's previous physical exam reviewed and updated as indicated.    Most recent labs, imaging scanned in chart from [facility] reviewed.    Assessment/Plan   Diagnoses and all orders for this visit:    1. Non-intractable vomiting with nausea, unspecified vomiting type (Primary)       Continue Phenergan as needed.  She is advised to contact Dr. Martínez's office for scheduling follow-up appointment.  If she has difficulty getting appointment in timely manner she will let us know we will assist her with being seen more urgently.  Clinically stable at this time.    She will keep her routine follow-up as scheduled.  Follow-up sooner as needed.  Patient was encouraged to keep me informed of any acute changes, lack of improvement, or any new concerning symptoms.  Pt is aware of reasons to seek emergent care.  Patient voiced understanding of all instructions and denied further questions.    I spent >20 minutes caring for Annie on this date of service. This time includes time spent by me in the following activities:preparing for the visit, reviewing tests, obtaining and/or reviewing a separately obtained history, performing a medically appropriate examination and/or evaluation , counseling and educating the patient/family/caregiver, documenting information in the medical record and care coordination.      Please note that portions of this note may have been completed with a voice recognition program. Efforts were made to edit the dictations, but occasionally words are mistranscribed.

## 2021-06-30 ENCOUNTER — TELEPHONE (OUTPATIENT)
Dept: NEUROSURGERY | Facility: CLINIC | Age: 76
End: 2021-06-30

## 2021-06-30 ENCOUNTER — LAB (OUTPATIENT)
Dept: LAB | Facility: HOSPITAL | Age: 76
End: 2021-06-30

## 2021-06-30 ENCOUNTER — TRANSCRIBE ORDERS (OUTPATIENT)
Dept: LAB | Facility: HOSPITAL | Age: 76
End: 2021-06-30

## 2021-06-30 DIAGNOSIS — Z01.818 PRE-OPERATIVE CLEARANCE: ICD-10-CM

## 2021-06-30 DIAGNOSIS — Z01.818 PRE-OPERATIVE CLEARANCE: Primary | ICD-10-CM

## 2021-06-30 LAB — SARS-COV-2 RNA NOSE QL NAA+PROBE: NOT DETECTED

## 2021-06-30 PROCEDURE — U0005 INFEC AGEN DETEC AMPLI PROBE: HCPCS

## 2021-06-30 PROCEDURE — U0004 COV-19 TEST NON-CDC HGH THRU: HCPCS

## 2021-06-30 RX ORDER — ALPRAZOLAM 0.25 MG/1
TABLET ORAL
Qty: 90 TABLET | Refills: 0 | Status: SHIPPED | OUTPATIENT
Start: 2021-06-30 | End: 2021-10-04

## 2021-07-02 ENCOUNTER — OUTSIDE FACILITY SERVICE (OUTPATIENT)
Dept: GASTROENTEROLOGY | Facility: CLINIC | Age: 76
End: 2021-07-02

## 2021-07-02 PROCEDURE — 43239 EGD BIOPSY SINGLE/MULTIPLE: CPT | Performed by: INTERNAL MEDICINE

## 2021-07-02 PROCEDURE — 88305 TISSUE EXAM BY PATHOLOGIST: CPT | Performed by: INTERNAL MEDICINE

## 2021-07-06 ENCOUNTER — LAB REQUISITION (OUTPATIENT)
Dept: LAB | Facility: HOSPITAL | Age: 76
End: 2021-07-06

## 2021-07-06 DIAGNOSIS — R11.0 NAUSEA: ICD-10-CM

## 2021-07-19 ENCOUNTER — PATIENT OUTREACH (OUTPATIENT)
Dept: CASE MANAGEMENT | Facility: OTHER | Age: 76
End: 2021-07-19

## 2021-07-19 NOTE — OUTREACH NOTE
Ambulatory Case Management Note    Patient Outreach    Patient reports she saw her GI specialist, Dr. Martínez, and had a biopsy taken of her stomach. Per chart documentation, the biopsy was negative for malignancy or h. Pylori. Pt does have Gerd, a moderate to large hiatal hernia, and gastritis. Patient reports she was placed on new Omeprazole, 40 mg QD and so far it has been effective. Patient states she is also working on a better diet to help control symptoms. ACM will follow up within the next few weeks for reassessment, pt agreeable.        Nadia Rai RN  Ambulatory Case Management    7/19/2021, 11:12 EDT

## 2021-07-21 ENCOUNTER — OFFICE VISIT (OUTPATIENT)
Dept: FAMILY MEDICINE CLINIC | Facility: CLINIC | Age: 76
End: 2021-07-21

## 2021-07-21 VITALS
SYSTOLIC BLOOD PRESSURE: 124 MMHG | DIASTOLIC BLOOD PRESSURE: 82 MMHG | OXYGEN SATURATION: 92 % | WEIGHT: 194.2 LBS | HEIGHT: 67 IN | HEART RATE: 52 BPM | BODY MASS INDEX: 30.48 KG/M2 | TEMPERATURE: 96.9 F

## 2021-07-21 DIAGNOSIS — K31.84 GASTROPARESIS: ICD-10-CM

## 2021-07-21 DIAGNOSIS — G52.2: ICD-10-CM

## 2021-07-21 DIAGNOSIS — Z79.4 TYPE 2 DIABETES MELLITUS WITHOUT COMPLICATION, WITH LONG-TERM CURRENT USE OF INSULIN (HCC): Primary | ICD-10-CM

## 2021-07-21 DIAGNOSIS — E11.9 TYPE 2 DIABETES MELLITUS WITHOUT COMPLICATION, WITH LONG-TERM CURRENT USE OF INSULIN (HCC): Primary | ICD-10-CM

## 2021-07-21 DIAGNOSIS — Z01.818 PREOP TESTING: ICD-10-CM

## 2021-07-21 DIAGNOSIS — I73.9 PERIPHERAL VASCULAR DISEASE (HCC): ICD-10-CM

## 2021-07-21 DIAGNOSIS — I10 ESSENTIAL HYPERTENSION: ICD-10-CM

## 2021-07-21 DIAGNOSIS — N28.9 RENAL INSUFFICIENCY: ICD-10-CM

## 2021-07-21 PROCEDURE — 99214 OFFICE O/P EST MOD 30 MIN: CPT | Performed by: FAMILY MEDICINE

## 2021-07-21 RX ORDER — PROMETHAZINE HYDROCHLORIDE 25 MG/1
SUPPOSITORY RECTAL
COMMUNITY
Start: 2021-06-28 | End: 2022-09-29 | Stop reason: SDUPTHER

## 2021-07-21 RX ORDER — OMEPRAZOLE 40 MG/1
40 CAPSULE, DELAYED RELEASE ORAL DAILY
COMMUNITY
Start: 2021-07-02 | End: 2021-12-31

## 2021-07-21 NOTE — PROGRESS NOTES
"Subjective   Annie Mejia is a 75 y.o. female.     History of Present Illness   Mrs. Mejia presents today for follow-up on chronic conditions and lab eval prior to procedure.  She will be having what sounds like catheterization of the lower extremities with possible PCI/stenting.  Per Dr. Love.  Status post femoropopliteal bypass 2012.  Taking antihypertensives and statin as prescribed.  Blood pressure has been well controlled.    She states her stomach is doing somewhat better.  Has had follow-up with Dr. Hagan.  She reports he feels it is most likely \"vagus nerve problem\" or gastroparesis.  She is planning to consult with specialist at [facility].    Has diabetes, checking her blood glucose generally in the 120s to 140s in the morning.  Has not been checking it postprandially.      She has already had preop chest x-ray and EKG.  Has order for preop Covid testing as well.    Noted to have stable renal insufficiency on previous labs.    The following portions of the patient's history were reviewed and updated as appropriate: allergies, current medications, past family history, past medical history, past social history, past surgical history and problem list.    Review of Systems   Constitutional: Positive for appetite change and fatigue. Negative for chills and fever.   HENT: Positive for congestion and postnasal drip. Negative for mouth sores, nosebleeds, rhinorrhea and sore throat.    Eyes: Positive for visual disturbance (chronic).   Respiratory: Positive for cough (dry, intermittent) and shortness of breath (with anxiety). Negative for wheezing.    Cardiovascular: Positive for chest pain (with anxiety) and palpitations (with anxiety). Negative for leg swelling.   Gastrointestinal: Positive for abdominal pain, diarrhea (chronic, stable) and nausea. Negative for blood in stool and vomiting.        Heartburn   Genitourinary: Negative for dysuria, hematuria and urgency.   Musculoskeletal: Positive for " arthralgias, back pain, gait problem and myalgias.   Skin: Negative for rash and wound.   Neurological: Positive for dizziness and weakness. Negative for syncope and headaches.   Hematological: Negative for adenopathy. Bruises/bleeds easily.   Psychiatric/Behavioral: Positive for dysphoric mood and sleep disturbance. Negative for confusion. The patient is nervous/anxious.    Pt's previous ROS reviewed and updated as indicated.     Objective    Vitals:    07/21/21 0842   BP: 124/82   Pulse: 52   Temp: 96.9 °F (36.1 °C)   SpO2: 92%     Body mass index is 30.42 kg/m².      07/21/21  0842   Weight: 88.1 kg (194 lb 3.2 oz)       Physical Exam  Vitals and nursing note reviewed.   Constitutional:       General: She is not in acute distress.     Appearance: She is well-developed, well-groomed and overweight. She is not ill-appearing.   HENT:      Head: Normocephalic and atraumatic.   Eyes:      General: No scleral icterus.  Cardiovascular:      Rate and Rhythm: Normal rate and regular rhythm.      Pulses: Normal pulses.      Heart sounds: Normal heart sounds.   Pulmonary:      Effort: Pulmonary effort is normal.      Breath sounds: Normal breath sounds.   Musculoskeletal:      Right lower leg: No edema.      Left lower leg: No edema.   Skin:     General: Skin is warm and dry.      Coloration: Skin is not jaundiced or pale.      Findings: No bruising or rash.   Neurological:      Mental Status: She is alert and oriented to person, place, and time.      Gait: Gait abnormal (antalgic on left not using cane/walker today).   Psychiatric:         Mood and Affect: Affect normal. Mood is anxious and depressed (mildly).         Speech: Speech normal.         Behavior: Behavior normal. Behavior is cooperative.         Thought Content: Thought content normal. Thought content does not include suicidal ideation.         Cognition and Memory: Cognition normal.         Judgment: Judgment normal.     Pt's previous physical exam reviewed  and updated as indicated.    Lab Results   Component Value Date    CHOL 151 01/18/2021    CHLPL 169 09/29/2020    TRIG 180 (H) 01/18/2021    HDL 56 01/18/2021    LDL 65 01/18/2021     Assessment/Plan   Diagnoses and all orders for this visit:    1. Type 2 diabetes mellitus without complication, with long-term current use of insulin (CMS/[facility]) (Primary)  -     Basic Metabolic Panel  -     CBC & Differential  -     Hemoglobin A1c    2. Renal insufficiency  -     Basic Metabolic Panel  -     CBC & Differential    3. Peripheral vascular disease (CMS/[facility])  -     CBC & Differential    4. Preop testing  -     Basic Metabolic Panel  -     CBC & Differential  -     Hemoglobin A1c    5. Essential hypertension    6. 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 Gastroparesis    Other orders  -     Manual Differential      Intermittent episodes of severe nausea, dry heaving, abdominal pain.  Has had consultation with gastroenterology.  Felt to be vagus nerve disorder and/or gastroparesis.  Patient will be consulting with [facility] in near future.    Type 2 diabetes previously with A1c at goal.  Recheck as above.  Adjust treatment as indicated.    Hypertension controlled.  Continue Lipitor, amlodipine, propranolol    Preop testing as requested per vascular surgery.  She will follow with him next week for procedure as scheduled.  Currently on aspirin, statin.    Routine follow-up in 3-month sooner as needed/instructed.  I will contact patient regarding test results and provide instructions regarding any necessary changes in plan of care.  Patient was encouraged to keep me informed of any acute changes, lack of improvement, or any new concerning symptoms.  Pt is aware of reasons to seek emergent care.  Patient voiced understanding of all instructions and denied further questions.    Please note that portions of this note may have been completed with a voice recognition program. Efforts were made to edit the dictations, but  occasionally words are mistranscribed.

## 2021-07-22 ENCOUNTER — TRANSCRIBE ORDERS (OUTPATIENT)
Dept: LAB | Facility: HOSPITAL | Age: 76
End: 2021-07-22

## 2021-07-22 ENCOUNTER — LAB (OUTPATIENT)
Dept: LAB | Facility: HOSPITAL | Age: 76
End: 2021-07-22

## 2021-07-22 ENCOUNTER — TELEPHONE (OUTPATIENT)
Dept: FAMILY MEDICINE CLINIC | Facility: CLINIC | Age: 76
End: 2021-07-22

## 2021-07-22 DIAGNOSIS — Z01.818 PRE-OPERATIVE CLEARANCE: Primary | ICD-10-CM

## 2021-07-22 DIAGNOSIS — Z01.818 PRE-OPERATIVE CLEARANCE: ICD-10-CM

## 2021-07-22 LAB
BASOPHILS # BLD AUTO: ABNORMAL 10*3/UL
BUN SERPL-MCNC: 16 MG/DL (ref 8–23)
BUN/CREAT SERPL: 16.3 (ref 7–25)
CALCIUM SERPL-MCNC: 9.6 MG/DL (ref 8.6–10.5)
CHLORIDE SERPL-SCNC: 105 MMOL/L (ref 98–107)
CO2 SERPL-SCNC: 27.9 MMOL/L (ref 22–29)
CREAT SERPL-MCNC: 0.98 MG/DL (ref 0.57–1)
DIFFERENTIAL COMMENT: ABNORMAL
EOSINOPHIL # BLD AUTO: ABNORMAL 10*3/UL
EOSINOPHIL # BLD MANUAL: 0.05 10*3/MM3 (ref 0–0.4)
EOSINOPHIL NFR BLD AUTO: ABNORMAL %
EOSINOPHIL NFR BLD MANUAL: 1 % (ref 0.3–6.2)
ERYTHROCYTE [DISTWIDTH] IN BLOOD BY AUTOMATED COUNT: 16.2 % (ref 12.3–15.4)
GLUCOSE SERPL-MCNC: 108 MG/DL (ref 65–99)
HBA1C MFR BLD: 7.6 % (ref 4.8–5.6)
HCT VFR BLD AUTO: 38 % (ref 34–46.6)
HGB BLD-MCNC: 12.3 G/DL (ref 12–15.9)
LYMPHOCYTES # BLD AUTO: ABNORMAL 10*3/UL
LYMPHOCYTES # BLD MANUAL: 3.24 10*3/MM3 (ref 0.7–3.1)
LYMPHOCYTES NFR BLD AUTO: ABNORMAL %
LYMPHOCYTES NFR BLD MANUAL: 69.7 % (ref 19.6–45.3)
MCH RBC QN AUTO: 29.6 PG (ref 26.6–33)
MCHC RBC AUTO-ENTMCNC: 32.4 G/DL (ref 31.5–35.7)
MCV RBC AUTO: 91.3 FL (ref 79–97)
MONOCYTES # BLD MANUAL: 0.33 10*3/MM3 (ref 0.1–0.9)
MONOCYTES NFR BLD AUTO: ABNORMAL %
MONOCYTES NFR BLD MANUAL: 7.1 % (ref 5–12)
NEUTROPHILS # BLD MANUAL: 1.03 10*3/MM3 (ref 1.7–7)
NEUTROPHILS NFR BLD AUTO: ABNORMAL %
NEUTROPHILS NFR BLD MANUAL: 22.2 % (ref 42.7–76)
PLATELET # BLD AUTO: 137 10*3/MM3 (ref 140–450)
PLATELET BLD QL SMEAR: ABNORMAL
POTASSIUM SERPL-SCNC: 4.7 MMOL/L (ref 3.5–5.2)
RBC # BLD AUTO: 4.16 10*6/MM3 (ref 3.77–5.28)
RBC MORPH BLD: ABNORMAL
SARS-COV-2 RNA NOSE QL NAA+PROBE: NOT DETECTED
SODIUM SERPL-SCNC: 139 MMOL/L (ref 136–145)
WBC # BLD AUTO: 4.65 10*3/MM3 (ref 3.4–10.8)

## 2021-07-22 PROCEDURE — U0005 INFEC AGEN DETEC AMPLI PROBE: HCPCS

## 2021-07-22 PROCEDURE — U0004 COV-19 TEST NON-CDC HGH THRU: HCPCS

## 2021-07-22 PROCEDURE — C9803 HOPD COVID-19 SPEC COLLECT: HCPCS

## 2021-07-25 PROBLEM — G52.2: Status: ACTIVE | Noted: 2021-07-25

## 2021-07-25 PROBLEM — K31.84 GASTROPARESIS: Status: ACTIVE | Noted: 2021-07-25

## 2021-07-27 ENCOUNTER — TRANSCRIBE ORDERS (OUTPATIENT)
Dept: ADMINISTRATIVE | Facility: HOSPITAL | Age: 76
End: 2021-07-27

## 2021-07-27 DIAGNOSIS — Z12.31 VISIT FOR SCREENING MAMMOGRAM: Primary | ICD-10-CM

## 2021-07-29 ENCOUNTER — TELEPHONE (OUTPATIENT)
Dept: FAMILY MEDICINE CLINIC | Facility: CLINIC | Age: 76
End: 2021-07-29

## 2021-08-06 ENCOUNTER — TELEPHONE (OUTPATIENT)
Dept: NEUROSURGERY | Facility: CLINIC | Age: 76
End: 2021-08-06

## 2021-08-06 NOTE — TELEPHONE ENCOUNTER
DOCUMENTATION ONLY    Patient called to make sure it was okay to keep her upcoming apt with Dr. Ybarra.  She recently had some blood vessels cleaned out in her lower extremities and is on a blood thinner for 3 months.  Reassured patient that nothing invasive was scheduled as Dr. Ybarra is going to go over her MRI of the lumbar spine with her. She will keep her appointment.

## 2021-08-09 ENCOUNTER — PATIENT OUTREACH (OUTPATIENT)
Dept: CASE MANAGEMENT | Facility: OTHER | Age: 76
End: 2021-08-09

## 2021-08-09 NOTE — OUTREACH NOTE
Ambulatory Case Management Note    Patient Outreach    Patient reports she has been doing OK; is taking the new omeprazole and experiencing less GI upset. Patient reports she still experiences some nausea but not as bad. Patient also reports having undergone a procedure two weeks ago for revascularization of the vessels in her lower extremities. Patient reports she is still swelling in her BLE, is trying to elevate it when not active. Patient has a follow-up with the surgeon this Friday. Patient's b/p has been good; reports trying to increase water intake. Pt to report any new or worsening s/s to her providers.    Care Plan: HTN/PVD   Updates made since 8/9/2021 12:00 AM      Problem: LIFESTYLE CHOICES       Goal: Healthy food choice       Task: Provide resources for diet management such as calorie counting and sodium reduction    Responsible User: Nadia Rai RN      Task: Provide community resources for healthy food options    Responsible User: Nadia Rai RN      Task: Discuss healthy food options and preferred restaurants    Responsible User: Nadia Rai RN      Problem: SODIUM INTAKE       Goal: Patient will maintain management of sodium consumption       Task: Educate patient on daily sodium intake & how sodium affects the heart    Responsible User: Nadia Rai RN      Task: Instruct patient to eat less salt and watch fluids. No added salt or no more than 2 grams (2000mgs) of sodium or 2 liters of fluid in a 24-hour period, or follow provider's specific recommendations.    Responsible User: Nadia Rai RN      Task: Educate patient how to read a nutrition label pointing out servings and serving size    Responsible User: Nadia Rai RN      Task: Educate patient on sodium alternatives    Responsible User: Nadia Rai RN      Problem: BLOOD PRESSURE MONITORING       Goal: Establish Plan for Regular Lab Work       Task: Track patient LDL and HDL levels    Responsible User: Cecelia  CATHY Zuniga      Task: Track patient serum potassium and serum creatinine levels    Responsible User: Nadia Rai RN      Task: Review the patients last urine protein. Discuss need if patient has not had one.    Responsible User: Nadia Rai RN      Task: Review patient's last ECHO or EKG. Discuss need if patient has not had one.    Responsible User: Nadia Rai RN      Goal: Establish Regular Follow-Ups with PCP Completed 8/9/2021      Task: Refer patient to PCP Completed 8/9/2021   Responsible User: Nadia Rai RN      Task: Determine patient's next PCP visit Completed 8/9/2021   Responsible User: Nadia Rai RN      Task: Discuss schedule for PCP visits with patient Completed 8/9/2021   Responsible User: Nadia Rai RN      Problem: MEDICATION ADHERENCE Resolved 8/9/2021      Goal: Consistently take medications as prescribed Completed 8/9/2021      Task: Discuss barriers to medication adherence with patient Completed 8/9/2021   Responsible User: Nadia Rai RN      Task: Educate patient on frequency and refill details of meds Completed 8/9/2021   Responsible User: Nadia Rai RN      Task: Assist patients in setting up daily notes/alarms for meds. Consider pill box use Completed 8/9/2021   Responsible User: Nadia Rai RN      Task: Refer to value-based pharmacist Completed 8/9/2021   Responsible User: Nadia Rai RN      Problem: PATIENT IS INACTIVE       Goal: Exercise at least 20 minutes per day       Task: Discuss barriers to activity with patient. Update SDOH.    Responsible User: Nadia Rai RN      Task: Develop exercise plan with patient    Responsible User: Nadia Rai RN      Task: Explore community resources including walking groups, assistance programs, and home videos.    Responsible User: Nadia Rai RN      Problem: PATIENT IS HYPERTENSIVE       Goal: Remain at or Below Target Blood Pressure Completed 8/9/2021      Task: Establish a target blood  pressure with the patient in conjunction with PCP Completed 8/9/2021   Responsible User: Nadia Rai RN      Task: Discuss steps to manage BP with patient Completed 8/9/2021   Responsible User: Nadia Rai RN      Task: Educate on disease process and complications associated with noncompliance Completed 8/9/2021   Responsible User: Nadia Rai RN      Task: Educate on keeping a log Completed 8/9/2021   Responsible User: Nadia Rai RN Debra Beverly, RN  Ambulatory Case Management    8/9/2021, 15:41 EDT

## 2021-08-10 ENCOUNTER — OFFICE VISIT (OUTPATIENT)
Dept: NEUROSURGERY | Facility: CLINIC | Age: 76
End: 2021-08-10

## 2021-08-10 VITALS — TEMPERATURE: 97.5 F | BODY MASS INDEX: 30.92 KG/M2 | WEIGHT: 197 LBS | HEIGHT: 67 IN

## 2021-08-10 DIAGNOSIS — M54.9 MECHANICAL BACK PAIN: Primary | ICD-10-CM

## 2021-08-10 PROCEDURE — 99203 OFFICE O/P NEW LOW 30 MIN: CPT | Performed by: NEUROLOGICAL SURGERY

## 2021-08-10 RX ORDER — APIXABAN 2.5 MG/1
2.5 TABLET, FILM COATED ORAL 2 TIMES DAILY
COMMUNITY
Start: 2021-07-29 | End: 2022-07-15

## 2021-08-10 NOTE — PROGRESS NOTES
Patient: Annie Mejia  : 1945    Primary Care Provider: Olga Pimentel MD    Requesting Provider: As above        History    Chief Complaint: Chronic low back pain.    History of Present Illness: Ms. Mejia is a 75-year-old unemployed woman who has had a 5 decade long history of back pain.  That has been unchanged.  She suffered a fall in May and has had bothersome hip pain.  Her low back pain is not any different at all.  The pain will run down her legs.  In November her  passed away due to Covid.  She also had Covid and suffered an associated stroke.  She is anticoagulated given some vascular difficulties in her left lower extremity.  In general her pain is worse with standing, walking, even sitting down.  She had some sort of intervention at L4-5 by Dr. Ibrahim in 2018.  Apparently some decompression was performed and an interspinous device was placed.    Review of Systems   Constitutional: Negative for activity change, appetite change, chills, diaphoresis, fatigue, fever and unexpected weight change.   HENT: Negative for congestion, dental problem, drooling, ear discharge, ear pain, facial swelling, hearing loss, mouth sores, nosebleeds, postnasal drip, rhinorrhea, sinus pressure, sinus pain, sneezing, sore throat, tinnitus, trouble swallowing and voice change.    Eyes: Negative for photophobia, pain, discharge, redness, itching and visual disturbance.   Respiratory: Negative for apnea, cough, choking, chest tightness, shortness of breath, wheezing and stridor.    Cardiovascular: Negative for chest pain, palpitations and leg swelling.   Gastrointestinal: Positive for abdominal pain, nausea and vomiting. Negative for abdominal distention, anal bleeding, blood in stool, constipation, diarrhea and rectal pain.   Endocrine: Negative for cold intolerance, heat intolerance, polydipsia, polyphagia and polyuria.   Genitourinary: Positive for vaginal discharge. Negative for decreased urine volume,  "difficulty urinating, dyspareunia, dysuria, enuresis, flank pain, frequency, genital sores, hematuria, menstrual problem, pelvic pain, urgency, vaginal bleeding and vaginal pain.   Musculoskeletal: Positive for arthralgias and back pain. Negative for gait problem, joint swelling, myalgias, neck pain and neck stiffness.   Skin: Negative for color change, pallor, rash and wound.   Allergic/Immunologic: Negative for environmental allergies, food allergies and immunocompromised state.   Neurological: Negative for dizziness, tremors, seizures, syncope, facial asymmetry, speech difficulty, weakness, light-headedness, numbness and headaches.   Hematological: Negative for adenopathy. Bruises/bleeds easily.   Psychiatric/Behavioral: Negative for agitation, behavioral problems, confusion, decreased concentration, dysphoric mood, hallucinations, self-injury, sleep disturbance and suicidal ideas. The patient is not nervous/anxious and is not hyperactive.        The patient's past medical history, past surgical history, family history, and social history have been reviewed at length in the electronic medical record.    Physical Exam:   Temp 97.5 °F (36.4 °C)   Ht 170.2 cm (67\")   Wt 89.4 kg (197 lb)   BMI 30.85 kg/m²   CONSTITUTIONAL: Patient is well-nourished, pleasant and appears stated age.  CV: Heart regular rate and rhythm without murmur, rub, or gallop.  PULMONARY: Lungs are clear to ascultation.  MUSCULOSKELETAL:  Neck tenderness to palpation is not observed.   ROM in the neck is limited in all directions.  NEUROLOGICAL:  Orientation, memory, attention span, language function, and cognition have been examined and are intact.  Strength is intact in the upper and lower extremities to direct testing.  Muscle tone is normal throughout.  Station and gait are normal.  Sensation is intact to light touch testing throughout.  Deep tendon reflexes are absent throughout.  Shavon's Sign is negative bilaterally. No clonus is " elicited.  Coordination is intact.      Medical Decision Making    Data Review:   (All imaging studies were personally reviewed unless stated otherwise)  MRI of the lumbar spine demonstrates extensive diffuse degenerative disc disease and facet arthropathy.  On the STIR sequences there is some increased signal at L4 level but in the upper aspect of T12.    Diagnosis:   1.  Chronic mechanical low back pain.  2.  Possible subacute fractures of T12 and L4.    Treatment Options:   The patient symptoms are completely unchanged from the way they have been for 50 years.  She is also anticoagulated.  Given all of this I really do not think she is a surgical candidate.  Treatment should remain symptomatic.       Diagnosis Plan   1. Mechanical back pain         Scribed for Kareem Ybarra MD by Jennifer Bean [facility] 8/10/2021 15:13 EDT      I, Dr. Ybarra, personally performed the services described in the documentation, as scribed in my presence, and it is both accurate and complete.

## 2021-08-25 ENCOUNTER — PATIENT OUTREACH (OUTPATIENT)
Dept: CASE MANAGEMENT | Facility: OTHER | Age: 76
End: 2021-08-25

## 2021-08-25 NOTE — OUTREACH NOTE
Ambulatory Case Management Note    Patient Outreach    Outreach to patient for care plan progression. Conversation brief as patient was taking her daughter to the doctor. ACM offered to call again next week for longer clinical session, pt stated that would be fine. ACM will send some educational material via [employer] on heart-healthy diet to discuss at next call.    Care Plan: HTN/PVD   Updates made since 8/25/2021 12:00 AM      Problem: LIFESTYLE CHOICES       Goal: Healthy food choice       Task: Provide resources for diet management such as calorie counting and sodium reduction Completed 8/25/2021   Responsible User: Nadia Rai RN      Problem: SODIUM INTAKE       Goal: Patient will maintain management of sodium consumption       Task: Educate patient on daily sodium intake & how sodium affects the heart Completed 8/25/2021   Responsible User: Nadia Rai RN          Nadia Rai RN  Ambulatory Case Management    8/25/2021, 13:21 EDT

## 2021-08-25 NOTE — PATIENT INSTRUCTIONS
Heart-Healthy Eating Plan  Heart-healthy meal planning includes:  · Eating less unhealthy fats.  · Eating more healthy fats.  · Making other changes in your diet.  Talk with your doctor or a diet specialist (dietitian) to create an eating plan that is right for you.  What is my plan?  Your doctor may recommend an eating plan that includes:  · Total fat: ______% or less of total calories a day.  · Saturated fat: ______% or less of total calories a day.  · Cholesterol: less than _________mg a day.  What are tips for following this plan?  Cooking  Avoid frying your food. Try to bake, boil, grill, or broil it instead. You can also reduce fat by:  · Removing the skin from poultry.  · Removing all visible fats from meats.  · Steaming vegetables in water or broth.  Meal planning    · At meals, divide your plate into four equal parts:  ? Fill one-half of your plate with vegetables and green salads.  ? Fill one-fourth of your plate with whole grains.  ? Fill one-fourth of your plate with lean protein foods.  · Eat 4-5 servings of vegetables per day. A serving of vegetables is:  ? 1 cup of raw or cooked vegetables.  ? 2 cups of raw leafy greens.  · Eat 4-5 servings of fruit per day. A serving of fruit is:  ? 1 medium whole fruit.  ? ¼ cup of dried fruit.  ? ½ cup of fresh, frozen, or canned fruit.  ? ½ cup of 100% fruit juice.  · Eat more foods that have soluble fiber. These are apples, broccoli, carrots, beans, peas, and barley. Try to get 20-30 g of fiber per day.  · Eat 4-5 servings of nuts, legumes, and seeds per week:  ? 1 serving of dried beans or legumes equals ½ cup after being cooked.  ? 1 serving of nuts is ¼ cup.  ? 1 serving of seeds equals 1 tablespoon.  General information  · Eat more home-cooked food. Eat less restaurant, buffet, and fast food.  · Limit or avoid alcohol.  · Limit foods that are high in starch and sugar.  · Avoid fried foods.  · Lose weight if you are overweight.  · Keep track of how much salt  (sodium) you eat. This is important if you have high blood pressure. Ask your doctor to tell you more about this.  · Try to add vegetarian meals each week.  Fats  · Choose healthy fats. These include olive oil and canola oil, flaxseeds, walnuts, almonds, and seeds.  · Eat more omega-3 fats. These include salmon, mackerel, sardines, tuna, flaxseed oil, and ground flaxseeds. Try to eat fish at least 2 times each week.  · Check food labels. Avoid foods with trans fats or high amounts of saturated fat.  · Limit saturated fats.  ? These are often found in animal products, such as meats, butter, and cream.  ? These are also found in plant foods, such as palm oil, palm kernel oil, and coconut oil.  · Avoid foods with partially hydrogenated oils in them. These have trans fats. Examples are stick margarine, some tub margarines, cookies, crackers, and other baked goods.  What foods can I eat?  Fruits  All fresh, canned (in natural juice), or frozen fruits.  Vegetables  Fresh or frozen vegetables (raw, steamed, roasted, or grilled). Green salads.  Grains  Most grains. Choose whole wheat and whole grains most of the time. Rice and pasta, including brown rice and pastas made with whole wheat.  Meats and other proteins  Lean, well-trimmed beef, veal, pork, and lamb. Chicken and turkey without skin. All fish and shellfish. Wild duck, rabbit, pheasant, and venison. Egg whites or low-cholesterol egg substitutes. Dried beans, peas, lentils, and tofu. Seeds and most nuts.  Dairy  Low-fat or nonfat cheeses, including ricotta and mozzarella. Skim or 1% milk that is liquid, powdered, or evaporated. Buttermilk that is made with low-fat milk. Nonfat or low-fat yogurt.  Fats and oils  Non-hydrogenated (trans-free) margarines. Vegetable oils, including soybean, sesame, sunflower, olive, peanut, safflower, corn, canola, and cottonseed. Salad dressings or mayonnaise made with a vegetable oil.  Beverages  Mineral water. Coffee and tea. Diet  carbonated beverages.  Sweets and desserts  Sherbet, gelatin, and fruit ice. Small amounts of dark chocolate.  Limit all sweets and desserts.  Seasonings and condiments  All seasonings and condiments.  The items listed above may not be a complete list of foods and drinks you can eat. Contact a dietitian for more options.  What foods should I avoid?  Fruits  Canned fruit in heavy syrup. Fruit in cream or butter sauce. Fried fruit. Limit coconut.  Vegetables  Vegetables cooked in cheese, cream, or butter sauce. Fried vegetables.  Grains  Breads that are made with saturated or trans fats, oils, or whole milk. Croissants. Sweet rolls. Donuts. High-fat crackers, such as cheese crackers.  Meats and other proteins  Fatty meats, such as hot dogs, ribs, sausage, knott, rib-eye roast or steak. High-fat deli meats, such as salami and bologna. Caviar. Domestic duck and goose. Organ meats, such as liver.  Dairy  Cream, sour cream, cream cheese, and creamed cottage cheese. Whole-milk cheeses. Whole or 2% milk that is liquid, evaporated, or condensed. Whole buttermilk. Cream sauce or high-fat cheese sauce. Yogurt that is made from whole milk.  Fats and oils  Meat fat, or shortening. Cocoa butter, hydrogenated oils, palm oil, coconut oil, palm kernel oil. Solid fats and shortenings, including knott fat, salt pork, lard, and butter. Nondairy cream substitutes. Salad dressings with cheese or sour cream.  Beverages  Regular sodas and juice drinks with added sugar.  Sweets and desserts  Frosting. Pudding. Cookies. Cakes. Pies. Milk chocolate or white chocolate. Buttered syrups. Full-fat ice cream or ice cream drinks.  The items listed above may not be a complete list of foods and drinks to avoid. Contact a dietitian for more information.  Summary  · Heart-healthy meal planning includes eating less unhealthy fats, eating more healthy fats, and making other changes in your diet.  · Eat a balanced diet. This includes fruits and  vegetables, low-fat or nonfat dairy, lean protein, nuts and legumes, whole grains, and heart-healthy oils and fats.  This information is not intended to replace advice given to you by your health care provider. Make sure you discuss any questions you have with your health care provider.  Document Revised: 02/21/2019 Document Reviewed: 01/25/2019  [email or web address] Patient Education © 2021 [email or web address]

## 2021-09-02 ENCOUNTER — PATIENT OUTREACH (OUTPATIENT)
Dept: CASE MANAGEMENT | Facility: OTHER | Age: 76
End: 2021-09-02

## 2021-09-02 NOTE — PATIENT INSTRUCTIONS
Peptic Ulcer Eating Plan  Peptic ulcers are sores that form on the lining of the stomach, esophagus, or the part of the small intestine that is attached to the stomach (duodenum). These sores are also called stomach ulcers. When ulcers develop, they can cause a burning feeling in the stomach as well as bloating, nausea, vomiting, and poor appetite.  If you have a history of peptic ulcers, it is important to keep track of what foods and drinks cause symptoms.  What are tips for following this plan?    · Eat a healthy, well-balanced diet.  This includes:  ? Fresh fruits and vegetables. Eat a variety of colors of fruits and vegetables.  ? Whole grains. Try to make sure at least half of the grains you eat each day are whole grains.  ? Low-fat dairy.  ? Lean meat, fish, poultry, eggs, beans, and nuts.  ? Healthy fats, such as olive oil, grapeseed oil, or canola oil. Try to eat less than 8 teaspoons of fats and oils each day.  · Avoid foods that cause irritation or pain. These may be different for different people. Keep a food diary to identify foods that cause symptoms.  · Avoid processed foods that have added salt and sugar.  · Avoid drinking alcohol.  · Avoid drinks with caffeine, such as cola, black tea, energy drinks, and coffee.  Recommended foods  Grains  · Whole grains.  Vegetables  · All fresh or frozen vegetables. Low-sodium canned vegetables.  Fruits  · All fresh, frozen, or dried fruit. Fruit canned in juice.  Meats and other protein foods  · Lean cuts of meat. Skinless poultry. Fresh or canned fish. Eggs. Tofu. Nuts and nut butter. Dried beans. Low-sodium canned beans.  Dairy  · Low-fat or nonfat (skim) milk. Nonfat or low-fat yogurt. Nonfat or low-fat cheese.  Beverages  · Water. Soy or nut milks. Caffeine-free soft drinks. Herbal tea.  Fats and oils  · Olive oil. Canola oil. Grapeseed oil. Sunflower oil.  Seasoning and other foods  · Low-fat salad dressing. Ketchup. Low-fat mayonnaise. All spices except  pepper. Low-sodium seasoning mixes.  Foods to avoid  Meats and other protein foods  · Fatty meats. Fried meats. Any meat that causes symptoms.  Dairy  · Whole milk. Ice cream. Cream. Chocolate milk.  Beverages  · Alcohol. Coffee. Cola and energy drinks. Black or green tea. Cocoa.  Fats and oils  · Butter. Lard. Ghee.  Seasoning and other foods  · Pepper. Hot sauce. Any seasonings or condiments that cause symptoms.  Summary  · Peptic ulcers can cause burning in the stomach as well as bloating, nausea, vomiting, and poor appetite. You may be able to limit symptoms by avoiding foods that make you feel worse.  · Work with your dietitian or health care provider to identify foods that cause symptoms. This may include caffeinated drinks, alcohol, or pepper.  This information is not intended to replace advice given to you by your health care provider. Make sure you discuss any questions you have with your health care provider.  Document Revised: 11/30/2018 Document Reviewed: 01/29/2018  [employer] Patient Education © 2021 [employer]

## 2021-09-02 NOTE — OUTREACH NOTE
Ambulatory Case Management Note    Patient Outreach    Patient reports she is doing OK, had a couple of bad days with GI upset on Sunday and Monday; no identifying cause known for the exacerbation of symptoms. Patient denies any changes in diet but describes a feeling of liquid squirting into her stomach followed by pain and tightening in her abdomen precipitating the symptoms. Patient follows regularly with providers for GI issues and reports compliance with meds/instructions.   Patient has chronic back pain that limits her mobility, states at her last follow-up for this condition her provider advised her that there is nothing more that can be done for her pain. ACM discussed how pain and stress contribute to GI upset and more pain. ACM discussed importance of self-care for stress relief and relaxation to aid in symptom management, pt voiced understanding. ACM and patient discussed and identified several areas that the patient practices this such as Jew and family involvement, t.v. programs, and bible study.     Care Plan: HTN/PVD   Updates made since 9/2/2021 12:00 AM      Problem: LIFESTYLE CHOICES Resolved 9/2/2021      Goal: Healthy food choice Completed 9/2/2021      Task: Provide community resources for healthy food options Completed 9/2/2021   Responsible User: Nadia Rai RN      Task: Discuss healthy food options and preferred restaurants Completed 9/2/2021   Responsible User: Nadia Rai RN      Problem: SODIUM INTAKE       Goal: Patient will maintain management of sodium consumption Completed 9/2/2021      Task: Instruct patient to eat less salt and watch fluids. No added salt or no more than 2 grams (2000mgs) of sodium or 2 liters of fluid in a 24-hour period, or follow provider's specific recommendations. Completed 9/2/2021   Responsible User: Nadia Rai RN      Task: Educate patient how to read a nutrition label pointing out servings and serving size Completed 9/2/2021   Responsible  User: Nadia Rai RN      Task: Educate patient on sodium alternatives Completed 9/2/2021   Responsible User: Nadia Rai RN      Problem: BLOOD PRESSURE MONITORING Resolved 9/2/2021      Goal: Establish Plan for Regular Lab Work Completed 9/2/2021      Task: Track patient LDL and HDL levels Completed 9/2/2021   Responsible User: Nadia Rai RN      Task: Track patient serum potassium and serum creatinine levels Completed 9/2/2021   Responsible User: Nadia Rai RN      Task: Review the patients last urine protein. Discuss need if patient has not had one. Completed 9/2/2021   Responsible User: Nadia Rai RN      Task: Review patient's last ECHO or EKG. Discuss need if patient has not had one. Completed 9/2/2021   Responsible User: Nadia Rai RN      Problem: PATIENT IS INACTIVE       Goal: Exercise at least 20 minutes per day       Task: Discuss barriers to activity with patient. Update SDOH. Completed 9/2/2021   Responsible User: Nadia Rai RN      Task: Develop exercise plan with patient Completed 9/2/2021   Responsible User: Nadia Rai RN      Task: Explore community resources including walking groups, assistance programs, and home videos. Completed 9/2/2021   Responsible User: Nadia Rai RN Debra Beverly, RN  Ambulatory Case Management    9/2/2021, 11:57 EDT

## 2021-09-08 ENCOUNTER — LAB (OUTPATIENT)
Dept: LAB | Facility: HOSPITAL | Age: 76
End: 2021-09-08

## 2021-09-08 ENCOUNTER — TRANSCRIBE ORDERS (OUTPATIENT)
Dept: LAB | Facility: HOSPITAL | Age: 76
End: 2021-09-08

## 2021-09-08 DIAGNOSIS — Z20.822 COVID-19 RULED OUT: ICD-10-CM

## 2021-09-08 DIAGNOSIS — Z20.822 COVID-19 RULED OUT: Primary | ICD-10-CM

## 2021-09-08 PROCEDURE — U0004 COV-19 TEST NON-CDC HGH THRU: HCPCS

## 2021-09-08 PROCEDURE — U0005 INFEC AGEN DETEC AMPLI PROBE: HCPCS

## 2021-09-09 LAB — SARS-COV-2 RNA NOSE QL NAA+PROBE: NOT DETECTED

## 2021-09-10 ENCOUNTER — HOSPITAL ENCOUNTER (OUTPATIENT)
Dept: MAMMOGRAPHY | Facility: HOSPITAL | Age: 76
Discharge: HOME | End: 2021-09-10
Admitting: FAMILY MEDICINE

## 2021-09-10 DIAGNOSIS — Z12.31 VISIT FOR SCREENING MAMMOGRAM: ICD-10-CM

## 2021-09-10 PROCEDURE — 77063 BREAST TOMOSYNTHESIS BI: CPT

## 2021-09-10 PROCEDURE — 77067 SCR MAMMO BI INCL CAD: CPT

## 2021-09-13 DIAGNOSIS — R92.8 ABNORMALITY OF RIGHT BREAST ON SCREENING MAMMOGRAM: Primary | ICD-10-CM

## 2021-09-16 ENCOUNTER — TELEPHONE (OUTPATIENT)
Dept: FAMILY MEDICINE CLINIC | Facility: CLINIC | Age: 76
End: 2021-09-16

## 2021-09-16 NOTE — TELEPHONE ENCOUNTER
Caller: Annie Mejia    Relationship: Self    Best call back number: [phone]     What medication are you requesting: ONDANSETRON 4 MG    Have you been treated for these symptoms before:   [] Yes  [x] No    If a prescription is needed, what is your preferred pharmacy and phone number: [facility] - [facility] [address] - [phone]  - [phone] FX     Additional notes: PATIENT ALSO ASKED IF DR LOMBARDI HAS LOOKED AT HER MAMMOGRAM.  PATIENT STATED THAT YOU RECEIVED A LETTER ABOUT POSSIBLY GETTING MORE IMAGES DONE.  PATIENT ASKED FOR A CALL BACK.

## 2021-09-17 RX ORDER — ONDANSETRON 4 MG/1
4 TABLET, FILM COATED ORAL EVERY 8 HOURS PRN
Qty: 90 TABLET | Refills: 1 | Status: SHIPPED | OUTPATIENT
Start: 2021-09-17 | End: 2022-12-16

## 2021-09-17 NOTE — TELEPHONE ENCOUNTER
Screening mammogram was abnormal as communicated via [employer]. Orders already placed for f/u studies.    zofran sent in.

## 2021-09-24 ENCOUNTER — TELEPHONE (OUTPATIENT)
Dept: FAMILY MEDICINE CLINIC | Facility: CLINIC | Age: 76
End: 2021-09-24

## 2021-09-24 NOTE — TELEPHONE ENCOUNTER
Caller: PÉREZ    Relationship: DAUGHTER IN LAW    Best call back number: [phone]    Who are you requesting to speak with (clinical staff, provider,  specific staff member): CLINICAL STAFF    What was the call regarding: PÉREZ STATED SHE CONTACTED [facility] AS RECOMMENDED BY DR. LOMBARDI BUT THEY WANTED TO KNOW WHAT DEPARTMENT THE PATIENT NEEDS TO BE SEEN IN AND WHAT IS HER DIAGNOSIS.     Do you require a callback: YES

## 2021-09-27 NOTE — TELEPHONE ENCOUNTER
Pt informed me at her last visit that she was going to consult with Reji Adkins for her stomach.    Current working dx include: gastroparesis vs vagus nerve disorder

## 2021-10-01 ENCOUNTER — PATIENT OUTREACH (OUTPATIENT)
Dept: CASE MANAGEMENT | Facility: OTHER | Age: 76
End: 2021-10-01

## 2021-10-01 ENCOUNTER — TELEPHONE (OUTPATIENT)
Dept: FAMILY MEDICINE CLINIC | Facility: CLINIC | Age: 76
End: 2021-10-01

## 2021-10-01 DIAGNOSIS — E78.2 MIXED HYPERLIPIDEMIA: ICD-10-CM

## 2021-10-01 DIAGNOSIS — M79.605 LEFT LEG PAIN: ICD-10-CM

## 2021-10-01 DIAGNOSIS — F41.8 MIXED ANXIETY DEPRESSIVE DISORDER: ICD-10-CM

## 2021-10-01 RX ORDER — ATORVASTATIN CALCIUM 40 MG/1
40 TABLET, FILM COATED ORAL NIGHTLY
Qty: 90 TABLET | Refills: 3 | Status: SHIPPED | OUTPATIENT
Start: 2021-10-01 | End: 2022-05-16

## 2021-10-01 NOTE — TELEPHONE ENCOUNTER
Caller: Annie Mejia    Relationship: Self      Medication requested (name and dosage):     gabapentin (NEURONTIN) 300 MG capsule   atorvastatin (LIPITOR) 40 MG tablet         Pharmacy where request should be sent: [facility] - [facility] [address] - [phone]  - [phone] FX  [phone]    Additional details provided by patient:     Best call back number: [phone]    Does the patient have less than a 3 day supply:  [] Yes  [x] No    Tony Padron Rep   10/01/21 10:25 EDT

## 2021-10-01 NOTE — OUTREACH NOTE
Ambulatory Case Management Note    Patient Outreach    RN-ACM outreach with patient currently engaged in HRCM.  Acute activity alert received via Patient PING.  Patient had an ED visit at [facility] 09/30/21.  Patient reported having presented due to stomach pain which is an ongoing concern for her. Patient was treated and discharged to home to follow with PCP as needed.     Patient discussed having been in contact with [facility] and plans to have her GI problems evaluated by clinicians at the facility.  Per her report, she is to have imaging and testing done locally and transmitted to [place], then she will be contacted with an appointment date.      Patient discussed having lost her  to COVID19.  Patient was sick as well but has since recovered.  Patient has adult children who have full time jobs but assist as much as possible.          Miranda Villanueva RN  Ambulatory Case Management    10/1/2021, 10:11 EDT

## 2021-10-04 RX ORDER — GABAPENTIN 300 MG/1
300 CAPSULE ORAL 3 TIMES DAILY
Qty: 270 CAPSULE | Refills: 0 | Status: SHIPPED | OUTPATIENT
Start: 2021-10-04 | End: 2022-04-07 | Stop reason: SDUPTHER

## 2021-10-04 RX ORDER — ALPRAZOLAM 0.25 MG/1
TABLET ORAL
Qty: 90 TABLET | Refills: 0 | Status: SHIPPED | OUTPATIENT
Start: 2021-10-04 | End: 2021-12-21

## 2021-10-14 ENCOUNTER — HOSPITAL ENCOUNTER (OUTPATIENT)
Dept: ULTRASOUND IMAGING | Facility: HOSPITAL | Age: 76
Discharge: HOME | End: 2021-10-14

## 2021-10-14 ENCOUNTER — HOSPITAL ENCOUNTER (OUTPATIENT)
Dept: MAMMOGRAPHY | Facility: HOSPITAL | Age: 76
Discharge: HOME | End: 2021-10-14

## 2021-10-14 DIAGNOSIS — R92.8 ABNORMALITY OF RIGHT BREAST ON SCREENING MAMMOGRAM: ICD-10-CM

## 2021-10-14 PROCEDURE — G0279 TOMOSYNTHESIS, MAMMO: HCPCS

## 2021-10-14 PROCEDURE — 77065 DX MAMMO INCL CAD UNI: CPT

## 2021-10-15 ENCOUNTER — PATIENT OUTREACH (OUTPATIENT)
Dept: CASE MANAGEMENT | Facility: OTHER | Age: 76
End: 2021-10-15

## 2021-10-15 NOTE — OUTREACH NOTE
Ambulatory Case Management Note    Patient Outreach    Patient reports she's doing well today, states she doesn't feel this good every day but today has been good. Patient reports continued intermittent GI upset, states she is being assessed for vagus nerve involvement. Care plan goals reviewed and updated.    Care Plan: HTN/PVD   Updates made since 10/15/2021 12:00 AM      Problem: SODIUM INTAKE Resolved 10/15/2021          Nadia Rai RN  Ambulatory Case Management    10/15/2021, 15:54 EDT

## 2021-10-21 ENCOUNTER — OFFICE VISIT (OUTPATIENT)
Dept: FAMILY MEDICINE CLINIC | Facility: CLINIC | Age: 76
End: 2021-10-21

## 2021-10-21 VITALS
BODY MASS INDEX: 31.08 KG/M2 | TEMPERATURE: 96.5 F | WEIGHT: 198 LBS | HEART RATE: 54 BPM | DIASTOLIC BLOOD PRESSURE: 78 MMHG | HEIGHT: 67 IN | OXYGEN SATURATION: 100 % | SYSTOLIC BLOOD PRESSURE: 120 MMHG

## 2021-10-21 DIAGNOSIS — F41.8 MIXED ANXIETY DEPRESSIVE DISORDER: ICD-10-CM

## 2021-10-21 DIAGNOSIS — E78.2 MIXED HYPERLIPIDEMIA: ICD-10-CM

## 2021-10-21 DIAGNOSIS — Z00.00 MEDICARE ANNUAL WELLNESS VISIT, SUBSEQUENT: Primary | ICD-10-CM

## 2021-10-21 DIAGNOSIS — K21.00 GASTROESOPHAGEAL REFLUX DISEASE WITH ESOPHAGITIS WITHOUT HEMORRHAGE: ICD-10-CM

## 2021-10-21 DIAGNOSIS — E11.9 TYPE 2 DIABETES MELLITUS WITHOUT COMPLICATION, WITH LONG-TERM CURRENT USE OF INSULIN (HCC): ICD-10-CM

## 2021-10-21 DIAGNOSIS — E66.09 CLASS 1 OBESITY DUE TO EXCESS CALORIES WITH SERIOUS COMORBIDITY AND BODY MASS INDEX (BMI) OF 31.0 TO 31.9 IN ADULT: ICD-10-CM

## 2021-10-21 DIAGNOSIS — Z23 NEED FOR INFLUENZA VACCINATION: ICD-10-CM

## 2021-10-21 DIAGNOSIS — Z11.59 NEED FOR HEPATITIS C SCREENING TEST: ICD-10-CM

## 2021-10-21 DIAGNOSIS — Z79.899 CHRONIC PRESCRIPTION BENZODIAZEPINE USE: ICD-10-CM

## 2021-10-21 DIAGNOSIS — M81.0 OSTEOPOROSIS WITHOUT CURRENT PATHOLOGICAL FRACTURE, UNSPECIFIED OSTEOPOROSIS TYPE: ICD-10-CM

## 2021-10-21 DIAGNOSIS — I10 ESSENTIAL HYPERTENSION: ICD-10-CM

## 2021-10-21 DIAGNOSIS — Z79.4 TYPE 2 DIABETES MELLITUS WITHOUT COMPLICATION, WITH LONG-TERM CURRENT USE OF INSULIN (HCC): ICD-10-CM

## 2021-10-21 DIAGNOSIS — N28.9 RENAL INSUFFICIENCY: ICD-10-CM

## 2021-10-21 PROCEDURE — G0439 PPPS, SUBSEQ VISIT: HCPCS | Performed by: FAMILY MEDICINE

## 2021-10-21 PROCEDURE — 1160F RVW MEDS BY RX/DR IN RCRD: CPT | Performed by: FAMILY MEDICINE

## 2021-10-21 PROCEDURE — 1125F AMNT PAIN NOTED PAIN PRSNT: CPT | Performed by: FAMILY MEDICINE

## 2021-10-21 PROCEDURE — G0008 ADMIN INFLUENZA VIRUS VAC: HCPCS | Performed by: FAMILY MEDICINE

## 2021-10-21 PROCEDURE — 90662 IIV NO PRSV INCREASED AG IM: CPT | Performed by: FAMILY MEDICINE

## 2021-10-21 PROCEDURE — 1170F FXNL STATUS ASSESSED: CPT | Performed by: FAMILY MEDICINE

## 2021-10-21 RX ORDER — ONDANSETRON 4 MG/1
TABLET, ORALLY DISINTEGRATING ORAL
COMMUNITY
Start: 2021-09-25 | End: 2021-11-23 | Stop reason: SDUPTHER

## 2021-10-21 NOTE — PROGRESS NOTES
The ABCs of the Annual Wellness Visit  Subsequent Medicare Wellness Visit    Chief Complaint   Patient presents with   • Medicare Wellness-subsequent     3 month follow up; patient requesting DTAP; patient has been taking [place] OTC and would like to discuss      Subjective    History of Present Illness:  Annie Mejia is a 75 y.o. female who presents for a Subsequent Medicare Wellness Visit.    Has had consultation with neurosurgery, Dr. Ybarra.  Nothing else to be done in regard to her lumbar DDD/DJD.    She will be seen in [facility] in February for persistent GI symptoms including early satiety, bloating, nausea etc.    Taking meds as prescribed for hypertension, hyperlipidemia, diabetes.    The following portions of the patient's history were reviewed and   updated as appropriate: allergies, current medications, past family history, past medical history, past social history, past surgical history and problem list.    Compared to one year ago, the patient feels her physical   health is worse.    Compared to one year ago, the patient feels her mental   health is the same.    Recent Hospitalizations:  This patient has not had a [facility] admission record on file within the last 365 days.    Current Medical Providers:  Patient Care Team:  Olga Pimentel MD as PCP - General (Family Medicine)  Tj Rowe MD as Consulting Physician (General Surgery)  Derrick Martínez MD as Consulting Physician (Gastroenterology)  Maxx Dior MD as Consulting Physician (Orthopedic Surgery)  Lavelle Méndez OD (Optometry)  Shaheen Ibrahim MD as Surgeon (Orthopedic Surgery)  Glendy Hubbard MD as Consulting Physician (Obstetrics and Gynecology)  Shaheen Ibrahim MD as Surgeon (Orthopedic Surgery)  Nadia Rai RN as Ambulatory  (Population Health)    Outpatient Medications Prior to Visit   Medication Sig Dispense Refill   • ALPRAZolam (XANAX) 0.25 MG tablet TAKE 1 TO 2  TABLETS BY MOUTH UP TO TWICE DAILY AS NEEDED FOR ANXIETY OR PANIC 90 tablet 0   • amLODIPine (NORVASC) 5 MG tablet Take 1 tablet by mouth Daily. 90 tablet 3   • aspirin  MG EC tablet Take 325 mg by mouth Daily.     • atorvastatin (LIPITOR) 40 MG tablet Take 1 tablet by mouth Every Night. 90 tablet 3   • Diclofenac Sodium (VOLTAREN) 1 % gel gel Apply 4 g topically to the appropriate area as directed 4 (Four) Times a Day As Needed (muscular pain). 100 g 0   • Doxylamine Succinate, Sleep, (SLEEP AID PO) Take  by mouth. Natures bounty Sleep 3     • Eliquis 2.5 MG tablet tablet Take 2.5 mg by mouth 2 (Two) Times a Day.     • EPINEPHrine (EPIPEN) 0.3 MG/0.3ML solution auto-injector injection EpiPen 2-Lev 0.3 MG/0.3ML Injection Solution Auto-injector; Patient Sig: EpiPen 2-Lev 0.3 MG/0.3ML Injection Solution Auto-injector INJECT 0.3ML INTRAMUSCULARLY AS DIRECTED.; 1; 2; 06-May-2015; Active     • gabapentin (NEURONTIN) 300 MG capsule Take 1 capsule by mouth 3 (Three) Times a Day. 270 capsule 0   • glucose blood (FREESTYLE LITE) test strip USE ONE STRIP TO CHECK GLUCOSE FASTING IN THE MORNING AND IN THE EVENING 100 each 12   • HYDROcodone-acetaminophen (Norco) 7.5-325 MG per tablet Take 1 tablet by mouth Every 8 (Eight) Hours As Needed for Severe Pain . 30 tablet 0   • Lancets (freestyle) lancets CHECK GLUCOSE FASTING IN THE MORNING AND IN THE EVENING, 100 each 5   • lisinopril (PRINIVIL,ZESTRIL) 20 MG tablet Take 1 tablet by mouth Daily. 90 tablet 3   • meclizine (ANTIVERT) 25 MG tablet Take 1-2 tablets by mouth every 6 (six) to 8 (eight) hours as needed for dizziness. 30 tablet 0   • omeprazole (priLOSEC) 40 MG capsule Take 40 mg by mouth Daily.     • ondansetron (Zofran) 4 MG tablet Take 1 tablet by mouth Every 8 (Eight) Hours As Needed for Nausea or Vomiting. 90 tablet 1   • promethazine (PHENERGAN) 25 MG suppository INSERT 1 SUPPOSITORY RECTALLY EVERY 8 HOURS AS NEEDED FOR NAUSEA     • promethazine (PHENERGAN) 25  MG tablet Take 1 tablet by mouth Every 8 (Eight) Hours As Needed for Nausea or Vomiting. 90 tablet 0   • propranolol (INDERAL) 40 MG tablet Take 1 tablet by mouth 2 (two) times a day. 180 tablet 3   • Magnesium Hydroxide (DULCOLAX PO) Take  by mouth. AS NEEDED     • ondansetron ODT (ZOFRAN-ODT) 4 MG disintegrating tablet DISSOLVE 1 TABLET IN MOUTH EVERY 6 TO 8 HOURS AS NEEDED       No facility-administered medications prior to visit.       Opioid medication/s are on active medication list.  and I have evaluated her active treatment plan and pain score trends (see table).  There were no vitals filed for this visit.  I have reviewed the chart for potential of high risk medication and harmful drug interactions in the elderly.            Aspirin is on active medication list. Aspirin use is indicated based on review of current medical condition/s. Pros and cons of this therapy have been discussed today. Benefits of this medication outweigh potential harm.  Patient has been encouraged to continue taking this medication.  .      Patient Active Problem List   Diagnosis   • Abnormal liver enzymes   • Arthritis   • Nausea   • Type 2 diabetes mellitus without complication, with long-term current use of insulin (HCC)   • Mixed anxiety depressive disorder   • Gastroesophageal reflux disease with esophagitis   • First degree atrioventricular block   • Hyperlipidemia   • Essential hypertension   • Insomnia   • Cyst of ovary   • Adiposity   • Osteoporosis   • Peripheral vascular disease (HCC)   • Renal insufficiency   • Hiatal hernia   • Skin tags, multiple acquired   • Primary localized osteoarthrosis of left lower leg   • Chronic pain syndrome   • Intervertebral disc disorder   • Lumbosacral spondylosis without myelopathy   • History of 2019 novel coronavirus disease (COVID-19)   • Ischemic cerebrovascular accident (CVA) (HCC)   • History of stroke   • 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  ACP discussion was held with the patient during this visit. Patient has an advance directive in EMR which is still valid.     Review of Systems   Constitutional: Positive for appetite change and fatigue. Negative for fever.   HENT: Positive for congestion and postnasal drip. Negative for sore throat and trouble swallowing.    Eyes: Positive for visual disturbance (chronic).   Respiratory: Positive for cough (dry, intermittent, stable) and shortness of breath (with anxiety). Negative for wheezing.    Cardiovascular: Positive for chest pain (with anxiety) and palpitations (with anxiety). Negative for leg swelling.   Gastrointestinal: Positive for abdominal distention, abdominal pain, diarrhea and nausea.        Heartburn   Genitourinary: Negative for dysuria and hematuria.   Musculoskeletal: Positive for arthralgias, back pain, gait problem, joint swelling and myalgias.   Skin: Negative for rash and bruise.   Neurological: Positive for dizziness, weakness and confusion (mild, intermittent). Negative for syncope.   Hematological: Negative for adenopathy. Bruises/bleeds easily.   Psychiatric/Behavioral: Positive for dysphoric mood and sleep disturbance. Negative for suicidal ideas. The patient is nervous/anxious.         Objective    Vitals:    10/21/21 0827   BP: 120/78   Pulse: 54   Temp: 96.5 °F (35.8 °C)   SpO2: 100%   Weight: 89.8 kg (198 lb)   Height: 170.2 cm (67\")     BMI Readings from Last 1 Encounters:   10/21/21 31.01 kg/m²   BMI is above normal parameters. Recommendations include: exercise counseling and nutrition counseling    Does the patient have evidence of cognitive impairment? No    Physical Exam  Vitals and nursing note reviewed.   Constitutional:       General: She is not in acute distress.     Appearance: She is well-developed, well-groomed and overweight. She is not ill-appearing.   HENT:      Head: " Normocephalic and atraumatic.   Eyes:      General: No scleral icterus.  Cardiovascular:      Rate and Rhythm: Normal rate and regular rhythm.      Pulses: Normal pulses.      Heart sounds: Normal heart sounds.   Pulmonary:      Effort: Pulmonary effort is normal.      Breath sounds: Normal breath sounds.   Musculoskeletal:      Right lower leg: No edema.      Left lower leg: No edema.   Skin:     General: Skin is warm and dry.      Coloration: Skin is not jaundiced or pale.      Findings: No bruising or rash.   Neurological:      Mental Status: She is alert and oriented to person, place, and time.      Gait: Gait abnormal (antalgic on left not using cane/walker today).   Psychiatric:         Mood and Affect: Affect normal. Mood is anxious and depressed (mildly).         Speech: Speech normal.         Behavior: Behavior normal. Behavior is cooperative.         Thought Content: Thought content normal. Thought content does not include suicidal ideation.         Cognition and Memory: Cognition normal.         Judgment: Judgment normal.     Pt's previous physical exam reviewed and updated as indicated.    Lab Results   Component Value Date     (H) 10/21/2021    CHLPL 149 10/21/2021    TRIG 101 10/21/2021    HDL 49 10/21/2021    LDL 81 10/21/2021    VLDL 19 10/21/2021    HGBA1C 7.10 (H) 10/21/2021            HEALTH RISK ASSESSMENT    Smoking Status:  Social History     Tobacco Use   Smoking Status Former Smoker   • Packs/day: 1.00   • Years: 15.00   • Pack years: 15.00   • Start date:    • Quit date: 2012   • Years since quittin.5   Smokeless Tobacco Never Used     Alcohol Consumption:  Social History     Substance and Sexual Activity   Alcohol Use No     Fall Risk Screen:    STEADI Fall Risk Assessment was completed, and patient is at LOW risk for falls.Assessment completed on:10/21/2021    Depression Screening:  PHQ-2/PHQ-9 Depression Screening 10/21/2021   Little interest or pleasure in doing things  -   Feeling down, depressed, or hopeless 1   Trouble falling or staying asleep, or sleeping too much -   Feeling tired or having little energy -   Poor appetite or overeating -   Feeling bad about yourself - or that you are a failure or have let yourself or your family down -   Trouble concentrating on things, such as reading the newspaper or watching television -   Moving or speaking so slowly that other people could have noticed. Or the opposite - being so fidgety or restless that you have been moving around a lot more than usual -   Thoughts that you would be better off dead, or of hurting yourself in some way -   Total Score 1   If you checked off any problems, how difficult have these problems made it for you to do your work, take care of things at home, or get along with other people? -       Health Habits and Functional and Cognitive Screening:  Functional & Cognitive Status 10/21/2021   Do you have difficulty preparing food and eating? No   Do you have difficulty bathing yourself, getting dressed or grooming yourself? No   Do you have difficulty using the toilet? No   Do you have difficulty moving around from place to place? No   Do you have trouble with steps or getting out of a bed or a chair? No   Current Diet Well Balanced Diet   Dental Exam Up to date   Eye Exam Not up to date   Exercise (times per week) 7 times per week   Current Exercises Include Walking   Current Exercise Activities Include -   Do you need help using the phone?  No   Are you deaf or do you have serious difficulty hearing?  No   Do you need help with transportation? No   Do you need help shopping? No   Do you need help preparing meals?  No   Do you need help with housework?  Yes   Do you need help with laundry? No   Do you need help taking your medications? No   Do you need help managing money? No   Do you ever drive or ride in a car without wearing a seat belt? No   Have you felt unusual stress, anger or loneliness in the last month?  Yes   Who do you live with? Alone   If you need help, do you have trouble finding someone available to you? No   Do you have difficulty concentrating, remembering or making decisions? No       Age-appropriate Screening Schedule:  Refer to the list below for future screening recommendations based on patient's age, sex and/or medical conditions. Orders for these recommended tests are listed in the plan section. The patient has been provided with a written plan.    Health Maintenance   Topic Date Due   • ZOSTER VACCINE (2 of 2) 02/26/2006   • DIABETIC EYE EXAM  09/11/2021   • DXA SCAN  03/13/2022   • HEMOGLOBIN A1C  04/21/2022   • LIPID PANEL  10/21/2022   • URINE MICROALBUMIN  10/21/2022   • TDAP/TD VACCINES (2 - Td or Tdap) 11/01/2022   • MAMMOGRAM  10/14/2023   • INFLUENZA VACCINE  Completed              Assessment/Plan   CMS Preventative Services Quick Reference  Risk Factors Identified During Encounter  Cardiovascular Disease  Depression/Dysphoria  Fall Risk-High or Moderate  Immunizations Discussed/Encouraged (specific Immunizations; Influenza and Shingrix  Inactivity/Sedentary  Obesity/Overweight   The above risks/problems have been discussed with the patient.  Follow up actions/plans if indicated are seen below in the Assessment/Plan Section.  Pertinent information has been shared with the patient in the After Visit Summary.    Diagnoses and all orders for this visit:    1. Medicare annual wellness visit, subsequent (Primary)    2. Type 2 diabetes mellitus without complication, with long-term current use of insulin (HCC)  -     Microalbumin / Creatinine Urine Ratio - Urine, Clean Catch  -     Hemoglobin A1c  -     Comprehensive Metabolic Panel  -     TSH Rfx On Abnormal To Free T4    3. Renal insufficiency  -     Comprehensive Metabolic Panel    4. Mixed hyperlipidemia  -     Lipid Panel  -     Comprehensive Metabolic Panel  -     TSH Rfx On Abnormal To Free T4    5. Essential hypertension  -     Microalbumin /  Creatinine Urine Ratio - Urine, Clean Catch  -     Comprehensive Metabolic Panel  -     TSH Rfx On Abnormal To Free T4    6. Need for hepatitis C screening test  -     Hepatitis C Antibody    7. Need for influenza vaccination  -     Fluzone High-Dose 65+yrs ([id number])    8. Osteoporosis without current pathological fracture, unspecified osteoporosis type    9. Mixed anxiety depressive disorder    10. Gastroesophageal reflux disease with esophagitis without hemorrhage    11. Class 1 obesity due to excess calories with serious comorbidity and body mass index (BMI) of 31.0 to 31.9 in adult    12. Chronic prescription benzodiazepine use      Chronic conditions appear generally stable.    She will be having further follow-up at [facility] in February for GI issues, suspected gastroparesis versus vagus nerve disorder in addition to GERD with esophagitis.  For now continue antiemetics, PPI.    Follow Up:   Return in about 3 months (around 1/21/2022).   Follow-up as needed/instructed.  I will contact patient regarding test results and provide instructions regarding any necessary changes in plan of care.  Patient was encouraged to keep me informed of any acute changes or any new concerning symptoms.  Pt is aware of reasons to seek emergent care.  Patient voiced understanding of all instructions and denied further questions.    An After Visit Summary and PPPS were made available to the patient.          Please note that portions of this note may have been completed with a voice recognition program. Efforts were made to edit the dictations, but occasionally words are mistranscribed.

## 2021-10-21 NOTE — PATIENT INSTRUCTIONS
Preventive Care 65 Years and Older, Female  Preventive care refers to lifestyle choices and visits with your health care provider that can promote health and wellness. This includes:  · A yearly physical exam. This is also called an annual well check.  · Regular dental and eye exams.  · Immunizations.  · Screening for certain conditions.  · Healthy lifestyle choices, such as diet and exercise.  What can I expect for my preventive care visit?  Physical exam  Your health care provider will check:  · Height and weight. These may be used to calculate body mass index (BMI), which is a measurement that tells if you are at a healthy weight.  · Heart rate and blood pressure.  · Your skin for abnormal spots.  Counseling  Your health care provider may ask you questions about:  · Alcohol, tobacco, and drug use.  · Emotional well-being.  · Home and relationship well-being.  · Sexual activity.  · Eating habits.  · History of falls.  · Memory and ability to understand (cognition).  · Work and work environment.  · Pregnancy and menstrual history.  What immunizations do I need?    Influenza (flu) vaccine  · This is recommended every year.  Tetanus, diphtheria, and pertussis (Tdap) vaccine  · You may need a Td booster every 10 years.  Varicella (chickenpox) vaccine  · You may need this vaccine if you have not already been vaccinated.  Zoster (shingles) vaccine  · You may need this after age 60.  Pneumococcal conjugate (PCV13) vaccine  · One dose is recommended after age 65.  Pneumococcal polysaccharide (PPSV23) vaccine  · One dose is recommended after age 65.  Measles, mumps, and rubella (MMR) vaccine  · You may need at least one dose of MMR if you were born in 1957 or later. You may also need a second dose.  Meningococcal conjugate (MenACWY) vaccine  · You may need this if you have certain conditions.  Hepatitis A vaccine  · You may need this if you have certain conditions or if you travel or work in places where you may be exposed  to hepatitis A.  Hepatitis B vaccine  · You may need this if you have certain conditions or if you travel or work in places where you may be exposed to hepatitis B.  Haemophilus influenzae type b (Hib) vaccine  · You may need this if you have certain conditions.  You may receive vaccines as individual doses or as more than one vaccine together in one shot (combination vaccines). Talk with your health care provider about the risks and benefits of combination vaccines.  What tests do I need?  Blood tests  · Lipid and cholesterol levels. These may be checked every 5 years, or more frequently depending on your overall health.  · Hepatitis C test.  · Hepatitis B test.  Screening  · Lung cancer screening. You may have this screening every year starting at age 55 if you have a 30-pack-year history of smoking and currently smoke or have quit within the past 15 years.  · Colorectal cancer screening. All adults should have this screening starting at age 50 and continuing until age 75. Your health care provider may recommend screening at age 45 if you are at increased risk. You will have tests every 1-10 years, depending on your results and the type of screening test.  · Diabetes screening. This is done by checking your blood sugar (glucose) after you have not eaten for a while (fasting). You may have this done every 1-3 years.  · Mammogram. This may be done every 1-2 years. Talk with your health care provider about how often you should have regular mammograms.  · BRCA-related cancer screening. This may be done if you have a family history of breast, ovarian, tubal, or peritoneal cancers.  Other tests  · Sexually transmitted disease (STD) testing.  · Bone density scan. This is done to screen for osteoporosis. You may have this done starting at age 65.  Follow these instructions at home:  Eating and drinking  · Eat a diet that includes fresh fruits and vegetables, whole grains, lean protein, and low-fat dairy products. Limit  your intake of foods with high amounts of sugar, saturated fats, and salt.  · Take vitamin and mineral supplements as recommended by your health care provider.  · Do not drink alcohol if your health care provider tells you not to drink.  · If you drink alcohol:  ? Limit how much you have to 0-1 drink a day.  ? Be aware of how much alcohol is in your drink. In the [place] one drink equals one 12 oz bottle of beer (355 mL), one 5 oz glass of wine (148 mL), or one 1½ oz glass of hard liquor (44 mL).  Lifestyle  · Take daily care of your teeth and gums.  · Stay active. Exercise for at least 30 minutes on 5 or more days each week.  · Do not use any products that contain nicotine or tobacco, such as cigarettes, e-cigarettes, and chewing tobacco. If you need help quitting, ask your health care provider.  · If you are sexually active, practice safe sex. Use a condom or other form of protection in order to prevent STIs (sexually transmitted infections).  · Talk with your health care provider about taking a low-dose aspirin or statin.  What's next?  · Go to your health care provider once a year for a well check visit.  · Ask your health care provider how often you should have your eyes and teeth checked.  · Stay up to date on all vaccines.  This information is not intended to replace advice given to you by your health care provider. Make sure you discuss any questions you have with your health care provider.  Document Revised: 2019 Document Reviewed: 2019  [employer] Patient Education 2020 [employer]      Medicare Wellness  Personal Prevention Plan of Service     Date of Office Visit:  10/21/2021  Encounter Provider:  Olga Pimentel MD  Place of Service:  [facility]  Patient Name: Annie Mejia  :  1945    As part of the Medicare Wellness portion of your visit today, we are providing you with this personalized preventive plan of services (PPPS). This plan is based  upon recommendations of the [place] Preventive Services Task Force (USPSTF) and the Advisory Committee on Immunization Practices (ACIP).    This lists the preventive care services that should be considered, and provides dates of when you are due. Items listed as completed are up-to-date and do not require any further intervention.    Health Maintenance   Topic Date Due   • ZOSTER VACCINE (2 of 2) 02/26/2006   • HEPATITIS C SCREENING  Never done   • ANNUAL WELLNESS VISIT  06/03/2020   • INFLUENZA VACCINE  08/01/2021   • URINE MICROALBUMIN  08/05/2021   • DIABETIC EYE EXAM  09/11/2021   • LIPID PANEL  01/18/2022   • HEMOGLOBIN A1C  01/21/2022   • DXA SCAN  03/13/2022   • TDAP/TD VACCINES (2 - Td or Tdap) 11/01/2022   • COLORECTAL CANCER SCREENING  06/25/2023   • MAMMOGRAM  10/14/2023   • Pneumococcal Vaccine 65+  Completed   • COVID-19 Vaccine  Discontinued       Orders Placed This Encounter   Procedures   • Microalbumin / Creatinine Urine Ratio - Urine, Clean Catch     Order Specific Question:   Release to patient     Answer:   Immediate   • Hemoglobin A1c     Order Specific Question:   Release to patient     Answer:   Immediate   • Lipid Panel   • Comprehensive Metabolic Panel     Order Specific Question:   Release to patient     Answer:   Immediate   • TSH Rfx On Abnormal To Free T4     Order Specific Question:   Release to patient     Answer:   Immediate   • Hepatitis C Antibody     Order Specific Question:   Release to patient     Answer:   Immediate       Return in about 3 months (around 1/21/2022).

## 2021-10-22 PROBLEM — I63.9 CVA (CEREBRAL VASCULAR ACCIDENT) (HCC): Status: RESOLVED | Noted: 2021-01-17 | Resolved: 2021-10-22

## 2021-10-22 PROBLEM — Z79.899 CHRONIC PRESCRIPTION BENZODIAZEPINE USE: Status: ACTIVE | Noted: 2021-10-22

## 2021-10-22 LAB
ALBUMIN SERPL-MCNC: 4.3 G/DL (ref 3.5–5.2)
ALBUMIN/CREAT UR: 20 MG/G CREAT (ref 0–29)
ALBUMIN/GLOB SERPL: 2 G/DL
ALP SERPL-CCNC: 85 U/L (ref 39–117)
ALT SERPL-CCNC: 6 U/L (ref 1–33)
AST SERPL-CCNC: 11 U/L (ref 1–32)
BILIRUB SERPL-MCNC: 0.5 MG/DL (ref 0–1.2)
BUN SERPL-MCNC: 18 MG/DL (ref 8–23)
BUN/CREAT SERPL: 16.1 (ref 7–25)
CALCIUM SERPL-MCNC: 9.7 MG/DL (ref 8.6–10.5)
CHLORIDE SERPL-SCNC: 103 MMOL/L (ref 98–107)
CHOLEST SERPL-MCNC: 149 MG/DL (ref 0–200)
CO2 SERPL-SCNC: 27.9 MMOL/L (ref 22–29)
CREAT SERPL-MCNC: 1.12 MG/DL (ref 0.57–1)
CREAT UR-MCNC: 190.1 MG/DL
GLOBULIN SER CALC-MCNC: 2.1 GM/DL
GLUCOSE SERPL-MCNC: 132 MG/DL (ref 65–99)
HBA1C MFR BLD: 7.1 % (ref 4.8–5.6)
HCV AB S/CO SERPL IA: <0.1 S/CO RATIO (ref 0–0.9)
HDLC SERPL-MCNC: 49 MG/DL (ref 40–60)
LDLC SERPL CALC-MCNC: 81 MG/DL (ref 0–100)
MICROALBUMIN UR-MCNC: 37.9 UG/ML
POTASSIUM SERPL-SCNC: 4.4 MMOL/L (ref 3.5–5.2)
PROT SERPL-MCNC: 6.4 G/DL (ref 6–8.5)
SODIUM SERPL-SCNC: 139 MMOL/L (ref 136–145)
TRIGL SERPL-MCNC: 101 MG/DL (ref 0–150)
TSH SERPL DL<=0.005 MIU/L-ACNC: 1.9 UIU/ML (ref 0.27–4.2)
VLDLC SERPL CALC-MCNC: 19 MG/DL (ref 5–40)

## 2021-11-11 NOTE — PROGRESS NOTES
Subjective   Annie Mejia is a 71 y.o. female.     Knee Pain    The incident occurred more than 1 week ago (2 weeks ago). The incident occurred at home. Injury mechanism: turned to walk 2 weeks ago and felt a little pain, then 1 hour later,it started hurting a lot more and swelling. The pain is present in the left knee. The quality of the pain is described as aching and shooting. The pain is at a severity of 10/10. The pain is moderate. The pain has been constant since onset. She reports no foreign bodies present. The symptoms are aggravated by movement, palpation and weight bearing. She has tried ice and elevation for the symptoms. The treatment provided no relief.       The following portions of the patient's history were reviewed and updated as appropriate: allergies, current medications, past family history, past medical history, past social history, past surgical history and problem list.    Review of Systems   Constitutional: Negative.    Respiratory: Negative.    Cardiovascular: Negative.    Gastrointestinal: Negative for abdominal pain, constipation, diarrhea, nausea and vomiting.   Musculoskeletal: Positive for arthralgias and gait problem.   Skin: Negative.    Neurological: Negative for dizziness, weakness, light-headedness and headaches.       Objective   Physical Exam   Constitutional: She is oriented to person, place, and time. She appears well-developed and well-nourished. No distress.   HENT:   Head: Normocephalic.   Right Ear: External ear normal.   Left Ear: External ear normal.   Nose: Nose normal.   Eyes: Conjunctivae are normal.   Cardiovascular: Normal rate, regular rhythm and normal heart sounds.    Pulmonary/Chest: Effort normal and breath sounds normal. No respiratory distress.   Musculoskeletal:        Left knee: She exhibits decreased range of motion and swelling. Tenderness found.   Tenderness noted with palpation of the patella and just inferior to the patella.   Neurological: She is  alert and oriented to person, place, and time.   Skin: Skin is warm and dry. No rash noted. She is not diaphoretic.   Psychiatric: She has a normal mood and affect. Her behavior is normal. Judgment and thought content normal.   Nursing note and vitals reviewed.      Assessment/Plan   Annie was seen today for knee pain.    Diagnoses and all orders for this visit:    Acute pain of left knee  -     XR Knee 1 or 2 View Left    Swelling of left knee joint  -     XR Knee 1 or 2 View Left    Xray ordered of left knee, for further evaluation of the pain and swelling. I will contact patient regarding test results and provide instructions regarding any necessary changes in plan of care.     Patient was encouraged to keep me informed of any acute changes, lack of improvement, or any new concerning symptoms.Patient voiced understanding of all instructions and denied further questions.     Patient to RTC pending Xray results.            Xray Hand 2 Views, Right

## 2021-11-16 ENCOUNTER — PATIENT OUTREACH (OUTPATIENT)
Dept: CASE MANAGEMENT | Facility: OTHER | Age: 76
End: 2021-11-16

## 2021-11-16 NOTE — OUTREACH NOTE
Ambulatory Case Management Note    General & Health Literacy Assessment    Questions/Answers      Most Recent Value   Assessment Completed With Patient   Living Arrangement Alone   Type of Residence Private Residence   Home Care Services No   Equiptment Used at Home Cane   Communication Device No   Bed or Wheelchair Confined No   Difficulty Keeping Appointments No   Mandaen or Spiritual Beliefs that Impact Treatment No   Health Literacy Good        Care Evaluation    Questions/Answers      Most Recent Value   Annual Wellness Visit:  Patient Has Completed   Care Gaps Addressed Mammogram   Mammogram Status Up to Date   Other Patient Education/Resources  24/7 [facility] Nurse Call Line   24/7 Nurse Call Line Education Method Verbal   Advanced Directives: Patient Has   Medication Adherence Medications understood   Healthy Lifestyle (Self-Efficacy) self-monitors vital signs appropriately,  recognizes when to contact medical assistance,  correctly recognizes signs/symptoms of pulmonary distress,  correctly recognizes signs/symptoms of cardiac distress,  recognizes when to stop activity,  self-reports important symptoms to medical professional      Patient Outreach    Patient reports not feeling well today due to anxiety and concern about an ill family member; stress management techniques reviewed, care plan goals discussed and updated, ACM provided education on disease mgmt/progression; HRCM will continue with monthly outreach.    Care Plan: HTN/PVD   Updates made since 11/16/2021 12:00 AM      Problem: PATIENT IS HYPERTENSIVE       Goal: Manage Stress, Depression, or Anxiety       Task: Provide education on stress management techniques Completed 11/16/2021   Responsible User: Nadia Rai, RN          Nadia Rai RN  Ambulatory Case Management    11/16/2021, 10:02 EST

## 2021-11-17 RX ORDER — AMLODIPINE BESYLATE 5 MG/1
TABLET ORAL
Qty: 90 TABLET | Refills: 3 | Status: SHIPPED | OUTPATIENT
Start: 2021-11-17 | End: 2022-11-09

## 2021-11-17 RX ORDER — LISINOPRIL 20 MG/1
TABLET ORAL
Qty: 90 TABLET | Refills: 3 | Status: SHIPPED | OUTPATIENT
Start: 2021-11-17 | End: 2022-11-09

## 2021-11-18 RX ORDER — PROPRANOLOL HYDROCHLORIDE 40 MG/1
TABLET ORAL
Qty: 180 TABLET | Refills: 3 | Status: SHIPPED | OUTPATIENT
Start: 2021-11-18 | End: 2022-11-09

## 2021-11-22 RX ORDER — OMEPRAZOLE 20 MG/1
CAPSULE, DELAYED RELEASE ORAL
Qty: 90 CAPSULE | Refills: 1 | Status: SHIPPED | OUTPATIENT
Start: 2021-11-22 | End: 2021-12-31

## 2021-11-23 DIAGNOSIS — M25.562 CHRONIC PAIN OF LEFT KNEE: Primary | ICD-10-CM

## 2021-11-23 DIAGNOSIS — R11.0 NAUSEA: ICD-10-CM

## 2021-11-23 DIAGNOSIS — G89.29 CHRONIC PAIN OF LEFT KNEE: Primary | ICD-10-CM

## 2021-11-23 RX ORDER — ONDANSETRON 4 MG/1
4 TABLET, ORALLY DISINTEGRATING ORAL EVERY 8 HOURS PRN
Qty: 90 TABLET | Refills: 1 | Status: SHIPPED | OUTPATIENT
Start: 2021-11-23 | End: 2022-03-14

## 2021-11-23 NOTE — TELEPHONE ENCOUNTER
Caller: Mejia Aliza    Relationship: Self    Best call back number: [phone]    Requested Prescriptions:   Requested Prescriptions     Pending Prescriptions Disp Refills   • ondansetron ODT (ZOFRAN-ODT) 4 MG disintegrating tablet          Pharmacy where request should be sent: [facility] [address] [place] [address] [phone] [facility] [phone]      Additional details provided by patient: PATIENT STATED THAT SHE IS SICK AT HER STOMACH AND THESE WORKED THE BEST      Does the patient have less than a 3 day supply:  [x] Yes  [] No    Tony Valdivia Rep   11/23/21 12:35 EST

## 2021-12-20 DIAGNOSIS — F41.8 MIXED ANXIETY DEPRESSIVE DISORDER: ICD-10-CM

## 2021-12-21 RX ORDER — ALPRAZOLAM 0.25 MG/1
TABLET ORAL
Qty: 90 TABLET | Refills: 0 | Status: SHIPPED | OUTPATIENT
Start: 2021-12-21 | End: 2022-03-14 | Stop reason: SDUPTHER

## 2021-12-21 NOTE — TELEPHONE ENCOUNTER
Rx Refill Note  Requested Prescriptions     Pending Prescriptions Disp Refills   • ALPRAZolam (XANAX) 0.25 MG tablet [Pharmacy Med Name: ALPRAZolam 0.25 MG Oral Tablet] 90 tablet 0     Sig: TAKE 1 TO 2 TABLETS BY MOUTH UP TO TWICE DAILY AS NEEDED FOR ANXIETY OR  PANIC      Last office visit with prescribing clinician: 10/21/2021      Next office visit with prescribing clinician: 1/24/2022            Silvana Tay MA  12/21/21, 07:24 EST

## 2021-12-21 NOTE — TELEPHONE ENCOUNTER
LILIAM reviewed. CSA needs to be updated (unless completed and not yet scanned). Refill approved and printed for . Pt to keep f/u apt as scheduled.

## 2021-12-30 ENCOUNTER — TELEPHONE (OUTPATIENT)
Dept: FAMILY MEDICINE CLINIC | Facility: CLINIC | Age: 76
End: 2021-12-30

## 2021-12-31 DIAGNOSIS — K21.9 GASTROESOPHAGEAL REFLUX DISEASE WITHOUT ESOPHAGITIS: Primary | ICD-10-CM

## 2021-12-31 RX ORDER — PANTOPRAZOLE SODIUM 40 MG/1
40 TABLET, DELAYED RELEASE ORAL DAILY
Qty: 30 TABLET | Refills: 2 | Status: SHIPPED | OUTPATIENT
Start: 2021-12-31 | End: 2022-01-30

## 2022-01-02 NOTE — TELEPHONE ENCOUNTER
Discussed at her visit this week, she declined  
PTS DAUGHTER CALLED STATING PT LOST HER      SHE IS REQUESTING THAT PT IS ABLE TO DISCUSS REFERRALS FOR GRIEF COUNSELING AT [facility] [place] TOM[facility]ROW    PLEASE ADVISE AT: [phone]  
Admission

## 2022-01-05 ENCOUNTER — PATIENT OUTREACH (OUTPATIENT)
Dept: CASE MANAGEMENT | Facility: OTHER | Age: 77
End: 2022-01-05

## 2022-01-05 NOTE — OUTREACH NOTE
Ambulatory Case Management Note    Patient Outreach    Patient reports she's been doing well, blood pressure has remained WNL. Patient reports she still has trouble with her stomach; experiences frequent nausea. Pt is scheduled at the [facility] next month for a GI assessment. Patient states she has to stay for a week for this testing to find out what is causing the ongoing nausea. Care plan goals completed, patient denies any ongoing concerns other than with her stomach. RN-ACM will monitor for any outreach need prior to next scheduled call after patient's stay at the [facility].   Care Evaluation    Questions/Answers      Most Recent Value   Suggested Appointments Other (See Comment)  [[facility] in Feb,  follow up with PCP as scheduled]   Annual Wellness Visit:  Patient Has Completed   Care Gaps Addressed Diabetic A1C   HbA1c Status Up to Date-within defined limits   HbA1c Completion at [facility] [place] or Other [facility] [place]   HbA1c Comments 7.1   Other Patient Education/Resources  24/7 [facility] Nurse Call Line   24/7 Nurse Call Line Education Method Verbal   Advanced Directives: Patient Has   Medication Adherence Medications understood   Healthy Lifestyle (Self-Efficacy) self-monitors vital signs appropriately,  recognizes when to contact medical assistance,  correctly recognizes signs/symptoms of cardiac distress,  self-reports important symptoms to medical professional,  correctly recognizes signs/symptoms of pulmonary distress,  recognizes when to stop activity        General & Health Literacy Assessment    Questions/Answers      Most Recent Value   Assessment Completed With Patient   Living Arrangement Alone   Type of Residence Private Residence   Home Care Services No   Equiptment Used at Home Cane   Communication Device No   Bed or Wheelchair Confined No   Difficulty Keeping Appointments No   Anabaptism or Spiritual Beliefs that Impact Treatment No   Health Literacy Good          Care Plan:  HTN/PVD   Updates made since 1/5/2022 12:00 AM      Problem: PATIENT IS INACTIVE Resolved 1/5/2022      Goal: Exercise at least 20 minutes per day Completed 1/5/2022      Problem: PATIENT IS HYPERTENSIVE Resolved 1/5/2022      Goal: Manage Stress, Depression, or Anxiety Completed 1/5/2022          Nadia Rai RN  Ambulatory Case Management    1/5/2022, 15:57 EST

## 2022-01-24 RX ORDER — PROMETHAZINE HYDROCHLORIDE 25 MG/1
25 TABLET ORAL EVERY 8 HOURS PRN
Qty: 90 TABLET | Refills: 0 | Status: SHIPPED | OUTPATIENT
Start: 2022-01-24 | End: 2022-06-03

## 2022-01-24 NOTE — TELEPHONE ENCOUNTER
Pt states that she needs refill on phenergan & other meds, but does not know the names of the other meds. Said she will call back with the rest of her refills.  She also did not want to reschedule her appointment today at this time.

## 2022-02-09 ENCOUNTER — TELEPHONE (OUTPATIENT)
Dept: FAMILY MEDICINE CLINIC | Facility: CLINIC | Age: 77
End: 2022-02-09

## 2022-02-09 RX ORDER — FAMOTIDINE 40 MG/1
40 TABLET, FILM COATED ORAL 2 TIMES DAILY PRN
Qty: 180 TABLET | Refills: 3 | Status: SHIPPED | OUTPATIENT
Start: 2022-02-09 | End: 2022-03-16

## 2022-02-09 NOTE — TELEPHONE ENCOUNTER
Caller: Annie Mejia    Relationship: Self    Best call back number: [phone]     What medication are you requesting: FAMOTIDINE 40 MG TABLETS - 1 TABLETS 2 TIMES DAILY AS NEEDED    What are your current symptoms: HEARTBURN     How long have you been experiencing symptoms: FOR YEARS   Have you had these symptoms before:    [x] Yes  [] No    Have you been treated for these symptoms before:   [x] Yes  [] No    If a prescription is needed, what is your preferred pharmacy and phone number: [facility] - [facility] [address] - [phone] [facility] [phone]      Additional notes:  THE PATIENT REPORTS THAT DR LOMBARDI WROTE A PRESCRIPTION FOR THIS MEDICATION FOR HER BACK IN 04/2020 AND IS REQUESTING THIS MEDICATION AT A 90 DAY SUPPLY, WITH REFILLS TO BE SENT TO [facility].    PLEASE CALL THE PATIENT IF ANY QUESTIONS -[phone].

## 2022-02-21 ENCOUNTER — HOSPITAL ENCOUNTER (OUTPATIENT)
Dept: GENERAL RADIOLOGY | Facility: HOSPITAL | Age: 77
Discharge: HOME | End: 2022-02-21
Admitting: FAMILY MEDICINE

## 2022-02-21 ENCOUNTER — OFFICE VISIT (OUTPATIENT)
Dept: FAMILY MEDICINE CLINIC | Facility: CLINIC | Age: 77
End: 2022-02-21

## 2022-02-21 VITALS
HEIGHT: 67 IN | OXYGEN SATURATION: 95 % | SYSTOLIC BLOOD PRESSURE: 120 MMHG | HEART RATE: 74 BPM | TEMPERATURE: 97.9 F | RESPIRATION RATE: 18 BRPM | BODY MASS INDEX: 32.33 KG/M2 | DIASTOLIC BLOOD PRESSURE: 78 MMHG | WEIGHT: 206 LBS

## 2022-02-21 DIAGNOSIS — R05.9 COUGH: Primary | ICD-10-CM

## 2022-02-21 DIAGNOSIS — R05.9 COUGH: ICD-10-CM

## 2022-02-21 DIAGNOSIS — K21.00 GASTROESOPHAGEAL REFLUX DISEASE WITH ESOPHAGITIS WITHOUT HEMORRHAGE: ICD-10-CM

## 2022-02-21 PROCEDURE — 99214 OFFICE O/P EST MOD 30 MIN: CPT | Performed by: FAMILY MEDICINE

## 2022-02-21 PROCEDURE — 71046 X-RAY EXAM CHEST 2 VIEWS: CPT

## 2022-02-21 RX ORDER — ALBUTEROL SULFATE 90 UG/1
2 AEROSOL, METERED RESPIRATORY (INHALATION) EVERY 4 HOURS PRN
Qty: 18 G | Refills: 1 | Status: SHIPPED | OUTPATIENT
Start: 2022-02-21

## 2022-02-21 RX ORDER — CLINDAMYCIN HYDROCHLORIDE 300 MG/1
300 CAPSULE ORAL 3 TIMES DAILY
Qty: 30 CAPSULE | Refills: 0 | Status: SHIPPED | OUTPATIENT
Start: 2022-02-21 | End: 2022-03-03

## 2022-02-21 RX ORDER — DEXTROMETHORPHAN HYDROBROMIDE AND PROMETHAZINE HYDROCHLORIDE 15; 6.25 MG/5ML; MG/5ML
5 SYRUP ORAL 4 TIMES DAILY PRN
Qty: 118 ML | Refills: 0 | Status: SHIPPED | OUTPATIENT
Start: 2022-02-21 | End: 2022-03-14

## 2022-02-21 NOTE — PROGRESS NOTES
"Subjective   Annie Mejia is a 76 y.o. female.     Here today with daughter. C/o cough.    Cough  This is a new problem. The current episode started 1 to 4 weeks ago (approx 2 weeks). The problem has been gradually worsening. The problem occurs every few minutes. The cough is non-productive. Associated symptoms include chest pain (anterior mid, with cough), myalgias, nasal congestion, postnasal drip, a sore throat (mild), shortness of breath (mild) and wheezing. Pertinent negatives include no ear pain, eye redness, fever, headaches, hemoptysis, rash, rhinorrhea or weight loss. The symptoms are aggravated by lying down and exercise. She has tried OTC cough suppressant for the symptoms. The treatment provided no relief.     She believes symptoms began same night she had episode of severe heartburn/reflux and she \"choked\" on reflux.    Tested negative for COVID yesterday.    The following portions of the patient's history were reviewed and updated as appropriate: allergies, current medications, past family history, past medical history, past social history, past surgical history and problem list.    Review of Systems   Constitutional: Positive for fatigue and unexpected weight change. Negative for fever and weight loss.   HENT: Positive for congestion, postnasal drip and sore throat (mild). Negative for ear pain, mouth sores, nosebleeds, rhinorrhea and trouble swallowing.    Eyes: Negative for pain, discharge and redness.   Respiratory: Positive for cough, chest tightness, shortness of breath (mild) and wheezing. Negative for hemoptysis.    Cardiovascular: Positive for chest pain (anterior mid, with cough).   Gastrointestinal: Negative for diarrhea, nausea and vomiting.   Genitourinary: Negative for dysuria.   Musculoskeletal: Positive for arthralgias and myalgias.   Skin: Negative for rash.   Neurological: Positive for weakness (mild generalized). Negative for syncope and headaches.   Hematological: Negative for " adenopathy.   Psychiatric/Behavioral: Positive for sleep disturbance (due to cough). Negative for confusion.       Objective    Vitals:    02/21/22 1536   BP: 120/78   Pulse: 74   Resp: 18   Temp: 97.9 °F (36.6 °C)   SpO2: 95%     Body mass index is 32.26 kg/m².      02/21/22  1536   Weight: 93.4 kg (206 lb)       Physical Exam  Vitals and nursing note reviewed.   Constitutional:       General: She is not in acute distress.     Appearance: She is well-developed and well-groomed. She is ill-appearing (mildly).   HENT:      Head: Normocephalic and atraumatic.      Right Ear: Tympanic membrane, ear canal and external ear normal.      Left Ear: Tympanic membrane, ear canal and external ear normal.      Nose: Congestion present. No rhinorrhea.      Mouth/Throat:      Mouth: Mucous membranes are moist.      Pharynx: Oropharynx is clear.   Eyes:      Conjunctiva/sclera: Conjunctivae normal.   Cardiovascular:      Rate and Rhythm: Normal rate and regular rhythm.      Pulses: Normal pulses.      Heart sounds: Normal heart sounds.   Pulmonary:      Effort: Pulmonary effort is normal. No respiratory distress (end expiratory cough).      Breath sounds: Decreased breath sounds (mildly) present. No wheezing, rhonchi or rales.   Musculoskeletal:      Right lower leg: No edema.      Left lower leg: No edema.   Lymphadenopathy:      Cervical: No cervical adenopathy.   Skin:     General: Skin is warm and dry.      Coloration: Skin is not jaundiced or pale.   Neurological:      Mental Status: She is alert and oriented to person, place, and time.   Psychiatric:         Behavior: Behavior normal. Behavior is cooperative.         Cognition and Memory: Cognition normal.       Lab Results   Component Value Date    WBC 4.65 07/21/2021    HGB 12.3 07/21/2021    HCT 38.0 07/21/2021    MCV 91.3 07/21/2021     (L) 07/21/2021       Lab Results   Component Value Date    GLUCOSE 132 (H) 10/21/2021    BUN 18 10/21/2021    CREATININE 1.12 (H)  10/21/2021    EGFRIFNONA 47 (L) 10/21/2021    EGFRIFAFRI 57 (L) 10/21/2021    BCR 16.1 10/21/2021    K 4.4 10/21/2021    CO2 27.9 10/21/2021    CALCIUM 9.7 10/21/2021    PROTENTOTREF 6.4 10/21/2021    ALBUMIN 4.30 10/21/2021    LABIL2 2.0 10/21/2021    AST 11 10/21/2021    ALT 6 10/21/2021       Lab Results   Component Value Date    HGBA1C 7.10 (H) 10/21/2021       Lab Results   Component Value Date    TSH 1.900 10/21/2021         Assessment/Plan   Diagnoses and all orders for this visit:    1. Cough (Primary)  -     XR Chest PA & Lateral; Future  -     HYDROcod Polst-CPM Polst ER (Tussionex Pennkinetic ER) 10-8 MG/5ML ER suspension; Take 5 mL by mouth Every 12 (Twelve) Hours As Needed for Cough.  Dispense: 100 mL; Refill: 0    2. Gastroesophageal reflux disease with esophagitis without hemorrhage    Other orders  -     clindamycin (Cleocin) 300 MG capsule; Take 1 capsule by mouth 3 (Three) Times a Day for 10 days.  Dispense: 30 capsule; Refill: 0  -     albuterol sulfate  (90 Base) MCG/ACT inhaler; Inhale 2 puffs Every 4 (Four) Hours As Needed for Wheezing or Shortness of Air.  Dispense: 18 g; Refill: 1  -     promethazine-dextromethorphan (PROMETHAZINE-DM) 6.25-15 MG/5ML syrup; Take 5 mL by mouth 4 (Four) Times a Day As Needed for Cough.  Dispense: 118 mL; Refill: 0       Concern for possible aspiration pneumonia vs CAP. She is amenable to stat CXR as above, tx with clindamycin as above, albuterol MDI prn, antitussive, etc.  Continue pepcid twice daily.    Promethazine DM only to be filled if tussionex not covered or too expensive for pt.    As part of patient's treatment plan I am prescribing a controlled substance.  The patient has been made aware of the appropriate use of such medications, including potential risk of somnolence, limited ability to drive and/or work safely, and potential for dependence and/or overdose.  It has also been made clear that these medications are for use by this patient only,  without concomitant use of alcohol or other substances, unless prescribed.  LILIAM reviewed  Keep routine f/u as scheduled. If minimal improvement, CT chest.  I will contact patient regarding test results and provide instructions regarding any necessary changes in plan of care.  Patient was encouraged to keep me informed of any acute changes, lack of improvement, or any new concerning symptoms.  Pt is aware of reasons to seek emergent care.  Patient voiced understanding of all instructions and denied further questions.    Please note that portions of this note may have been completed with a voice recognition program. Efforts were made to edit the dictations, but occasionally words are mistranscribed.

## 2022-02-23 ENCOUNTER — TELEPHONE (OUTPATIENT)
Dept: FAMILY MEDICINE CLINIC | Facility: CLINIC | Age: 77
End: 2022-02-23

## 2022-02-23 RX ORDER — DEXAMETHASONE 4 MG/1
4 TABLET ORAL
Qty: 5 TABLET | Refills: 0 | Status: SHIPPED | OUTPATIENT
Start: 2022-02-23 | End: 2022-02-28

## 2022-02-23 NOTE — TELEPHONE ENCOUNTER
Spoke with patient.  She is taking Clindamycin, albuterol inhaler and cough syrup with codeine.  She is having increased cough and chest tightness.  I [facility].  She said her oxygen was normal.  I told her I would see if you had any other recommendations but I would suggest she seek treatment.

## 2022-02-23 NOTE — TELEPHONE ENCOUNTER
Caller: Annie Mejia    Relationship: Self    Best call back number: [phone]    What medication are you requesting: UNSURE     What are your current symptoms: COUGH, TIGHTENING OF CHEST    How long have you been experiencing symptoms: COUGH, CHEST PAIN    Have you had these symptoms before:    [x] Yes  [] No    Have you been treated for these symptoms before:   [x] Yes  [] No    If a prescription is needed, what is your preferred pharmacy and phone number: [address] [phone] [facility] [phone]      Additional notes: PATIENT WAS GIVEN A COUGH MEDICATION, ANTIBIOTIC, AND AN INHALER BUT SHE IS NOT FEELING ANY BETTER. PATIENT DOES NOT KNOW WHAT MEDICATION SHE MAY NEED BUT SHE IS COUGHING AND HAS A TIGHTEN CHEST PAIN

## 2022-02-23 NOTE — TELEPHONE ENCOUNTER
Is inhaler helping when she uses it?  I will send in steroids but they need to have a very low threshold for taking her to the ER.

## 2022-03-01 ENCOUNTER — CLINICAL SUPPORT (OUTPATIENT)
Dept: FAMILY MEDICINE CLINIC | Facility: CLINIC | Age: 77
End: 2022-03-01

## 2022-03-01 ENCOUNTER — TELEPHONE (OUTPATIENT)
Dept: FAMILY MEDICINE CLINIC | Facility: CLINIC | Age: 77
End: 2022-03-01

## 2022-03-01 DIAGNOSIS — R30.0 DYSURIA: Primary | ICD-10-CM

## 2022-03-01 NOTE — TELEPHONE ENCOUNTER
Patient is unable to come in to leave a sample, she says she has been taking OTC Azo.     Can you please send in rx to [facility] in [place]?    Patient would like a call back

## 2022-03-01 NOTE — TELEPHONE ENCOUNTER
Doesn't matter about AZO, still need sample to run for culture.    I can then send her in abx while we await culture report.

## 2022-03-01 NOTE — TELEPHONE ENCOUNTER
Caller: Jackie Aliza    Relationship: Self    Best call back number: [phone]    What medication are you requesting: ANTIBIOTIC     What are your current symptoms: BURNING, FREQUENT URINATION,  AND PRESSURE    How long have you been experiencing symptoms: A WEEK    Have you had these symptoms before:    [x] Yes  [] No    Have you been treated for these symptoms before:   [x] Yes  [] No    If a prescription is needed, what is your preferred pharmacy and phone number: [facility] [address] [place] [address] [phone] [facility] [phone] FX     Additional notes:

## 2022-03-02 ENCOUNTER — PATIENT OUTREACH (OUTPATIENT)
Dept: CASE MANAGEMENT | Facility: OTHER | Age: 77
End: 2022-03-02

## 2022-03-02 RX ORDER — CEFDINIR 300 MG/1
300 CAPSULE ORAL 2 TIMES DAILY
Qty: 10 CAPSULE | Refills: 0 | Status: SHIPPED | OUTPATIENT
Start: 2022-03-02 | End: 2022-03-07

## 2022-03-02 NOTE — OUTREACH NOTE
Ambulatory Case Management Note    Patient Outreach    Patient reports she was unable to attend her [facility] visit as her daughter was ill and unable to take her; pt's apt is rescheduled for June, states her GI symptoms are manageable for the time being. Pt states she has a UTI, is waiting on test results for confirmation and treatment. Patient verbalizes understanding of disease mgmt and health maintenance. Care plan goals completed. ACM will graduate patient from program at this time.     Care Evaluation    Questions/Answers      Most Recent Value   Suggested Appointments Other (See Comment)  [Follow-up with PCP]   Annual Wellness Visit:  Patient Has Completed   Care Gaps Addressed Flu Shot   Flu Shot Status Up to Date   Other Patient Education/Resources  24/7 [facility] Nurse Call Line,  Where to Go   24/7 Nurse Call Line Education Method Verbal   Where To Go Education Method Verbal   Advanced Directives: Patient Has   Medication Adherence Medications understood   Healthy Lifestyle (Self-Efficacy) self-monitors vital signs appropriately,  recognizes when to contact medical assistance,  correctly recognizes signs/symptoms of cardiac distress,  self-reports important symptoms to medical professional,  correctly recognizes signs/symptoms of pulmonary distress,  recognizes when to stop activity        General & Health Literacy Assessment    Questions/Answers      Most Recent Value   Assessment Completed With Patient   Living Arrangement Alone  [Adult daughter and TANGELA staying with her temporarily]   Home Care Services No   Equiptment Used at Home Cane   Communication Device No   Bed or Wheelchair Confined No   Difficulty Keeping Appointments No   Buddhist or Spiritual Beliefs that Impact Treatment No   Health Literacy Good            Nadia Rai RN  Ambulatory Case Management    3/2/2022, 10:57 EST

## 2022-03-03 ENCOUNTER — OFFICE VISIT (OUTPATIENT)
Dept: FAMILY MEDICINE CLINIC | Facility: CLINIC | Age: 77
End: 2022-03-03

## 2022-03-03 VITALS
BODY MASS INDEX: 31.92 KG/M2 | HEART RATE: 100 BPM | SYSTOLIC BLOOD PRESSURE: 122 MMHG | TEMPERATURE: 96.9 F | OXYGEN SATURATION: 97 % | HEIGHT: 67 IN | DIASTOLIC BLOOD PRESSURE: 84 MMHG | WEIGHT: 203.4 LBS

## 2022-03-03 DIAGNOSIS — B37.0 THRUSH: Primary | ICD-10-CM

## 2022-03-03 PROCEDURE — 99212 OFFICE O/P EST SF 10 MIN: CPT
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
## 2022-03-04 LAB
BACTERIA UR CULT: ABNORMAL
BACTERIA UR CULT: ABNORMAL
OTHER ANTIBIOTIC SUSC ISLT: ABNORMAL

## 2022-03-14 ENCOUNTER — OFFICE VISIT (OUTPATIENT)
Dept: FAMILY MEDICINE CLINIC | Facility: CLINIC | Age: 77
End: 2022-03-14

## 2022-03-14 VITALS
SYSTOLIC BLOOD PRESSURE: 118 MMHG | WEIGHT: 201 LBS | TEMPERATURE: 97.5 F | HEART RATE: 64 BPM | DIASTOLIC BLOOD PRESSURE: 74 MMHG | OXYGEN SATURATION: 98 % | HEIGHT: 67 IN | BODY MASS INDEX: 31.55 KG/M2

## 2022-03-14 DIAGNOSIS — K21.00 GASTROESOPHAGEAL REFLUX DISEASE WITH ESOPHAGITIS WITHOUT HEMORRHAGE: ICD-10-CM

## 2022-03-14 DIAGNOSIS — Z78.0 POSTMENOPAUSAL: ICD-10-CM

## 2022-03-14 DIAGNOSIS — G52.2: ICD-10-CM

## 2022-03-14 DIAGNOSIS — K31.84 GASTROPARESIS: ICD-10-CM

## 2022-03-14 DIAGNOSIS — E78.2 MIXED HYPERLIPIDEMIA: ICD-10-CM

## 2022-03-14 DIAGNOSIS — F41.8 MIXED ANXIETY DEPRESSIVE DISORDER: ICD-10-CM

## 2022-03-14 DIAGNOSIS — E11.9 TYPE 2 DIABETES MELLITUS WITHOUT COMPLICATION, WITH LONG-TERM CURRENT USE OF INSULIN: Primary | ICD-10-CM

## 2022-03-14 DIAGNOSIS — R30.0 DYSURIA: ICD-10-CM

## 2022-03-14 DIAGNOSIS — M81.0 AGE-RELATED OSTEOPOROSIS WITHOUT CURRENT PATHOLOGICAL FRACTURE: ICD-10-CM

## 2022-03-14 DIAGNOSIS — N28.9 RENAL INSUFFICIENCY: ICD-10-CM

## 2022-03-14 DIAGNOSIS — Z79.4 TYPE 2 DIABETES MELLITUS WITHOUT COMPLICATION, WITH LONG-TERM CURRENT USE OF INSULIN: Primary | ICD-10-CM

## 2022-03-14 PROCEDURE — 99214 OFFICE O/P EST MOD 30 MIN: CPT | Performed by: FAMILY MEDICINE

## 2022-03-14 RX ORDER — PHENOL 1.4 %
AEROSOL, SPRAY (ML) MUCOUS MEMBRANE
COMMUNITY

## 2022-03-14 RX ORDER — PANTOPRAZOLE SODIUM 40 MG/1
40 TABLET, DELAYED RELEASE ORAL DAILY
COMMUNITY
End: 2022-07-13

## 2022-03-14 RX ORDER — ALPRAZOLAM 0.25 MG/1
TABLET ORAL
Qty: 90 TABLET | Refills: 2 | Status: SHIPPED | OUTPATIENT
Start: 2022-03-14 | End: 2022-09-06 | Stop reason: SDUPTHER

## 2022-03-14 NOTE — PROGRESS NOTES
Subjective   Annie Mejia is a 76 y.o. female.     History of Present Illness   Mrs. Mejia presents today for f/u from recent ER visit to [facility]. Had one of her intractable n/v spells. Now on PPI, has questions about continuing pepcid. Generally able to take phenergan and xanax to calm down her stomach but this time it did not work. For several months avoided ER visits with this regimen. Awaiting [facility] eval for motility issue. Since ER visit feels she is back to baseline. No acute concerns today.    Has type 2 DM, overdue for eye exam.     Anxiety and depression has been stable.    On statin for HLP.    Needs recheck of mild renal insufficiency. Avoiding NSAIDS.    Also c/o persistent intermittent dysuris.    Has osteoporosis, overdue for dexa.      The following portions of the patient's history were reviewed and updated as appropriate: allergies, current medications, past family history, past medical history, past social history, past surgical history and problem list.    Review of Systems   Constitutional: Positive for appetite change and fatigue. Negative for chills and fever.   HENT: Positive for congestion and postnasal drip. Negative for mouth sores, nosebleeds, rhinorrhea and sore throat.    Eyes: Positive for visual disturbance (chronic).   Respiratory: Positive for cough (dry, intermittent) and shortness of breath (with anxiety). Negative for wheezing.    Cardiovascular: Positive for chest pain (with anxiety) and palpitations (with anxiety). Negative for leg swelling.   Gastrointestinal: Positive for abdominal pain, diarrhea (chronic, stable) and nausea. Negative for blood in stool and vomiting.        Heartburn   Genitourinary: Negative for dysuria, hematuria and urgency.   Musculoskeletal: Positive for arthralgias, back pain, gait problem and myalgias.   Skin: Negative for rash and wound.   Neurological: Positive for dizziness and weakness. Negative for syncope and headaches.   Hematological:  Negative for adenopathy. Bruises/bleeds easily.   Psychiatric/Behavioral: Positive for dysphoric mood and sleep disturbance. Negative for confusion. The patient is nervous/anxious.    Pt's previous ROS reviewed and updated as indicated.       Objective    Vitals:    03/14/22 1010   BP: 118/74   Pulse: 64   Temp: 97.5 °F (36.4 °C)   SpO2: 98%     Body mass index is 31.48 kg/m².      03/14/22  1010   Weight: 91.2 kg (201 lb)       Physical Exam  Vitals and nursing note reviewed.   Constitutional:       General: She is not in acute distress.     Appearance: She is well-developed and well-groomed. She is obese. She is not ill-appearing.   HENT:      Head: Normocephalic and atraumatic.      Mouth/Throat:      Mouth: Mucous membranes are moist.   Eyes:      General: No scleral icterus.  Cardiovascular:      Rate and Rhythm: Normal rate and regular rhythm.      Pulses: Normal pulses.      Heart sounds: Normal heart sounds.   Pulmonary:      Effort: Pulmonary effort is normal.      Breath sounds: Normal breath sounds.   Abdominal:      Palpations: Abdomen is soft.      Tenderness: There is generalized abdominal tenderness (mild).   Musculoskeletal:      Right lower leg: No edema.      Left lower leg: No edema.   Skin:     General: Skin is warm and dry.      Coloration: Skin is not jaundiced or pale.      Findings: No bruising or rash.   Neurological:      Mental Status: She is alert and oriented to person, place, and time.      Gait: Gait abnormal (mildly antalgic).   Psychiatric:         Mood and Affect: Mood and affect normal.         Speech: Speech normal.         Behavior: Behavior normal. Behavior is cooperative.         Thought Content: Thought content normal.         Cognition and Memory: Cognition normal.         Judgment: Judgment normal.     Pt's previous physical exam reviewed and updated as indicated.    Assessment/Plan   Diagnoses and all orders for this visit:    1. Type 2 diabetes mellitus without  complication, with long-term current use of insulin (HCC) (Primary)  -     Hemoglobin A1c  -     Basic Metabolic Panel  -     Ambulatory Referral for Diabetic Eye Exam-Optometry    2. Mixed anxiety depressive disorder  -     ALPRAZolam (XANAX) 0.25 MG tablet; 1-2 po bid as needed for panic  Dispense: 90 tablet; Refill: 2    3. Mixed hyperlipidemia  -     Lipid Panel    4. Renal insufficiency  -     Basic Metabolic Panel    5. Dysuria  -     Urinalysis With Culture If Indicated - Urine, Clean Catch    6. Gastroesophageal reflux disease with esophagitis without hemorrhage    7. Gastroparesis    8. 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 Postmenopausal  -     DEXA Bone Density Axial; Future    10. Age-related osteoporosis without current pathological fracture  -     DEXA Bone Density Axial; Future       NIDDM fairly well controlled for age/risk factors. Recheck A1c, adjust tx as indicated.    Depression with anxiety stable. Continue low dose xanax as needed. Was not able to tolerate SSRI/SNRI.  As part of patient's treatment plan I am prescribing a controlled substance.  The patient has been made aware of the appropriate use of such medications, including potential risk of somnolence, limited ability to drive and/or work safely, and potential for dependence and/or overdose.  It has also been made clear that these medications are for use by this patient only, without concomitant use of alcohol or other substances, unless prescribed.  History and physical exam exhibit continued safe and appropriate use of controlled substance.  LILIAM reviewed.  Patient has completed a prescribing agreement detailing terms of continued prescribing of controlled substances, including monitoring LILIAM reports, urine drug screening, and pill counts if necessary.  Patient is aware that inappropriate use will result in cessation of prescribing such medications.    Recheck u/a, tx as indicated based on culture.    Continue PPI for  GERD with esophagitis. She has upcoming apt with [facility] for eval of gastroparesis and/or motility DO, etc.    Routine f/u in 3-4 months, sooner as needed/instructed.  I will contact patient regarding test results and provide instructions regarding any necessary changes in plan of care.  Patient was encouraged to keep me informed of any acute changes, lack of improvement, or any new concerning symptoms.  Pt is aware of reasons to seek emergent care.  Patient voiced understanding of all instructions and denied further questions.    Please note that portions of this note may have been completed with a voice recognition program. Efforts were made to edit the dictations, but occasionally words are mistranscribed.

## 2022-03-17 DIAGNOSIS — R42 VERTIGO: ICD-10-CM

## 2022-03-17 RX ORDER — MECLIZINE HYDROCHLORIDE 25 MG/1
TABLET ORAL
Qty: 30 TABLET | Refills: 0 | Status: SHIPPED | OUTPATIENT
Start: 2022-03-17 | End: 2023-02-27 | Stop reason: SDUPTHER

## 2022-03-17 NOTE — TELEPHONE ENCOUNTER
Caller: Annie Mejia    Relationship: Self    Best call back number: [phone]    Requested Prescriptions:   Requested Prescriptions     Pending Prescriptions Disp Refills   • meclizine (ANTIVERT) 25 MG tablet 30 tablet 0     Sig: Take 1-2 tablets by mouth every 6 (six) to 8 (eight) hours as needed for dizziness.        Pharmacy where request should be sent: [facility] [address] [place] [address] [phone] [facility] [phone]      Additional details provided by patient: PATIENT IS OUT OF MEDICATION AND WOULD LIKE TO  TODAY.  PATIENT HAD AN EPISODE OF VERTIGO YESTERDAY     Does the patient have less than a 3 day supply:  [x] Yes  [] No    Tony Fleming Rep   03/17/22 08:07 EDT

## 2022-03-18 LAB
APPEARANCE UR: CLEAR
BACTERIA #/AREA URNS HPF: ABNORMAL /HPF
BACTERIA UR CULT: ABNORMAL
BACTERIA UR CULT: ABNORMAL
BILIRUB UR QL STRIP: NEGATIVE
BUN SERPL-MCNC: 18 MG/DL (ref 8–23)
BUN/CREAT SERPL: 14.1 (ref 7–25)
CALCIUM SERPL-MCNC: 9.4 MG/DL (ref 8.6–10.5)
CASTS URNS QL MICRO: ABNORMAL /LPF
CHLORIDE SERPL-SCNC: 102 MMOL/L (ref 98–107)
CHOLEST SERPL-MCNC: 179 MG/DL (ref 0–200)
CO2 SERPL-SCNC: 27.7 MMOL/L (ref 22–29)
COLOR UR: YELLOW
CREAT SERPL-MCNC: 1.28 MG/DL (ref 0.57–1)
CRYSTALS URNS MICRO: ABNORMAL
EGFRCR SERPLBLD CKD-EPI 2021: 43 ML/MIN/1.73
EPI CELLS #/AREA URNS HPF: >10 /HPF (ref 0–10)
GLUCOSE SERPL-MCNC: 148 MG/DL (ref 65–99)
GLUCOSE UR QL STRIP: NEGATIVE
HBA1C MFR BLD: 7.6 % (ref 4.8–5.6)
HDLC SERPL-MCNC: 42 MG/DL (ref 40–60)
HGB UR QL STRIP: NEGATIVE
KETONES UR QL STRIP: ABNORMAL
LDLC SERPL CALC-MCNC: 104 MG/DL (ref 0–100)
LEUKOCYTE ESTERASE UR QL STRIP: ABNORMAL
MICRO URNS: ABNORMAL
NITRITE UR QL STRIP: NEGATIVE
OTHER ANTIBIOTIC SUSC ISLT: ABNORMAL
PH UR STRIP: 5.5 [PH] (ref 5–7.5)
POTASSIUM SERPL-SCNC: 4.5 MMOL/L (ref 3.5–5.2)
PROT UR QL STRIP: ABNORMAL
RBC #/AREA URNS HPF: ABNORMAL /HPF (ref 0–2)
SODIUM SERPL-SCNC: 139 MMOL/L (ref 136–145)
SP GR UR STRIP: 1.02 (ref 1–1.03)
TRIGL SERPL-MCNC: 192 MG/DL (ref 0–150)
UNIDENT CRYS URNS QL MICRO: PRESENT /LPF
URINALYSIS REFLEX: ABNORMAL
UROBILINOGEN UR STRIP-MCNC: 1 MG/DL (ref 0.2–1)
VLDLC SERPL CALC-MCNC: 33 MG/DL (ref 5–40)
WBC #/AREA URNS HPF: ABNORMAL /HPF (ref 0–5)

## 2022-03-20 DIAGNOSIS — N30.00 ACUTE CYSTITIS WITHOUT HEMATURIA: Primary | ICD-10-CM

## 2022-03-20 RX ORDER — CIPROFLOXACIN 250 MG/1
250 TABLET, FILM COATED ORAL 2 TIMES DAILY
Qty: 14 TABLET | Refills: 0 | Status: SHIPPED | OUTPATIENT
Start: 2022-03-20 | End: 2022-03-23 | Stop reason: SINTOL

## 2022-03-21 ENCOUNTER — TELEPHONE (OUTPATIENT)
Dept: ORTHOPEDIC SURGERY | Facility: CLINIC | Age: 77
End: 2022-03-21

## 2022-03-21 ENCOUNTER — APPOINTMENT (OUTPATIENT)
Dept: BONE DENSITY | Facility: HOSPITAL | Age: 77
End: 2022-03-21

## 2022-03-21 DIAGNOSIS — Z78.0 POSTMENOPAUSAL: ICD-10-CM

## 2022-03-21 DIAGNOSIS — M81.0 AGE-RELATED OSTEOPOROSIS WITHOUT CURRENT PATHOLOGICAL FRACTURE: ICD-10-CM

## 2022-03-21 PROCEDURE — 77080 DXA BONE DENSITY AXIAL: CPT

## 2022-03-21 NOTE — TELEPHONE ENCOUNTER
Caller: LUPE TESFAYE    Relationship to patient:SELF     Best call back number: [phone]    Chief complaint: PATIENT OF DR. STRANGE- ONEL BILATERAL KNEE INJ- LAST APPT WITH DR STRANGE 06/21/2021    Type of visit: INJECTION    Requested date: ASAP     If rescheduling, when is the original appointment:      Additional notes:

## 2022-03-21 NOTE — PROGRESS NOTES
Inform pt recent labs fine other than:  1) A1c of 7.6, overall stable  2) kidney function further declined- make sure she is avoiding NSAIDs,   3) still with UTI based on culture- sent in cipro to pharmacy

## 2022-03-22 ENCOUNTER — TELEPHONE (OUTPATIENT)
Dept: ORTHOPEDIC SURGERY | Facility: CLINIC | Age: 77
End: 2022-03-22

## 2022-03-22 NOTE — TELEPHONE ENCOUNTER
Caller: PATIENT     Relationship to patient: SELF     Best call back number: [phone]      Chief complaint: BILATERAL KNEE PAIN     Type of visit: CORTISONE INJECTIONS     Requested date:        Additional notes: PT. NEEDS TO SCHEDULE CORTISONE INJECTIONS FOR BOTH KNEES.

## 2022-03-23 RX ORDER — AMOXICILLIN 500 MG/1
500 CAPSULE ORAL 3 TIMES DAILY
Qty: 15 CAPSULE | Refills: 0 | Status: SHIPPED | OUTPATIENT
Start: 2022-03-23 | End: 2022-03-28

## 2022-03-24 ENCOUNTER — TELEPHONE (OUTPATIENT)
Dept: FAMILY MEDICINE CLINIC | Facility: CLINIC | Age: 77
End: 2022-03-24

## 2022-03-24 NOTE — TELEPHONE ENCOUNTER
Spoke with patient.  She is feeling much better.  Patient will let us know if she needs anything else.

## 2022-03-24 NOTE — TELEPHONE ENCOUNTER
Caller: Jackie Aliza    Relationship: Self    Best call back number: [phone]    Additional notes: PATIENT STATES SHE WAS SEEN AT [facility] ED YESTERDAY FOR AN UPSET STOMACH AND WAS GIVEN AN INJECTION OF PHENERGAN. PATIENT STATES THE INJECTION DID HELP AND HER STOMACH DOES FEEL BETTER.

## 2022-03-28 ENCOUNTER — TELEPHONE (OUTPATIENT)
Dept: ORTHOPEDIC SURGERY | Facility: CLINIC | Age: 77
End: 2022-03-28
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
## 2022-04-04 ENCOUNTER — TELEPHONE (OUTPATIENT)
Dept: FAMILY MEDICINE CLINIC | Facility: CLINIC | Age: 77
End: 2022-04-04

## 2022-04-04 RX ORDER — OMEPRAZOLE 20 MG/1
20 CAPSULE, DELAYED RELEASE ORAL DAILY
Qty: 30 CAPSULE | Refills: 0 | Status: SHIPPED | OUTPATIENT
Start: 2022-04-04 | End: 2022-07-13 | Stop reason: SDUPTHER

## 2022-04-04 NOTE — TELEPHONE ENCOUNTER
Caller: Jackie Aliza    Relationship: Self    Best call back number: [phone]    What medication are you requesting: OMEPRAZOLE 20MG    What are your current symptoms: HEARTBURN    How long have you been experiencing symptoms:     Have you had these symptoms before:    [x] Yes  [] No    Have you been treated for these symptoms before:   [x] Yes  [] No    If a prescription is needed, what is your preferred pharmacy and phone number: [facility] - [facility] [address] - [phone] [facility] [phone]      Additional notes: PATIENT STATES THAT SHE HAS A OLD PRESCRIPTION OF OMEPRAZOLE AND IT IS HELPING WITH HER HEARTBURN.

## 2022-04-07 DIAGNOSIS — M79.605 LEFT LEG PAIN: ICD-10-CM

## 2022-04-07 RX ORDER — GABAPENTIN 300 MG/1
300 CAPSULE ORAL 3 TIMES DAILY
Qty: 90 CAPSULE | Refills: 0 | Status: SHIPPED | OUTPATIENT
Start: 2022-04-07 | End: 2022-05-16 | Stop reason: SDUPTHER

## 2022-04-07 NOTE — TELEPHONE ENCOUNTER
Rx Refill Note  Requested Prescriptions     Pending Prescriptions Disp Refills   • gabapentin (NEURONTIN) 300 MG capsule 270 capsule 0     Sig: Take 1 capsule by mouth 3 (Three) Times a Day.      Last office visit with prescribing clinician: 3/14/2022      Next office visit with prescribing clinician: 7/13/2022            Silvana Tay MA  04/07/22, 11:42 EDT

## 2022-04-07 NOTE — TELEPHONE ENCOUNTER
Caller: Annie Mejia    Relationship: Self    Best call back number: [phone]    Requested Prescriptions:   Requested Prescriptions     Pending Prescriptions Disp Refills   • gabapentin (NEURONTIN) 300 MG capsule 270 capsule 0     Sig: Take 1 capsule by mouth 3 (Three) Times a Day.        Pharmacy where request should be sent: [facility] - [facility] [address] - [phone]  - [phone]     Additional details provided by patient: PATIENT NEEDS REFILL    Does the patient have less than a 3 day supply:  [] Yes  [x] No    Tony Larios Rep   04/07/22 11:39 EDT

## 2022-05-16 DIAGNOSIS — M79.605 LEFT LEG PAIN: ICD-10-CM

## 2022-05-16 DIAGNOSIS — E78.2 MIXED HYPERLIPIDEMIA: ICD-10-CM

## 2022-05-16 RX ORDER — ATORVASTATIN CALCIUM 40 MG/1
40 TABLET, FILM COATED ORAL NIGHTLY
Qty: 90 TABLET | Refills: 3 | Status: SHIPPED | OUTPATIENT
Start: 2022-05-16

## 2022-05-16 NOTE — TELEPHONE ENCOUNTER
Caller: Annie Mejia    Relationship: Self    Best call back number: [phone]  Requested Prescriptions:   Requested Prescriptions     Pending Prescriptions Disp Refills   • gabapentin (NEURONTIN) 300 MG capsule 90 capsule 0     Sig: Take 1 capsule by mouth 3 (Three) Times a Day.        Pharmacy where request should be sent:      [facility] - [place] [id number] - [phone]  - [phone] FX        Additional details provided by patient:     Does the patient have less than a 3 day supply:  [] Yes  [x] No    Tony Garzon Rep   05/16/22 12:31 EDT

## 2022-05-17 RX ORDER — GABAPENTIN 300 MG/1
300 CAPSULE ORAL 3 TIMES DAILY
Qty: 90 CAPSULE | Refills: 2 | Status: SHIPPED | OUTPATIENT
Start: 2022-05-17

## 2022-06-03 RX ORDER — PROMETHAZINE HYDROCHLORIDE 25 MG/1
TABLET ORAL
Qty: 90 TABLET | Refills: 0 | Status: SHIPPED | OUTPATIENT
Start: 2022-06-03 | End: 2022-06-06

## 2022-06-06 RX ORDER — PROMETHAZINE HYDROCHLORIDE 25 MG/1
TABLET ORAL
Qty: 90 TABLET | Refills: 0 | Status: SHIPPED | OUTPATIENT
Start: 2022-06-06 | End: 2022-07-20

## 2022-06-08 ENCOUNTER — TELEPHONE (OUTPATIENT)
Dept: FAMILY MEDICINE CLINIC | Facility: CLINIC | Age: 77
End: 2022-06-08

## 2022-06-08 NOTE — TELEPHONE ENCOUNTER
Caller: Annie Mejia    Relationship: Self    Best call back number: [phone]     What orders are you requesting (i.e. lab or imaging):  XR ESOPHAGRAM     In what timeframe would the patient need to come in: AS SOON AS POSSIBLE     Where will you receive your lab/imaging services:  [facility]     Additional notes:  PATIENT SAW DR DENNEY TODAY 6-8-22 AT THE [facility]   THEY RAN TESTS TODAY AND DR DENNEY WANTED HER TO HAVE A   XR ESOPHAGRAM   THEY COULDN'T RUN THAT TEST UNTIL Friday AND THE PATIENT COULDN'T STAY  2 MORE  DAYS JUST FOR THE TEST   DR DENNEY WANTED TO SEE IF DR LOMBARDI WILL ORDER THE TEST FOR THE PATIENT     PLEASE CALL IF ORDER CAN BE PUT IN

## 2022-06-13 ENCOUNTER — TELEPHONE (OUTPATIENT)
Dept: FAMILY MEDICINE CLINIC | Facility: CLINIC | Age: 77
End: 2022-06-13

## 2022-06-13 DIAGNOSIS — K21.00 GASTROESOPHAGEAL REFLUX DISEASE WITH ESOPHAGITIS WITHOUT HEMORRHAGE: Primary | ICD-10-CM

## 2022-06-13 DIAGNOSIS — R13.10 DYSPHAGIA, UNSPECIFIED TYPE: ICD-10-CM

## 2022-06-13 DIAGNOSIS — K31.84 GASTROPARESIS: ICD-10-CM

## 2022-06-13 NOTE — TELEPHONE ENCOUNTER
Caller: Annie Mejia    Relationship: Self    Best call back number: [phone]    What orders are you requesting (i.e. lab or imaging): XRESOTHAGRAM (GIVEN BY PATIENT)    In what timeframe would the patient need to come in: ANY TIME IN THE NEAR FUTURE    Additional notes:    DR DENNEY ORDERED XRESOTHAGRAM PATIENT WANTS DR LOMBARDI TO SCHEDULE THIS TEST FOR HER    PATIENT IS REQUESTING US TO SCHEDULE     PATIENT SAYS THAT SHE MIGHT HAVE THE ORDER FOR THIS PROCEDURE BUT IS UNSURE IF IT IS, PATIENT WILL BRING IN TO OFFICE FOR VERIFICATION    PLEASE CALL PATIENT TO VERIFY THIS    PATIENT IS ON HER WAY TO OFFICE AS OF 11:37AM 06/13/22

## 2022-06-14 ENCOUNTER — TELEPHONE (OUTPATIENT)
Dept: FAMILY MEDICINE CLINIC | Facility: CLINIC | Age: 77
End: 2022-06-14

## 2022-06-14 NOTE — TELEPHONE ENCOUNTER
PT was seen at [facility] and needed an order from Dr Pimentel for an XR ESOPHAGRAM    Provider did not order it while she was there.

## 2022-06-21 DIAGNOSIS — Z01.818 PREOPERATIVE CLEARANCE: Primary | ICD-10-CM

## 2022-06-21 DIAGNOSIS — Z11.52 ENCOUNTER FOR SCREENING FOR COVID-19: ICD-10-CM

## 2022-06-27 ENCOUNTER — APPOINTMENT (OUTPATIENT)
Dept: LAB | Facility: HOSPITAL | Age: 77
End: 2022-06-27

## 2022-06-27 ENCOUNTER — LAB (OUTPATIENT)
Dept: LAB | Facility: HOSPITAL | Age: 77
End: 2022-06-27

## 2022-06-27 DIAGNOSIS — Z11.52 ENCOUNTER FOR SCREENING FOR COVID-19: ICD-10-CM

## 2022-06-27 DIAGNOSIS — Z01.818 PREOPERATIVE CLEARANCE: ICD-10-CM

## 2022-06-27 LAB — SARS-COV-2 RNA NOSE QL NAA+PROBE: NOT DETECTED

## 2022-06-27 PROCEDURE — C9803 HOPD COVID-19 SPEC COLLECT: HCPCS

## 2022-06-27 PROCEDURE — U0005 INFEC AGEN DETEC AMPLI PROBE: HCPCS

## 2022-06-27 PROCEDURE — U0004 COV-19 TEST NON-CDC HGH THRU: HCPCS

## 2022-06-29 ENCOUNTER — HOSPITAL ENCOUNTER (OUTPATIENT)
Dept: GENERAL RADIOLOGY | Facility: HOSPITAL | Age: 77
Discharge: HOME | End: 2022-06-29
Admitting: FAMILY MEDICINE

## 2022-06-29 DIAGNOSIS — R13.10 DYSPHAGIA, UNSPECIFIED TYPE: ICD-10-CM

## 2022-06-29 DIAGNOSIS — K31.84 GASTROPARESIS: ICD-10-CM

## 2022-06-29 DIAGNOSIS — K21.00 GASTROESOPHAGEAL REFLUX DISEASE WITH ESOPHAGITIS WITHOUT HEMORRHAGE: ICD-10-CM

## 2022-06-29 PROCEDURE — 74220 X-RAY XM ESOPHAGUS 1CNTRST: CPT

## 2022-06-30 NOTE — PROGRESS NOTES
Inform pt her esophogram showed esophageal narrowing, moderate size hiatal hernia and abnormal motion of the esophagus.     We need to make sure the specialist she is seeing at [facility] receives the results of her gastric emptying study as well as this esophogram. Please confirm name, contact info with pt.

## 2022-07-13 ENCOUNTER — OFFICE VISIT (OUTPATIENT)
Dept: FAMILY MEDICINE CLINIC | Facility: CLINIC | Age: 77
End: 2022-07-13

## 2022-07-13 VITALS
WEIGHT: 193 LBS | TEMPERATURE: 97.8 F | SYSTOLIC BLOOD PRESSURE: 120 MMHG | OXYGEN SATURATION: 99 % | HEIGHT: 67 IN | HEART RATE: 62 BPM | DIASTOLIC BLOOD PRESSURE: 70 MMHG | BODY MASS INDEX: 30.29 KG/M2

## 2022-07-13 DIAGNOSIS — F41.8 MIXED ANXIETY DEPRESSIVE DISORDER: ICD-10-CM

## 2022-07-13 DIAGNOSIS — I10 ESSENTIAL HYPERTENSION: ICD-10-CM

## 2022-07-13 DIAGNOSIS — E11.9 TYPE 2 DIABETES MELLITUS WITHOUT COMPLICATION, WITH LONG-TERM CURRENT USE OF INSULIN: ICD-10-CM

## 2022-07-13 DIAGNOSIS — N18.31 STAGE 3A CHRONIC KIDNEY DISEASE: ICD-10-CM

## 2022-07-13 DIAGNOSIS — E11.9 DIABETES MELLITUS, STABLE: Primary | ICD-10-CM

## 2022-07-13 DIAGNOSIS — E78.2 MIXED HYPERLIPIDEMIA: ICD-10-CM

## 2022-07-13 DIAGNOSIS — Z79.4 TYPE 2 DIABETES MELLITUS WITHOUT COMPLICATION, WITH LONG-TERM CURRENT USE OF INSULIN: ICD-10-CM

## 2022-07-13 PROBLEM — M51.36 DEGENERATION OF INTERVERTEBRAL DISC OF LUMBAR REGION: Status: ACTIVE | Noted: 2022-06-07

## 2022-07-13 PROBLEM — M51.369 DEGENERATION OF INTERVERTEBRAL DISC OF LUMBAR REGION: Status: ACTIVE | Noted: 2022-06-07

## 2022-07-13 PROCEDURE — 99214 OFFICE O/P EST MOD 30 MIN: CPT | Performed by: FAMILY MEDICINE

## 2022-07-13 RX ORDER — MAGNESIUM OXIDE 400 MG/1
400 TABLET ORAL DAILY
COMMUNITY
Start: 2022-06-07

## 2022-07-13 RX ORDER — LANCETS 28 GAUGE
EACH MISCELLANEOUS
Qty: 100 EACH | Refills: 5 | Status: SHIPPED | OUTPATIENT
Start: 2022-07-13 | End: 2022-07-21 | Stop reason: SDUPTHER

## 2022-07-13 RX ORDER — UREA 10 %
1000 LOTION (ML) TOPICAL DAILY
COMMUNITY
Start: 2022-06-07 | End: 2023-01-11

## 2022-07-13 RX ORDER — OMEPRAZOLE 20 MG/1
20 CAPSULE, DELAYED RELEASE ORAL DAILY
Qty: 90 CAPSULE | Refills: 3 | Status: SHIPPED | OUTPATIENT
Start: 2022-07-13 | End: 2022-09-16 | Stop reason: SDUPTHER

## 2022-07-13 RX ORDER — CHLORAL HYDRATE 500 MG
1000 CAPSULE ORAL DAILY
COMMUNITY
Start: 2022-06-07

## 2022-07-13 RX ORDER — MELATONIN
1000 DAILY
COMMUNITY
Start: 2022-06-07 | End: 2023-01-11

## 2022-07-13 RX ORDER — ASCORBIC ACID 500 MG
1 TABLET ORAL DAILY
COMMUNITY

## 2022-07-13 NOTE — PROGRESS NOTES
Subjective   Annie Mejia is a 76 y.o. female.     History of Present Illness   Mrs. Mejia presents today for f/u from recent ER visit to [facility]. Had one of her intractable n/v spells. On PPI and antiemetics. Has had consultation with [facility] for motility issue. Since ER visit feels she is back to baseline. No acute concerns today.     Has type 2 DM which has been fairly well controlled.     On norvasc, lisinopril, inderal for HTN. (inderal also for anxiety)     Anxiety and depression has been stable.     On statin for HLP.     Has CKD3, Avoiding NSAIDS.     The following portions of the patient's history were reviewed and updated as appropriate: allergies, current medications, past family history, past medical history, past social history, past surgical history and problem list.    Review of Systems   Constitutional: Positive for appetite change and fatigue. Negative for chills and fever.   HENT: Positive for congestion and postnasal drip. Negative for mouth sores, nosebleeds, rhinorrhea and sore throat.    Eyes: Positive for visual disturbance (chronic).   Respiratory: Positive for cough (dry, intermittent) and shortness of breath (with anxiety). Negative for wheezing.    Cardiovascular: Positive for chest pain (with anxiety) and palpitations (with anxiety). Negative for leg swelling.   Gastrointestinal: Positive for abdominal pain, diarrhea (chronic, stable) and nausea. Negative for blood in stool and vomiting.        Heartburn   Genitourinary: Negative for dysuria, hematuria and urgency.   Musculoskeletal: Positive for arthralgias, back pain, gait problem and myalgias.   Skin: Negative for rash and wound.   Neurological: Positive for dizziness and weakness. Negative for syncope and headaches.   Hematological: Negative for adenopathy. Bruises/bleeds easily.   Psychiatric/Behavioral: Positive for dysphoric mood and sleep disturbance. Negative for confusion. The patient is nervous/anxious.    Pt's  previous ROS reviewed and updated as indicated.       Objective    Vitals:    07/13/22 0928   BP: 120/70   Pulse: 62   Temp: 97.8 °F (36.6 °C)   SpO2: 99%     Body mass index is 30.23 kg/m².      07/13/22 0928   Weight: 87.5 kg (193 lb)       Physical Exam  Vitals and nursing note reviewed.   Constitutional:       General: She is not in acute distress.     Appearance: She is well-developed and well-groomed. She is not ill-appearing.   HENT:      Mouth/Throat:      Mouth: Mucous membranes are moist.   Eyes:      General: No scleral icterus.     Conjunctiva/sclera: Conjunctivae normal.   Neck:      Thyroid: No thyroid mass.   Cardiovascular:      Rate and Rhythm: Normal rate and regular rhythm.      Pulses: Normal pulses.      Heart sounds: Normal heart sounds.   Pulmonary:      Effort: Pulmonary effort is normal.      Breath sounds: Normal breath sounds.   Abdominal:      Palpations: Abdomen is soft.      Tenderness: There is generalized abdominal tenderness (mild).   Musculoskeletal:      Right lower leg: No edema.      Left lower leg: No edema.   Lymphadenopathy:      Cervical: No cervical adenopathy.   Skin:     General: Skin is warm and dry.      Coloration: Skin is not jaundiced or pale.      Findings: No bruising or rash.   Neurological:      Mental Status: She is alert and oriented to person, place, and time.      Gait: Gait abnormal (mildly antalgic).   Psychiatric:         Mood and Affect: Mood and affect normal.         Behavior: Behavior normal. Behavior is cooperative.         Cognition and Memory: Cognition normal.       FL Esophagram Complete Single Contrast  Result Date: 6/29/2022  1. 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  2. Moderate-sized sliding hiatal hernia.  3. Moderate esophageal dysmotility.    Images personally reviewed, interpreted and dictated by JOANNA Coughlin.  This report was signed and finalized on 6/29/2022 10:58 AM by JOANNA Coughlin.    NM GASTRIC  EMPTYING SOLID  Result Date: 6/8/2022  IMPRESSION: NORMAL RATE OF GASTRIC EMPTYING OF SOLID MEAL. : FRANKY   Transcribe Date/Time: Jun 8 2022  1:31P Dictated by : ALLISON RUBY DO This examination was interpreted and the report reviewed and electronically signed by: BRIAN PRINGLE MD on Jun 8 2022  1:42PM  EST    ER records reviewed on chart.    Assessment & Plan   Diagnoses and all orders for this visit:    1. Diabetes mellitus, stable (HCC) (Primary)  -     Lancets (freestyle) lancets; CHECK GLUCOSE FASTING IN THE MORNING AND IN THE EVENING,  Dispense: 100 each; Refill: 5  -     Hemoglobin A1c    2. Mixed hyperlipidemia  -     Lipid Panel    3. Essential hypertension  -     CBC & Differential  -     Basic Metabolic Panel    4. Type 2 diabetes mellitus without complication, with long-term current use of insulin (HCC)    5. Stage 3a chronic kidney disease (HCC)    6. Mixed anxiety depressive disorder    Other orders  -     omeprazole (priLOSEC) 20 MG capsule; Take 1 capsule by mouth Daily.  Dispense: 90 capsule; Refill: 3  -     glucose blood (FREESTYLE LITE) test strip; USE ONE STRIP TO CHECK GLUCOSE FASTING IN THE MORNING AND IN THE EVENING  Dispense: 120 each; Refill: 12  -     Manual Differential  -     aspirin EC 81 MG EC tablet; Take 1 tablet by mouth Daily.  Dispense: 100 tablet; Refill: 11      Recheck A1c, treat NIDDM as indicated. Patient encouraged to take statin, ASA, ace-inh as rx'd, follow diabetic appropriate diet, exercise daily, perform feet check daily, and have yearly diabetic eye exams.    Assess status of CKD, avoid NSAIDs.    HTn controlled. Continue norvasc, lisinopril, propranolol.    Anxiety/depression stable. Continue BB, low dose xanax as needed. Not able to tolerate SSRI, SNRI.    F/u with [facility] as scheduled.  F/u with me per routine in 3 months, sooner as needed/instructed.  I will contact patient regarding test results and provide instructions  regarding any necessary changes in plan of care.  Patient was encouraged to keep me informed of any acute changes, lack of improvement, or any new concerning symptoms.  Pt is aware of reasons to seek emergent care.  Patient voiced understanding of all instructions and denied further questions.    Please note that portions of this note may have been completed with a voice recognition program. Efforts were made to edit the dictations, but occasionally words are mistranscribed.

## 2022-07-14 LAB
BASOPHILS # BLD AUTO: NORMAL 10*3/UL
BASOPHILS # BLD MANUAL: 0.04 10*3/MM3 (ref 0–0.2)
BASOPHILS NFR BLD MANUAL: 1 % (ref 0–1.5)
BUN SERPL-MCNC: 18 MG/DL (ref 8–23)
BUN/CREAT SERPL: 15.9 (ref 7–25)
CALCIUM SERPL-MCNC: 9.6 MG/DL (ref 8.6–10.5)
CHLORIDE SERPL-SCNC: 106 MMOL/L (ref 98–107)
CHOLEST SERPL-MCNC: 119 MG/DL (ref 0–200)
CO2 SERPL-SCNC: 29.2 MMOL/L (ref 22–29)
CREAT SERPL-MCNC: 1.13 MG/DL (ref 0.57–1)
DIFFERENTIAL COMMENT: ABNORMAL
EGFRCR SERPLBLD CKD-EPI 2021: 49.9 ML/MIN/1.73
EOSINOPHIL # BLD AUTO: NORMAL 10*3/UL
EOSINOPHIL # BLD MANUAL: 0.17 10*3/MM3 (ref 0–0.4)
EOSINOPHIL NFR BLD AUTO: NORMAL %
EOSINOPHIL NFR BLD MANUAL: 4 % (ref 0.3–6.2)
ERYTHROCYTE [DISTWIDTH] IN BLOOD BY AUTOMATED COUNT: 14.2 % (ref 12.3–15.4)
GLUCOSE SERPL-MCNC: 136 MG/DL (ref 65–99)
HBA1C MFR BLD: 7 % (ref 4.8–5.6)
HCT VFR BLD AUTO: 38.3 % (ref 34–46.6)
HDLC SERPL-MCNC: 38 MG/DL (ref 40–60)
HGB BLD-MCNC: 12.1 G/DL (ref 12–15.9)
LDLC SERPL CALC-MCNC: 64 MG/DL (ref 0–100)
LYMPHOCYTES # BLD AUTO: NORMAL 10*3/UL
LYMPHOCYTES # BLD MANUAL: 2.51 10*3/MM3 (ref 0.7–3.1)
LYMPHOCYTES NFR BLD AUTO: NORMAL %
LYMPHOCYTES NFR BLD MANUAL: 58 % (ref 19.6–45.3)
MCH RBC QN AUTO: 29.5 PG (ref 26.6–33)
MCHC RBC AUTO-ENTMCNC: 31.6 G/DL (ref 31.5–35.7)
MCV RBC AUTO: 93.4 FL (ref 79–97)
MONOCYTES # BLD MANUAL: 0.46 10*3/MM3 (ref 0.1–0.9)
MONOCYTES NFR BLD AUTO: NORMAL %
MONOCYTES NFR BLD MANUAL: 11 % (ref 5–12)
NEUTROPHILS # BLD MANUAL: 1 10*3/MM3 (ref 1.7–7)
NEUTROPHILS NFR BLD AUTO: NORMAL %
NEUTROPHILS NFR BLD MANUAL: 24 % (ref 42.7–76)
PLATELET # BLD AUTO: 150 10*3/MM3 (ref 140–450)
PLATELET BLD QL SMEAR: ABNORMAL
POTASSIUM SERPL-SCNC: 4.4 MMOL/L (ref 3.5–5.2)
RBC # BLD AUTO: 4.1 10*6/MM3 (ref 3.77–5.28)
RBC MORPH BLD: ABNORMAL
SODIUM SERPL-SCNC: 145 MMOL/L (ref 136–145)
TRIGL SERPL-MCNC: 89 MG/DL (ref 0–150)
VLDLC SERPL CALC-MCNC: 17 MG/DL (ref 5–40)
WBC # BLD AUTO: 4.18 10*3/MM3 (ref 3.4–10.8)

## 2022-07-15 RX ORDER — ASPIRIN 81 MG/1
81 TABLET ORAL DAILY
Qty: 100 TABLET | Refills: 11
Start: 2022-07-15

## 2022-07-20 RX ORDER — PROMETHAZINE HYDROCHLORIDE 25 MG/1
TABLET ORAL
Qty: 90 TABLET | Refills: 0 | Status: SHIPPED | OUTPATIENT
Start: 2022-07-20 | End: 2023-01-11 | Stop reason: SDUPTHER

## 2022-07-21 DIAGNOSIS — E11.9 DIABETES MELLITUS, STABLE: ICD-10-CM

## 2022-07-21 RX ORDER — LANCETS 28 GAUGE
EACH MISCELLANEOUS
Qty: 100 EACH | Refills: 5 | Status: SHIPPED | OUTPATIENT
Start: 2022-07-21

## 2022-07-21 NOTE — TELEPHONE ENCOUNTER
Caller: Annie Mejia    Relationship: Self    Best call back number: [phone]    Requested Prescriptions:   Requested Prescriptions     Pending Prescriptions Disp Refills   • Lancets (freestyle) lancets 100 each 5     Sig: CHECK GLUCOSE FASTING IN THE MORNING AND IN THE EVENING,   • glucose blood (FREESTYLE LITE) test strip 120 each 12     Sig: USE ONE STRIP TO CHECK GLUCOSE FASTING IN THE MORNING AND IN THE EVENING        Pharmacy where request should be sent: [facility] [address] [place] [address] [phone] [facility] [phone]      Additional details provided by patient: EMA DID NOT CARRY, PATIENT WANTS TO BE SENT TO NEW PHARMACY: [facility] IN [place]      Does the patient have less than a 3 day supply:  [x] Yes  [] No    SiTony Castle Rep   07/21/22 11:03 EDT

## 2022-07-25 ENCOUNTER — TELEPHONE (OUTPATIENT)
Dept: FAMILY MEDICINE CLINIC | Facility: CLINIC | Age: 77
End: 2022-07-25

## 2022-07-25 NOTE — TELEPHONE ENCOUNTER
WENT TO EMERGENCY ROOM AND THEY SAID DIVERTICULITIS AND GAVE HER 2 ANTIBIOTICS AND THEY ARE MAKING HER SICK TO HER STOMACH WANTS TO KNOW IF YOU CAN GIVE HER ANYTHING ELSE

## 2022-07-26 ENCOUNTER — TELEPHONE (OUTPATIENT)
Dept: FAMILY MEDICINE CLINIC | Facility: CLINIC | Age: 77
End: 2022-07-26

## 2022-07-26 RX ORDER — AMOXICILLIN AND CLAVULANATE POTASSIUM 562.5; 437.5; 62.5 MG/1; MG/1; MG/1
1 TABLET, FILM COATED, EXTENDED RELEASE ORAL 2 TIMES DAILY
Qty: 14 TABLET | Refills: 0 | Status: SHIPPED | OUTPATIENT
Start: 2022-07-26 | End: 2022-08-02

## 2022-07-26 NOTE — TELEPHONE ENCOUNTER
Other treatment options are also likely to upset her stomach but she can try.    augmentin sent in    Did she have imaging in ER? I do not see any on her chart.

## 2022-07-26 NOTE — TELEPHONE ENCOUNTER
Patient called again this morning about this message. I advised her that we had been waiting to verify which medications she was given at the ER (which we just got late yesterday) and that someone should be contacting her soon once Dr Pimentel has been able to review the meds she was given.

## 2022-07-26 NOTE — TELEPHONE ENCOUNTER
Caller: Annie Mejia    Relationship: Self    Best call back number: [phone]    What medications are you currently taking:   Current Outpatient Medications on File Prior to Visit   Medication Sig Dispense Refill   • albuterol sulfate  (90 Base) MCG/ACT inhaler Inhale 2 puffs Every 4 (Four) Hours As Needed for Wheezing or Shortness of Air. 18 g 1   • ALPRAZolam (XANAX) 0.25 MG tablet 1-2 po bid as needed for panic 90 tablet 2   • amLODIPine (NORVASC) 5 MG tablet TAKE 1 TABLET EVERY DAY 90 tablet 3   • ascorbic acid (VITAMIN C) 500 MG tablet Take 1 tablet by mouth Daily.     • aspirin EC 81 MG EC tablet Take 1 tablet by mouth Daily. 100 tablet 11   • atorvastatin (LIPITOR) 40 MG tablet TAKE 1 TABLET BY MOUTH EVERY NIGHT. 90 tablet 3   • calcium carbonate (OS-GILLIAN) 1250 (500 Ca) MG chewable tablet Chew 1,000 mg Daily.     • cholecalciferol (Cholecalciferol) 25 MCG (1000 UT) tablet Take 1,000 Units by mouth Daily.     • Diclofenac Sodium (VOLTAREN) 1 % gel gel Apply 4 g topically to the appropriate area as directed 4 (Four) Times a Day As Needed (muscular pain). 100 g 0   • EPINEPHrine (EPIPEN) 0.3 MG/0.3ML solution auto-injector injection EpiPen 2-Lev 0.3 MG/0.3ML Injection Solution Auto-injector; Patient Sig: EpiPen 2-Lev 0.3 MG/0.3ML Injection Solution Auto-injector INJECT 0.3ML INTRAMUSCULARLY AS DIRECTED.; 1; 2; 06-May-2015; Active     • gabapentin (NEURONTIN) 300 MG capsule Take 1 capsule by mouth 3 (Three) Times a Day. 90 capsule 2   • glucose blood (FREESTYLE LITE) test strip USE ONE STRIP TO CHECK GLUCOSE FASTING IN THE MORNING AND IN THE EVENING 120 each 12   • HYDROcodone-acetaminophen (Norco) 7.5-325 MG per tablet Take 1 tablet by mouth Every 8 (Eight) Hours As Needed for Severe Pain . 30 tablet 0   • Lancets (freestyle) lancets CHECK GLUCOSE FASTING IN THE MORNING AND IN THE EVENING, 100 each 5   • lisinopril (PRINIVIL,ZESTRIL) 20 MG tablet TAKE 1 TABLET EVERY DAY 90 tablet 3   •  magnesium oxide (MAG-OX) 400 MG tablet Take 400 mg by mouth Daily.     • meclizine (ANTIVERT) 25 MG tablet Take 1-2 tablets by mouth every 6 (six) to 8 (eight) hours as needed for dizziness. 30 tablet 0   • Melatonin 10 MG tablet Take  by mouth.     • Omega-3 1000 MG capsule Take 1,000 mg by mouth Daily.     • omeprazole (priLOSEC) 20 MG capsule Take 1 capsule by mouth Daily. 90 capsule 3   • ondansetron (Zofran) 4 MG tablet Take 1 tablet by mouth Every 8 (Eight) Hours As Needed for Nausea or Vomiting. (Patient taking differently: Take 8 mg by mouth Every 8 (Eight) Hours As Needed for Nausea or Vomiting.) 90 tablet 1   • promethazine (PHENERGAN) 25 MG suppository INSERT 1 SUPPOSITORY RECTALLY EVERY 8 HOURS AS NEEDED FOR NAUSEA     • promethazine (PHENERGAN) 25 MG tablet TAKE 1 TABLET BY MOUTH EVERY 8 HOURS AS NEEDED FOR NAUSEA FOR VOMITING 90 tablet 0   • propranolol (INDERAL) 40 MG tablet TAKE 1 TABLET TWICE DAILY 180 tablet 3     No current facility-administered medications on file prior to visit.          When did you start taking these medications: DAY BEFORE YESTERDAY    Which medication are you concerned about: ANTIBIOTICS - 500 MG EACH -     Who prescribed you this medication: ER DOCTOR    What are your concerns: MAKING HER SICK, STARTING TO GET A LITTLE PAIN AGAIN IN STOMACH    How long have you had these concerns: SINCE BEING IN HOSPITAL

## 2022-07-26 NOTE — TELEPHONE ENCOUNTER
Patient thought she had an ultrasound.  [facility] medical records said patient did not have any imaging.

## 2022-08-02 ENCOUNTER — TELEPHONE (OUTPATIENT)
Dept: FAMILY MEDICINE CLINIC | Facility: CLINIC | Age: 77
End: 2022-08-02

## 2022-08-02 NOTE — TELEPHONE ENCOUNTER
Attempted to contact patients daughter to see exactly what type of stool test needs to be done.  No answer.

## 2022-08-02 NOTE — TELEPHONE ENCOUNTER
Caller: PÉREZ WILLIS    Relationship: DAUGHTER IN LAW    Best call back number:[phone]    What was the call regarding: PATIENT'S DAUGHTER  IN LAW PÉREZ CALLED STATING DR MAHI DOYLE FROM THE [facility] WOULD LIKE THE PATIENT TO HAVE A STOOL TEST.  HE ASKED IF DR LOMBARDI WOULD BE WILLING TO ORDER THE TEST AND IF SO WHAT FAX NUMBER SHOULD THEY SEND THE ORDERS.      Do you require a callback: YES

## 2022-08-03 NOTE — TELEPHONE ENCOUNTER
I spoke with patients daughter.  She said [facility] is faxing an order.  She is unsure of the exact tests that they are requesting.  Daughter informed that once orders are received that we will verify with our  that she can process these test then I would check with you to see if you would be willing to order them.

## 2022-08-08 ENCOUNTER — TELEPHONE (OUTPATIENT)
Dept: FAMILY MEDICINE CLINIC | Facility: CLINIC | Age: 77
End: 2022-08-08

## 2022-08-08 DIAGNOSIS — R19.4 CHANGE IN BOWEL HABIT: Primary | ICD-10-CM

## 2022-08-08 DIAGNOSIS — R19.7 DIARRHEA, UNSPECIFIED: ICD-10-CM

## 2022-08-08 NOTE — TELEPHONE ENCOUNTER
Stool study orders received from [facility]. Orders entered into chart.    Of note, one study was not able to be ordered under [facility]. Only option in [facility] was for [facility]. Please check with  regarding order options.

## 2022-08-09 ENCOUNTER — TELEPHONE (OUTPATIENT)
Dept: FAMILY MEDICINE CLINIC | Facility: CLINIC | Age: 77
End: 2022-08-09

## 2022-08-09 ENCOUNTER — OFFICE VISIT (OUTPATIENT)
Dept: FAMILY MEDICINE CLINIC | Facility: CLINIC | Age: 77
End: 2022-08-09

## 2022-08-09 VITALS
SYSTOLIC BLOOD PRESSURE: 125 MMHG | HEIGHT: 67 IN | HEART RATE: 123 BPM | OXYGEN SATURATION: 95 % | DIASTOLIC BLOOD PRESSURE: 82 MMHG | WEIGHT: 187.2 LBS | TEMPERATURE: 97.6 F | BODY MASS INDEX: 29.38 KG/M2

## 2022-08-09 DIAGNOSIS — R11.0 NAUSEA: Primary | ICD-10-CM

## 2022-08-09 DIAGNOSIS — R10.84 GENERALIZED ABDOMINAL PAIN: ICD-10-CM

## 2022-08-09 DIAGNOSIS — K59.00 CONSTIPATION, UNSPECIFIED CONSTIPATION TYPE: ICD-10-CM

## 2022-08-09 PROCEDURE — 99213 OFFICE O/P EST LOW 20 MIN: CPT | Performed by: NURSE PRACTITIONER

## 2022-08-09 RX ORDER — METOCLOPRAMIDE 5 MG/1
5 TABLET ORAL 3 TIMES DAILY
Qty: 6 TABLET | Refills: 0 | Status: SHIPPED | OUTPATIENT
Start: 2022-08-09 | End: 2022-08-11 | Stop reason: SDUPTHER

## 2022-08-09 RX ORDER — ONDANSETRON 4 MG/1
TABLET, ORALLY DISINTEGRATING ORAL
COMMUNITY
Start: 2022-06-29 | End: 2022-08-09 | Stop reason: SDUPTHER

## 2022-08-09 RX ORDER — DICYCLOMINE HYDROCHLORIDE 10 MG/1
10 CAPSULE ORAL 3 TIMES DAILY PRN
Qty: 30 CAPSULE | Refills: 0 | Status: SHIPPED | OUTPATIENT
Start: 2022-08-09 | End: 2022-10-24

## 2022-08-09 NOTE — TELEPHONE ENCOUNTER
Caller: Annie Mejia    Relationship: Self    Best call back number:[phone]    What was the call regarding: PATIENT IS WANTING TO KNOW WHAT VIVIEN LAXATIVE PATIENT CAN TAKE TO HELP WITH THE STOOL SAMPLE SHE IS NEEDING TO DO. PATIENT IS HAVING TROUBLE EATING.    Do you require a callback: YES

## 2022-08-09 NOTE — PROGRESS NOTES
Subjective     Chief Complaint:    Chief Complaint   Patient presents with   • Nausea       History of Present Illness:   Spastic belly with tightening like a wide band above or below her belly button. She is nauseated. She is dry heave. Cannot eat well. This has happened before over the past 7 years. Has been to several specialist including [facility]. She has taken promethazine and zofran orally but it is not helping. She has been using prom supp also and is not helping.   She is constipated. Took laxitive last night, 2 BM today. She notes thin liquid BM      Review of Systems  Gen- No fevers, chills  CV- No chest pain, palpitations  Resp- No cough, dyspnea  GI- No N/V/D, abd pain  Neuro-No dizziness, headaches      I have reviewed and/or updated the patient's past medical, surgical, family, social history and problem list as appropriate.     Medications:    Current Outpatient Medications:   •  albuterol sulfate  (90 Base) MCG/ACT inhaler, Inhale 2 puffs Every 4 (Four) Hours As Needed for Wheezing or Shortness of Air., Disp: 18 g, Rfl: 1  •  ALPRAZolam (XANAX) 0.25 MG tablet, 1-2 po bid as needed for panic, Disp: 90 tablet, Rfl: 2  •  amLODIPine (NORVASC) 5 MG tablet, TAKE 1 TABLET EVERY DAY, Disp: 90 tablet, Rfl: 3  •  ascorbic acid (VITAMIN C) 500 MG tablet, Take 1 tablet by mouth Daily., Disp: , Rfl:   •  aspirin EC 81 MG EC tablet, Take 1 tablet by mouth Daily., Disp: 100 tablet, Rfl: 11  •  atorvastatin (LIPITOR) 40 MG tablet, TAKE 1 TABLET BY MOUTH EVERY NIGHT., Disp: 90 tablet, Rfl: 3  •  calcium carbonate (OS-GILLIAN) 1250 (500 Ca) MG chewable tablet, Chew 1,000 mg Daily., Disp: , Rfl:   •  cholecalciferol (VITAMIN D3) 25 MCG (1000 UT) tablet, Take 1,000 Units by mouth Daily., Disp: , Rfl:   •  Diclofenac Sodium (VOLTAREN) 1 % gel gel, Apply 4 g topically to the appropriate area as directed 4 (Four) Times a Day As Needed (muscular pain)., Disp: 100 g, Rfl: 0  •  EPINEPHrine (EPIPEN) 0.3  MG/0.3ML solution auto-injector injection, EpiPen 2-Lev 0.3 MG/0.3ML Injection Solution Auto-injector; Patient Sig: EpiPen 2-Lev 0.3 MG/0.3ML Injection Solution Auto-injector INJECT 0.3ML INTRAMUSCULARLY AS DIRECTED.; 1; 2; 06-May-2015; Active, Disp: , Rfl:   •  gabapentin (NEURONTIN) 300 MG capsule, Take 1 capsule by mouth 3 (Three) Times a Day., Disp: 90 capsule, Rfl: 2  •  glucose blood (FREESTYLE LITE) test strip, USE ONE STRIP TO CHECK GLUCOSE FASTING IN THE MORNING AND IN THE EVENING, Disp: 120 each, Rfl: 12  •  HYDROcodone-acetaminophen (Norco) 7.5-325 MG per tablet, Take 1 tablet by mouth Every 8 (Eight) Hours As Needed for Severe Pain ., Disp: 30 tablet, Rfl: 0  •  hyoscyamine (LEVSIN) 0.125 MG SL tablet, DISSOLVE 2 TABLETS UNDER THE TONGUE EVERY 4 HOURS AS NEEDED, Disp: , Rfl:   •  Lancets (freestyle) lancets, CHECK GLUCOSE FASTING IN THE MORNING AND IN THE EVENING,, Disp: 100 each, Rfl: 5  •  lisinopril (PRINIVIL,ZESTRIL) 20 MG tablet, TAKE 1 TABLET EVERY DAY, Disp: 90 tablet, Rfl: 3  •  magnesium oxide (MAG-OX) 400 MG tablet, Take 400 mg by mouth Daily., Disp: , Rfl:   •  meclizine (ANTIVERT) 25 MG tablet, Take 1-2 tablets by mouth every 6 (six) to 8 (eight) hours as needed for dizziness., Disp: 30 tablet, Rfl: 0  •  Melatonin 10 MG tablet, Take  by mouth., Disp: , Rfl:   •  Omega-3 1000 MG capsule, Take 1,000 mg by mouth Daily., Disp: , Rfl:   •  omeprazole (priLOSEC) 20 MG capsule, Take 1 capsule by mouth Daily., Disp: 90 capsule, Rfl: 3  •  ondansetron (Zofran) 4 MG tablet, Take 1 tablet by mouth Every 8 (Eight) Hours As Needed for Nausea or Vomiting. (Patient taking differently: Take 8 mg by mouth Every 8 (Eight) Hours As Needed for Nausea or Vomiting.), Disp: 90 tablet, Rfl: 1  •  promethazine (PHENERGAN) 25 MG suppository, INSERT 1 SUPPOSITORY RECTALLY EVERY 8 HOURS AS NEEDED FOR NAUSEA, Disp: , Rfl:   •  promethazine (PHENERGAN) 25 MG tablet, TAKE 1 TABLET BY MOUTH EVERY 8 HOURS AS NEEDED FOR  "NAUSEA FOR VOMITING, Disp: 90 tablet, Rfl: 0  •  propranolol (INDERAL) 40 MG tablet, TAKE 1 TABLET TWICE DAILY, Disp: 180 tablet, Rfl: 3  •  dicyclomine (Bentyl) 10 MG capsule, Take 1 capsule by mouth 3 (Three) Times a Day As Needed (abdominal pain)., Disp: 30 capsule, Rfl: 0  •  metoclopramide (Reglan) 5 MG tablet, Take 1 tablet by mouth 3 (Three) Times a Day for 2 days., Disp: 6 tablet, Rfl: 0  •  ondansetron ODT (ZOFRAN-ODT) 4 MG disintegrating tablet, DISSOLVE 1 TABLET IN MOUTH EVERY 12 HOURS AS NEEDED FOR NAUSEA AND VOMITING, Disp: , Rfl:     Allergies:  Allergies   Allergen Reactions   • Oxycodone Shortness Of Breath   • Oxycodone-Acetaminophen Nausea And Vomiting and Shortness Of Breath   • Azithromycin Nausea And Vomiting   • Erythrityl Tetranitrate Nausea And Vomiting   • Morphine Itching   • Morphine And Related Unknown - Low Severity       Objective     Vital Signs:   Vitals:    08/09/22 1614   BP: 125/82   Pulse: (!) 123   Temp: 97.6 °F (36.4 °C)   SpO2: 95%   Weight: 84.9 kg (187 lb 3.2 oz)   Height: 170.2 cm (67.01\")   PainSc:   5     Body mass index is 29.31 kg/m².    Physical Exam:    Physical Exam  Vitals and nursing note reviewed.   Constitutional:       Appearance: She is well-developed.   HENT:      Head: Normocephalic and atraumatic.   Eyes:      Pupils: Pupils are equal, round, and reactive to light.   Cardiovascular:      Rate and Rhythm: Normal rate and regular rhythm.      Heart sounds: Normal heart sounds.   Pulmonary:      Effort: Pulmonary effort is normal.      Breath sounds: Normal breath sounds.   Abdominal:      General: Bowel sounds are normal. There is no distension.      Palpations: Abdomen is soft.      Tenderness: There is no abdominal tenderness.   Musculoskeletal:      Cervical back: Neck supple.   Skin:     General: Skin is warm and dry.      Capillary Refill: Capillary refill takes less than 2 seconds.   Neurological:      General: No focal deficit present.      Mental " Status: She is alert and oriented to person, place, and time.   Psychiatric:         Mood and Affect: Mood normal.         Behavior: Behavior normal.         Assessment / Plan     Assessment/Plan:   Problem List Items Addressed This Visit        Gastrointestinal Abdominal     Nausea - Primary    Relevant Medications    metoclopramide (Reglan) 5 MG tablet      Other Visit Diagnoses     Generalized abdominal pain        Relevant Medications    metoclopramide (Reglan) 5 MG tablet    dicyclomine (Bentyl) 10 MG capsule    Constipation, unspecified constipation type        Relevant Medications    metoclopramide (Reglan) 5 MG tablet        -- trial short course of reglan, counseled on side effect, ok to trial bentyl as well, avoid constipation, discussed bowel regimen    Follow up:  As needed    Electronically signed by ALEXIA Franco   08/09/2022 16:21 EDT      Please note that portions of this note may have been completed with a voice recognition program. Efforts were made to edit the dictations, but occasionally words are mistranscribed.

## 2022-08-09 NOTE — TELEPHONE ENCOUNTER
Patient is having difficulty having a bowel movement due to decreased PO intake.  What would you suggest she do in order to be able to produce a BM for the stool studies she is to have?

## 2022-08-09 NOTE — TELEPHONE ENCOUNTER
She should discuss this with staff at [facility] who requested the tests.    If she is not having diarrhea I'm not sure why they are doing most of these stools studies.

## 2022-08-11 ENCOUNTER — TELEPHONE (OUTPATIENT)
Dept: FAMILY MEDICINE CLINIC | Facility: CLINIC | Age: 77
End: 2022-08-11

## 2022-08-11 DIAGNOSIS — K59.00 CONSTIPATION, UNSPECIFIED CONSTIPATION TYPE: ICD-10-CM

## 2022-08-11 DIAGNOSIS — R11.0 NAUSEA: ICD-10-CM

## 2022-08-11 DIAGNOSIS — R10.84 GENERALIZED ABDOMINAL PAIN: ICD-10-CM

## 2022-08-11 NOTE — TELEPHONE ENCOUNTER
Caller: Annie Mejia    Relationship: Self    Best call back number: [phone]    Requested Prescriptions:   Requested Prescriptions     Pending Prescriptions Disp Refills   • metoclopramide (Reglan) 5 MG tablet 6 tablet 0     Sig: Take 1 tablet by mouth 3 (Three) Times a Day for 2 days.        Pharmacy where request should be sent: [facility] [address] [place] [address] [phone] [facility] [phone]     Additional details provided by patient: PLEASE SEND MEDICATION TO THE [place] [facility]. PATIENT WILL BE IN [place] TODAY.    Does the patient have less than a 3 day supply:  [x] Yes  [] No OUT OF MEDICATION  Tony Vera Rep   08/11/22 10:16 EDT

## 2022-08-11 NOTE — TELEPHONE ENCOUNTER
Caller: Mejia Aliza    Relationship: Self    Best call back number: [phone]    What is the best time to reach you: ANY    Who are you requesting to speak with (clinical staff, provider,  specific staff member): CLINICAL     What was the call regarding: PATIENT CALLED WANTING TO KNOW WHAT TO DO WITH STOOL SAMPLES.  SHOULD SHE BRING THEM IN?  CALLED OFFICE TWICE AND IT RANG AND WENT TO BUSY SIGNAL BOTH TIMES.    Do you require a callback: YES

## 2022-08-11 NOTE — TELEPHONE ENCOUNTER
Caller: Annie Mejia    Relationship: Self    Best call back number: [phone]     Requested Prescriptions:   Requested Prescriptions     Pending Prescriptions Disp Refills   • metoclopramide (Reglan) 5 MG tablet 6 tablet 0     Sig: Take 1 tablet by mouth 3 (Three) Times a Day for 2 days.        Pharmacy where request should be sent: [facility] [address] [place] [address] [phone] [facility] [phone]     Additional details provided by patient: PATIENT STATED THAT SHE WAS ONLY GIVEN 2 DAYS WORTH OF PILLS.  PATIENT ASKED IF SHE COULD GET ENOUGH UNTIL NEXT Tuesday 8-16-22.  PATIENT STATING SHE IS TAKING dicyclomine (Bentyl) 10 MG capsule.  SHE ASKED IF SHE COULD TAKE THESE TWO MEDICATIONS TOGETHER.     Does the patient have less than a 3 day supply:  [x] Yes  [] No    Tony Harris Rep   08/11/22 08:22 EDT

## 2022-08-12 ENCOUNTER — TELEPHONE (OUTPATIENT)
Dept: FAMILY MEDICINE CLINIC | Facility: CLINIC | Age: 77
End: 2022-08-12

## 2022-08-12 DIAGNOSIS — K59.00 CONSTIPATION, UNSPECIFIED CONSTIPATION TYPE: ICD-10-CM

## 2022-08-12 DIAGNOSIS — R10.84 GENERALIZED ABDOMINAL PAIN: ICD-10-CM

## 2022-08-12 DIAGNOSIS — R11.0 NAUSEA: ICD-10-CM

## 2022-08-12 RX ORDER — METOCLOPRAMIDE 5 MG/1
5 TABLET ORAL 3 TIMES DAILY
Qty: 6 TABLET | Refills: 0 | Status: CANCELLED | OUTPATIENT
Start: 2022-08-12 | End: 2022-08-14

## 2022-08-12 RX ORDER — METOCLOPRAMIDE 5 MG/1
5 TABLET ORAL 3 TIMES DAILY
Qty: 6 TABLET | Refills: 0 | Status: SHIPPED | OUTPATIENT
Start: 2022-08-12 | End: 2022-08-15 | Stop reason: SDUPTHER

## 2022-08-15 ENCOUNTER — OFFICE VISIT (OUTPATIENT)
Dept: FAMILY MEDICINE CLINIC | Facility: CLINIC | Age: 77
End: 2022-08-15

## 2022-08-15 VITALS
BODY MASS INDEX: 29.35 KG/M2 | WEIGHT: 187 LBS | DIASTOLIC BLOOD PRESSURE: 80 MMHG | HEIGHT: 67 IN | SYSTOLIC BLOOD PRESSURE: 128 MMHG | TEMPERATURE: 98.1 F | OXYGEN SATURATION: 96 % | HEART RATE: 82 BPM

## 2022-08-15 DIAGNOSIS — R10.84 GENERALIZED ABDOMINAL PAIN: ICD-10-CM

## 2022-08-15 DIAGNOSIS — K59.00 CONSTIPATION, UNSPECIFIED CONSTIPATION TYPE: ICD-10-CM

## 2022-08-15 DIAGNOSIS — R11.0 NAUSEA: ICD-10-CM

## 2022-08-15 PROCEDURE — 99213 OFFICE O/P EST LOW 20 MIN: CPT | Performed by: FAMILY MEDICINE

## 2022-08-15 RX ORDER — METOCLOPRAMIDE 5 MG/1
5 TABLET ORAL 3 TIMES DAILY
Qty: 30 TABLET | Refills: 0 | Status: SHIPPED | OUTPATIENT
Start: 2022-08-15 | End: 2022-09-06

## 2022-08-15 NOTE — PROGRESS NOTES
Subjective   Annie Mejia is a 76 y.o. female.     History of Present Illness   Mrs. Mejia presents today for ER f/u. Seen at [facility] 8/4 for recurrent intractable n/v, abd pain. Had f/u with Chandrika in clinic 8/9. Placed on low dose reglan. States it is helping. Would like refill.    Has [facility] telehealth visit coming up with GI.    The following portions of the patient's history were reviewed and updated as appropriate: allergies, current medications, past family history, past medical history, past social history, past surgical history and problem list.    Review of Systems   Constitutional: Positive for appetite change and fatigue. Negative for chills and fever.   HENT: Positive for congestion and postnasal drip. Negative for mouth sores, nosebleeds, rhinorrhea and sore throat.    Eyes: Positive for visual disturbance (chronic).   Respiratory: Positive for cough (dry, intermittent) and shortness of breath (with anxiety). Negative for wheezing.    Cardiovascular: Positive for chest pain (with anxiety) and palpitations (with anxiety). Negative for leg swelling.   Gastrointestinal: Positive for abdominal pain, diarrhea (chronic, stable) and nausea. Negative for blood in stool and vomiting.        Heartburn   Genitourinary: Negative for dysuria, hematuria and urgency.   Musculoskeletal: Positive for arthralgias, back pain, gait problem and myalgias.   Skin: Negative for rash and wound.   Neurological: Positive for dizziness and weakness. Negative for syncope and headaches.   Hematological: Negative for adenopathy. Bruises/bleeds easily.   Psychiatric/Behavioral: Positive for dysphoric mood and sleep disturbance. Negative for confusion and suicidal ideas. The patient is nervous/anxious.    Pt's previous ROS reviewed and updated as indicated.       Objective    Vitals:    08/15/22 1014   BP: 128/80   Pulse: 82   Temp: 98.1 °F (36.7 °C)   SpO2: 96%     Body mass index is 29.29 kg/m².      08/15/22  1014   Weight: 84.8 kg  (187 lb)       Physical Exam  Vitals and nursing note reviewed.   Constitutional:       General: She is not in acute distress.     Appearance: She is well-developed, well-groomed and overweight. She is not ill-appearing.   HENT:      Head: Atraumatic.      Mouth/Throat:      Mouth: Mucous membranes are moist.   Eyes:      General: No scleral icterus.     Conjunctiva/sclera: Conjunctivae normal.   Cardiovascular:      Rate and Rhythm: Normal rate and regular rhythm.      Pulses: Normal pulses.      Heart sounds: Normal heart sounds.   Pulmonary:      Effort: Pulmonary effort is normal.      Breath sounds: Normal breath sounds.   Abdominal:      General: Bowel sounds are normal. There is no distension.      Palpations: Abdomen is soft. There is no hepatomegaly, splenomegaly or mass.      Tenderness: There is generalized abdominal tenderness (mild). There is no guarding or rebound.   Musculoskeletal:      Right lower leg: No edema.      Left lower leg: No edema.   Skin:     General: Skin is warm and dry.      Coloration: Skin is not jaundiced or pale.      Findings: No bruising or rash.   Neurological:      Mental Status: She is alert and oriented to person, place, and time.      Gait: Gait abnormal (mildly antalgic).   Psychiatric:         Mood and Affect: Affect normal. Mood is depressed.         Behavior: Behavior normal. Behavior is cooperative.         Thought Content: Thought content is not paranoid or delusional. Thought content does not include suicidal ideation.         Cognition and Memory: Cognition normal.     Pt's previous physical exam reviewed and updated as indicated.    ER records reviewed on chart.    Lab Results   Component Value Date    GLUCOSE 136 (H) 07/13/2022    BUN 18 07/13/2022    CREATININE 1.13 (H) 07/13/2022    EGFRIFNONA 47 (L) 10/21/2021    EGFRIFAFRI 57 (L) 10/21/2021    BCR 15.9 07/13/2022    K 4.4 07/13/2022    CO2 29.2 (H) 07/13/2022    CALCIUM 9.6 07/13/2022    PROTENTOTREF 6.4  10/21/2021    ALBUMIN 4.30 10/21/2021    LABIL2 2.0 10/21/2021    AST 11 10/21/2021    ALT 6 10/21/2021         Assessment & Plan   Diagnoses and all orders for this visit:    1. Nausea  -     metoclopramide (Reglan) 5 MG tablet; Take 1 tablet by mouth 3 (Three) Times a Day.  Dispense: 30 tablet; Refill: 0    2. Generalized abdominal pain  -     metoclopramide (Reglan) 5 MG tablet; Take 1 tablet by mouth 3 (Three) Times a Day.  Dispense: 30 tablet; Refill: 0    3. Constipation, unspecified constipation type  -     metoclopramide (Reglan) 5 MG tablet; Take 1 tablet by mouth 3 (Three) Times a Day.  Dispense: 30 tablet; Refill: 0       Continue reglan as prevoiusly rx'd. We had an extensive review of risks/benefits and potential side effects including TD and potential nephrotoxicity. F/u with GI as scheduled at [facility].    Keep routine f/u as scheduled, f/u sooner as needed.  Patient was encouraged to keep me informed of any acute changes, lack of improvement, or any new concerning symptoms.  Pt is aware of reasons to seek emergent care.  Patient voiced understanding of all instructions and denied further questions.    Please note that portions of this note may have been completed with a voice recognition program. Efforts were made to edit the dictations, but occasionally words are mistranscribed.

## 2022-08-16 ENCOUNTER — TELEPHONE (OUTPATIENT)
Dept: FAMILY MEDICINE CLINIC | Facility: CLINIC | Age: 77
End: 2022-08-16

## 2022-08-16 NOTE — TELEPHONE ENCOUNTER
Patient requested the Reglan because it eased her symptoms however, she is now complaining of nausea after taking it.  She was told by pharmacist not to take phenergan and Reglan together.  She took Reglan at 7:15PM 08/16/2022.  She wants to know if it is ok to take phenergan now or does she need to wait a full 24 hours.  She also wanted to verify if she can take Bentyl while taking Reglan.

## 2022-08-16 NOTE — TELEPHONE ENCOUNTER
Caller: Annie Mejia    Relationship: Self    Best call back number: [phone]    What was the call regarding: PATIENT STATED SHE TOOK THE   metoclopramide (Reglan) 5 MG tablet          LAST NIGHT AS PRESCRIBED AND WAS ILL ALL DAY FROM THE MEDICATION.  PATIENT WOULD LIKE TO KNOW IF THERE HAS BEEN ENOUGH TIME BETWEEN YESTERDAY AND TODAY TO TAKE HER   promethazine (PHENERGAN) 25 MG tablet     PATIENT STATED SHE TOOK THE REGLAN LAST NIGHT @ 7:15PM.      PATIENT WOULD ALSO LIKE TO KNOW IF IT IS OK TO START TAKING THE   dicyclomine (Bentyl) 10 MG capsule   WITH THE PHENERGAN    Do you require a callback: YES

## 2022-08-18 LAB
ADV 40+41 DNA STL QL NAA+NON-PROBE: NOT DETECTED
ASTRO TYP 1-8 RNA STL QL NAA+NON-PROBE: NOT DETECTED
C CAYETANENSIS DNA STL QL NAA+NON-PROBE: NOT DETECTED
C COLI+JEJ+UPSA DNA STL QL NAA+NON-PROBE: NOT DETECTED
C DIF TOX TCDA+TCDB STL QL NAA+NON-PROBE: DETECTED
C DIFF TOX GENS STL QL NAA+PROBE: POSITIVE
CRYPTOSP AG STL QL IA: NEGATIVE
CRYPTOSP DNA STL QL NAA+NON-PROBE: NOT DETECTED
E COLI O157 DNA STL QL NAA+NON-PROBE: ABNORMAL
E HISTOLYT DNA STL QL NAA+NON-PROBE: NOT DETECTED
EAEC PAA PLAS AGGR+AATA ST NAA+NON-PRB: NOT DETECTED
EC STX1+STX2 GENES STL QL NAA+NON-PROBE: NOT DETECTED
EPEC EAE GENE STL QL NAA+NON-PROBE: NOT DETECTED
ETEC LTA+ST1A+ST1B TOX ST NAA+NON-PROBE: NOT DETECTED
G LAMBLIA AG STL QL IA: NEGATIVE
G LAMBLIA DNA STL QL NAA+NON-PROBE: NOT DETECTED
LACTOFERRIN STL-MCNC: 1.36 UG/ML(G) (ref 0–7.24)
NOROVIRUS GI+II RNA STL QL NAA+NON-PROBE: NOT DETECTED
P SHIGELLOIDES DNA STL QL NAA+NON-PROBE: NOT DETECTED
RVA RNA STL QL NAA+NON-PROBE: NOT DETECTED
S ENT+BONG DNA STL QL NAA+NON-PROBE: NOT DETECTED
SAPO I+II+IV+V RNA STL QL NAA+NON-PROBE: NOT DETECTED
SHIGELLA SP+EIEC IPAH ST NAA+NON-PROBE: NOT DETECTED
V CHOL+PARA+VUL DNA STL QL NAA+NON-PROBE: NOT DETECTED
V CHOLERAE DNA STL QL NAA+NON-PROBE: NOT DETECTED
WBC STL QL MICRO: NORMAL
WBC STL QL MICRO: NORMAL
Y ENTEROCOL DNA STL QL NAA+NON-PROBE: NOT DETECTED

## 2022-08-22 NOTE — PROGRESS NOTES
Pt had stool testing as requested by [facility] provider. They are normal other than she is positive for C dif. I need to know if they plan to treat her or I should go ahead and start treatment.    Please forward all results to [facility] provider. FAX [phone] Dr. Naila Beach

## 2022-08-29 ENCOUNTER — TELEPHONE (OUTPATIENT)
Dept: FAMILY MEDICINE CLINIC | Facility: CLINIC | Age: 77
End: 2022-08-29

## 2022-08-29 NOTE — TELEPHONE ENCOUNTER
Caller: Annie Mejia    Relationship: Self    Best call back number: [phone]     What was the call regarding: PATIENT CALLED STATING THAT  VANCOMYCIN  MG CAPSULES IS TOO EXPENSIVE.  PATIENT ASKED IF SOMETHING ELSE COULD BE SENT IN THAT IS SIMILAR TO THE PRESCRIPTION. PATIENT STATED THAT DR MAHI DOYLE WITH THE [facility] PRESCRIBED THIS MEDICATION FOR BACTERIA IN HER STOOL SAMPLE.     Do you require a callback: YES

## 2022-08-31 NOTE — TELEPHONE ENCOUNTER
Caller: Annie Mejia    Relationship: Self    Best call back number: [phone]    What is the best time to reach you: ANYTIME     Who are you requesting to speak with (clinical staff, provider,  specific staff member): CLINICAL STAFF    Do you know the name of the person who called: THE PATIENT     What was the call regarding: THE PATIENT IS REQUESTING A CALL BACK WITH THE STATUS OF WHETHER OR NOT THE PRACTICE WILL WRITE A PRESCRIPTION FOR A MORE AFFORDABLE MEDICATION. THE PATIENT IS REQUESTING A CALL BACK AND STATES THAT [facility] ADVISED THE PATIENT TO CALL [facility] BACK IF SHE COULD NOT GET A MORE AFFORDABLE MEDICATION FROM DR LOMBARDI.    Do you require a callback: YES

## 2022-09-02 NOTE — TELEPHONE ENCOUNTER
If the [facility] wrote the medicine they should try to find an alternative.  I do know some pharmacies will compound the vancomycin into a liquid and it is cheaper that way.

## 2022-09-05 DIAGNOSIS — F41.8 MIXED ANXIETY DEPRESSIVE DISORDER: ICD-10-CM

## 2022-09-05 DIAGNOSIS — K59.00 CONSTIPATION, UNSPECIFIED CONSTIPATION TYPE: ICD-10-CM

## 2022-09-05 DIAGNOSIS — R11.0 NAUSEA: ICD-10-CM

## 2022-09-05 DIAGNOSIS — R10.84 GENERALIZED ABDOMINAL PAIN: ICD-10-CM

## 2022-09-06 ENCOUNTER — CLINICAL SUPPORT (OUTPATIENT)
Dept: FAMILY MEDICINE CLINIC | Facility: CLINIC | Age: 77
End: 2022-09-06

## 2022-09-06 DIAGNOSIS — Z79.899 ENCOUNTER FOR MEDICATION MANAGEMENT: ICD-10-CM

## 2022-09-06 DIAGNOSIS — Z51.81 ENCOUNTER FOR THERAPEUTIC DRUG MONITORING: Primary | ICD-10-CM

## 2022-09-06 RX ORDER — ALPRAZOLAM 0.25 MG/1
TABLET ORAL
Qty: 90 TABLET | Refills: 0 | Status: SHIPPED | OUTPATIENT
Start: 2022-09-06 | End: 2022-11-15

## 2022-09-06 RX ORDER — ALPRAZOLAM 0.25 MG/1
TABLET ORAL
Qty: 90 TABLET | Refills: 0 | OUTPATIENT
Start: 2022-09-06

## 2022-09-06 RX ORDER — METOCLOPRAMIDE 5 MG/1
TABLET ORAL
Qty: 30 TABLET | Refills: 0 | Status: SHIPPED | OUTPATIENT
Start: 2022-09-06

## 2022-09-06 NOTE — TELEPHONE ENCOUNTER
LILIAM reviewed. CSA UTD. Refill approved and printed for . Pt to have UDS at time of . Please document timing of last dose of each controlled medication. Pt to keep f/u apt as scheduled.

## 2022-09-10 LAB — DRUGS UR: NORMAL

## 2022-09-16 ENCOUNTER — TELEPHONE (OUTPATIENT)
Dept: FAMILY MEDICINE CLINIC | Facility: CLINIC | Age: 77
End: 2022-09-16

## 2022-09-16 RX ORDER — OMEPRAZOLE 20 MG/1
20 CAPSULE, DELAYED RELEASE ORAL DAILY
Qty: 90 CAPSULE | Refills: 3 | Status: SHIPPED | OUTPATIENT
Start: 2022-09-16 | End: 2023-07-26

## 2022-09-16 NOTE — TELEPHONE ENCOUNTER
Caller: Annie Mejia    Relationship: Self    Best call back number: [phone]    Requested Prescriptions: LAMOTIDINE 40MG     Pharmacy where request should be sent: [facility] (NOW [facility]) - [facility] [address] - [phone]  - [phone] FX     Additional details provided by patient: THE LSAST TIME SHE GOT THIS PRESCRIPTION WAS 4/8/2020. IT IS FOR ACID THAT COMES UP IN HER THROAT    Does the patient have less than a 3 day supply:  [x] Yes  [] No    Issa Benavides, PCT   09/16/22 09:20 EDT

## 2022-09-23 ENCOUNTER — TELEPHONE (OUTPATIENT)
Dept: FAMILY MEDICINE CLINIC | Facility: CLINIC | Age: 77
End: 2022-09-23

## 2022-09-26 ENCOUNTER — OFFICE VISIT (OUTPATIENT)
Dept: FAMILY MEDICINE CLINIC | Facility: CLINIC | Age: 77
End: 2022-09-26

## 2022-09-26 VITALS
DIASTOLIC BLOOD PRESSURE: 80 MMHG | HEIGHT: 67 IN | WEIGHT: 187 LBS | TEMPERATURE: 98 F | BODY MASS INDEX: 29.35 KG/M2 | OXYGEN SATURATION: 98 % | HEART RATE: 100 BPM | SYSTOLIC BLOOD PRESSURE: 140 MMHG

## 2022-09-26 DIAGNOSIS — G31.84 MILD COGNITIVE IMPAIRMENT: ICD-10-CM

## 2022-09-26 DIAGNOSIS — Z79.4 TYPE 2 DIABETES MELLITUS WITHOUT COMPLICATION, WITH LONG-TERM CURRENT USE OF INSULIN: ICD-10-CM

## 2022-09-26 DIAGNOSIS — R26.9 GAIT DISTURBANCE: ICD-10-CM

## 2022-09-26 DIAGNOSIS — N18.31 STAGE 3A CHRONIC KIDNEY DISEASE: ICD-10-CM

## 2022-09-26 DIAGNOSIS — R41.3 MEMORY CHANGE: Primary | ICD-10-CM

## 2022-09-26 DIAGNOSIS — Z79.899 CHRONIC PRESCRIPTION BENZODIAZEPINE USE: ICD-10-CM

## 2022-09-26 DIAGNOSIS — K21.00 GASTROESOPHAGEAL REFLUX DISEASE WITH ESOPHAGITIS WITHOUT HEMORRHAGE: ICD-10-CM

## 2022-09-26 DIAGNOSIS — Z86.73 HISTORY OF STROKE: ICD-10-CM

## 2022-09-26 DIAGNOSIS — I73.9 PERIPHERAL VASCULAR DISEASE: ICD-10-CM

## 2022-09-26 DIAGNOSIS — K31.84 GASTROPARESIS: ICD-10-CM

## 2022-09-26 DIAGNOSIS — F41.8 MIXED ANXIETY DEPRESSIVE DISORDER: ICD-10-CM

## 2022-09-26 DIAGNOSIS — E11.9 TYPE 2 DIABETES MELLITUS WITHOUT COMPLICATION, WITH LONG-TERM CURRENT USE OF INSULIN: ICD-10-CM

## 2022-09-26 DIAGNOSIS — I10 ESSENTIAL HYPERTENSION: ICD-10-CM

## 2022-09-26 DIAGNOSIS — E78.2 MIXED HYPERLIPIDEMIA: ICD-10-CM

## 2022-09-26 PROCEDURE — 99215 OFFICE O/P EST HI 40 MIN: CPT | Performed by: FAMILY MEDICINE

## 2022-09-26 RX ORDER — METRONIDAZOLE 500 MG/1
TABLET ORAL
COMMUNITY
Start: 2022-09-06 | End: 2022-10-24

## 2022-09-26 NOTE — PROGRESS NOTES
Subjective   Annie Mejia is a 76 y.o. female.     History of Present Illness   Mrs. Mejia presents today with her daughter-in-law for f/u on several chronic med problems. She has had recent  Trips to ER related to her chronic GI problems.     Has f/u with [facility] November.    Family concerned about worsening memory, mood changes, balance problems, falls, etc. Using walker at home. Does not have it with her today. She continues to c/o depression related to death of her . Has had difficulty tolerating anti-depressants due to GI side effects.    Has known carotid artery disease. Dx of previous TIA/?CVA. Had extensive eval in 2/2022. Had consultation with neurology following that but not since.    Of note, she has been on low dose alprazolam for several years. Has helped control anxiety which often serves as trigger for her GI symptoms. No escalation in dosing.    Chronic conditions include DM, HTN, HLP, PAD.    The following portions of the patient's history were reviewed and updated as appropriate: allergies, current medications, past family history, past medical history, past social history, past surgical history and problem list.    Review of Systems   Constitutional: Positive for fatigue. Negative for chills, fever and unexpected weight change.   HENT: Positive for congestion and postnasal drip. Negative for mouth sores, nosebleeds, rhinorrhea and sore throat.    Eyes: Positive for visual disturbance (chronic).   Respiratory: Positive for cough (dry, intermittent) and shortness of breath (with anxiety). Negative for wheezing.    Cardiovascular: Positive for chest pain (with anxiety) and palpitations (with anxiety). Negative for leg swelling.   Gastrointestinal: Positive for abdominal pain (chronic, intermittent), diarrhea (chronic, stable), nausea (chronic) and vomiting (intermittent). Negative for blood in stool.        Heartburn   Genitourinary: Negative for dysuria, hematuria, urgency and  vaginal bleeding.   Musculoskeletal: Positive for arthralgias, back pain, gait problem and myalgias.   Skin: Negative for rash and wound.   Neurological: Positive for dizziness (intermittent) and weakness. Negative for syncope, facial asymmetry, speech difficulty and headaches.   Hematological: Negative for adenopathy. Bruises/bleeds easily.   Psychiatric/Behavioral: Positive for agitation, dysphoric mood and sleep disturbance. Negative for confusion and suicidal ideas. The patient is nervous/anxious.    Pt's previous ROS reviewed and updated as indicated.       Objective    Vitals:    09/26/22 1526   BP: 140/80   Pulse: 100   Temp: 98 °F (36.7 °C)   SpO2: 98%     Body mass index is 29.29 kg/m².      09/26/22  1526   Weight: 84.8 kg (187 lb)       Physical Exam  Vitals and nursing note reviewed.   Constitutional:       General: She is not in acute distress.     Appearance: She is well-developed, well-groomed and overweight. She is not ill-appearing.   HENT:      Head: Atraumatic.      Mouth/Throat:      Mouth: Mucous membranes are moist.   Eyes:      General: No scleral icterus.     Extraocular Movements: Extraocular movements intact.      Conjunctiva/sclera: Conjunctivae normal.      Pupils: Pupils are equal, round, and reactive to light.   Neck:      Thyroid: No thyroid mass.      Vascular: Normal carotid pulses.   Cardiovascular:      Rate and Rhythm: Normal rate and regular rhythm.      Pulses: Normal pulses.      Heart sounds: Normal heart sounds.   Pulmonary:      Effort: Pulmonary effort is normal.      Breath sounds: Normal breath sounds.   Abdominal:      General: Bowel sounds are normal. There is no distension.      Palpations: Abdomen is soft. There is no hepatomegaly, splenomegaly or mass.      Tenderness: There is generalized abdominal tenderness (mild). There is no guarding or rebound.   Musculoskeletal:      Right lower leg: No edema.      Left lower leg: No edema.   Lymphadenopathy:      Cervical: No  cervical adenopathy.   Skin:     General: Skin is warm and dry.      Coloration: Skin is not jaundiced or pale.      Findings: No bruising or rash.   Neurological:      Mental Status: She is alert and oriented to person, place, and time.      Cranial Nerves: Cranial nerves are intact.      Sensory: Sensation is intact.      Motor: Motor function is intact.      Coordination: Coordination is intact. Romberg sign negative. Finger-Nose-Finger Test normal.      Gait: Gait abnormal (mildly antalgic).      Comments: Mini-Cog score of 2/5   Psychiatric:         Mood and Affect: Affect normal. Mood is anxious and depressed.         Speech: Speech normal.         Behavior: Behavior normal. Behavior is cooperative.         Thought Content: Thought content normal. Thought content is not paranoid or delusional. Thought content does not include suicidal ideation.         Cognition and Memory: Cognition normal.     Pt's previous physical exam reviewed and updated as indicated.    Most recent labs reviewed from ER.    Lab Results   Component Value Date    WBC 4.18 07/13/2022    HGB 12.1 07/13/2022    HCT 38.3 07/13/2022    MCV 93.4 07/13/2022     07/13/2022       Lab Results   Component Value Date    GLUCOSE 136 (H) 07/13/2022    BUN 18 07/13/2022    CREATININE 1.13 (H) 07/13/2022    EGFRIFNONA 47 (L) 10/21/2021    EGFRIFAFRI 57 (L) 10/21/2021    BCR 15.9 07/13/2022    K 4.4 07/13/2022    CO2 29.2 (H) 07/13/2022    CALCIUM 9.6 07/13/2022    PROTENTOTREF 6.4 10/21/2021    ALBUMIN 4.30 10/21/2021    LABIL2 2.0 10/21/2021    AST 11 10/21/2021    ALT 6 10/21/2021       Lab Results   Component Value Date    CHOL 151 01/18/2021    CHLPL 119 07/13/2022    TRIG 89 07/13/2022    HDL 38 (L) 07/13/2022    LDL 64 07/13/2022       Lab Results   Component Value Date    HGBA1C 7.00 (H) 07/13/2022       Lab Results   Component Value Date    TSH 1.900 10/21/2021       Lab Results   Component Value Date    [id number] 626 04/17/2018        Assessment & Plan   Diagnoses and all orders for this visit:    1. Memory change (Primary)  -     Ambulatory Referral to Neurology  -     MRI Brain With & Without Contrast; Future    2. Gait disturbance  -     Ambulatory Referral to Neurology  -     MRI Brain With & Without Contrast; Future    3. Peripheral vascular disease (HCC)  -     Ambulatory Referral to Neurology  -     MRI Brain With & Without Contrast; Future    4. Mixed hyperlipidemia    5. Essential hypertension  -     Basic Metabolic Panel; Future    6. Type 2 diabetes mellitus without complication, with long-term current use of insulin (HCC)  -     Basic Metabolic Panel; Future    7. Stage 3a chronic kidney disease (HCC)    8. Gastroesophageal reflux disease with esophagitis without hemorrhage    9. Gastroparesis    10. Mixed anxiety depressive disorder    11. Chronic prescription benzodiazepine use    12. History of stroke  -     Ambulatory Referral to Neurology  -     MRI Brain With & Without Contrast; Future    13. Mild cognitive impairment  -     Ambulatory Referral to Neurology  -     MRI Brain With & Without Contrast; Future    Other orders  -     promethazine (PHENERGAN) 25 MG suppository; 1/2 to 1 per rectum q 8 hours as needed for severe nausea/vomiting  Dispense: 12 suppository; Refill: 1       We have discussed potential contributors to her mild cognitive impairment, gait disturbance including medications (benzodiazepines, opioid analgesics, promethazine, reglan, gabapentin, etc). She is a high risk for vascular dementia and she is amenable to neurology eval, MRI etc. She is aware that weaning down or d/c of meds may be necessary.    Of note, she is using her gabapentin and norco very intermittently for chronic LBP due to severe DDD/DJD.    Strongly advised to keep f/u GI at [facility] as scheduled.    Continue current medical mgnt for DM, HTN, HLP, PAD, CKD.    F/u in 2 months, f/u sooner as needed/instructed.  I will contact  patient regarding test results and provide instructions regarding any necessary changes in plan of care.  Patient was encouraged to keep me informed of any acute changes, lack of improvement, or any new concerning symptoms.  Pt is aware of reasons to seek emergent care.  Patient voiced understanding of all instructions and denied further questions.    I spent 42 minutes caring for Annie on this date of service. This time includes time spent by me in the following activities:preparing for the visit, reviewing tests, obtaining and/or reviewing a separately obtained history, performing a medically appropriate examination and/or evaluation , counseling and educating the patient/family/caregiver, ordering medications, tests, or procedures, documenting information in the medical record and care coordination.      Please note that portions of this note may have been completed with a voice recognition program. Efforts were made to edit the dictations, but occasionally words are mistranscribed.

## 2022-09-29 RX ORDER — PROMETHAZINE HYDROCHLORIDE 25 MG/1
SUPPOSITORY RECTAL
Qty: 12 SUPPOSITORY | Refills: 1 | Status: SHIPPED | OUTPATIENT
Start: 2022-09-29

## 2022-10-03 ENCOUNTER — TELEPHONE (OUTPATIENT)
Dept: FAMILY MEDICINE CLINIC | Facility: CLINIC | Age: 77
End: 2022-10-03

## 2022-10-04 NOTE — TELEPHONE ENCOUNTER
MILLY pt daughter & provided her with scheduling number for [facility] & advised her to call me if she has any problems with scheduling or additional concerns.

## 2022-10-04 NOTE — TELEPHONE ENCOUNTER
Per chat message received, I have called &  LM for pt daughter to call my direct extension to discuss patient's MRI (she is on verbal release)

## 2022-10-12 ENCOUNTER — TELEPHONE (OUTPATIENT)
Dept: FAMILY MEDICINE CLINIC | Facility: CLINIC | Age: 77
End: 2022-10-12

## 2022-10-12 NOTE — TELEPHONE ENCOUNTER
Notified Nano (daughter in law on [address]) that you are out of the office until 10/17/2022 but I would forward the message.  Advised that we could schedule with one of the providers in office.  Declines at this time, but will let me know if something changes.

## 2022-10-12 NOTE — TELEPHONE ENCOUNTER
Caller: MejiaAnnie    Relationship: Self    Best call back number: [phone]    What medication are you requesting: PCP DISCRETION    What are your current symptoms: INSOMNIA    How long have you been experiencing symptoms: 1 DAY    Have you had these symptoms before:    [] Yes  [x] No    Have you been treated for these symptoms before:   [] Yes  [x] No    If a prescription is needed, what is your preferred pharmacy and phone number:      [facility] [address] [place] [address] [phone] [facility] [phone] FX        Additional notes: REQUESTING A CALL IF MEDICATION IS CALLED IN

## 2022-10-17 NOTE — TELEPHONE ENCOUNTER
If she is still having difficulty sleeping I recommend we discuss further at her f/u visit on 10/21

## 2022-10-24 ENCOUNTER — OFFICE VISIT (OUTPATIENT)
Dept: FAMILY MEDICINE CLINIC | Facility: CLINIC | Age: 77
End: 2022-10-24

## 2022-10-24 VITALS
HEIGHT: 67 IN | HEART RATE: 63 BPM | OXYGEN SATURATION: 100 % | WEIGHT: 182 LBS | SYSTOLIC BLOOD PRESSURE: 124 MMHG | TEMPERATURE: 98 F | DIASTOLIC BLOOD PRESSURE: 70 MMHG | BODY MASS INDEX: 28.56 KG/M2

## 2022-10-24 DIAGNOSIS — N28.9 RENAL INSUFFICIENCY: ICD-10-CM

## 2022-10-24 DIAGNOSIS — Z23 NEED FOR INFLUENZA VACCINATION: ICD-10-CM

## 2022-10-24 DIAGNOSIS — D69.6 THROMBOCYTOPENIA: ICD-10-CM

## 2022-10-24 DIAGNOSIS — I10 ESSENTIAL HYPERTENSION: ICD-10-CM

## 2022-10-24 DIAGNOSIS — E11.9 TYPE 2 DIABETES MELLITUS WITHOUT COMPLICATION, WITH LONG-TERM CURRENT USE OF INSULIN: Primary | ICD-10-CM

## 2022-10-24 DIAGNOSIS — Z79.4 TYPE 2 DIABETES MELLITUS WITHOUT COMPLICATION, WITH LONG-TERM CURRENT USE OF INSULIN: Primary | ICD-10-CM

## 2022-10-24 DIAGNOSIS — R11.0 NAUSEA: ICD-10-CM

## 2022-10-24 PROCEDURE — 90662 IIV NO PRSV INCREASED AG IM: CPT | Performed by: FAMILY MEDICINE

## 2022-10-24 PROCEDURE — G0008 ADMIN INFLUENZA VIRUS VAC: HCPCS | Performed by: FAMILY MEDICINE

## 2022-10-24 PROCEDURE — 99214 OFFICE O/P EST MOD 30 MIN: CPT | Performed by: FAMILY MEDICINE

## 2022-10-24 RX ORDER — AMOXICILLIN AND CLAVULANATE POTASSIUM 875; 125 MG/1; MG/1
1 TABLET, FILM COATED ORAL 2 TIMES DAILY
COMMUNITY
Start: 2022-10-14 | End: 2022-11-29

## 2022-10-24 NOTE — PROGRESS NOTES
Subjective   Annie Mejia is a 76 y.o. female.     History of Present Illness   Mrs. Mejia presents today for ER follow-up.  Has had 2 ER visits since her last follow-up here.  Both recurrent nausea and vomiting, abdominal pain which has been a recurrent chronic issue for her.  Currently under evaluation at [facility].  Has upcoming telehealth appointment.    Apparently had CT abdomen each time she was seen in the ER.  Results unavailable at this time.    She continues to use Phenergan as needed, low-dose alprazolam as needed when anxiety triggers the symptoms.  Otherwise denies any changes or new concerns.    Taking her routine medications for her diabetes, hypertension as prescribed.    Noted to have mild thrombocytopenia during ER visit.  No bleeding tendencies.    Requesting flu shot today.    She continues to have significant depression, anxiety.  Still grieving over the loss of her .  Significant difficulty tolerating SSRI, SNRI medications due to GI side effects.    The following portions of the patient's history were reviewed and updated as appropriate: allergies, current medications, past family history, past medical history, past social history, past surgical history and problem list.    Review of Systems   Constitutional: Positive for fatigue. Negative for chills, fever and unexpected weight change.   HENT: Positive for congestion and postnasal drip. Negative for mouth sores, nosebleeds, rhinorrhea and sore throat.    Eyes: Positive for visual disturbance (chronic).   Respiratory: Positive for cough (dry, intermittent) and shortness of breath (with anxiety). Negative for wheezing.    Cardiovascular: Positive for chest pain (with anxiety) and palpitations (with anxiety). Negative for leg swelling.   Gastrointestinal: Positive for abdominal pain (chronic, intermittent), diarrhea (chronic, stable), nausea (chronic) and vomiting (intermittent). Negative for blood in stool.        Heartburn    Genitourinary: Negative for dysuria, hematuria, urgency and vaginal bleeding.   Musculoskeletal: Positive for arthralgias, back pain, gait problem and myalgias.   Skin: Negative for rash and wound.   Neurological: Positive for dizziness (intermittent) and weakness. Negative for syncope, facial asymmetry, speech difficulty and headaches.   Hematological: Negative for adenopathy. Bruises/bleeds easily.   Psychiatric/Behavioral: Positive for agitation, dysphoric mood and sleep disturbance. Negative for confusion and suicidal ideas. The patient is nervous/anxious.    Pt's previous ROS reviewed and updated as indicated.       Objective    Vitals:    10/24/22 1006   BP: 124/70   Pulse: 63   Temp: 98 °F (36.7 °C)   SpO2: 100%     Body mass index is 28.51 kg/m².      10/24/22  1006   Weight: 82.6 kg (182 lb)       Physical Exam  Vitals and nursing note reviewed.   Constitutional:       General: She is not in acute distress.     Appearance: She is well-developed, well-groomed and overweight. She is not ill-appearing.   HENT:      Head: Atraumatic.      Mouth/Throat:      Mouth: Mucous membranes are moist.   Eyes:      General: No scleral icterus.     Conjunctiva/sclera: Conjunctivae normal.   Cardiovascular:      Rate and Rhythm: Normal rate and regular rhythm.      Pulses: Normal pulses.      Heart sounds: Normal heart sounds.   Pulmonary:      Effort: Pulmonary effort is normal.      Breath sounds: Normal breath sounds.   Musculoskeletal:      Right lower leg: No edema.      Left lower leg: No edema.   Skin:     General: Skin is warm and dry.      Coloration: Skin is not jaundiced or pale.      Findings: No bruising or rash.   Neurological:      Mental Status: She is alert and oriented to person, place, and time.      Gait: Gait abnormal (mildly antalgic).   Psychiatric:         Mood and Affect: Affect normal. Mood is anxious and depressed.         Speech: Speech normal.         Behavior: Behavior normal. Behavior is  cooperative.         Thought Content: Thought content normal. Thought content is not paranoid or delusional. Thought content does not include suicidal ideation.         Cognition and Memory: Cognition normal.     Pt's previous physical exam reviewed and updated as indicated.        Assessment & Plan   Diagnoses and all orders for this visit:    1. Type 2 diabetes mellitus without complication, with long-term current use of insulin (HCC) (Primary)  -     Comprehensive Metabolic Panel  -     Hemoglobin A1c    2. Need for influenza vaccination  -     Fluzone High-Dose 65+yrs (0331-5544)    3. Thrombocytopenia (HCC)  -     CBC (No Diff)    4. Essential hypertension  -     CBC (No Diff)  -     Comprehensive Metabolic Panel    5. Renal insufficiency  -     Comprehensive Metabolic Panel    6. Nausea       Recheck A1c, adjust treatment for diabetes as indicated.  For now continue ACE inhibitor, statin, aspirin.    Recheck platelet count.    Hypertension controlled continue Norvasc, lisinopril, propranolol (also for anxiety).    Reassess renal function.    Chronic nausea vomiting, abdominal pain suspected to be multifactorial.  No new findings or history today.  Continue current management.  Follow with [facility] as scheduled.    Records requested from [facility].  Including recent CT scans.    Routine follow-up as scheduled.  Follow-up sooner as needed/instructed.  I will contact patient regarding test results and provide instructions regarding any necessary changes in plan of care.  Patient was encouraged to keep me informed of any acute changes, lack of improvement, or any new concerning symptoms.  Pt is aware of reasons to seek emergent care.  Patient voiced understanding of all instructions and denied further questions.    Please note that portions of this note may have been completed with a voice recognition program. Efforts were made to edit the dictations, but occasionally words are  mistranscribed.

## 2022-10-25 LAB
ALBUMIN SERPL-MCNC: 4.1 G/DL (ref 3.5–5.2)
ALBUMIN/GLOB SERPL: 1.5 G/DL
ALP SERPL-CCNC: 67 U/L (ref 39–117)
ALT SERPL-CCNC: 13 U/L (ref 1–33)
AST SERPL-CCNC: 17 U/L (ref 1–32)
BILIRUB SERPL-MCNC: 0.4 MG/DL (ref 0–1.2)
BUN SERPL-MCNC: 15 MG/DL (ref 8–23)
BUN/CREAT SERPL: 16.1 (ref 7–25)
CALCIUM SERPL-MCNC: 9.6 MG/DL (ref 8.6–10.5)
CHLORIDE SERPL-SCNC: 105 MMOL/L (ref 98–107)
CO2 SERPL-SCNC: 29.7 MMOL/L (ref 22–29)
CREAT SERPL-MCNC: 0.93 MG/DL (ref 0.57–1)
EGFRCR SERPLBLD CKD-EPI 2021: 63 ML/MIN/1.73
ERYTHROCYTE [DISTWIDTH] IN BLOOD BY AUTOMATED COUNT: 14.4 % (ref 12.3–15.4)
GLOBULIN SER CALC-MCNC: 2.7 GM/DL
GLUCOSE SERPL-MCNC: 127 MG/DL (ref 65–99)
HBA1C MFR BLD: 6.5 % (ref 4.8–5.6)
HCT VFR BLD AUTO: 39.6 % (ref 34–46.6)
HGB BLD-MCNC: 12.4 G/DL (ref 12–15.9)
MCH RBC QN AUTO: 29.7 PG (ref 26.6–33)
MCHC RBC AUTO-ENTMCNC: 31.3 G/DL (ref 31.5–35.7)
MCV RBC AUTO: 95 FL (ref 79–97)
PLATELET # BLD AUTO: 129 10*3/MM3 (ref 140–450)
POTASSIUM SERPL-SCNC: 4.5 MMOL/L (ref 3.5–5.2)
PROT SERPL-MCNC: 6.8 G/DL (ref 6–8.5)
RBC # BLD AUTO: 4.17 10*6/MM3 (ref 3.77–5.28)
SODIUM SERPL-SCNC: 143 MMOL/L (ref 136–145)
WBC # BLD AUTO: 4.08 10*3/MM3 (ref 3.4–10.8)

## 2022-11-02 LAB
BUN SERPL-MCNC: 14 MG/DL (ref 8–23)
BUN/CREAT SERPL: 15.6 (ref 7–25)
CALCIUM SERPL-MCNC: 8.9 MG/DL (ref 8.6–10.5)
CHLORIDE SERPL-SCNC: 107 MMOL/L (ref 98–107)
CO2 SERPL-SCNC: 26.5 MMOL/L (ref 22–29)
CREAT SERPL-MCNC: 0.9 MG/DL (ref 0.57–1)
EGFRCR SERPLBLD CKD-EPI 2021: 65.6 ML/MIN/1.73
GLUCOSE SERPL-MCNC: 183 MG/DL (ref 65–99)
POTASSIUM SERPL-SCNC: 4.8 MMOL/L (ref 3.5–5.2)
SODIUM SERPL-SCNC: 142 MMOL/L (ref 136–145)

## 2022-11-07 ENCOUNTER — HOSPITAL ENCOUNTER (OUTPATIENT)
Dept: MRI IMAGING | Facility: HOSPITAL | Age: 77
Discharge: HOME | End: 2022-11-07
Admitting: FAMILY MEDICINE

## 2022-11-07 DIAGNOSIS — G31.84 MILD COGNITIVE IMPAIRMENT: ICD-10-CM

## 2022-11-07 DIAGNOSIS — Z86.73 HISTORY OF STROKE: ICD-10-CM

## 2022-11-07 DIAGNOSIS — I73.9 PERIPHERAL VASCULAR DISEASE: ICD-10-CM

## 2022-11-07 DIAGNOSIS — R26.9 GAIT DISTURBANCE: ICD-10-CM

## 2022-11-07 DIAGNOSIS — R41.3 MEMORY CHANGE: ICD-10-CM

## 2022-11-07 PROCEDURE — 0 GADOBENATE DIMEGLUMINE 529 MG/ML SOLUTION: Performed by: FAMILY MEDICINE

## 2022-11-07 PROCEDURE — A9577 INJ MULTIHANCE: HCPCS | Performed by: FAMILY MEDICINE

## 2022-11-07 PROCEDURE — 70553 MRI BRAIN STEM W/O & W/DYE: CPT

## 2022-11-07 RX ADMIN — GADOBENATE DIMEGLUMINE 15 ML: 529 INJECTION, SOLUTION INTRAVENOUS at 09:14

## 2022-11-09 RX ORDER — PROPRANOLOL HYDROCHLORIDE 40 MG/1
TABLET ORAL
Qty: 180 TABLET | Refills: 3 | Status: SHIPPED | OUTPATIENT
Start: 2022-11-09

## 2022-11-09 RX ORDER — AMLODIPINE BESYLATE 5 MG/1
TABLET ORAL
Qty: 90 TABLET | Refills: 3 | Status: SHIPPED | OUTPATIENT
Start: 2022-11-09

## 2022-11-09 RX ORDER — LISINOPRIL 20 MG/1
TABLET ORAL
Qty: 90 TABLET | Refills: 3 | Status: SHIPPED | OUTPATIENT
Start: 2022-11-09

## 2022-11-15 DIAGNOSIS — F41.8 MIXED ANXIETY DEPRESSIVE DISORDER: ICD-10-CM

## 2022-11-15 RX ORDER — ALPRAZOLAM 0.25 MG/1
TABLET ORAL
Qty: 90 TABLET | Refills: 0 | Status: SHIPPED | OUTPATIENT
Start: 2022-11-15 | End: 2023-01-18

## 2022-11-15 NOTE — TELEPHONE ENCOUNTER
Rx Refill Note  Requested Prescriptions     Pending Prescriptions Disp Refills   • ALPRAZolam (XANAX) 0.25 MG tablet [Pharmacy Med Name: ALPRAZolam 0.25 MG Oral Tablet] 90 tablet 0     Sig: TAKE 1 TO 2 TABLETS BY MOUTH TWICE DAILY AS NEEDED FOR  PANIC      Last office visit with prescribing clinician: 10/24/2022      Next office visit with prescribing clinician: 12/14/2022            Silvana Tay MA  11/15/22, 10:08 EST

## 2022-11-28 ENCOUNTER — OFFICE VISIT (OUTPATIENT)
Dept: FAMILY MEDICINE CLINIC | Facility: CLINIC | Age: 77
End: 2022-11-28

## 2022-11-28 VITALS
HEART RATE: 66 BPM | DIASTOLIC BLOOD PRESSURE: 70 MMHG | BODY MASS INDEX: 28.88 KG/M2 | HEIGHT: 67 IN | OXYGEN SATURATION: 94 % | WEIGHT: 184 LBS | TEMPERATURE: 98 F | SYSTOLIC BLOOD PRESSURE: 130 MMHG

## 2022-11-28 DIAGNOSIS — U07.1 COVID-19 VIRUS INFECTION: Primary | ICD-10-CM

## 2022-11-28 DIAGNOSIS — J02.9 SORE THROAT: ICD-10-CM

## 2022-11-28 LAB
EXPIRATION DATE: ABNORMAL
EXPIRATION DATE: NORMAL
FLUAV AG UPPER RESP QL IA.RAPID: NOT DETECTED
FLUBV AG UPPER RESP QL IA.RAPID: NOT DETECTED
INTERNAL CONTROL: ABNORMAL
INTERNAL CONTROL: NORMAL
Lab: ABNORMAL
Lab: NORMAL
S PYO AG THROAT QL: NEGATIVE
SARS-COV-2 AG UPPER RESP QL IA.RAPID: DETECTED

## 2022-11-28 PROCEDURE — 99213 OFFICE O/P EST LOW 20 MIN: CPT | Performed by: FAMILY MEDICINE

## 2022-11-28 PROCEDURE — 87880 STREP A ASSAY W/OPTIC: CPT | Performed by: FAMILY MEDICINE

## 2022-11-28 PROCEDURE — 87428 SARSCOV & INF VIR A&B AG IA: CPT | Performed by: FAMILY MEDICINE

## 2022-11-28 RX ORDER — DEXAMETHASONE 4 MG/1
4 TABLET ORAL
Qty: 5 TABLET | Refills: 0 | Status: SHIPPED | OUTPATIENT
Start: 2022-11-28 | End: 2022-12-03

## 2022-11-28 NOTE — PROGRESS NOTES
Subjective   Annie Mejia is a 77 y.o. female.     History of Present Illness  She c/o sore throat, congestion  URI   This is a new problem. The current episode started in the past 7 days. The problem has been gradually worsening. There has been no fever. Associated symptoms include abdominal pain (chronic, intermittent), chest pain (with anxiety), congestion, coughing (dry, intermittent), diarrhea (chronic, stable), nausea (chronic), rhinorrhea, a sore throat and vomiting (intermittent). Pertinent negatives include no dysuria, ear pain, headaches, rash, sinus pain or wheezing. Treatments tried: OTC cough/cold meds. The treatment provided mild relief.       The following portions of the patient's history were reviewed and updated as appropriate: allergies, current medications, past family history, past medical history, past social history, past surgical history and problem list.    Review of Systems   Constitutional: Positive for fatigue. Negative for chills, fever and unexpected weight change.   HENT: Positive for congestion, postnasal drip, rhinorrhea and sore throat. Negative for ear pain, mouth sores, nosebleeds, sinus pain and trouble swallowing.    Eyes: Positive for visual disturbance (chronic). Negative for pain, discharge and redness.   Respiratory: Positive for cough (dry, intermittent) and shortness of breath (with anxiety). Negative for wheezing.    Cardiovascular: Positive for chest pain (with anxiety) and palpitations (with anxiety). Negative for leg swelling.   Gastrointestinal: Positive for abdominal pain (chronic, intermittent), diarrhea (chronic, stable), nausea (chronic) and vomiting (intermittent). Negative for blood in stool.        Heartburn   Genitourinary: Negative for dysuria, hematuria, urgency and vaginal bleeding.   Musculoskeletal: Positive for arthralgias, back pain, gait problem and myalgias.   Skin: Negative for rash and wound.   Neurological: Positive for dizziness (intermittent)  and weakness. Negative for syncope, facial asymmetry, speech difficulty and headaches.   Hematological: Negative for adenopathy. Bruises/bleeds easily.   Psychiatric/Behavioral: Positive for agitation, dysphoric mood and sleep disturbance. Negative for confusion and suicidal ideas. The patient is nervous/anxious.    Pt's previous ROS reviewed and updated as indicated.       Objective    Vitals:    11/28/22 0945   BP: 130/70   Pulse: 66   Temp: 98 °F (36.7 °C)   SpO2: 94%     Body mass index is 28.82 kg/m².      11/28/22  0945   Weight: 83.5 kg (184 lb)       Physical Exam  Vitals and nursing note reviewed.   Constitutional:       General: She is not in acute distress.     Appearance: She is well-developed, well-groomed and overweight. She is ill-appearing.   HENT:      Head: Atraumatic.      Right Ear: Tympanic membrane, ear canal and external ear normal.      Left Ear: Tympanic membrane, ear canal and external ear normal.      Nose: Mucosal edema, congestion and rhinorrhea present. Rhinorrhea is clear.      Mouth/Throat:      Mouth: Mucous membranes are moist. No oral lesions.      Pharynx: Posterior oropharyngeal erythema (mild) present. No oropharyngeal exudate.   Eyes:      Conjunctiva/sclera: Conjunctivae normal.   Cardiovascular:      Rate and Rhythm: Normal rate and regular rhythm.      Pulses: Normal pulses.   Pulmonary:      Effort: Pulmonary effort is normal.      Breath sounds: Decreased breath sounds (mildly) present. No wheezing, rhonchi or rales.   Lymphadenopathy:      Cervical: No cervical adenopathy.   Skin:     General: Skin is warm and dry.      Coloration: Skin is not cyanotic, jaundiced or pale.      Findings: No rash.   Neurological:      Mental Status: She is alert and oriented to person, place, and time.   Psychiatric:         Behavior: Behavior normal. Behavior is cooperative.         Cognition and Memory: Cognition normal.         Recent Results (from the past 24 hour(s))   POCT rapid strep  A    Collection Time: 11/28/22 10:03 AM    Specimen: Swab   Result Value Ref Range    Rapid Strep A Screen Negative Negative, VALID, INVALID, Not Performed    Internal Control Passed Passed    Lot Number [id number]     Expiration Date 02/29/2024    POCT SARS-CoV-2 Antigen AALIYAH + Flu    Collection Time: 11/28/22 10:04 AM    Specimen: Swab   Result Value Ref Range    SARS Antigen Detected (A) Not Detected, Presumptive Negative    Influenza A Antigen AALIYAH Not Detected Not Detected    Influenza B Antigen AALIYAH Not Detected Not Detected    Internal Control Passed Passed    Lot Number [id number]     Expiration Date 03/09/2023          Assessment & Plan   Diagnoses and all orders for this visit:    1. COVID-19 virus infection (Primary)    2. Sore throat  -     POCT SARS-CoV-2 Antigen AALIYAH + Flu  -     POCT rapid strep A    Other orders  -     dexamethasone (DECADRON) 4 MG tablet; Take 1 tablet by mouth Daily With Breakfast for 5 days.  Dispense: 5 tablet; Refill: 0       Vitals stable. No resp distress. Slightly improved course today. I have reviewed risks/benefits and potential side effects of various treatment options. Pt voices understanding and wishes to proceed with symptom mgnt and Dex as above at this time. No signs of secondary bacterial infection at this time. Has good family support and monitoring. Has O2 sat on hand at home and is familiar with use. Encouraged good hydration, rest,     Routine f/u as scheduled, f/u sooner as needed.  Patient was encouraged to keep me informed of any acute changes, lack of improvement, or any new concerning symptoms.  Pt is aware of reasons to seek emergent care.  Patient voiced understanding of all instructions and denied further questions.    Please note that portions of this note may have been completed with a voice recognition program.

## 2022-11-29 ENCOUNTER — TELEPHONE (OUTPATIENT)
Dept: FAMILY MEDICINE CLINIC | Facility: CLINIC | Age: 77
End: 2022-11-29

## 2022-11-29 NOTE — TELEPHONE ENCOUNTER
Patient was seen by Dr. Recinos at [facility] at [facility].  would like patient to be prescribed Lexapro 10 mg by Dr. Pimentel

## 2022-11-30 NOTE — TELEPHONE ENCOUNTER
Please contact [facility] center to confirm this information as this is not the usual protocol.

## 2022-12-07 RX ORDER — ESCITALOPRAM OXALATE 10 MG/1
TABLET ORAL
Qty: 30 TABLET | Refills: 2 | Status: SHIPPED | OUTPATIENT
Start: 2022-12-07 | End: 2022-12-28

## 2022-12-14 ENCOUNTER — OFFICE VISIT (OUTPATIENT)
Dept: FAMILY MEDICINE CLINIC | Facility: CLINIC | Age: 77
End: 2022-12-14

## 2022-12-14 VITALS
TEMPERATURE: 97.9 F | OXYGEN SATURATION: 97 % | WEIGHT: 184 LBS | DIASTOLIC BLOOD PRESSURE: 80 MMHG | BODY MASS INDEX: 28.88 KG/M2 | HEART RATE: 77 BPM | SYSTOLIC BLOOD PRESSURE: 130 MMHG | HEIGHT: 67 IN

## 2022-12-14 DIAGNOSIS — J18.9 COMMUNITY ACQUIRED PNEUMONIA, UNSPECIFIED LATERALITY: Primary | ICD-10-CM

## 2022-12-14 DIAGNOSIS — F41.8 MIXED ANXIETY DEPRESSIVE DISORDER: ICD-10-CM

## 2022-12-14 DIAGNOSIS — Z79.4 TYPE 2 DIABETES MELLITUS WITHOUT COMPLICATION, WITH LONG-TERM CURRENT USE OF INSULIN: ICD-10-CM

## 2022-12-14 DIAGNOSIS — E11.9 TYPE 2 DIABETES MELLITUS WITHOUT COMPLICATION, WITH LONG-TERM CURRENT USE OF INSULIN: ICD-10-CM

## 2022-12-14 PROCEDURE — 99214 OFFICE O/P EST MOD 30 MIN: CPT | Performed by: FAMILY MEDICINE

## 2022-12-14 PROCEDURE — 96372 THER/PROPH/DIAG INJ SC/IM: CPT | Performed by: FAMILY MEDICINE

## 2022-12-14 RX ORDER — DEXAMETHASONE SODIUM PHOSPHATE 4 MG/ML
4 INJECTION, SOLUTION INTRA-ARTICULAR; INTRALESIONAL; INTRAMUSCULAR; INTRAVENOUS; SOFT TISSUE ONCE
Status: COMPLETED | OUTPATIENT
Start: 2022-12-14 | End: 2022-12-14

## 2022-12-14 RX ORDER — CEFTRIAXONE 1 G/1
1 INJECTION, POWDER, FOR SOLUTION INTRAMUSCULAR; INTRAVENOUS ONCE
Status: COMPLETED | OUTPATIENT
Start: 2022-12-14 | End: 2022-12-14

## 2022-12-14 RX ORDER — LEVOFLOXACIN 500 MG/1
500 TABLET, FILM COATED ORAL DAILY
Qty: 7 TABLET | Refills: 0 | Status: SHIPPED | OUTPATIENT
Start: 2022-12-14 | End: 2022-12-21

## 2022-12-14 RX ADMIN — CEFTRIAXONE 1 G: 1 INJECTION, POWDER, FOR SOLUTION INTRAMUSCULAR; INTRAVENOUS at 14:00

## 2022-12-14 RX ADMIN — DEXAMETHASONE SODIUM PHOSPHATE 4 MG: 4 INJECTION, SOLUTION INTRA-ARTICULAR; INTRALESIONAL; INTRAMUSCULAR; INTRAVENOUS; SOFT TISSUE at 14:01

## 2022-12-14 NOTE — PROGRESS NOTES
"Subjective   Annie Mejia is a 77 y.o. female.     History of Present Illness   Mrs. Mejia presents today with complaint of persistent cough.  Diagnosed 2 weeks ago with COVID.  States she is not feeling much improved.  Continues to have cough, chest tightness, chest congestion.  No hemoptysis.  No fever.     Since last visit had evaluation by [facility] in [place] for chronic anxiety.  Prescribed Lexapro.  Has not yet tried it as she felt it was for \"depression and bipolar disorder\".  She continues to have significant anxiety.    Has type 2 diabetes most recently treated mainly with insulin.  Has recently held insulin dosing due to low blood sugars.  Fasting blood sugars generally under 150.  She has not been checking postprandial numbers.    The following portions of the patient's history were reviewed and updated as appropriate: allergies, current medications, past family history, past medical history, past social history, past surgical history and problem list.    Review of Systems   Constitutional: Positive for fatigue. Negative for chills, fever and unexpected weight change.   HENT: Positive for congestion, postnasal drip, rhinorrhea and sore throat. Negative for ear pain, mouth sores, nosebleeds, sinus pain and trouble swallowing.    Eyes: Positive for visual disturbance (chronic). Negative for pain, discharge and redness.   Respiratory: Positive for cough and shortness of breath. Negative for wheezing.    Cardiovascular: Positive for chest pain (with anxiety) and palpitations (with anxiety). Negative for leg swelling.   Gastrointestinal: Positive for abdominal pain (chronic, intermittent), diarrhea (chronic, stable), nausea (chronic) and vomiting (intermittent). Negative for blood in stool.        Heartburn   Genitourinary: Negative for dysuria, hematuria, urgency and vaginal bleeding.   Musculoskeletal: Positive for arthralgias, back pain, gait problem and myalgias.   Skin: Negative for rash " and wound.   Neurological: Positive for dizziness (intermittent) and weakness. Negative for syncope, facial asymmetry, speech difficulty and headaches.   Hematological: Negative for adenopathy. Bruises/bleeds easily.   Psychiatric/Behavioral: Positive for agitation, dysphoric mood and sleep disturbance. Negative for confusion and suicidal ideas. The patient is nervous/anxious.    Pt's previous ROS reviewed and updated as indicated.       Objective    Vitals:    12/14/22 1314   BP: 130/80   Pulse: 77   Temp: 97.9 °F (36.6 °C)   SpO2: 97%     Body mass index is 28.82 kg/m².      12/14/22  1314   Weight: 83.5 kg (184 lb)       Physical Exam  Vitals and nursing note reviewed.   Constitutional:       General: She is not in acute distress.     Appearance: She is well-developed, well-groomed and overweight. She is ill-appearing.   HENT:      Head: Atraumatic.      Mouth/Throat:      Mouth: Mucous membranes are moist.   Eyes:      General: No scleral icterus.     Conjunctiva/sclera: Conjunctivae normal.   Neck:      Thyroid: No thyroid mass.   Cardiovascular:      Rate and Rhythm: Normal rate and regular rhythm.      Pulses: Normal pulses.      Heart sounds: Normal heart sounds.   Pulmonary:      Effort: Pulmonary effort is normal. No respiratory distress.      Breath sounds: Decreased breath sounds (mildly) and wheezing (coarse end expiratory) present. No rhonchi or rales.   Musculoskeletal:      Right lower leg: No edema.      Left lower leg: No edema.   Lymphadenopathy:      Cervical: No cervical adenopathy.      Upper Body:      Right upper body: No supraclavicular adenopathy.      Left upper body: No supraclavicular adenopathy.   Skin:     General: Skin is warm and dry.      Coloration: Skin is not cyanotic, jaundiced or pale.      Findings: No rash.   Neurological:      Mental Status: She is alert and oriented to person, place, and time.      Gait: Gait is intact.   Psychiatric:         Mood and Affect: Mood is  anxious.         Behavior: Behavior normal. Behavior is cooperative.         Cognition and Memory: Cognition normal.     Pt's previous physical exam reviewed and updated as indicated.    Lab Results   Component Value Date    WBC 4.08 10/24/2022    HGB 12.4 10/24/2022    HCT 39.6 10/24/2022    MCV 95.0 10/24/2022     (L) 10/24/2022       Lab Results   Component Value Date    GLUCOSE 183 (H) 11/01/2022    BUN 14 11/01/2022    CREATININE 0.90 11/01/2022    EGFRIFNONA 47 (L) 10/21/2021    EGFRIFAFRI 57 (L) 10/21/2021    BCR 15.6 11/01/2022    K 4.8 11/01/2022    CO2 26.5 11/01/2022    CALCIUM 8.9 11/01/2022    PROTENTOTREF 6.8 10/24/2022    ALBUMIN 4.10 10/24/2022    LABIL2 1.5 10/24/2022    AST 17 10/24/2022    ALT 13 10/24/2022       Lab Results   Component Value Date    CHOL 151 01/18/2021    CHLPL 119 07/13/2022    TRIG 89 07/13/2022    HDL 38 (L) 07/13/2022    LDL 64 07/13/2022       Lab Results   Component Value Date    HGBA1C 6.50 (H) 10/24/2022       Lab Results   Component Value Date    TSH 1.900 10/21/2021           Assessment & Plan   Diagnoses and all orders for this visit:    1. Community acquired pneumonia, unspecified laterality (Primary)  -     cefTRIAXone (ROCEPHIN) injection 1 g  -     dexamethasone (DECADRON) injection 4 mg    2. Mixed anxiety depressive disorder    3. Type 2 diabetes mellitus without complication, with long-term current use of insulin (HCC)    Other orders  -     levoFLOXacin (Levaquin) 500 MG tablet; Take 1 tablet by mouth Daily for 7 days.  Dispense: 7 tablet; Refill: 0       I suspect she has developed secondary immune acquired bacterial pneumonia during her COVID course.  Continue butyryl as needed, plan of care meds as above including ceftriaxone, dexamethasone.  Levaquin x1 week.  If minimal improvement consider chest x-ray or CT.    Poorly controlled anxiety with associated depression, she is once again encouraged to try the Lexapro as previously discussed.  Encouraged  to take one half dose initially to assess for tolerability.  She denies suicidal ideation.    Type 2 insulin-dependent diabetes controlled.  Encouraged to check postprandial numbers.    Follow-up in 1 month, sooner as needed.  Patient was encouraged to keep me informed of any acute changes, lack of improvement, or any new concerning symptoms.  Pt is aware of reasons to seek emergent care.  Patient voiced understanding of all instructions and denied further questions.    Please note that portions of this note may have been completed with a voice recognition program. Efforts were made to edit the dictations, but occasionally words are mistranscribed.            Note Disclaimer: At [facility], we believe that sharing information builds trust and better relationships. You are receiving this note because you are receiving care at [facility] or recently visited. It is possible you will see health information before a provider has talked with you about it. This kind of information can be easy to misunderstand. To help you fully understand what it means for your health, we urge you to discuss this note with your provider.

## 2022-12-28 ENCOUNTER — OFFICE VISIT (OUTPATIENT)
Dept: FAMILY MEDICINE CLINIC | Facility: CLINIC | Age: 77
End: 2022-12-28

## 2022-12-28 VITALS
SYSTOLIC BLOOD PRESSURE: 128 MMHG | BODY MASS INDEX: 29.51 KG/M2 | TEMPERATURE: 97.8 F | DIASTOLIC BLOOD PRESSURE: 70 MMHG | OXYGEN SATURATION: 94 % | WEIGHT: 188 LBS | HEART RATE: 96 BPM | HEIGHT: 67 IN

## 2022-12-28 DIAGNOSIS — R30.0 DYSURIA: Primary | ICD-10-CM

## 2022-12-28 DIAGNOSIS — R82.90 ABNORMAL URINALYSIS: ICD-10-CM

## 2022-12-28 LAB
BILIRUB BLD-MCNC: NEGATIVE MG/DL
CLARITY, POC: CLEAR
COLOR UR: YELLOW
GLUCOSE UR STRIP-MCNC: NEGATIVE MG/DL
KETONES UR QL: NEGATIVE
LEUKOCYTE EST, POC: ABNORMAL
NITRITE UR-MCNC: NEGATIVE MG/ML
PH UR: 5.5 [PH] (ref 5–8)
PROT UR STRIP-MCNC: ABNORMAL MG/DL
RBC # UR STRIP: NEGATIVE /UL
SP GR UR: 1.03 (ref 1–1.03)
UROBILINOGEN UR QL: NORMAL

## 2022-12-28 PROCEDURE — 99213 OFFICE O/P EST LOW 20 MIN: CPT | Performed by: FAMILY MEDICINE

## 2022-12-28 PROCEDURE — 81002 URINALYSIS NONAUTO W/O SCOPE: CPT | Performed by: FAMILY MEDICINE

## 2022-12-28 RX ORDER — PHENAZOPYRIDINE HYDROCHLORIDE 200 MG/1
200 TABLET, FILM COATED ORAL 3 TIMES DAILY PRN
Qty: 6 TABLET | Refills: 0 | Status: SHIPPED | OUTPATIENT
Start: 2022-12-28 | End: 2022-12-30

## 2022-12-28 RX ORDER — CEFDINIR 300 MG/1
300 CAPSULE ORAL 2 TIMES DAILY
Qty: 10 CAPSULE | Refills: 0 | Status: SHIPPED | OUTPATIENT
Start: 2022-12-28 | End: 2023-01-02

## 2022-12-28 NOTE — PROGRESS NOTES
Subjective   Annie Mejia is a 77 y.o. female.     History of Present Illness  She c/o dysuria  Difficulty Urinating  This is a new problem. The current episode started in the past 7 days. The problem occurs intermittently. The problem has been gradually worsening. Associated symptoms include nausea and urinary symptoms. Pertinent negatives include no abdominal pain, change in bowel habit, chest pain, chills, coughing, diaphoresis, fever, headaches, sore throat, vomiting or weakness. Nothing aggravates the symptoms. She has tried nothing for the symptoms.       The following portions of the patient's history were reviewed and updated as appropriate: allergies, current medications, past family history, past medical history, past social history, past surgical history and problem list.    Review of Systems   Constitutional: Negative for chills, diaphoresis and fever.   HENT: Negative for sore throat.    Eyes: Negative for pain, discharge and redness.   Respiratory: Negative for cough and shortness of breath.    Cardiovascular: Negative for chest pain.   Gastrointestinal: Positive for nausea. Negative for abdominal pain, change in bowel habit and vomiting.   Genitourinary: Positive for difficulty urinating, dysuria, frequency and urgency. Negative for hematuria.   Neurological: Negative for weakness and headaches.   Psychiatric/Behavioral: Negative for confusion.       Objective    Vitals:    12/28/22 1535   BP: 128/70   Pulse: 96   Temp: 97.8 °F (36.6 °C)   SpO2: 94%     Body mass index is 29.44 kg/m².      12/28/22  1535   Weight: 85.3 kg (188 lb)       Physical Exam  Vitals and nursing note reviewed.   Constitutional:       General: She is not in acute distress.     Appearance: She is well-developed, well-groomed and overweight. She is not ill-appearing.   HENT:      Head: Atraumatic.   Eyes:      Conjunctiva/sclera: Conjunctivae normal.   Cardiovascular:      Rate and Rhythm: Normal rate and regular rhythm.       Pulses: Normal pulses.      Heart sounds: Normal heart sounds.   Pulmonary:      Effort: Pulmonary effort is normal.      Breath sounds: Normal breath sounds.   Abdominal:      General: There is no distension.      Palpations: Abdomen is soft.      Tenderness: There is no abdominal tenderness. There is no right CVA tenderness or left CVA tenderness.   Musculoskeletal:      Right lower leg: No edema.      Left lower leg: No edema.   Skin:     General: Skin is warm and dry.   Neurological:      Mental Status: She is alert and oriented to person, place, and time. Mental status is at baseline.   Psychiatric:         Behavior: Behavior normal. Behavior is cooperative.         Cognition and Memory: Cognition normal.       Brief Urine Lab Results  (Last result in the past 365 days)      Color   Clarity   Blood   Leuk Est   Nitrite   Protein   CREAT   Urine HCG        12/28/22 1552 Yellow   Clear   Negative   Trace   Negative   1+                   Assessment & Plan   Diagnoses and all orders for this visit:    1. Dysuria (Primary)  -     POC Urinalysis Dipstick  -     Urine Culture - Urine, Urine, Clean Catch    2. Abnormal urinalysis  -     Urine Culture - Urine, Urine, Clean Catch    Other orders  -     cefdinir (OMNICEF) 300 MG capsule; Take 1 capsule by mouth 2 (Two) Times a Day for 5 days.  Dispense: 10 capsule; Refill: 0  -     phenazopyridine (Pyridium) 200 MG tablet; Take 1 tablet by mouth 3 (Three) Times a Day As Needed for Bladder Spasms for up to 2 days.  Dispense: 6 tablet; Refill: 0       Empiric mgnt of suspected UTI while awaiting urine culture.  Routine f/u as scheduled, f/u sooner as needed/instructed.  I will contact patient regarding test results and provide instructions regarding any necessary changes in plan of care.  Patient was encouraged to keep me informed of any acute changes, lack of improvement, or any new concerning symptoms.  Pt is aware of reasons to seek emergent care.  Patient voiced  understanding of all instructions and denied further questions.    Please note that portions of this note may have been completed with a voice recognition program. Efforts were made to edit the dictations, but occasionally words are mistranscribed.

## 2022-12-30 LAB
BACTERIA UR CULT: NORMAL
BACTERIA UR CULT: NORMAL

## 2023-01-11 ENCOUNTER — OFFICE VISIT (OUTPATIENT)
Dept: NEUROLOGY | Facility: CLINIC | Age: 78
End: 2023-01-11
Payer: MEDICARE

## 2023-01-11 VITALS
HEIGHT: 67 IN | DIASTOLIC BLOOD PRESSURE: 76 MMHG | OXYGEN SATURATION: 98 % | SYSTOLIC BLOOD PRESSURE: 142 MMHG | WEIGHT: 187.2 LBS | BODY MASS INDEX: 29.38 KG/M2 | HEART RATE: 86 BPM

## 2023-01-11 DIAGNOSIS — I69.30 SEQUELAE, POST-STROKE: ICD-10-CM

## 2023-01-11 DIAGNOSIS — G31.84 MILD COGNITIVE IMPAIRMENT: Primary | ICD-10-CM

## 2023-01-11 PROCEDURE — 99214 OFFICE O/P EST MOD 30 MIN: CPT | Performed by: PSYCHIATRY & NEUROLOGY

## 2023-01-11 RX ORDER — DONEPEZIL HYDROCHLORIDE 5 MG/1
5 TABLET, FILM COATED ORAL NIGHTLY
Qty: 30 TABLET | Refills: 5 | Status: SHIPPED | OUTPATIENT
Start: 2023-01-11 | End: 2024-01-11

## 2023-01-11 RX ORDER — ESCITALOPRAM OXALATE 10 MG/1
TABLET ORAL
COMMUNITY
Start: 2023-01-05 | End: 2023-01-16
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
## 2023-01-16 ENCOUNTER — OFFICE VISIT (OUTPATIENT)
Dept: FAMILY MEDICINE CLINIC | Facility: CLINIC | Age: 78
End: 2023-01-16
Payer: MEDICARE

## 2023-01-16 VITALS
DIASTOLIC BLOOD PRESSURE: 80 MMHG | TEMPERATURE: 97 F | WEIGHT: 187 LBS | BODY MASS INDEX: 29.35 KG/M2 | SYSTOLIC BLOOD PRESSURE: 118 MMHG | HEART RATE: 73 BPM | HEIGHT: 67 IN | OXYGEN SATURATION: 98 %

## 2023-01-16 DIAGNOSIS — K21.00 GASTROESOPHAGEAL REFLUX DISEASE WITH ESOPHAGITIS WITHOUT HEMORRHAGE: ICD-10-CM

## 2023-01-16 DIAGNOSIS — I10 ESSENTIAL HYPERTENSION: ICD-10-CM

## 2023-01-16 DIAGNOSIS — Z79.899 CHRONIC PRESCRIPTION BENZODIAZEPINE USE: ICD-10-CM

## 2023-01-16 DIAGNOSIS — N18.31 STAGE 3A CHRONIC KIDNEY DISEASE: ICD-10-CM

## 2023-01-16 DIAGNOSIS — E78.2 MIXED HYPERLIPIDEMIA: ICD-10-CM

## 2023-01-16 DIAGNOSIS — N28.9 RENAL INSUFFICIENCY: ICD-10-CM

## 2023-01-16 DIAGNOSIS — Z86.73 HISTORY OF STROKE: ICD-10-CM

## 2023-01-16 DIAGNOSIS — Z79.4 TYPE 2 DIABETES MELLITUS WITHOUT COMPLICATION, WITH LONG-TERM CURRENT USE OF INSULIN: ICD-10-CM

## 2023-01-16 DIAGNOSIS — I73.9 PERIPHERAL VASCULAR DISEASE: ICD-10-CM

## 2023-01-16 DIAGNOSIS — K14.0 GLOSSITIS: ICD-10-CM

## 2023-01-16 DIAGNOSIS — E11.9 TYPE 2 DIABETES MELLITUS WITHOUT COMPLICATION, WITH LONG-TERM CURRENT USE OF INSULIN: ICD-10-CM

## 2023-01-16 DIAGNOSIS — D69.6 THROMBOCYTOPENIA: ICD-10-CM

## 2023-01-16 DIAGNOSIS — M81.0 AGE-RELATED OSTEOPOROSIS WITHOUT CURRENT PATHOLOGICAL FRACTURE: ICD-10-CM

## 2023-01-16 DIAGNOSIS — F41.8 MIXED ANXIETY DEPRESSIVE DISORDER: ICD-10-CM

## 2023-01-16 DIAGNOSIS — Z00.00 MEDICARE ANNUAL WELLNESS VISIT, SUBSEQUENT: Primary | ICD-10-CM

## 2023-01-16 PROCEDURE — 1170F FXNL STATUS ASSESSED: CPT | Performed by: FAMILY MEDICINE

## 2023-01-16 PROCEDURE — 99213 OFFICE O/P EST LOW 20 MIN: CPT | Performed by: FAMILY MEDICINE

## 2023-01-16 PROCEDURE — G0439 PPPS, SUBSEQ VISIT: HCPCS | Performed by: FAMILY MEDICINE

## 2023-01-16 PROCEDURE — 1160F RVW MEDS BY RX/DR IN RCRD: CPT | Performed by: FAMILY MEDICINE

## 2023-01-16 PROCEDURE — 1159F MED LIST DOCD IN RCRD: CPT | Performed by: FAMILY MEDICINE

## 2023-01-16 PROCEDURE — 1126F AMNT PAIN NOTED NONE PRSNT: CPT | Performed by: FAMILY MEDICINE

## 2023-01-16 PROCEDURE — 1125F AMNT PAIN NOTED PAIN PRSNT: CPT | Performed by: FAMILY MEDICINE

## 2023-01-16 NOTE — PROGRESS NOTES
The ABCs of the Annual Wellness Visit  Subsequent Medicare Wellness Visit    Subjective    Annie Mejia is a 77 y.o. female who presents for a Subsequent Medicare Wellness Visit.    The following portions of the patient's history were reviewed and   updated as appropriate: allergies, current medications, past family history, past medical history, past social history, past surgical history and problem list.    Compared to one year ago, the patient feels her physical   health is worse.    Compared to one year ago, the patient feels her mental   health is the same.    Recent Hospitalizations:  She was not admitted to the hospital during the last year.       Current Medical Providers:  Patient Care Team:  Olga Pimentel MD as PCP - General (Family Medicine)  Tj Rowe MD as Consulting Physician (General Surgery)  Derrick Martínez MD as Consulting Physician (Gastroenterology)  Maxx Dior MD (Inactive) as Consulting Physician (Orthopedic Surgery)  Lavelle Méndez OD (Optometry)  Shaheen Ibrahim MD as Surgeon (Orthopedic Surgery)  Glendy Hubbard MD as Consulting Physician (Obstetrics and Gynecology)  Shaheen Ibrahim MD as Surgeon (Orthopedic Surgery)    Outpatient Medications Prior to Visit   Medication Sig Dispense Refill   • albuterol sulfate  (90 Base) MCG/ACT inhaler Inhale 2 puffs Every 4 (Four) Hours As Needed for Wheezing or Shortness of Air. 18 g 1   • ALPRAZolam (XANAX) 0.25 MG tablet TAKE 1 TO 2 TABLETS BY MOUTH TWICE DAILY AS NEEDED FOR  PANIC 90 tablet 0   • amLODIPine (NORVASC) 5 MG tablet TAKE 1 TABLET EVERY DAY 90 tablet 3   • ascorbic acid (VITAMIN C) 500 MG tablet Take 1 tablet by mouth Daily.     • aspirin EC 81 MG EC tablet Take 1 tablet by mouth Daily. 100 tablet 11   • atorvastatin (LIPITOR) 40 MG tablet TAKE 1 TABLET BY MOUTH EVERY NIGHT. 90 tablet 3   • donepezil (Aricept) 5 MG tablet Take 1 tablet by mouth Every Night. 30 tablet 5   • EPINEPHrine  (EPIPEN) 0.3 MG/0.3ML solution auto-injector injection EpiPen 2-Lev 0.3 MG/0.3ML Injection Solution Auto-injector; Patient Sig: EpiPen 2-Lev 0.3 MG/0.3ML Injection Solution Auto-injector INJECT 0.3ML INTRAMUSCULARLY AS DIRECTED.; 1; 2; 06-May-2015; Active     • gabapentin (NEURONTIN) 300 MG capsule Take 1 capsule by mouth 3 (Three) Times a Day. 90 capsule 2   • glucose blood (FREESTYLE LITE) test strip USE ONE STRIP TO CHECK GLUCOSE FASTING IN THE MORNING AND IN THE EVENING 120 each 12   • HYDROcodone-acetaminophen (Norco) 7.5-325 MG per tablet Take 1 tablet by mouth Every 8 (Eight) Hours As Needed for Severe Pain . 30 tablet 0   • Lancets (freestyle) lancets CHECK GLUCOSE FASTING IN THE MORNING AND IN THE EVENING, 100 each 5   • lisinopril (PRINIVIL,ZESTRIL) 20 MG tablet TAKE 1 TABLET EVERY DAY 90 tablet 3   • magnesium oxide (MAG-OX) 400 MG tablet Take 400 mg by mouth Daily.     • meclizine (ANTIVERT) 25 MG tablet Take 1-2 tablets by mouth every 6 (six) to 8 (eight) hours as needed for dizziness. 30 tablet 0   • Melatonin 10 MG tablet Take  by mouth.     • metoclopramide (REGLAN) 5 MG tablet TAKE 1 TABLET BY MOUTH THREE TIMES DAILY 30 tablet 0   • Omega-3 1000 MG capsule Take 1,000 mg by mouth Daily.     • omeprazole (priLOSEC) 20 MG capsule Take 1 capsule by mouth Daily. 90 capsule 3   • promethazine (PHENERGAN) 25 MG suppository 1/2 to 1 per rectum q 8 hours as needed for severe nausea/vomiting 12 suppository 1   • propranolol (INDERAL) 40 MG tablet TAKE 1 TABLET TWICE DAILY 180 tablet 3   • escitalopram (LEXAPRO) 10 MG tablet TAKE 1/2 (ONE-HALF) TABLET BY MOUTH ONCE DAILY FOR 14 DAYS THEN 1 ONCE DAILY       No facility-administered medications prior to visit.       Opioid medication/s are on active medication list.  and I have evaluated her active treatment plan and pain score trends (see table).  Vitals:    01/16/23 0953   PainSc: 0-No pain     I have reviewed the chart for potential of high risk medication  "and harmful drug interactions in the elderly.            Aspirin is on active medication list. Aspirin use is indicated based on review of current medical condition/s. Pros and cons of this therapy have been discussed today. Benefits of this medication outweigh potential harm.  Patient has been encouraged to continue taking this medication.  .      Patient Active Problem List   Diagnosis   • Abnormal liver enzymes   • Arthritis   • Nausea   • Type 2 diabetes mellitus without complication, with long-term current use of insulin (HCC)   • Mixed anxiety depressive disorder   • Gastroesophageal reflux disease with esophagitis   • First degree atrioventricular block   • Mixed hyperlipidemia   • Essential hypertension   • Insomnia   • Cyst of ovary   • Adiposity   • Age-related osteoporosis without current pathological fracture   • Peripheral vascular disease (HCC)   • Renal insufficiency   • Hiatal hernia   • Skin tags, multiple acquired   • Primary localized osteoarthrosis of left lower leg   • Chronic pain syndrome   • Intervertebral disc disorder   • Lumbosacral spondylosis without myelopathy   • History of 2019 novel coronavirus disease (COVID-19)   • Ischemic cerebrovascular accident (CVA) (HCC)   • History of stroke   • 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  ACP discussion was held with the patient during this visit. Patient has an advance directive in EMR which is still valid.      Objective    Vitals:    01/16/23 0953   BP: 118/80   Pulse: 73   Temp: 97 °F (36.1 °C)   SpO2: 98%   Weight: 84.8 kg (187 lb)   Height: 170.2 cm (67\")   PainSc: 0-No pain     Estimated body mass index is 29.29 kg/m² as calculated from the following:    Height as of this encounter: 170.2 cm (67\").    Weight as of this " encounter: 84.8 kg (187 lb).    BMI is >= 25 and <30. (Overweight) The following options were offered after discussion;: weight loss educational material (shared in after visit summary), exercise counseling/recommendations and nutrition counseling/recommendations      Does the patient have evidence of cognitive impairment? No    Lab Results   Component Value Date    HGBA1C 6.50 (H) 10/24/2022        HEALTH RISK ASSESSMENT    Smoking Status:  Social History     Tobacco Use   Smoking Status Former   • Packs/day: 1.00   • Years: 15.00   • Pack years: 15.00   • Types: Cigarettes   • Start date: 1999   • Quit date: 4/1/2012   • Years since quitting: 10.8   Smokeless Tobacco Never     Alcohol Consumption:  Social History     Substance and Sexual Activity   Alcohol Use No     Fall Risk Screen:    RAVI Fall Risk Assessment was completed, and patient is at HIGH risk for falls. Assessment completed on:1/16/2023    Depression Screening:  PHQ-2/PHQ-9 Depression Screening 1/16/2023   Little Interest or Pleasure in Doing Things -   Feeling Down, Depressed or Hopeless 1-->several days   PHQ-9: Brief Depression Severity Measure Score 1       Health Habits and Functional and Cognitive Screening:  Functional & Cognitive Status 1/16/2023   Do you have difficulty preparing food and eating? No   Do you have difficulty bathing yourself, getting dressed or grooming yourself? No   Do you have difficulty using the toilet? No   Do you have difficulty moving around from place to place? No   Do you have trouble with steps or getting out of a bed or a chair? No   Current Diet Well Balanced Diet   Dental Exam Up to date   Eye Exam Up to date   Exercise (times per week) 7 times per week   Current Exercises Include No Regular Exercise;Walking        Exercise Comment walks to mailbox and basement daily   Current Exercise Activities Include -   Do you need help using the phone?  No   Are you deaf or do you have serious difficulty hearing?  Yes    Do you need help with transportation? No   Do you need help shopping? No   Do you need help preparing meals?  No   Do you need help with housework?  Yes   Do you need help with laundry? No   Do you need help taking your medications? No   Do you need help managing money? No   Do you ever drive or ride in a car without wearing a seat belt? No   Have you felt unusual stress, anger or loneliness in the last month? Yes   Who do you live with? Alone   If you need help, do you have trouble finding someone available to you? No   Do you have difficulty concentrating, remembering or making decisions? -       Age-appropriate Screening Schedule:  Refer to the list below for future screening recommendations based on patient's age, sex and/or medical conditions. Orders for these recommended tests are listed in the plan section. The patient has been provided with a written plan.    Health Maintenance   Topic Date Due   • ZOSTER VACCINE (2 of 2) 02/26/2006   • TDAP/TD VACCINES (2 - Td or Tdap) 11/01/2022   • DIABETIC EYE EXAM  04/05/2023   • HEMOGLOBIN A1C  04/24/2023   • LIPID PANEL  07/13/2023   • MAMMOGRAM  10/14/2023   • URINE MICROALBUMIN  01/16/2024   • DXA SCAN  03/21/2024   • INFLUENZA VACCINE  Completed                CMS Preventative Services Quick Reference  Risk Factors Identified During Encounter  Chronic Pain: Natural history and expected course discussed. Questions answered.  Depression/Dysphoria: pt declines medication at this time, stopped lexapro  Fall Risk-High or Moderate: Discussed Fall Prevention in the home and Information on Fall Prevention Shared in After Visit Summary  Immunizations Discussed/Encouraged: Tdap, Influenza, Pneumococcal 23, Prevnar 20 (Pneumococcal 20-valent conjugate), Shingrix and COVID19  Inactivity/Sedentary: Patient was advised to exercise at least 150 minutes a week per CDC recommendations.  Polypharmacy: Medication List reviewed  The above risks/problems have been discussed with the  "patient.  Pertinent information has been shared with the patient in the After Visit Summary.  An After Visit Summary and PPPS were made available to the patient.    Follow Up:   Next Medicare Wellness visit to be scheduled in 1 year.       Additional E&M Note during same encounter follows:  Patient has multiple medical problems which are significant and separately identifiable that require additional work above and beyond the Medicare Wellness Visit.      Chief Complaint  Medicare Wellness-subsequent (C/o burning inside mouth )    Subjective        HPI  Annie Mejia is also being seen for c/o burning mouth for several weeks. Feels like a \"scalded tongue\". Worse with mouthwash.    Has NIDDM, recently controlled. Due for urine microalbumin. plt count low last CBC. Denies bleeding tendencies.     Review of Systems   Constitutional: Positive for fatigue. Negative for fever.   HENT: Positive for congestion and postnasal drip. Negative for sore throat and trouble swallowing.    Eyes: Positive for visual disturbance (chronic).   Respiratory: Positive for cough (dry, intermittent, stable) and shortness of breath (with anxiety). Negative for wheezing.    Cardiovascular: Positive for chest pain (with anxiety) and palpitations (with anxiety). Negative for leg swelling.   Gastrointestinal: Positive for abdominal distention, abdominal pain, diarrhea and nausea.        Heartburn   Genitourinary: Negative for dysuria and hematuria.   Musculoskeletal: Positive for arthralgias, back pain, gait problem, joint swelling and myalgias.   Skin: Negative for rash and wound.   Neurological: Positive for dizziness, weakness and confusion (mild, intermittent). Negative for syncope.   Hematological: Negative for adenopathy. Bruises/bleeds easily.   Psychiatric/Behavioral: Positive for dysphoric mood and sleep disturbance. Negative for suicidal ideas. The patient is nervous/anxious.    Pt's previous ROS reviewed and updated as indicated. " "      Objective   Vital Signs:  /80   Pulse 73   Temp 97 °F (36.1 °C)   Ht 170.2 cm (67\")   Wt 84.8 kg (187 lb)   SpO2 98%   BMI 29.29 kg/m²     Physical Exam  Vitals and nursing note reviewed.   Constitutional:       General: She is not in acute distress.     Appearance: She is well-developed, well-groomed and overweight. She is not ill-appearing.   HENT:      Head: Atraumatic.   Eyes:      General: No scleral icterus.     Conjunctiva/sclera: Conjunctivae normal.   Neck:      Thyroid: No thyroid mass.      Vascular: Normal carotid pulses. No carotid bruit.   Cardiovascular:      Rate and Rhythm: Normal rate and regular rhythm.      Pulses: Normal pulses.      Heart sounds: Normal heart sounds.   Pulmonary:      Effort: Pulmonary effort is normal.      Breath sounds: Normal breath sounds.   Abdominal:      General: There is no distension.      Palpations: Abdomen is soft.      Tenderness: There is no abdominal tenderness. There is no right CVA tenderness or left CVA tenderness.   Musculoskeletal:      Right lower leg: No edema.      Left lower leg: No edema.   Lymphadenopathy:      Cervical: No cervical adenopathy.   Skin:     General: Skin is warm and dry.      Coloration: Skin is not jaundiced or pale.      Findings: No bruising or rash.   Neurological:      Mental Status: She is alert and oriented to person, place, and time. Mental status is at baseline.      Gait: Gait is intact.   Psychiatric:         Mood and Affect: Mood and affect normal.         Behavior: Behavior normal. Behavior is cooperative.         Cognition and Memory: Cognition normal.      Pt's previous physical exam reviewed and updated as indicated.      Lab Results   Component Value Date    GLUCOSE 183 (H) 11/01/2022    BUN 14 11/01/2022    CREATININE 0.90 11/01/2022    EGFRIFNONA 47 (L) 10/21/2021    EGFRIFAFRI 57 (L) 10/21/2021    BCR 15.6 11/01/2022    K 4.8 11/01/2022    CO2 26.5 11/01/2022    CALCIUM 8.9 11/01/2022    " PROTENTOTREF 6.8 10/24/2022    ALBUMIN 4.10 10/24/2022    LABIL2 1.5 10/24/2022    AST 17 10/24/2022    ALT 13 10/24/2022       Lab Results   Component Value Date    CHOL 151 01/18/2021    CHLPL 119 07/13/2022    TRIG 89 07/13/2022    HDL 38 (L) 07/13/2022    LDL 64 07/13/2022       Lab Results   Component Value Date    HGBA1C 6.50 (H) 10/24/2022       Lab Results   Component Value Date    TSH 1.900 10/21/2021                      Assessment and Plan   Diagnoses and all orders for this visit:    1. Medicare annual wellness visit, subsequent (Primary)    2. Type 2 diabetes mellitus without complication, with long-term current use of insulin (HCC)  -     Microalbumin / Creatinine Urine Ratio - Urine, Clean Catch    3. Thrombocytopenia (HCC)  -     CBC (No Diff)    4. Essential hypertension  -     Microalbumin / Creatinine Urine Ratio - Urine, Clean Catch    5. Glossitis  -     nystatin (MYCOSTATIN) 100,000 unit/mL suspension; Swish and swallow 5 mL 4 (Four) Times a Day.  Dispense: 140 mL; Refill: 0    6. Mixed hyperlipidemia    7. Peripheral vascular disease (HCC)    8. Gastroesophageal reflux disease with esophagitis without hemorrhage    9. Renal insufficiency    10. Stage 3a chronic kidney disease (HCC)    11. Mixed anxiety depressive disorder    12. Age-related osteoporosis without current pathological fracture    13. Chronic prescription benzodiazepine use    14. History of stroke      Chronic conditions appear stable. Trial of nystatin S/S for burning mouth/glossitis.         Follow Up   Return in about 3 months (around 4/16/2023).   F/u sooner as needed.  Patient was encouraged to keep me informed of any acute changes, lack of improvement, or any new concerning symptoms.  Pt is aware of reasons to seek emergent care.  Patient voiced understanding of all instructions and denied further questions.    Patient was given instructions and counseling regarding her condition or for health maintenance advice. Please see  specific information pulled into the AVS if appropriate.     Please note that portions of this note may have been completed with a voice recognition program.

## 2023-01-17 DIAGNOSIS — F41.8 MIXED ANXIETY DEPRESSIVE DISORDER: ICD-10-CM

## 2023-01-17 LAB
ALBUMIN/CREAT UR: 13 MG/G CREAT (ref 0–29)
CREAT UR-MCNC: 161.2 MG/DL
ERYTHROCYTE [DISTWIDTH] IN BLOOD BY AUTOMATED COUNT: 13.9 % (ref 12.3–15.4)
HCT VFR BLD AUTO: 37.4 % (ref 34–46.6)
HGB BLD-MCNC: 11.8 G/DL (ref 12–15.9)
MCH RBC QN AUTO: 29.4 PG (ref 26.6–33)
MCHC RBC AUTO-ENTMCNC: 31.6 G/DL (ref 31.5–35.7)
MCV RBC AUTO: 93.3 FL (ref 79–97)
MICROALBUMIN UR-MCNC: 21.2 UG/ML
PLATELET # BLD AUTO: 124 10*3/MM3 (ref 140–450)
RBC # BLD AUTO: 4.01 10*6/MM3 (ref 3.77–5.28)
WBC # BLD AUTO: 4.63 10*3/MM3 (ref 3.4–10.8)

## 2023-01-17 NOTE — TELEPHONE ENCOUNTER
Rx Refill Note  Requested Prescriptions     Pending Prescriptions Disp Refills   • ALPRAZolam (XANAX) 0.25 MG tablet [Pharmacy Med Name: ALPRAZolam 0.25 MG Oral Tablet] 90 tablet 0     Sig: TAKE 1 TO 2 TABLETS BY MOUTH TWICE DAILY AS NEEDED FOR  PANIC      Last office visit with prescribing clinician: 1/16/2023   Last telemedicine visit with prescribing clinician: Visit date not found   Next office visit with prescribing clinician: Visit date not found                         Would you like a call back once the refill request has been completed: [] Yes [] No    If the office needs to give you a call back, can they leave a voicemail: [] Yes [] No    Sonal Myers MA  01/17/23, 16:33 EST

## 2023-01-18 RX ORDER — ALPRAZOLAM 0.25 MG/1
TABLET ORAL
Qty: 60 TABLET | Refills: 0 | Status: SHIPPED | OUTPATIENT
Start: 2023-01-18 | End: 2023-04-03

## 2023-01-18 NOTE — PATIENT INSTRUCTIONS
Medicare Wellness  Personal Prevention Plan of Service     Date of Office Visit:    Encounter Provider:  Olga Pimentel MD  Place of Service:  [facility] [place]  Patient Name: Annie Mejia  :  1945    As part of the Medicare Wellness portion of your visit today, we are providing you with this personalized preventive plan of services (PPPS). This plan is based upon recommendations of the [place] Preventive Services Task Force (USPSTF) and the Advisory Committee on Immunization Practices (ACIP).    This lists the preventive care services that should be considered, and provides dates of when you are due. Items listed as completed are up-to-date and do not require any further intervention.    Health Maintenance   Topic Date Due    ZOSTER VACCINE (2 of 2) 2006    ANNUAL WELLNESS VISIT  10/21/2022    TDAP/TD VACCINES (2 - Td or Tdap) 2022    DIABETIC EYE EXAM  2023    HEMOGLOBIN A1C  2023    COLORECTAL CANCER SCREENING  2023    LIPID PANEL  2023    MAMMOGRAM  10/14/2023    URINE MICROALBUMIN  2024    DXA SCAN  2024    HEPATITIS C SCREENING  Completed    INFLUENZA VACCINE  Completed    Pneumococcal Vaccine 65+  Completed    COVID-19 Vaccine  Discontinued       Orders Placed This Encounter   Procedures    CBC (No Diff)     Order Specific Question:   Release to patient     Answer:   Routine Release     Order Specific Question:   [facility] Has the patient fasted?     Answer:   Yes    Microalbumin / Creatinine Urine Ratio - Urine, Clean Catch     Order Specific Question:   Release to patient     Answer:   Routine Release     Order Specific Question:   [facility] Has the patient fasted?     Answer:   Yes       Return in about 3 months (around 2023).

## 2023-01-18 NOTE — TELEPHONE ENCOUNTER
LILIAM reviewed. Refill approved. Medication E rx'd to pharmacy as requested. Pt to keep f/u apt as scheduled.    UDS and CSA UTD

## 2023-02-27 ENCOUNTER — TELEPHONE (OUTPATIENT)
Dept: FAMILY MEDICINE CLINIC | Facility: CLINIC | Age: 78
End: 2023-02-27

## 2023-02-27 ENCOUNTER — OFFICE VISIT (OUTPATIENT)
Dept: FAMILY MEDICINE CLINIC | Facility: CLINIC | Age: 78
End: 2023-02-27
Payer: MEDICARE

## 2023-02-27 VITALS
TEMPERATURE: 98.9 F | HEIGHT: 67 IN | HEART RATE: 74 BPM | SYSTOLIC BLOOD PRESSURE: 120 MMHG | BODY MASS INDEX: 29.51 KG/M2 | WEIGHT: 188 LBS | DIASTOLIC BLOOD PRESSURE: 70 MMHG | OXYGEN SATURATION: 98 %

## 2023-02-27 DIAGNOSIS — R10.9 ABDOMINAL CRAMPS: Primary | ICD-10-CM

## 2023-02-27 DIAGNOSIS — R42 VERTIGO: ICD-10-CM

## 2023-02-27 PROCEDURE — 99213 OFFICE O/P EST LOW 20 MIN: CPT | Performed by: FAMILY MEDICINE

## 2023-02-27 RX ORDER — MECLIZINE HYDROCHLORIDE 25 MG/1
TABLET ORAL
Qty: 30 TABLET | Refills: 0 | Status: SHIPPED | OUTPATIENT
Start: 2023-02-27

## 2023-02-27 RX ORDER — DICYCLOMINE HYDROCHLORIDE 10 MG/1
10 CAPSULE ORAL
Qty: 120 CAPSULE | Refills: 0 | Status: SHIPPED | OUTPATIENT
Start: 2023-02-27 | End: 2023-03-08

## 2023-02-27 NOTE — TELEPHONE ENCOUNTER
Please contact pt and review symptoms.    Is this her usual or something different? Fever? Ab pain? Blood in stool?

## 2023-02-27 NOTE — TELEPHONE ENCOUNTER
Caller: Annie Mejia    Relationship: Self    Best call back number: [phone]    What medication are you requesting:     What are your current symptoms: DIARRHEA, STOMACH CRAMPS    How long have you been experiencing symptoms: YESTERDAY    If a prescription is needed, what is your preferred pharmacy and phone number:  [facility] [address] [place] [address] [phone] [facility] [phone] FX      Additional notes:

## 2023-02-27 NOTE — PROGRESS NOTES
Subjective   Annie Mejia is a 77 y.o. female.     History of Present Illness  She c/o abd cramps  Abdominal Pain  This is a recurrent problem. The current episode started in the past 7 days. The onset quality is gradual. Episode frequency: every few minutes, comes in waves. The problem has been waxing and waning. The pain is located in the periumbilical region and suprapubic region. The pain is severe. The quality of the pain is cramping. The abdominal pain does not radiate. Associated symptoms include diarrhea (mild), flatus and hematochezia (due to hemorrhoid flare). Pertinent negatives include no dysuria, fever, frequency, hematuria, melena, nausea or vomiting. Nothing aggravates the pain. The pain is relieved by nothing. She has tried antacids (pepto, gas x) for the symptoms. The treatment provided mild relief. PMHx includes diverticulitis     Requests refill of meclizine which she uses intermittently for vertigo    The following portions of the patient's history were reviewed and updated as appropriate: allergies, current medications, past family history, past medical history, past social history, past surgical history and problem list.    Review of Systems   Constitutional: Negative for fever.   HENT: Negative for sore throat and trouble swallowing.    Respiratory: Negative for cough.    Cardiovascular: Negative for chest pain.   Gastrointestinal: Positive for abdominal pain, diarrhea (mild), flatus and hematochezia (due to hemorrhoid flare). Negative for melena, nausea and vomiting.   Genitourinary: Negative for dysuria, frequency and hematuria.   Neurological: Negative for weakness.   Psychiatric/Behavioral: Negative for confusion.       Objective    Vitals:    02/27/23 1502   BP: 120/70   Pulse: 74   Temp: 98.9 °F (37.2 °C)   SpO2: 98%     Body mass index is 29.44 kg/m².      02/27/23  1502   Weight: 85.3 kg (188 lb)       Physical Exam  Vitals and nursing note reviewed.   Constitutional:       General:  She is in acute distress (mild due to pain).      Appearance: She is well-developed, well-groomed and overweight. She is not ill-appearing.   HENT:      Mouth/Throat:      Mouth: Mucous membranes are moist.   Eyes:      General: No scleral icterus.     Conjunctiva/sclera: Conjunctivae normal.   Cardiovascular:      Rate and Rhythm: Normal rate and regular rhythm.      Pulses: Normal pulses.      Heart sounds: Normal heart sounds.   Pulmonary:      Effort: Pulmonary effort is normal.      Breath sounds: Normal breath sounds.   Abdominal:      General: Bowel sounds are increased. There is no distension.      Palpations: Abdomen is soft. There is no hepatomegaly, splenomegaly or mass.      Tenderness: There is abdominal tenderness (mild) in the periumbilical area and suprapubic area. There is no right CVA tenderness, left CVA tenderness, guarding or rebound.   Lymphadenopathy:      Cervical: No cervical adenopathy.   Skin:     General: Skin is warm and dry.      Coloration: Skin is not jaundiced or pale.   Neurological:      Mental Status: She is alert and oriented to person, place, and time. Mental status is at baseline.      Gait: Gait is intact.   Psychiatric:         Mood and Affect: Mood is anxious.         Behavior: Behavior normal. Behavior is cooperative.         Cognition and Memory: Cognition normal.         Assessment & Plan   Diagnoses and all orders for this visit:    1. Abdominal cramps (Primary)    2. Vertigo  -     meclizine (ANTIVERT) 25 MG tablet; Take 1-2 tablets by mouth every 6 (six) to 8 (eight) hours as needed for dizziness.  Dispense: 30 tablet; Refill: 0    Other orders  -     dicyclomine (BENTYL) 10 MG capsule; Take 1 capsule by mouth 4 (Four) Times a Day Before Meals & at Bedtime. For abd cramps.  Dispense: 120 capsule; Refill: 0       Non-acute abd exam. Not systemically ill. No linda sign of diverticulitis at this time. Possible viral enteritis. Trial of bentyl, continue gas x, d/c pepto  for now. [place] diet, stay hydrated. If minimal improvement or acute worsening, recommend CT scan abd, empiric tx with abx.     Routine f/u as scheduled, f/u sooner as needed.  Patient was encouraged to keep me informed of any acute changes, lack of improvement, or any new concerning symptoms.  Pt is aware of reasons to seek emergent care.  Patient voiced understanding of all instructions and denied further questions.    Please note that portions of this note may have been completed with a voice recognition program.

## 2023-02-27 NOTE — TELEPHONE ENCOUNTER
Patient called back and is having really bad cramps. Said she went to [facility] last week for diverticulitis and is having abdominal pain and does not have blood in stool but does have hemmoroids and bleed.

## 2023-02-28 ENCOUNTER — TELEPHONE (OUTPATIENT)
Dept: FAMILY MEDICINE CLINIC | Facility: CLINIC | Age: 78
End: 2023-02-28

## 2023-02-28 NOTE — TELEPHONE ENCOUNTER
Spoke with patient.  Informed her if the pain is worsening she should go to the ER.  Patient declined.  Patient said that she has some relief with the Dicyclomine. Patient informed that Dr Pimentel is out of the office until 03/07/2023.  I offered to make an appointment with another provider.  Patient declined.  She will call the office if symptoms worsen and she would like to be seen.

## 2023-02-28 NOTE — TELEPHONE ENCOUNTER
Caller: Annie Mejia    Relationship: Self    Best call back number: [phone]    What is the best time to reach you: ANYTIME    Who are you requesting to speak with (clinical staff, provider,  specific staff member): CLINICAL    Do you know the name of the person who called: PATIENT    What was the call regarding: PATIENT IS STILL EXPERIENCING SAME PAIN AS AT APPOINTMENT    Do you require a callback: PLEASE ADVISE AS SOON AS POSSIBLE

## 2023-02-28 NOTE — TELEPHONE ENCOUNTER
Caller: Pérez Macias  DAUGHTER IN [employer] [facility] [employer]    Relationship: Emergency Contact    Best call back number:    [phone]     Who are you requesting to speak with (clinical staff, provider,  specific staff member):  CLINICAL     What was the call regarding: PÉREZ DOES NOT KNOW WHAT TO DO FOR THE PATIENT   SHE IS CONCERNED AND SAID THE PATIENT IN A LOT OF PAIN   THE PATIENT TOLD PÉREZ THE PAIN IS ALMOST LIKE LABOR PAINS     Do you require a callback: PLEASE CALL AND ADVISE

## 2023-02-28 NOTE — TELEPHONE ENCOUNTER
PATIENT STATES SHE IS STILL HURTING BUT NOT QUITE AS BAD AS SHE WAS.  PAIN IS STILL COMING.  SHOULD SHE TAKE MORE PILLS?  WHAT TO DO?    PLEASE CALL [phone]

## 2023-02-28 NOTE — TELEPHONE ENCOUNTER
If pain is improving, then she should continue the anti-spasm medication rx'd, stay hydrated, monitor for fever, worsening pain, etc.

## 2023-02-28 NOTE — TELEPHONE ENCOUNTER
Caller: Annie Mejia     Relationship: SELF    Best call back number: [phone]    What is your medical concern? INTENSE STOMACH PAIN - WAS BETTER WHEN SHE WOKE UP BUT AS SOON AS SHE STARTED MOVING AROUND IT STARTED BACK UP. PROBABLY WORSE THAN YESTERDAY.  3RD CALL       Is your provider already aware of this issue? YES    Have you been treated for this issue? YES BUT TREATMENT NOT WORKING

## 2023-03-01 ENCOUNTER — APPOINTMENT (OUTPATIENT)
Dept: CT IMAGING | Facility: HOSPITAL | Age: 78
End: 2023-03-01
Payer: MEDICARE

## 2023-03-01 ENCOUNTER — TELEPHONE (OUTPATIENT)
Dept: FAMILY MEDICINE CLINIC | Facility: CLINIC | Age: 78
End: 2023-03-01
Payer: MEDICARE

## 2023-03-01 ENCOUNTER — HOSPITAL ENCOUNTER (EMERGENCY)
Facility: HOSPITAL | Age: 78
Discharge: HOME | End: 2023-03-01
Attending: STUDENT IN AN ORGANIZED HEALTH CARE EDUCATION/TRAINING PROGRAM | Admitting: STUDENT IN AN ORGANIZED HEALTH CARE EDUCATION/TRAINING PROGRAM
Payer: MEDICARE

## 2023-03-01 VITALS
RESPIRATION RATE: 16 BRPM | HEART RATE: 75 BPM | TEMPERATURE: 98.8 F | OXYGEN SATURATION: 95 % | BODY MASS INDEX: 29.51 KG/M2 | DIASTOLIC BLOOD PRESSURE: 56 MMHG | WEIGHT: 188 LBS | HEIGHT: 67 IN | SYSTOLIC BLOOD PRESSURE: 94 MMHG

## 2023-03-01 DIAGNOSIS — K57.92 DIVERTICULITIS: Primary | ICD-10-CM

## 2023-03-01 LAB
ALBUMIN SERPL-MCNC: 3.7 G/DL (ref 3.5–5.2)
ALBUMIN/GLOB SERPL: 1.1 G/DL
ALP SERPL-CCNC: 103 U/L (ref 39–117)
ALT SERPL W P-5'-P-CCNC: 7 U/L (ref 1–33)
ANION GAP SERPL CALCULATED.3IONS-SCNC: 10.2 MMOL/L (ref 5–15)
AST SERPL-CCNC: 10 U/L (ref 1–32)
BACTERIA UR QL AUTO: ABNORMAL /HPF
BASOPHILS # BLD AUTO: 0.02 10*3/MM3 (ref 0–0.2)
BASOPHILS NFR BLD AUTO: 0.1 % (ref 0–1.5)
BILIRUB SERPL-MCNC: 0.9 MG/DL (ref 0–1.2)
BILIRUB UR QL STRIP: NEGATIVE
BUN SERPL-MCNC: 23 MG/DL (ref 8–23)
BUN/CREAT SERPL: 20.4 (ref 7–25)
CALCIUM SPEC-SCNC: 9.1 MG/DL (ref 8.6–10.5)
CHLORIDE SERPL-SCNC: 100 MMOL/L (ref 98–107)
CLARITY UR: CLEAR
CO2 SERPL-SCNC: 23.8 MMOL/L (ref 22–29)
COLOR UR: YELLOW
CREAT SERPL-MCNC: 1.13 MG/DL (ref 0.57–1)
CRP SERPL-MCNC: 14.32 MG/DL (ref 0–0.5)
D-LACTATE SERPL-SCNC: 1.3 MMOL/L (ref 0.5–2)
DEPRECATED RDW RBC AUTO: 49.9 FL (ref 37–54)
EGFRCR SERPLBLD CKD-EPI 2021: 49.6 ML/MIN/1.73
EOSINOPHIL # BLD AUTO: 0.02 10*3/MM3 (ref 0–0.4)
EOSINOPHIL NFR BLD AUTO: 0.1 % (ref 0.3–6.2)
ERYTHROCYTE [DISTWIDTH] IN BLOOD BY AUTOMATED COUNT: 14.9 % (ref 12.3–15.4)
FLUAV RNA RESP QL NAA+PROBE: NOT DETECTED
FLUBV RNA RESP QL NAA+PROBE: NOT DETECTED
GLOBULIN UR ELPH-MCNC: 3.5 GM/DL
GLUCOSE SERPL-MCNC: 125 MG/DL (ref 65–99)
GLUCOSE UR STRIP-MCNC: NEGATIVE MG/DL
GRAN CASTS URNS QL MICRO: ABNORMAL /LPF
HCT VFR BLD AUTO: 36.6 % (ref 34–46.6)
HGB BLD-MCNC: 12.1 G/DL (ref 12–15.9)
HGB UR QL STRIP.AUTO: ABNORMAL
HYALINE CASTS UR QL AUTO: ABNORMAL /LPF
IMM GRANULOCYTES # BLD AUTO: 0.56 10*3/MM3 (ref 0–0.05)
IMM GRANULOCYTES NFR BLD AUTO: 4 % (ref 0–0.5)
INR PPP: 1.08 (ref 0.9–1.1)
KETONES UR QL STRIP: ABNORMAL
LEUKOCYTE ESTERASE UR QL STRIP.AUTO: ABNORMAL
LIPASE SERPL-CCNC: 49 U/L (ref 13–60)
LYMPHOCYTES # BLD AUTO: 2.77 10*3/MM3 (ref 0.7–3.1)
LYMPHOCYTES NFR BLD AUTO: 19.9 % (ref 19.6–45.3)
MAGNESIUM SERPL-MCNC: 1.9 MG/DL (ref 1.6–2.4)
MCH RBC QN AUTO: 30 PG (ref 26.6–33)
MCHC RBC AUTO-ENTMCNC: 33.1 G/DL (ref 31.5–35.7)
MCV RBC AUTO: 90.6 FL (ref 79–97)
MONOCYTES # BLD AUTO: 1.19 10*3/MM3 (ref 0.1–0.9)
MONOCYTES NFR BLD AUTO: 8.5 % (ref 5–12)
NEUTROPHILS NFR BLD AUTO: 67.4 % (ref 42.7–76)
NEUTROPHILS NFR BLD AUTO: 9.37 10*3/MM3 (ref 1.7–7)
NITRITE UR QL STRIP: NEGATIVE
NRBC BLD AUTO-RTO: 0 /100 WBC (ref 0–0.2)
NT-PROBNP SERPL-MCNC: 131.3 PG/ML (ref 0–1800)
PH UR STRIP.AUTO: 6.5 [PH] (ref 5–8)
PHOSPHATE SERPL-MCNC: 2.6 MG/DL (ref 2.5–4.5)
PLATELET # BLD AUTO: 144 10*3/MM3 (ref 140–450)
PMV BLD AUTO: 11.6 FL (ref 6–12)
POTASSIUM SERPL-SCNC: 4 MMOL/L (ref 3.5–5.2)
PROCALCITONIN SERPL-MCNC: 0.17 NG/ML (ref 0–0.25)
PROT SERPL-MCNC: 7.2 G/DL (ref 6–8.5)
PROT UR QL STRIP: ABNORMAL
PROTHROMBIN TIME: 14.4 SECONDS (ref 12.5–14.5)
RBC # BLD AUTO: 4.04 10*6/MM3 (ref 3.77–5.28)
RBC # UR STRIP: ABNORMAL /HPF
REF LAB TEST METHOD: ABNORMAL
SARS-COV-2 RNA RESP QL NAA+PROBE: NOT DETECTED
SODIUM SERPL-SCNC: 134 MMOL/L (ref 136–145)
SP GR UR STRIP: 1.02 (ref 1–1.03)
SQUAMOUS #/AREA URNS HPF: ABNORMAL /HPF
TROPONIN T SERPL HS-MCNC: 14 NG/L
TROPONIN T SERPL HS-MCNC: 16 NG/L
UROBILINOGEN UR QL STRIP: ABNORMAL
WBC # UR STRIP: ABNORMAL /HPF
WBC NRBC COR # BLD: 13.93 10*3/MM3 (ref 3.4–10.8)

## 2023-03-01 PROCEDURE — 80053 COMPREHEN METABOLIC PANEL: CPT | Performed by: STUDENT IN AN ORGANIZED HEALTH CARE EDUCATION/TRAINING PROGRAM

## 2023-03-01 PROCEDURE — 83605 ASSAY OF LACTIC ACID: CPT | Performed by: STUDENT IN AN ORGANIZED HEALTH CARE EDUCATION/TRAINING PROGRAM

## 2023-03-01 PROCEDURE — 25010000002 KETOROLAC TROMETHAMINE PER 15 MG: Performed by: STUDENT IN AN ORGANIZED HEALTH CARE EDUCATION/TRAINING PROGRAM

## 2023-03-01 PROCEDURE — 25010000002 ONDANSETRON PER 1 MG: Performed by: STUDENT IN AN ORGANIZED HEALTH CARE EDUCATION/TRAINING PROGRAM

## 2023-03-01 PROCEDURE — 83735 ASSAY OF MAGNESIUM: CPT | Performed by: STUDENT IN AN ORGANIZED HEALTH CARE EDUCATION/TRAINING PROGRAM

## 2023-03-01 PROCEDURE — 74177 CT ABD & PELVIS W/CONTRAST: CPT

## 2023-03-01 PROCEDURE — 96374 THER/PROPH/DIAG INJ IV PUSH: CPT

## 2023-03-01 PROCEDURE — 84484 ASSAY OF TROPONIN QUANT: CPT | Performed by: STUDENT IN AN ORGANIZED HEALTH CARE EDUCATION/TRAINING PROGRAM

## 2023-03-01 PROCEDURE — 86140 C-REACTIVE PROTEIN: CPT | Performed by: STUDENT IN AN ORGANIZED HEALTH CARE EDUCATION/TRAINING PROGRAM

## 2023-03-01 PROCEDURE — 96375 TX/PRO/DX INJ NEW DRUG ADDON: CPT

## 2023-03-01 PROCEDURE — 85025 COMPLETE CBC W/AUTO DIFF WBC: CPT | Performed by: STUDENT IN AN ORGANIZED HEALTH CARE EDUCATION/TRAINING PROGRAM

## 2023-03-01 PROCEDURE — 81001 URINALYSIS AUTO W/SCOPE: CPT | Performed by: STUDENT IN AN ORGANIZED HEALTH CARE EDUCATION/TRAINING PROGRAM

## 2023-03-01 PROCEDURE — 87636 SARSCOV2 & INF A&B AMP PRB: CPT | Performed by: STUDENT IN AN ORGANIZED HEALTH CARE EDUCATION/TRAINING PROGRAM

## 2023-03-01 PROCEDURE — 85610 PROTHROMBIN TIME: CPT | Performed by: STUDENT IN AN ORGANIZED HEALTH CARE EDUCATION/TRAINING PROGRAM

## 2023-03-01 PROCEDURE — 36415 COLL VENOUS BLD VENIPUNCTURE: CPT

## 2023-03-01 PROCEDURE — 84145 PROCALCITONIN (PCT): CPT | Performed by: STUDENT IN AN ORGANIZED HEALTH CARE EDUCATION/TRAINING PROGRAM

## 2023-03-01 PROCEDURE — 83690 ASSAY OF LIPASE: CPT | Performed by: STUDENT IN AN ORGANIZED HEALTH CARE EDUCATION/TRAINING PROGRAM

## 2023-03-01 PROCEDURE — 25510000001 IOPAMIDOL 61 % SOLUTION: Performed by: STUDENT IN AN ORGANIZED HEALTH CARE EDUCATION/TRAINING PROGRAM

## 2023-03-01 PROCEDURE — 93005 ELECTROCARDIOGRAM TRACING: CPT | Performed by: STUDENT IN AN ORGANIZED HEALTH CARE EDUCATION/TRAINING PROGRAM

## 2023-03-01 PROCEDURE — 99284 EMERGENCY DEPT VISIT MOD MDM: CPT

## 2023-03-01 PROCEDURE — 84100 ASSAY OF PHOSPHORUS: CPT | Performed by: STUDENT IN AN ORGANIZED HEALTH CARE EDUCATION/TRAINING PROGRAM

## 2023-03-01 PROCEDURE — 83880 ASSAY OF NATRIURETIC PEPTIDE: CPT | Performed by: STUDENT IN AN ORGANIZED HEALTH CARE EDUCATION/TRAINING PROGRAM

## 2023-03-01 RX ORDER — AMOXICILLIN AND CLAVULANATE POTASSIUM 875; 125 MG/1; MG/1
1 TABLET, FILM COATED ORAL 2 TIMES DAILY
Qty: 14 TABLET | Refills: 0 | Status: SHIPPED | OUTPATIENT
Start: 2023-03-01 | End: 2023-03-01 | Stop reason: SDUPTHER

## 2023-03-01 RX ORDER — NALOXONE HYDROCHLORIDE 4 MG/.1ML
1 SPRAY NASAL AS NEEDED
Qty: 1 EACH | Refills: 0 | Status: SHIPPED | OUTPATIENT
Start: 2023-03-01

## 2023-03-01 RX ORDER — HYDROCODONE BITARTRATE AND ACETAMINOPHEN 7.5; 325 MG/1; MG/1
1 TABLET ORAL EVERY 8 HOURS PRN
Qty: 12 TABLET | Refills: 0 | Status: SHIPPED | OUTPATIENT
Start: 2023-03-01

## 2023-03-01 RX ORDER — HYDROCODONE BITARTRATE AND ACETAMINOPHEN 7.5; 325 MG/1; MG/1
1 TABLET ORAL EVERY 8 HOURS PRN
Qty: 12 TABLET | Refills: 0 | Status: SHIPPED | OUTPATIENT
Start: 2023-03-01 | End: 2023-03-01 | Stop reason: SDUPTHER

## 2023-03-01 RX ORDER — AMOXICILLIN AND CLAVULANATE POTASSIUM 875; 125 MG/1; MG/1
1 TABLET, FILM COATED ORAL 2 TIMES DAILY
Qty: 14 TABLET | Refills: 0 | Status: SHIPPED | OUTPATIENT
Start: 2023-03-01 | End: 2023-03-08

## 2023-03-01 RX ORDER — ONDANSETRON 2 MG/ML
4 INJECTION INTRAMUSCULAR; INTRAVENOUS ONCE
Status: COMPLETED | OUTPATIENT
Start: 2023-03-01 | End: 2023-03-01

## 2023-03-01 RX ORDER — LIDOCAINE HYDROCHLORIDE 20 MG/ML
15 SOLUTION OROPHARYNGEAL ONCE
Status: COMPLETED | OUTPATIENT
Start: 2023-03-01 | End: 2023-03-01

## 2023-03-01 RX ORDER — KETOROLAC TROMETHAMINE 30 MG/ML
15 INJECTION, SOLUTION INTRAMUSCULAR; INTRAVENOUS ONCE
Status: COMPLETED | OUTPATIENT
Start: 2023-03-01 | End: 2023-03-01

## 2023-03-01 RX ORDER — ONDANSETRON 4 MG/1
4 TABLET, ORALLY DISINTEGRATING ORAL EVERY 8 HOURS PRN
Qty: 20 TABLET | Refills: 0 | Status: SHIPPED | OUTPATIENT
Start: 2023-03-01

## 2023-03-01 RX ORDER — ALUMINA, MAGNESIA, AND SIMETHICONE 2400; 2400; 240 MG/30ML; MG/30ML; MG/30ML
15 SUSPENSION ORAL ONCE
Status: COMPLETED | OUTPATIENT
Start: 2023-03-01 | End: 2023-03-01

## 2023-03-01 RX ADMIN — ALUMINUM HYDROXIDE, MAGNESIUM HYDROXIDE, AND DIMETHICONE 15 ML: 400; 400; 40 SUSPENSION ORAL at 08:33

## 2023-03-01 RX ADMIN — LIDOCAINE HYDROCHLORIDE 15 ML: 20 SOLUTION ORAL at 08:33

## 2023-03-01 RX ADMIN — KETOROLAC TROMETHAMINE 15 MG: 30 INJECTION, SOLUTION INTRAMUSCULAR; INTRAVENOUS at 10:58

## 2023-03-01 RX ADMIN — SODIUM CHLORIDE, POTASSIUM CHLORIDE, SODIUM LACTATE AND CALCIUM CHLORIDE 1000 ML: 600; 310; 30; 20 INJECTION, SOLUTION INTRAVENOUS at 08:34

## 2023-03-01 RX ADMIN — ONDANSETRON 4 MG: 2 INJECTION INTRAMUSCULAR; INTRAVENOUS at 08:34

## 2023-03-01 RX ADMIN — IOPAMIDOL 100 ML: 612 INJECTION, SOLUTION INTRAVENOUS at 09:33

## 2023-03-01 NOTE — TELEPHONE ENCOUNTER
Called to see how Annie was feeling today and If her symptoms have improved or if she would like to see an NP in our office today.  She is currently in [facility] being evaluated.
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
## 2023-03-01 NOTE — DISCHARGE INSTRUCTIONS
You were evaluated for belly pain.  We found that you had diverticulitis.  We treated you and your pain improved.  You are now stable for discharge.  We will treat you with an antibiotic called Augmentin and gave you extra pain medicine for pain control at home.  Your home pharmacy did not have these medications and so we sent them to our pharmacy which should have both the medications that we have prescribed.  Please take these as directed and only take your pain medicine as needed.  Would also recommend that you follow-up with your primary care doctor to ensure that you are improving appropriately.  If you experience a severe exacerbation of your abdominal pain or have persistent fevers in spite of your antibiotic therapy, please come back to emergency department for further evaluation.  You are now stable for discharge.

## 2023-03-02 ENCOUNTER — HOSPITAL ENCOUNTER (EMERGENCY)
Facility: HOSPITAL | Age: 78
Discharge: HOME | End: 2023-03-02
Attending: EMERGENCY MEDICINE | Admitting: EMERGENCY MEDICINE
Payer: MEDICARE

## 2023-03-02 ENCOUNTER — HOSPITAL ENCOUNTER (OUTPATIENT)
Dept: NEUROLOGY | Facility: HOSPITAL | Age: 78
Discharge: HOME | End: 2023-03-02
Admitting: PSYCHIATRY & NEUROLOGY
Payer: MEDICARE

## 2023-03-02 VITALS
SYSTOLIC BLOOD PRESSURE: 103 MMHG | TEMPERATURE: 97.9 F | HEIGHT: 67 IN | RESPIRATION RATE: 16 BRPM | HEART RATE: 89 BPM | BODY MASS INDEX: 29.51 KG/M2 | WEIGHT: 188 LBS | DIASTOLIC BLOOD PRESSURE: 54 MMHG | OXYGEN SATURATION: 96 %

## 2023-03-02 DIAGNOSIS — K57.92 DIVERTICULITIS: Primary | ICD-10-CM

## 2023-03-02 DIAGNOSIS — G31.84 MILD COGNITIVE IMPAIRMENT: ICD-10-CM

## 2023-03-02 LAB
ALBUMIN SERPL-MCNC: 3.6 G/DL (ref 3.5–5.2)
ALBUMIN/GLOB SERPL: 1.1 G/DL
ALP SERPL-CCNC: 95 U/L (ref 39–117)
ALT SERPL W P-5'-P-CCNC: 8 U/L (ref 1–33)
ANION GAP SERPL CALCULATED.3IONS-SCNC: 9.5 MMOL/L (ref 5–15)
AST SERPL-CCNC: 13 U/L (ref 1–32)
BASOPHILS # BLD AUTO: 0.01 10*3/MM3 (ref 0–0.2)
BASOPHILS NFR BLD AUTO: 0.1 % (ref 0–1.5)
BILIRUB SERPL-MCNC: 0.3 MG/DL (ref 0–1.2)
BUN SERPL-MCNC: 27 MG/DL (ref 8–23)
BUN/CREAT SERPL: 25.7 (ref 7–25)
CALCIUM SPEC-SCNC: 8.7 MG/DL (ref 8.6–10.5)
CHLORIDE SERPL-SCNC: 102 MMOL/L (ref 98–107)
CO2 SERPL-SCNC: 24.5 MMOL/L (ref 22–29)
CREAT SERPL-MCNC: 1.05 MG/DL (ref 0.57–1)
DEPRECATED RDW RBC AUTO: 50.1 FL (ref 37–54)
EGFRCR SERPLBLD CKD-EPI 2021: 54.2 ML/MIN/1.73
EOSINOPHIL # BLD AUTO: 0.02 10*3/MM3 (ref 0–0.4)
EOSINOPHIL NFR BLD AUTO: 0.2 % (ref 0.3–6.2)
ERYTHROCYTE [DISTWIDTH] IN BLOOD BY AUTOMATED COUNT: 14.8 % (ref 12.3–15.4)
GLOBULIN UR ELPH-MCNC: 3.2 GM/DL
GLUCOSE SERPL-MCNC: 172 MG/DL (ref 65–99)
HCT VFR BLD AUTO: 34.1 % (ref 34–46.6)
HGB BLD-MCNC: 10.9 G/DL (ref 12–15.9)
IMM GRANULOCYTES # BLD AUTO: 0.31 10*3/MM3 (ref 0–0.05)
IMM GRANULOCYTES NFR BLD AUTO: 3.8 % (ref 0–0.5)
LYMPHOCYTES # BLD AUTO: 2.2 10*3/MM3 (ref 0.7–3.1)
LYMPHOCYTES NFR BLD AUTO: 26.9 % (ref 19.6–45.3)
MCH RBC QN AUTO: 29.6 PG (ref 26.6–33)
MCHC RBC AUTO-ENTMCNC: 32 G/DL (ref 31.5–35.7)
MCV RBC AUTO: 92.7 FL (ref 79–97)
MONOCYTES # BLD AUTO: 0.65 10*3/MM3 (ref 0.1–0.9)
MONOCYTES NFR BLD AUTO: 7.9 % (ref 5–12)
NEUTROPHILS NFR BLD AUTO: 4.99 10*3/MM3 (ref 1.7–7)
NEUTROPHILS NFR BLD AUTO: 61.1 % (ref 42.7–76)
NRBC BLD AUTO-RTO: 0 /100 WBC (ref 0–0.2)
PLATELET # BLD AUTO: 128 10*3/MM3 (ref 140–450)
PMV BLD AUTO: 11.5 FL (ref 6–12)
POTASSIUM SERPL-SCNC: 4.1 MMOL/L (ref 3.5–5.2)
PROT SERPL-MCNC: 6.8 G/DL (ref 6–8.5)
RBC # BLD AUTO: 3.68 10*6/MM3 (ref 3.77–5.28)
SODIUM SERPL-SCNC: 136 MMOL/L (ref 136–145)
WBC NRBC COR # BLD: 8.18 10*3/MM3 (ref 3.4–10.8)

## 2023-03-02 PROCEDURE — 85025 COMPLETE CBC W/AUTO DIFF WBC: CPT | Performed by: EMERGENCY MEDICINE

## 2023-03-02 PROCEDURE — 99283 EMERGENCY DEPT VISIT LOW MDM: CPT

## 2023-03-02 PROCEDURE — 96375 TX/PRO/DX INJ NEW DRUG ADDON: CPT

## 2023-03-02 PROCEDURE — 95812 EEG 41-60 MINUTES: CPT | Performed by: PSYCHIATRY & NEUROLOGY

## 2023-03-02 PROCEDURE — 25010000002 ONDANSETRON PER 1 MG: Performed by: EMERGENCY MEDICINE

## 2023-03-02 PROCEDURE — 96374 THER/PROPH/DIAG INJ IV PUSH: CPT

## 2023-03-02 PROCEDURE — 95812 EEG 41-60 MINUTES: CPT

## 2023-03-02 PROCEDURE — 96376 TX/PRO/DX INJ SAME DRUG ADON: CPT

## 2023-03-02 PROCEDURE — 80053 COMPREHEN METABOLIC PANEL: CPT | Performed by: EMERGENCY MEDICINE

## 2023-03-02 RX ORDER — ONDANSETRON 2 MG/ML
4 INJECTION INTRAMUSCULAR; INTRAVENOUS ONCE
Status: COMPLETED | OUTPATIENT
Start: 2023-03-02 | End: 2023-03-02

## 2023-03-02 RX ORDER — SODIUM CHLORIDE 0.9 % (FLUSH) 0.9 %
10 SYRINGE (ML) INJECTION AS NEEDED
Status: DISCONTINUED | OUTPATIENT
Start: 2023-03-02 | End: 2023-03-02 | Stop reason: HOSPADM

## 2023-03-02 RX ORDER — FAMOTIDINE 10 MG/ML
20 INJECTION, SOLUTION INTRAVENOUS ONCE
Status: COMPLETED | OUTPATIENT
Start: 2023-03-02 | End: 2023-03-02

## 2023-03-02 RX ORDER — FENTANYL CITRATE 50 UG/ML
50 INJECTION, SOLUTION INTRAMUSCULAR; INTRAVENOUS
Status: DISCONTINUED | OUTPATIENT
Start: 2023-03-02 | End: 2023-03-02

## 2023-03-02 RX ADMIN — SODIUM CHLORIDE 500 ML: 9 INJECTION, SOLUTION INTRAVENOUS at 12:55

## 2023-03-02 RX ADMIN — ONDANSETRON 4 MG: 2 INJECTION INTRAMUSCULAR; INTRAVENOUS at 12:55

## 2023-03-02 RX ADMIN — ONDANSETRON 4 MG: 2 INJECTION INTRAMUSCULAR; INTRAVENOUS at 13:56

## 2023-03-02 RX ADMIN — ALUMINUM HYDROXIDE, MAGNESIUM HYDROXIDE, AND DIMETHICONE: 400; 400; 40 SUSPENSION ORAL at 13:57

## 2023-03-02 RX ADMIN — FAMOTIDINE 20 MG: 10 INJECTION INTRAVENOUS at 13:56
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
## 2023-03-08 ENCOUNTER — OFFICE VISIT (OUTPATIENT)
Dept: FAMILY MEDICINE CLINIC | Facility: CLINIC | Age: 78
End: 2023-03-08
Payer: MEDICARE

## 2023-03-08 VITALS
HEIGHT: 67 IN | HEART RATE: 65 BPM | BODY MASS INDEX: 29.51 KG/M2 | TEMPERATURE: 97.4 F | WEIGHT: 188 LBS | OXYGEN SATURATION: 98 % | SYSTOLIC BLOOD PRESSURE: 110 MMHG | DIASTOLIC BLOOD PRESSURE: 68 MMHG

## 2023-03-08 DIAGNOSIS — K57.92 DIVERTICULITIS: Primary | ICD-10-CM

## 2023-03-08 DIAGNOSIS — K56.7 ILEUS: ICD-10-CM

## 2023-03-08 PROCEDURE — 99214 OFFICE O/P EST MOD 30 MIN: CPT | Performed by: FAMILY MEDICINE

## 2023-03-08 NOTE — PROGRESS NOTES
Subjective   Annie Mejia is a 77 y.o. female.     History of Present Illness   Here for ER f/u. Seen here 2/27 for abd pain. Non-acute, minimal LLQ TTP. Symptoms worsened, she sent to ED 3/1 and 3/2. CT showed mild diverticulitis, ileus. tx'd with abx. Taking as rx'd. Bowels moving more regularly now, less cramping. No fever, no blood in sool.     The following portions of the patient's history were reviewed and updated as appropriate: allergies, current medications, past family history, past medical history, past social history, past surgical history and problem list.    Review of Systems   Constitutional: Negative for fever.   HENT: Negative for sore throat and trouble swallowing.    Respiratory: Negative for cough.    Cardiovascular: Negative for chest pain.   Gastrointestinal: Positive for abdominal pain (chronic, generalized, mild), constipation (intermittently), diarrhea (intermittently) and nausea. Negative for blood in stool and vomiting.   Genitourinary: Negative for dysuria, frequency and hematuria.   Neurological: Negative for weakness.   Psychiatric/Behavioral: Negative for confusion.   Pt's previous ROS reviewed and updated as indicated.       Objective    Vitals:    03/08/23 1016   BP: 110/68   Pulse: 65   Temp: 97.4 °F (36.3 °C)   SpO2: 98%     Body mass index is 29.44 kg/m².      03/08/23  1016   Weight: 85.3 kg (188 lb)       Physical Exam  Vitals and nursing note reviewed.   Constitutional:       General: She is not in acute distress.     Appearance: She is well-developed, well-groomed and overweight. She is not ill-appearing.   HENT:      Mouth/Throat:      Mouth: Mucous membranes are moist.   Eyes:      General: No scleral icterus.     Conjunctiva/sclera: Conjunctivae normal.   Cardiovascular:      Rate and Rhythm: Normal rate and regular rhythm.      Pulses: Normal pulses.      Heart sounds: Normal heart sounds.   Pulmonary:      Effort: Pulmonary effort is normal.      Breath sounds: Normal  breath sounds.   Abdominal:      General: Bowel sounds are normal. There is no distension.      Palpations: Abdomen is soft. There is no hepatomegaly, splenomegaly or mass.      Tenderness: There is generalized abdominal tenderness (mild). There is no right CVA tenderness, left CVA tenderness, guarding or rebound.   Skin:     General: Skin is warm and dry.      Coloration: Skin is not jaundiced or pale.   Neurological:      Mental Status: She is alert and oriented to person, place, and time. Mental status is at baseline.      Gait: Gait is intact.   Psychiatric:         Behavior: Behavior normal. Behavior is cooperative.         Cognition and Memory: Cognition normal.     Pt's previous physical exam reviewed and updated as indicated.    CT Abdomen Pelvis With Contrast  Result Date: 3/1/2023  1. Mid and distal sigmoid diverticulitis with questionable microperforation as described. There is no free fluid, abscess, or free air. 2. Air-fluid levels in nondistended small and large bowel, suspicious for ileus. 3. Right nephrolithiasis without hydronephrosis. 4. Hepatic and splenic cysts. 5. Small hiatal hernia.  Images were reviewed, interpreted, and dictated by Dr. Rhona Ferreira MD Transcribed by Odilia Pan PA-C.  This report was signed and finalized on 3/1/2023 10:22 AM by Rhona Ferreira MD.    Recent Results (from the past 336 hour(s))   COVID-19 and FLU A/B PCR - Swab, Nasopharynx    Collection Time: 03/01/23  8:33 AM    Specimen: Nasopharynx; Swab   Result Value Ref Range    COVID19 Not Detected Not Detected - Ref. Range    Influenza A PCR Not Detected Not Detected    Influenza B PCR Not Detected Not Detected   Comprehensive Metabolic Panel    Collection Time: 03/01/23  8:33 AM    Specimen: Blood   Result Value Ref Range    Glucose 125 (H) 65 - 99 mg/dL    BUN 23 8 - 23 mg/dL    Creatinine 1.13 (H) 0.57 - 1.00 mg/dL    Sodium 134 (L) 136 - 145 mmol/L    Potassium 4.0 3.5 - 5.2 mmol/L    Chloride 100 98 - 107  mmol/L    CO2 23.8 22.0 - 29.0 mmol/L    Calcium 9.1 8.6 - 10.5 mg/dL    Total Protein 7.2 6.0 - 8.5 g/dL    Albumin 3.7 3.5 - 5.2 g/dL    ALT (SGPT) 7 1 - 33 U/L    AST (SGOT) 10 1 - 32 U/L    Alkaline Phosphatase 103 39 - 117 U/L    Total Bilirubin 0.9 0.0 - 1.2 mg/dL    Globulin 3.5 gm/dL    A/G Ratio 1.1 g/dL    BUN/Creatinine Ratio 20.4 7.0 - 25.0    Anion Gap 10.2 5.0 - 15.0 mmol/L    eGFR 49.6 (L) >60.0 mL/min/1.73   Protime-INR    Collection Time: 03/01/23  8:33 AM    Specimen: Blood   Result Value Ref Range    Protime 14.4 12.5 - 14.5 Seconds    INR 1.08 0.90 - 1.10   Urinalysis With Culture If Indicated - Urine, Clean Catch    Collection Time: 03/01/23  8:33 AM    Specimen: Urine, Clean Catch   Result Value Ref Range    Color, UA Yellow Yellow, Straw    Appearance, UA Clear Clear    pH, UA 6.5 5.0 - 8.0    Specific Gravity, UA 1.019 1.005 - 1.030    Glucose, UA Negative Negative    Ketones, UA 15 mg/dL (1+) (A) Negative    Bilirubin, UA Negative Negative    Blood, UA Small (1+) (A) Negative    Protein, UA 30 mg/dL (1+) (A) Negative    Leuk Esterase, UA Small (1+) (A) Negative    Nitrite, UA Negative Negative    Urobilinogen, UA 0.2 E.U./dL 0.2 - 1.0 E.U./dL   Lipase    Collection Time: 03/01/23  8:33 AM    Specimen: Blood   Result Value Ref Range    Lipase 49 13 - 60 U/L   Single High Sensitivity Troponin T    Collection Time: 03/01/23  8:33 AM    Specimen: Blood   Result Value Ref Range    HS Troponin T 16 (H) <10 ng/L   BNP    Collection Time: 03/01/23  8:33 AM    Specimen: Blood   Result Value Ref Range    proBNP 131.3 0.0 - 1,800.0 pg/mL   Lactic Acid, Plasma    Collection Time: 03/01/23  8:33 AM    Specimen: Blood   Result Value Ref Range    Lactate 1.3 0.5 - 2.0 mmol/L   Procalcitonin    Collection Time: 03/01/23  8:33 AM    Specimen: Blood   Result Value Ref Range    Procalcitonin 0.17 0.00 - 0.25 ng/mL   C-reactive Protein    Collection Time: 03/01/23  8:33 AM    Specimen: Blood   Result Value Ref  Range    C-Reactive Protein 14.32 (H) 0.00 - 0.50 mg/dL   Magnesium    Collection Time: 03/01/23  8:33 AM    Specimen: Blood   Result Value Ref Range    Magnesium 1.9 1.6 - 2.4 mg/dL   Phosphorus    Collection Time: 03/01/23  8:33 AM    Specimen: Blood   Result Value Ref Range    Phosphorus 2.6 2.5 - 4.5 mg/dL   CBC Auto Differential    Collection Time: 03/01/23  8:33 AM    Specimen: Blood   Result Value Ref Range    WBC 13.93 (H) 3.40 - 10.80 10*3/mm3    RBC 4.04 3.77 - 5.28 10*6/mm3    Hemoglobin 12.1 12.0 - 15.9 g/dL    Hematocrit 36.6 34.0 - 46.6 %    MCV 90.6 79.0 - 97.0 fL    MCH 30.0 26.6 - 33.0 pg    MCHC 33.1 31.5 - 35.7 g/dL    RDW 14.9 12.3 - 15.4 %    RDW-SD 49.9 37.0 - 54.0 fl    MPV 11.6 6.0 - 12.0 fL    Platelets 144 140 - 450 10*3/mm3    Neutrophil % 67.4 42.7 - 76.0 %    Lymphocyte % 19.9 19.6 - 45.3 %    Monocyte % 8.5 5.0 - 12.0 %    Eosinophil % 0.1 (L) 0.3 - 6.2 %    Basophil % 0.1 0.0 - 1.5 %    Immature Grans % 4.0 (H) 0.0 - 0.5 %    Neutrophils, Absolute 9.37 (H) 1.70 - 7.00 10*3/mm3    Lymphocytes, Absolute 2.77 0.70 - 3.10 10*3/mm3    Monocytes, Absolute 1.19 (H) 0.10 - 0.90 10*3/mm3    Eosinophils, Absolute 0.02 0.00 - 0.40 10*3/mm3    Basophils, Absolute 0.02 0.00 - 0.20 10*3/mm3    Immature Grans, Absolute 0.56 (H) 0.00 - 0.05 10*3/mm3    nRBC 0.0 0.0 - 0.2 /100 WBC   Urinalysis, Microscopic Only - Urine, Clean Catch    Collection Time: 03/01/23  8:33 AM    Specimen: Urine, Clean Catch   Result Value Ref Range    RBC, UA 0-2 (A) None Seen /HPF    WBC, UA 3-5 (A) None Seen /HPF    Bacteria, UA Trace (A) None Seen /HPF    Squamous Epithelial Cells, UA 3-6 (A) None Seen, 0-2 /HPF    Hyaline Casts, UA None Seen None Seen /LPF    Granular Casts, UA 0-2 None Seen /LPF    Methodology Manual Light Microscopy    Single High Sensitivity Troponin T    Collection Time: 03/01/23 10:46 AM    Specimen: Blood   Result Value Ref Range    HS Troponin T 14 (H) <10 ng/L   Comprehensive Metabolic Panel     Collection Time: 03/02/23 12:56 PM    Specimen: Blood   Result Value Ref Range    Glucose 172 (H) 65 - 99 mg/dL    BUN 27 (H) 8 - 23 mg/dL    Creatinine 1.05 (H) 0.57 - 1.00 mg/dL    Sodium 136 136 - 145 mmol/L    Potassium 4.1 3.5 - 5.2 mmol/L    Chloride 102 98 - 107 mmol/L    CO2 24.5 22.0 - 29.0 mmol/L    Calcium 8.7 8.6 - 10.5 mg/dL    Total Protein 6.8 6.0 - 8.5 g/dL    Albumin 3.6 3.5 - 5.2 g/dL    ALT (SGPT) 8 1 - 33 U/L    AST (SGOT) 13 1 - 32 U/L    Alkaline Phosphatase 95 39 - 117 U/L    Total Bilirubin 0.3 0.0 - 1.2 mg/dL    Globulin 3.2 gm/dL    A/G Ratio 1.1 g/dL    BUN/Creatinine Ratio 25.7 (H) 7.0 - 25.0    Anion Gap 9.5 5.0 - 15.0 mmol/L    eGFR 54.2 (L) >60.0 mL/min/1.73   CBC Auto Differential    Collection Time: 03/02/23 12:56 PM    Specimen: Blood   Result Value Ref Range    WBC 8.18 3.40 - 10.80 10*3/mm3    RBC 3.68 (L) 3.77 - 5.28 10*6/mm3    Hemoglobin 10.9 (L) 12.0 - 15.9 g/dL    Hematocrit 34.1 34.0 - 46.6 %    MCV 92.7 79.0 - 97.0 fL    MCH 29.6 26.6 - 33.0 pg    MCHC 32.0 31.5 - 35.7 g/dL    RDW 14.8 12.3 - 15.4 %    RDW-SD 50.1 37.0 - 54.0 fl    MPV 11.5 6.0 - 12.0 fL    Platelets 128 (L) 140 - 450 10*3/mm3    Neutrophil % 61.1 42.7 - 76.0 %    Lymphocyte % 26.9 19.6 - 45.3 %    Monocyte % 7.9 5.0 - 12.0 %    Eosinophil % 0.2 (L) 0.3 - 6.2 %    Basophil % 0.1 0.0 - 1.5 %    Immature Grans % 3.8 (H) 0.0 - 0.5 %    Neutrophils, Absolute 4.99 1.70 - 7.00 10*3/mm3    Lymphocytes, Absolute 2.20 0.70 - 3.10 10*3/mm3    Monocytes, Absolute 0.65 0.10 - 0.90 10*3/mm3    Eosinophils, Absolute 0.02 0.00 - 0.40 10*3/mm3    Basophils, Absolute 0.01 0.00 - 0.20 10*3/mm3    Immature Grans, Absolute 0.31 (H) 0.00 - 0.05 10*3/mm3    nRBC 0.0 0.0 - 0.2 /100 WBC         Assessment & Plan   Diagnoses and all orders for this visit:    1. Diverticulitis (Primary)  -     Ambulatory Referral to Gastroenterology    2. Ileus (HCC)       Appears back to baseline in regard to her chronic GI symptoms. Having regular  BM. Non-acute abd exam. I suspect the majority of her symptoms prompting ER visit were from ilieus rather than from mild diverticulitis. Encouraged to complete course of augmentin.    Encouraged f/u with Dr. Martínez as she may need f/u endoscopy.    Keep routine f/u as scheduled, f/u sooner as needed.  Patient was encouraged to keep me informed of any acute changes, lack of improvement, or any new concerning symptoms.  Pt is aware of reasons to seek emergent care.  Patient voiced understanding of all instructions and denied further questions.    Please note that portions of this note may have been completed with a voice recognition program.

## 2023-03-13 ENCOUNTER — TELEPHONE (OUTPATIENT)
Dept: FAMILY MEDICINE CLINIC | Facility: CLINIC | Age: 78
End: 2023-03-13

## 2023-03-13 RX ORDER — SODIUM, POTASSIUM,MAG SULFATES 17.5-3.13G
SOLUTION, RECONSTITUTED, ORAL ORAL
Qty: 354 ML | Refills: 0 | Status: SHIPPED | OUTPATIENT
Start: 2023-03-13 | End: 2023-03-13

## 2023-03-13 NOTE — TELEPHONE ENCOUNTER
Caller: Annie Mejia    Relationship: Self    Best call back number: [phone]     What was the call regarding: PATIENT CALLED STATING THAT SHE IS NOT ABLE TO GET IN TO SEE DR CORTEZ UNTIL THE END OF MAY.  SHE ASKED IF SHE COULD GO SEE DR BROWN IN [place] -[phone].  PATIENT ASKED IF THIS IS SOMETHING THAT DR LOMBARDI WOULD WANT HER TO DO.     Do you require a callback: YES

## 2023-04-03 DIAGNOSIS — F41.8 MIXED ANXIETY DEPRESSIVE DISORDER: ICD-10-CM

## 2023-04-03 RX ORDER — ALPRAZOLAM 0.25 MG/1
TABLET ORAL
Qty: 30 TABLET | Refills: 0 | Status: SHIPPED | OUTPATIENT
Start: 2023-04-03

## 2023-04-06 ENCOUNTER — TELEPHONE (OUTPATIENT)
Dept: FAMILY MEDICINE CLINIC | Facility: CLINIC | Age: 78
End: 2023-04-06
Payer: MEDICARE

## 2023-04-06 NOTE — TELEPHONE ENCOUNTER
PATIENT IS IN SEVERE PAIN AND IS SURE SHE IS HAVING A DIVERTICULITIS ATTACK AND WAS WANTING TO SPEAK WITH NURSE ABOUT WHAT SHE NEEDS TO DO SHE SAID SHE DON'T WANNA GO TO THE ER.

## 2023-04-19 ENCOUNTER — OFFICE VISIT (OUTPATIENT)
Dept: NEUROLOGY | Facility: CLINIC | Age: 78
End: 2023-04-19
Payer: MEDICARE

## 2023-04-19 VITALS
HEART RATE: 73 BPM | BODY MASS INDEX: 29.51 KG/M2 | DIASTOLIC BLOOD PRESSURE: 72 MMHG | WEIGHT: 188 LBS | HEIGHT: 67 IN | SYSTOLIC BLOOD PRESSURE: 116 MMHG | OXYGEN SATURATION: 97 %

## 2023-04-19 DIAGNOSIS — I69.30 SEQUELAE, POST-STROKE: ICD-10-CM

## 2023-04-19 DIAGNOSIS — G31.84 MILD COGNITIVE IMPAIRMENT: Primary | ICD-10-CM

## 2023-04-19 PROCEDURE — 1160F RVW MEDS BY RX/DR IN RCRD: CPT | Performed by: PSYCHIATRY & NEUROLOGY

## 2023-04-19 PROCEDURE — 3078F DIAST BP <80 MM HG: CPT | Performed by: PSYCHIATRY & NEUROLOGY

## 2023-04-19 PROCEDURE — 99213 OFFICE O/P EST LOW 20 MIN: CPT | Performed by: PSYCHIATRY & NEUROLOGY

## 2023-04-19 PROCEDURE — 3074F SYST BP LT 130 MM HG: CPT | Performed by: PSYCHIATRY & NEUROLOGY

## 2023-04-19 PROCEDURE — 1159F MED LIST DOCD IN RCRD: CPT | Performed by: PSYCHIATRY & NEUROLOGY

## 2023-04-19 RX ORDER — DONEPEZIL HYDROCHLORIDE 5 MG/1
5 TABLET, FILM COATED ORAL NIGHTLY
Qty: 30 TABLET | Refills: 11 | Status: SHIPPED | OUTPATIENT
Start: 2023-04-19 | End: 2024-04-18
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
## 2023-04-20 DIAGNOSIS — M79.605 LEFT LEG PAIN: ICD-10-CM

## 2023-04-21 RX ORDER — GABAPENTIN 300 MG/1
300 CAPSULE ORAL 3 TIMES DAILY PRN
Qty: 90 CAPSULE | Refills: 0 | Status: SHIPPED | OUTPATIENT
Start: 2023-04-21

## 2023-04-21 NOTE — TELEPHONE ENCOUNTER
Rx Refill Note  Requested Prescriptions     Pending Prescriptions Disp Refills   • gabapentin (NEURONTIN) 300 MG capsule [Pharmacy Med Name: GABAPENTIN 300 MG Capsule] 90 capsule      Sig: TAKE 1 CAPSULE THREE TIMES DAILY      Last office visit with prescribing clinician: 3/8/2023   Last telemedicine visit with prescribing clinician: Visit date not found   Next office visit with prescribing clinician: Visit date not found                         Would you like a call back once the refill request has been completed: [] Yes [] No    If the office needs to give you a call back, can they leave a voicemail: [] Yes [] No    Silvana Tay MA  04/21/23, 10:16 EDT

## 2023-05-04 DIAGNOSIS — F41.8 MIXED ANXIETY DEPRESSIVE DISORDER: ICD-10-CM

## 2023-05-04 NOTE — TELEPHONE ENCOUNTER
Caller: Annie Mejia    Relationship: Self    Best call back number: [phone]    Requested Prescriptions:   Requested Prescriptions     Pending Prescriptions Disp Refills   • famotidine (PEPCID) 40 MG tablet [Pharmacy Med Name: FAMOTIDINE 40 MG Tablet] 180 tablet 3     Sig: TAKE 1 TABLET TWICE DAILY AS NEEDED FOR HEARTBURN   • ALPRAZolam (XANAX) 0.25 MG tablet 30 tablet 0     Sig: TAKE 1 TO 2 TABLETS BY MOUTH ONCE DAILY AS NEEDED FOR  PANIC        Pharmacy where request should be sent: [address] [phone] [facility] [phone] [facility]     Last office visit with prescribing clinician: 3/8/2023   Last telemedicine visit with prescribing clinician: Visit date not found   Next office visit with prescribing clinician: Visit date not found     Additional details provided by patient:     Does the patient have less than a 3 day supply:  [x] Yes  [] No    Tony Fleming Rep   05/04/23 10:39 EDT

## 2023-05-05 RX ORDER — FAMOTIDINE 40 MG/1
TABLET, FILM COATED ORAL
Qty: 180 TABLET | Refills: 3 | OUTPATIENT
Start: 2023-05-05

## 2023-05-05 RX ORDER — ALPRAZOLAM 0.25 MG/1
TABLET ORAL
Qty: 60 TABLET | Refills: 1 | Status: SHIPPED | OUTPATIENT
Start: 2023-05-05

## 2023-05-05 NOTE — TELEPHONE ENCOUNTER
Rx Refill Note  Requested Prescriptions     Pending Prescriptions Disp Refills   • ALPRAZolam (XANAX) 0.25 MG tablet 30 tablet 0     Sig: TAKE 1 TO 2 TABLETS BY MOUTH ONCE DAILY AS NEEDED FOR  PANIC     Refused Prescriptions Disp Refills   • famotidine (PEPCID) 40 MG tablet [Pharmacy Med Name: FAMOTIDINE 40 MG Tablet] 180 tablet 3     Sig: TAKE 1 TABLET TWICE DAILY AS NEEDED FOR HEARTBURN     Refused By: SILVANA TAY     Reason for Refusal: Refill not appropriate      Last office visit with prescribing clinician: 3/8/2023   Last telemedicine visit with prescribing clinician: Visit date not found   Next office visit with prescribing clinician: Visit date not found                         Would you like a call back once the refill request has been completed: [] Yes [] No    If the office needs to give you a call back, can they leave a voicemail: [] Yes [] No    Silvana Tay MA  05/05/23, 17:10 EDT

## 2023-05-12 ENCOUNTER — OFFICE VISIT (OUTPATIENT)
Dept: FAMILY MEDICINE CLINIC | Facility: CLINIC | Age: 78
End: 2023-05-12
Payer: MEDICARE

## 2023-05-12 VITALS
WEIGHT: 195 LBS | DIASTOLIC BLOOD PRESSURE: 64 MMHG | RESPIRATION RATE: 14 BRPM | HEART RATE: 64 BPM | BODY MASS INDEX: 30.61 KG/M2 | HEIGHT: 67 IN | TEMPERATURE: 97 F | SYSTOLIC BLOOD PRESSURE: 108 MMHG | OXYGEN SATURATION: 97 %

## 2023-05-12 DIAGNOSIS — M51.36 DEGENERATION OF INTERVERTEBRAL DISC OF LUMBAR REGION: Primary | ICD-10-CM

## 2023-05-12 DIAGNOSIS — G25.81 RESTLESS LEGS SYNDROME: ICD-10-CM

## 2023-05-12 PROCEDURE — 99213 OFFICE O/P EST LOW 20 MIN: CPT | Performed by: FAMILY MEDICINE

## 2023-05-12 PROCEDURE — 3074F SYST BP LT 130 MM HG: CPT | Performed by: FAMILY MEDICINE

## 2023-05-12 PROCEDURE — 3078F DIAST BP <80 MM HG: CPT | Performed by: FAMILY MEDICINE

## 2023-05-12 RX ORDER — ROPINIROLE 0.25 MG/1
TABLET, FILM COATED ORAL
Qty: 180 TABLET | Refills: 1 | Status: SHIPPED | OUTPATIENT
Start: 2023-05-12

## 2023-05-12 NOTE — PROGRESS NOTES
Established Patient        Chief Complaint:   Chief Complaint   Patient presents with   • Leg Pain        Annie Mejia is a 77 y.o. female    History of Present Illness:     Here today with complaints of painful LE at night when lying down; describes as painful paresthesias of chacha LE, constant urge to move legs, describes as evolution of cramping sensation.    She denies any abnormal rashes.  No reports of fever, chills or night sweats.  Denies any bowel incontinence.  No saddle anesthesia reported.  Denies chest pain, syncope, palpitations or vertigo.      Subjective     The following portions of the patient's history were reviewed and updated as appropriate: allergies, current medications, past family history, past medical history, past social history, past surgical history and problem list.    Allergies   Allergen Reactions   • Oxycodone Shortness Of Breath   • Oxycodone-Acetaminophen Nausea And Vomiting and Shortness Of Breath   • Azithromycin Nausea And Vomiting   • Erythrityl Tetranitrate Nausea And Vomiting   • Morphine Itching   • Morphine And Related Unknown - Low Severity       Review of Systems  1. Constitutional: Negative for fever. Negative for chills, diaphoresis, fatigue and unexpected weight change.   2. HENT: No dysphagia; no changes to vision/hearing/smell/taste; no epistaxis  3. Eyes: Negative for redness and visual disturbance.   4. Respiratory: negative for shortness of breath. Negative for chest pain . Negative for cough and chest tightness.   5. Cardiovascular: Negative for chest pain and palpitations.   6. Gastrointestinal: Negative for abdominal distention, abdominal pain and blood in stool.   7. Endocrine: Negative for cold intolerance and heat intolerance.   8. Genitourinary: Negative for difficulty urinating, dysuria and frequency.   9. Musculoskeletal: As per above.  10. Skin: Negative for color change, rash and wound.   11. Neurological: Negative for syncope,  "weakness and headaches.  Lesions to lower extremities as per above.  12. Hematological: Negative for adenopathy. Does not bruise/bleed easily.   13. Psychiatric/Behavioral: Negative for confusion. The patient is not nervous/anxious.    Objective     Physical Exam   Vital Signs: /64   Pulse 64   Temp 97 °F (36.1 °C)   Resp 14   Ht 170.2 cm (67\")   Wt 88.5 kg (195 lb)   SpO2 97%   BMI 30.54 kg/m²   BMI is >= 25 and <30. (Overweight) The following options were offered after discussion;: exercise counseling/recommendations and nutrition counseling/recommendations    General Appearance: alert, oriented x 3, no acute distress.  Skin: warm and dry.   HEENT: Atraumatic.  pupils round and reactive to light and accommodation, oral mucosa pink and moist.  Nares patent without epistaxis.  External auditory canals are patent tympanic membranes intact.  Neck: supple, no JVD, trachea midline.  No thyromegaly  Lungs: CTA, unlabored breathing effort.  Heart: RRR, normal S1 and S2, no S3, no rub.  Abdomen: soft, non-tender, no palpable bladder, present bowel sounds to auscultation ×4.  No guarding or rigidity.  Extremities: no clubbing, cyanosis or edema.  Good range of motion actively and passively.  Symmetric muscle strength and development  Neuro: normal speech and mental status.  Cranial nerves II through XII intact.  No anosmia. DTR 2+; proprioception intact.  No focal motor/sensory deficits.    Advance Care Planning   ACP discussion was held with the patient during this visit. Patient does not have an advance directive, information provided.       Assessment and Plan      Assessment/Plan:   Diagnoses and all orders for this visit:    1. Degeneration of intervertebral disc of lumbar region (Primary)  -     rOPINIRole (REQUIP) 0.25 MG tablet; 0.5 tab po qhs x first 2 nights; then increase to 1 tab po qhs x next five nights; then increase to 2 tabs po qhs each night thereafter  Dispense: 180 tablet; Refill: 1    2. " Restless legs syndrome  -     Basic Metabolic Panel  -     Magnesium  -     rOPINIRole (REQUIP) 0.25 MG tablet; 0.5 tab po qhs x first 2 nights; then increase to 1 tab po qhs x next five nights; then increase to 2 tabs po qhs each night thereafter  Dispense: 180 tablet; Refill: 1      Begin trial of ropinirole; will check BMP/Mag levels today.      Discussion Summary:    Discussed plan of care in detail with pt today; pt verb understanding and agrees.    I spent 25 minutes caring for Annie on this date of service. This time includes time spent by me in the following activities:preparing for the visit, performing a medically appropriate examination and/or evaluation , counseling and educating the patient/family/caregiver, ordering medications, tests, or procedures, documenting information in the medical record and care coordination    I have reviewed and updated all copied forward information, as appropriate.  I attest to the accuracy and relevance of any unchanged information.    Follow up:  Return in about 4 weeks (around 6/9/2023) for Recheck, Med Change/New Meds.     There are no Patient Instructions on file for this visit.    Kwame Warner DO  05/12/23  15:43 EDT

## 2023-05-13 LAB
BUN SERPL-MCNC: 32 MG/DL (ref 8–27)
BUN/CREAT SERPL: 36 (ref 12–28)
CALCIUM SERPL-MCNC: 9 MG/DL (ref 8.7–10.3)
CHLORIDE SERPL-SCNC: 104 MMOL/L (ref 96–106)
CO2 SERPL-SCNC: 22 MMOL/L (ref 20–29)
CREAT SERPL-MCNC: 0.9 MG/DL (ref 0.57–1)
EGFRCR SERPLBLD CKD-EPI 2021: 65 ML/MIN/1.73
GLUCOSE SERPL-MCNC: 158 MG/DL (ref 70–99)
MAGNESIUM SERPL-MCNC: 2 MG/DL (ref 1.6–2.3)
POTASSIUM SERPL-SCNC: 4.8 MMOL/L (ref 3.5–5.2)
SODIUM SERPL-SCNC: 141 MMOL/L (ref 134–144)

## 2023-05-16 ENCOUNTER — TELEPHONE (OUTPATIENT)
Dept: FAMILY MEDICINE CLINIC | Facility: CLINIC | Age: 78
End: 2023-05-16

## 2023-05-16 NOTE — TELEPHONE ENCOUNTER
Caller: Annie Mejia    Relationship: Self    Best call back number: [phone]    What medications are you currently taking:   Current Outpatient Medications on File Prior to Visit   Medication Sig Dispense Refill   • albuterol sulfate  (90 Base) MCG/ACT inhaler Inhale 2 puffs Every 4 (Four) Hours As Needed for Wheezing or Shortness of Air. 18 g 1   • ALPRAZolam (XANAX) 0.25 MG tablet TAKE 1 TO 2 TABLETS BY MOUTH ONCE DAILY AS NEEDED FOR  PANIC 60 tablet 1   • aluminum-magnesium hydroxide 200-200 MG/5ML suspension Take 10 mL by mouth Every 6 (Six) Hours As Needed for Heartburn. 180 mL 0   • amLODIPine (NORVASC) 5 MG tablet TAKE 1 TABLET EVERY DAY 90 tablet 3   • ascorbic acid (VITAMIN C) 500 MG tablet Take 1 tablet by mouth Daily.     • aspirin EC 81 MG EC tablet Take 1 tablet by mouth Daily. 100 tablet 11   • atorvastatin (LIPITOR) 40 MG tablet TAKE 1 TABLET BY MOUTH EVERY NIGHT. 90 tablet 3   • donepezil (Aricept) 5 MG tablet Take 1 tablet by mouth Every Night. 30 tablet 11   • EPINEPHrine (EPIPEN) 0.3 MG/0.3ML solution auto-injector injection EpiPen 2-Lev 0.3 MG/0.3ML Injection Solution Auto-injector; Patient Sig: EpiPen 2-Lev 0.3 MG/0.3ML Injection Solution Auto-injector INJECT 0.3ML INTRAMUSCULARLY AS DIRECTED.; 1; 2; 06-May-2015; Active     • gabapentin (NEURONTIN) 300 MG capsule Take 1 capsule by mouth 3 (Three) Times a Day As Needed (pain). 90 capsule 0   • glucose blood (FREESTYLE LITE) test strip USE ONE STRIP TO CHECK GLUCOSE FASTING IN THE MORNING AND IN THE EVENING 120 each 12   • HYDROcodone-acetaminophen (NORCO) 7.5-325 MG per tablet Take 1 tablet by mouth Every 8 (Eight) Hours As Needed for Severe Pain or Moderate Pain. Only 2 tablets per 8 hours from both this and your home loratab regimen 12 tablet 0   • Lancets (freestyle) lancets CHECK GLUCOSE FASTING IN THE MORNING AND IN THE EVENING, 100 each 5   • lisinopril (PRINIVIL,ZESTRIL) 20 MG tablet TAKE 1 TABLET EVERY DAY 90 tablet 3   •  magnesium oxide (MAG-OX) 400 MG tablet Take 1 tablet by mouth Daily.     • meclizine (ANTIVERT) 25 MG tablet Take 1-2 tablets by mouth every 6 (six) to 8 (eight) hours as needed for dizziness. 30 tablet 0   • Melatonin 10 MG tablet Take  by mouth.     • metoclopramide (REGLAN) 5 MG tablet TAKE 1 TABLET BY MOUTH THREE TIMES DAILY 30 tablet 0   • naloxone (NARCAN) 4 MG/0.1ML nasal spray 1 spray into the nostril(s) as directed by provider As Needed (altered mental status). 1 each 0   • Omega-3 1000 MG capsule Take 1 capsule by mouth Daily.     • omeprazole (priLOSEC) 20 MG capsule Take 1 capsule by mouth Daily. 90 capsule 3   • ondansetron ODT (ZOFRAN-ODT) 4 MG disintegrating tablet Place 1 tablet on the tongue Every 8 (Eight) Hours As Needed for Nausea or Vomiting. 20 tablet 0   • promethazine (PHENERGAN) 25 MG suppository 1/2 to 1 per rectum q 8 hours as needed for severe nausea/vomiting 12 suppository 1   • propranolol (INDERAL) 40 MG tablet TAKE 1 TABLET TWICE DAILY 180 tablet 3   • rOPINIRole (REQUIP) 0.25 MG tablet 0.5 tab po qhs x first 2 nights; then increase to 1 tab po qhs x next five nights; then increase to 2 tabs po qhs each night thereafter 180 tablet 1     No current facility-administered medications on file prior to visit.          When did you start taking these medications: Friday NIGHT    Which medication are you concerned about: rOPINIRole (REQUIP) 0.25 MG tablet    Who prescribed you this medication: DR DREW    What are your concerns: PATIENT IS HAVING NAUSEA AND HOT AND COLD CHILLS. PATIENT ASKED IF SHE COULD STOP TAKING THIS.  PATIENT STATED THAT SHE HAS BEEN SICK EVER SINCE TAKING THE MEDICATION.     How long have you had these concerns: SINCE Friday

## 2023-05-22 ENCOUNTER — OFFICE VISIT (OUTPATIENT)
Dept: FAMILY MEDICINE CLINIC | Facility: CLINIC | Age: 78
End: 2023-05-22
Payer: MEDICARE

## 2023-05-22 VITALS
HEART RATE: 74 BPM | BODY MASS INDEX: 28.88 KG/M2 | OXYGEN SATURATION: 99 % | HEIGHT: 67 IN | DIASTOLIC BLOOD PRESSURE: 82 MMHG | WEIGHT: 184 LBS | TEMPERATURE: 97.1 F | RESPIRATION RATE: 18 BRPM | SYSTOLIC BLOOD PRESSURE: 122 MMHG

## 2023-05-22 DIAGNOSIS — R10.9 ABDOMINAL CRAMPS: ICD-10-CM

## 2023-05-22 DIAGNOSIS — R10.32 LLQ PAIN: Primary | ICD-10-CM

## 2023-05-22 DIAGNOSIS — R10.84 GENERALIZED ABDOMINAL PAIN: ICD-10-CM

## 2023-05-22 PROCEDURE — 3079F DIAST BP 80-89 MM HG: CPT | Performed by: FAMILY MEDICINE

## 2023-05-22 PROCEDURE — 99213 OFFICE O/P EST LOW 20 MIN: CPT | Performed by: FAMILY MEDICINE

## 2023-05-22 PROCEDURE — 3074F SYST BP LT 130 MM HG: CPT | Performed by: FAMILY MEDICINE

## 2023-05-22 RX ORDER — DICYCLOMINE HCL 20 MG
20 TABLET ORAL EVERY 6 HOURS
Qty: 120 TABLET | Refills: 0 | Status: SHIPPED | OUTPATIENT
Start: 2023-05-22

## 2023-05-22 RX ORDER — AMOXICILLIN AND CLAVULANATE POTASSIUM 875; 125 MG/1; MG/1
1 TABLET, FILM COATED ORAL 2 TIMES DAILY
Qty: 14 TABLET | Refills: 0 | Status: SHIPPED | OUTPATIENT
Start: 2023-05-22 | End: 2023-05-29

## 2023-05-22 NOTE — PROGRESS NOTES
Subjective   Annie Mejia is a 77 y.o. female.     History of Present Illness   Presents with c/o recurrent LLQ pain. Previous dx of diverticulitis. States it feels similar. assoc'd with severe cramping. Did well with amoxicillin, bentyl previously. No fever, no blood in stool. No emesis.    Has apt with Dr. Martínez's office this week.    The following portions of the patient's history were reviewed and updated as appropriate: allergies, current medications, past family history, past medical history, past social history, past surgical history and problem list.    Review of Systems   Constitutional: Negative for fever.   HENT: Negative for sore throat and trouble swallowing.    Respiratory: Negative for cough.    Cardiovascular: Negative for chest pain.   Gastrointestinal: Positive for abdominal pain, constipation (intermittently), diarrhea (intermittently) and nausea. Negative for blood in stool and vomiting.   Genitourinary: Negative for dysuria, frequency and hematuria.   Neurological: Negative for weakness.   Psychiatric/Behavioral: Negative for confusion.       Objective    Vitals:    05/22/23 1107   BP: 122/82   Pulse: 74   Resp: 18   Temp: 97.1 °F (36.2 °C)   SpO2: 99%     Body mass index is 28.82 kg/m².      05/22/23  1107   Weight: 83.5 kg (184 lb)       Physical Exam  Vitals and nursing note reviewed.   Constitutional:       General: She is not in acute distress.     Appearance: She is well-developed, well-groomed and overweight. She is not ill-appearing.   HENT:      Head: Atraumatic.      Mouth/Throat:      Mouth: Mucous membranes are moist.   Eyes:      General: No scleral icterus.     Conjunctiva/sclera: Conjunctivae normal.   Cardiovascular:      Rate and Rhythm: Normal rate and regular rhythm.      Pulses: Normal pulses.      Heart sounds: Normal heart sounds.   Pulmonary:      Effort: Pulmonary effort is normal.      Breath sounds: Normal breath sounds.   Abdominal:      General: Bowel sounds are  normal. There is no distension.      Palpations: Abdomen is soft. There is no hepatomegaly, splenomegaly or mass.      Tenderness: There is abdominal tenderness (mild) in the suprapubic area and left lower quadrant. There is no right CVA tenderness, left CVA tenderness, guarding or rebound.   Skin:     General: Skin is warm and dry.      Coloration: Skin is not jaundiced or pale.   Neurological:      Mental Status: She is alert and oriented to person, place, and time. Mental status is at baseline.      Gait: Gait is intact.   Psychiatric:         Behavior: Behavior normal. Behavior is cooperative.         Cognition and Memory: Cognition normal.     Pt's previous physical exam reviewed and updated as indicated.        Assessment & Plan   Diagnoses and all orders for this visit:    1. LLQ pain (Primary)    2. Abdominal cramps    3. Generalized abdominal pain    Other orders  -     amoxicillin-clavulanate (AUGMENTIN) 875-125 MG per tablet; Take 1 tablet by mouth 2 (Two) Times a Day for 7 days.  Dispense: 14 tablet; Refill: 0  -     dicyclomine (BENTYL) 20 MG tablet; Take 1 tablet by mouth Every 6 (Six) Hours.  Dispense: 120 tablet; Refill: 0       Suspect recurrent diverticulitis. Non acute abd exam. Did well on bentyl amox previously. F/u with Dr. Martínez's office as scheduled.    Patient was encouraged to keep me informed of any acute changes, lack of improvement, or any new concerning symptoms.  Pt is aware of reasons to seek emergent care.  Patient voiced understanding of all instructions and denied further questions.    Please note that portions of this note may have been completed with a voice recognition program.

## 2023-05-25 ENCOUNTER — OFFICE VISIT (OUTPATIENT)
Dept: GASTROENTEROLOGY | Facility: CLINIC | Age: 78
End: 2023-05-25
Payer: MEDICARE

## 2023-05-25 VITALS
BODY MASS INDEX: 30.04 KG/M2 | DIASTOLIC BLOOD PRESSURE: 60 MMHG | HEIGHT: 67 IN | TEMPERATURE: 97.3 F | HEART RATE: 70 BPM | WEIGHT: 191.4 LBS | SYSTOLIC BLOOD PRESSURE: 101 MMHG

## 2023-05-25 DIAGNOSIS — Z87.898 HISTORY OF VOMITING: ICD-10-CM

## 2023-05-25 DIAGNOSIS — Z86.010 HISTORY OF COLONIC POLYPS: ICD-10-CM

## 2023-05-25 DIAGNOSIS — R11.0 CHRONIC NAUSEA: ICD-10-CM

## 2023-05-25 DIAGNOSIS — K57.92 DIVERTICULITIS: Primary | ICD-10-CM

## 2023-05-25 NOTE — PATIENT INSTRUCTIONS
Mix 1 dose of Miralax with 1 dose of Metamucil (psyllium) in 8 ounce of any liquid and drink once daily. With the exception of Miralax, separate Metamucil from other medications by 2 hours on both sides. If you have diarrhea taking Metamucil and Miralax, decrease dose of Miralax to half dose. If you have continued diarrhea stop Miralax and continue Metamucil (psyllium). If you continue to have diarrhea stop psyllium and continue to focus on 30 grams of dietary fiber per day.
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
## 2023-07-14 PROBLEM — R35.0 URINARY FREQUENCY: Status: ACTIVE | Noted: 2023-07-14

## 2023-07-19 ENCOUNTER — OUTSIDE FACILITY SERVICE (OUTPATIENT)
Dept: GASTROENTEROLOGY | Facility: CLINIC | Age: 78
End: 2023-07-19
Payer: MEDICARE

## 2023-07-19 PROCEDURE — 88305 TISSUE EXAM BY PATHOLOGIST: CPT

## 2023-07-20 ENCOUNTER — LAB REQUISITION (OUTPATIENT)
Dept: LAB | Facility: HOSPITAL | Age: 78
End: 2023-07-20
Payer: MEDICARE

## 2023-07-20 DIAGNOSIS — Z86.010 PERSONAL HISTORY OF COLONIC POLYPS: ICD-10-CM

## 2023-07-20 DIAGNOSIS — K57.30 DIVERTICULOSIS OF LARGE INTESTINE WITHOUT PERFORATION OR ABSCESS WITHOUT BLEEDING: ICD-10-CM

## 2023-07-20 DIAGNOSIS — K57.32 DIVERTICULITIS OF LARGE INTESTINE WITHOUT PERFORATION OR ABSCESS WITHOUT BLEEDING: ICD-10-CM

## 2023-07-20 DIAGNOSIS — D12.5 BENIGN NEOPLASM OF SIGMOID COLON: ICD-10-CM

## 2023-07-20 DIAGNOSIS — D12.2 BENIGN NEOPLASM OF ASCENDING COLON: ICD-10-CM

## 2023-07-20 DIAGNOSIS — D12.3 BENIGN NEOPLASM OF TRANSVERSE COLON: ICD-10-CM

## 2023-07-24 LAB — REF LAB TEST METHOD: NORMAL

## 2023-07-26 RX ORDER — OMEPRAZOLE 20 MG/1
CAPSULE, DELAYED RELEASE ORAL
Qty: 90 CAPSULE | Refills: 3 | Status: SHIPPED | OUTPATIENT
Start: 2023-07-26

## 2023-08-11 ENCOUNTER — OFFICE VISIT (OUTPATIENT)
Dept: GASTROENTEROLOGY | Facility: CLINIC | Age: 78
End: 2023-08-11
Payer: MEDICARE

## 2023-08-11 VITALS
SYSTOLIC BLOOD PRESSURE: 117 MMHG | BODY MASS INDEX: 30.29 KG/M2 | HEART RATE: 62 BPM | DIASTOLIC BLOOD PRESSURE: 73 MMHG | HEIGHT: 67 IN | WEIGHT: 193 LBS

## 2023-08-11 DIAGNOSIS — K57.90 DIVERTICULOSIS: ICD-10-CM

## 2023-08-11 DIAGNOSIS — R11.0 CHRONIC NAUSEA: ICD-10-CM

## 2023-08-11 DIAGNOSIS — D69.6 THROMBOCYTOPENIA: Primary | ICD-10-CM

## 2023-08-11 DIAGNOSIS — Z87.898 HISTORY OF VOMITING: ICD-10-CM

## 2023-08-11 DIAGNOSIS — Z86.010 HISTORY OF COLONIC POLYPS: ICD-10-CM

## 2023-08-11 DIAGNOSIS — Z87.19 HISTORY OF DIVERTICULITIS: ICD-10-CM

## 2023-08-11 RX ORDER — TRAZODONE HYDROCHLORIDE 50 MG/1
TABLET ORAL
Qty: 90 TABLET | Refills: 0 | Status: SHIPPED | OUTPATIENT
Start: 2023-08-11
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
## 2023-08-16 ENCOUNTER — TELEPHONE (OUTPATIENT)
Dept: GASTROENTEROLOGY | Facility: CLINIC | Age: 78
End: 2023-08-16
Payer: MEDICARE

## 2023-08-16 NOTE — TELEPHONE ENCOUNTER
Hub staff attempted to follow warm transfer process and was unsuccessful     Caller: Annie Mejia    Relationship to patient: Self    Best call back number: [phone]    Patient is needing: PATIENT CALLED AND STATED THAT SHE WAS TOLD BY TONI HERRERA THAT SHE WOULD NEED A MRI OF HER LIVER AND STOMACH DUE TO PATIENT'S ENZYMES BEING LOW. PATIENT ALSO STATED THAT SHE WAS SUPPOSED TO HAVE LAB WORK ORDERS TO GO TO [facility] AND WOULD LIKE TO KNOW WHEN THOSE ORDERS WILL BE SENT. PLEASE CALL BACK ANYTIME.OKAY TO LEAVE .

## 2023-08-17 NOTE — TELEPHONE ENCOUNTER
I returned patient phone call and she verbalized her appointment for the MRI has been scheduled and she knows where to go get her labs at [facility] in [facility]. Patient had no further questions or concerns.

## 2023-08-22 ENCOUNTER — LAB (OUTPATIENT)
Dept: LAB | Facility: HOSPITAL | Age: 78
End: 2023-08-22
Payer: MEDICARE

## 2023-08-22 DIAGNOSIS — D69.6 THROMBOCYTOPENIA: ICD-10-CM

## 2023-08-22 LAB
ALBUMIN SERPL-MCNC: 4.2 G/DL (ref 3.5–5.2)
ALBUMIN/GLOB SERPL: 1.8 G/DL
ALP SERPL-CCNC: 85 U/L (ref 39–117)
ALT SERPL W P-5'-P-CCNC: 14 U/L (ref 1–33)
ANION GAP SERPL CALCULATED.3IONS-SCNC: 9.1 MMOL/L (ref 5–15)
AST SERPL-CCNC: 15 U/L (ref 1–32)
BILIRUB SERPL-MCNC: 0.4 MG/DL (ref 0–1.2)
BUN SERPL-MCNC: 20 MG/DL (ref 8–23)
BUN/CREAT SERPL: 18.5 (ref 7–25)
CALCIUM SPEC-SCNC: 9.2 MG/DL (ref 8.6–10.5)
CHLORIDE SERPL-SCNC: 107 MMOL/L (ref 98–107)
CO2 SERPL-SCNC: 24.9 MMOL/L (ref 22–29)
CREAT SERPL-MCNC: 1.08 MG/DL (ref 0.57–1)
DEPRECATED RDW RBC AUTO: 47.1 FL (ref 37–54)
EGFRCR SERPLBLD CKD-EPI 2021: 52.4 ML/MIN/1.73
ERYTHROCYTE [DISTWIDTH] IN BLOOD BY AUTOMATED COUNT: 14.2 % (ref 12.3–15.4)
GLOBULIN UR ELPH-MCNC: 2.4 GM/DL
GLUCOSE SERPL-MCNC: 126 MG/DL (ref 65–99)
HCT VFR BLD AUTO: 36.3 % (ref 34–46.6)
HGB BLD-MCNC: 11.9 G/DL (ref 12–15.9)
INR PPP: 1 (ref 0.9–1.1)
MCH RBC QN AUTO: 30.2 PG (ref 26.6–33)
MCHC RBC AUTO-ENTMCNC: 32.8 G/DL (ref 31.5–35.7)
MCV RBC AUTO: 92.1 FL (ref 79–97)
PLATELET # BLD AUTO: 120 10*3/MM3 (ref 140–450)
PMV BLD AUTO: 12.1 FL (ref 6–12)
POTASSIUM SERPL-SCNC: 4.8 MMOL/L (ref 3.5–5.2)
PROT SERPL-MCNC: 6.6 G/DL (ref 6–8.5)
PROTHROMBIN TIME: 13.7 SECONDS (ref 12.3–15.1)
RBC # BLD AUTO: 3.94 10*6/MM3 (ref 3.77–5.28)
SODIUM SERPL-SCNC: 141 MMOL/L (ref 136–145)
WBC NRBC COR # BLD: 5 10*3/MM3 (ref 3.4–10.8)

## 2023-08-22 PROCEDURE — 80053 COMPREHEN METABOLIC PANEL: CPT

## 2023-08-22 PROCEDURE — 85027 COMPLETE CBC AUTOMATED: CPT

## 2023-08-22 PROCEDURE — 85610 PROTHROMBIN TIME: CPT

## 2023-08-24 ENCOUNTER — TRANSCRIBE ORDERS (OUTPATIENT)
Dept: ADMINISTRATIVE | Facility: HOSPITAL | Age: 78
End: 2023-08-24
Payer: MEDICARE

## 2023-08-24 DIAGNOSIS — M54.16 LUMBAR RADICULOPATHY: Primary | ICD-10-CM

## 2023-08-31 ENCOUNTER — HOSPITAL ENCOUNTER (OUTPATIENT)
Dept: ULTRASOUND IMAGING | Facility: HOSPITAL | Age: 78
Discharge: HOME | End: 2023-08-31
Admitting: NURSE PRACTITIONER
Payer: MEDICARE

## 2023-08-31 DIAGNOSIS — D69.6 THROMBOCYTOPENIA: ICD-10-CM

## 2023-08-31 PROCEDURE — 76700 US EXAM ABDOM COMPLETE: CPT

## 2023-09-01 ENCOUNTER — TELEPHONE (OUTPATIENT)
Dept: FAMILY MEDICINE CLINIC | Facility: CLINIC | Age: 78
End: 2023-09-01

## 2023-09-01 NOTE — TELEPHONE ENCOUNTER
Caller: Nano Macias    Relationship: Emergency Contact    Best call back number: [phone]     What test was performed: ULTRASOUND    When was the test performed: 08.31.23    Where was the test performed: [facility]    Additional notes: CALL WITH RESULTS

## 2023-09-05 NOTE — TELEPHONE ENCOUNTER
This ultrasound was ordered by gastro.    It looks like gastro has reached out to the patient via mychart.

## 2023-09-06 ENCOUNTER — TELEPHONE (OUTPATIENT)
Dept: FAMILY MEDICINE CLINIC | Facility: CLINIC | Age: 78
End: 2023-09-06
Payer: MEDICARE

## 2023-09-06 NOTE — TELEPHONE ENCOUNTER
Caller: Annie Mejia    Relationship: Self    Best call back number: [phone]    What medication are you requesting:     ANTIBIOTICS    What are your current symptoms:     SEVERE STOMACH PAINS, NAUSEA, DIARRHEA - DIVERTICULITIS    How long have you been experiencing symptoms: SINCE ABOUT 4:00 AM     Have you had these symptoms before:    [x] Yes  [] No    Have you been treated for these symptoms before:   [x] Yes  [] No    If a prescription is needed, what is your preferred pharmacy and phone number: [address] [phone] [facility] [phone]

## 2023-09-13 ENCOUNTER — OFFICE VISIT (OUTPATIENT)
Dept: FAMILY MEDICINE CLINIC | Facility: CLINIC | Age: 78
End: 2023-09-13
Payer: MEDICARE

## 2023-09-13 VITALS
TEMPERATURE: 97.5 F | DIASTOLIC BLOOD PRESSURE: 76 MMHG | WEIGHT: 191.2 LBS | HEIGHT: 67 IN | BODY MASS INDEX: 30.01 KG/M2 | OXYGEN SATURATION: 99 % | HEART RATE: 57 BPM | SYSTOLIC BLOOD PRESSURE: 122 MMHG

## 2023-09-13 DIAGNOSIS — R11.0 NAUSEA: Primary | ICD-10-CM

## 2023-09-13 DIAGNOSIS — G47.09 OTHER INSOMNIA: ICD-10-CM

## 2023-09-13 PROBLEM — M47.816 LUMBAR SPONDYLOSIS: Status: ACTIVE | Noted: 2023-07-13

## 2023-09-13 PROBLEM — M96.1 LUMBAR POST-LAMINECTOMY SYNDROME: Status: ACTIVE | Noted: 2023-07-20

## 2023-09-13 PROBLEM — M54.16 LUMBAR RADICULOPATHY: Status: ACTIVE | Noted: 2023-07-13

## 2023-09-13 PROCEDURE — 1160F RVW MEDS BY RX/DR IN RCRD: CPT | Performed by: FAMILY MEDICINE

## 2023-09-13 PROCEDURE — 99214 OFFICE O/P EST MOD 30 MIN: CPT | Performed by: FAMILY MEDICINE

## 2023-09-13 PROCEDURE — 3078F DIAST BP <80 MM HG: CPT | Performed by: FAMILY MEDICINE

## 2023-09-13 PROCEDURE — 1159F MED LIST DOCD IN RCRD: CPT | Performed by: FAMILY MEDICINE

## 2023-09-13 PROCEDURE — 3074F SYST BP LT 130 MM HG: CPT | Performed by: FAMILY MEDICINE

## 2023-09-13 RX ORDER — ESCITALOPRAM OXALATE 10 MG/1
10 TABLET ORAL DAILY
COMMUNITY

## 2023-09-13 RX ORDER — PROMETHAZINE HYDROCHLORIDE 25 MG/1
SUPPOSITORY RECTAL
COMMUNITY
End: 2023-09-14 | Stop reason: SDUPTHER

## 2023-09-13 RX ORDER — DICYCLOMINE HYDROCHLORIDE 10 MG/1
CAPSULE ORAL
COMMUNITY

## 2023-09-13 NOTE — PROGRESS NOTES
Subjective   Annie Mejia is a 77 y.o. female.     History of Present Illness  She c/o 1 week of nausea. Some pain and diarrhea initially now better. Has previous dx of diverticulitis. Requesting refill of promethazine suppository. Has f/u colonoscopy coming up due to multiple polyps.    Taking trazodone 1-2 qhs but not helping.     Will be having MRI first of October    The following portions of the patient's history were reviewed and updated as appropriate: allergies, current medications, past family history, past medical history, past social history, past surgical history, and problem list.    Review of Systems   Constitutional:  Positive for fatigue. Negative for fever and unexpected weight change.   HENT:  Negative for mouth sores, nosebleeds, sore throat and trouble swallowing.    Respiratory:  Negative for cough.    Cardiovascular:  Negative for chest pain.   Gastrointestinal:  Positive for abdominal pain, constipation (intermittently), diarrhea (intermittently) and nausea. Negative for blood in stool and vomiting.   Genitourinary:  Negative for dysuria, frequency and hematuria.   Musculoskeletal:  Positive for arthralgias and back pain.   Skin:  Negative for rash and wound.   Neurological:  Negative for syncope and weakness.   Hematological:  Negative for adenopathy. Bruises/bleeds easily.   Psychiatric/Behavioral:  Negative for confusion.    Pt's previous ROS reviewed and updated as indicated.     Objective   Vitals:    09/13/23 0927   BP: 122/76   Pulse: 57   Temp: 97.5 °F (36.4 °C)   SpO2: 99%     Body mass index is 29.95 kg/m².      09/13/23 0927   Weight: 86.7 kg (191 lb 3.2 oz)       Physical Exam  Vitals and nursing note reviewed.   Constitutional:       General: She is not in acute distress.     Appearance: She is well-developed, well-groomed and overweight. She is not ill-appearing.   HENT:      Head: Atraumatic.      Mouth/Throat:      Mouth: Mucous membranes are moist. No oral lesions.   Eyes:       General: No scleral icterus.     Conjunctiva/sclera: Conjunctivae normal.   Cardiovascular:      Rate and Rhythm: Normal rate and regular rhythm.      Pulses: Normal pulses.      Heart sounds: Normal heart sounds.   Pulmonary:      Effort: Pulmonary effort is normal.      Breath sounds: Normal breath sounds.   Abdominal:      General: Bowel sounds are increased. There is no distension.      Palpations: Abdomen is soft. There is no hepatomegaly, splenomegaly or mass.      Tenderness: There is generalized abdominal tenderness (mild). There is no right CVA tenderness, left CVA tenderness, guarding or rebound.   Musculoskeletal:      Right lower leg: No edema.      Left lower leg: No edema.   Skin:     General: Skin is warm and dry.      Coloration: Skin is not jaundiced or pale.      Findings: No bruising or rash.   Neurological:      Mental Status: She is alert and oriented to person, place, and time. Mental status is at baseline.      Gait: Gait is intact.   Psychiatric:         Mood and Affect: Mood is anxious (mildly). Mood is not depressed.         Behavior: Behavior normal. Behavior is cooperative.         Cognition and Memory: Cognition normal.   Pt's previous physical exam reviewed and updated as indicated.    Lab Results   Component Value Date    WBC 5.00 08/22/2023    HGB 11.9 (L) 08/22/2023    HCT 36.3 08/22/2023    MCV 92.1 08/22/2023     (L) 08/22/2023       Lab Results   Component Value Date    GLUCOSE 126 (H) 08/22/2023    BUN 20 08/22/2023    CREATININE 1.08 (H) 08/22/2023    EGFRIFNONA 47 (L) 10/21/2021    EGFRIFAFRI 57 (L) 10/21/2021    BCR 18.5 08/22/2023    K 4.8 08/22/2023    CO2 24.9 08/22/2023    CALCIUM 9.2 08/22/2023    PROTENTOTREF 6.9 06/21/2023    ALBUMIN 4.2 08/22/2023    LABIL2 1.8 06/21/2023    AST 15 08/22/2023    ALT 14 08/22/2023     Assessment & Plan   Diagnoses and all orders for this visit:    1. Nausea (Primary)    2. Other insomnia    Other orders  -     promethazine  (PHENERGAN) 25 MG suppository; Insert 1 suppository into the rectum Every 8 (Eight) Hours As Needed for Nausea or Vomiting.  Dispense: 12 suppository; Refill: 1  -     amoxicillin-clavulanate (AUGMENTIN) 875-125 MG per tablet; Take 1 tablet by mouth Every 12 (Twelve) Hours for 7 days.  Dispense: 14 tablet; Refill: 0       Possible recurrence of diverticulitis. Phenergan prn. If abd pain worsens, bowel changes, etc abx as above.    Trial of trazodone up to 3 tablets qhs. Sleep hygiene reviewed.    Keep routine f/u as scheduled, f/u sooner as needed.  Patient was encouraged to keep me informed of any acute changes, lack of improvement, or any new concerning symptoms.  Pt is aware of reasons to seek emergent care.  Patient voiced understanding of all instructions and denied further questions.    Please note that portions of this note may have been completed with a voice recognition program.

## 2023-09-14 ENCOUNTER — TELEPHONE (OUTPATIENT)
Dept: FAMILY MEDICINE CLINIC | Facility: CLINIC | Age: 78
End: 2023-09-14

## 2023-09-14 RX ORDER — AMOXICILLIN AND CLAVULANATE POTASSIUM 875; 125 MG/1; MG/1
1 TABLET, FILM COATED ORAL EVERY 12 HOURS SCHEDULED
Qty: 14 TABLET | Refills: 0 | Status: SHIPPED | OUTPATIENT
Start: 2023-09-14 | End: 2023-09-21

## 2023-09-14 RX ORDER — PROMETHAZINE HYDROCHLORIDE 25 MG/1
25 SUPPOSITORY RECTAL EVERY 8 HOURS PRN
Qty: 12 SUPPOSITORY | Refills: 1 | Status: SHIPPED | OUTPATIENT
Start: 2023-09-14

## 2023-09-14 NOTE — TELEPHONE ENCOUNTER
Caller: Jackie Aliza    Relationship: Self    Best call back number: [phone]     What medication are you requesting: SUPPOSITORIES AND ANTIBIOTIC    What are your current symptoms: NA    How long have you been experiencing symptoms: PATIENT SEEN ON SEPTEMBER 13    Have you had these symptoms before:    [] Yes  [] No    Have you been treated for these symptoms before:   [] Yes  [] No    If a prescription is needed, what is your preferred pharmacy and phone number: [facility] [address] [place] [address] [address] [place] [address] [phone] [facility] [phone]      Additional notes: PATIENT STATED PHARMACY DID NOT RECEIVE THE SUPPOSITORIES THAT PROVIDER WAS GOING TO SEND, AND REQUESTING IF DOCTOR LOMBARDI CAN ALSO PRESCRIBE AN ANTIBIOTIC.

## 2023-09-22 ENCOUNTER — PRIOR AUTHORIZATION (OUTPATIENT)
Dept: FAMILY MEDICINE CLINIC | Facility: CLINIC | Age: 78
End: 2023-09-22
Payer: MEDICARE

## 2023-09-22 NOTE — TELEPHONE ENCOUNTER
A PRIOR AUTH HAS BEEN STARTED THROUGH [employer] FOR PROMETHAZINE SUPPOSITORIES.    WIATING ON A RESPONSE FROM THE INSURANCE.    Key: [id number]

## 2023-09-30 DIAGNOSIS — F41.8 MIXED ANXIETY DEPRESSIVE DISORDER: ICD-10-CM

## 2023-10-02 ENCOUNTER — HOSPITAL ENCOUNTER (OUTPATIENT)
Dept: MRI IMAGING | Facility: HOSPITAL | Age: 78
Discharge: HOME | End: 2023-10-02
Admitting: PHYSICIAN ASSISTANT
Payer: MEDICARE

## 2023-10-02 DIAGNOSIS — M54.16 LUMBAR RADICULOPATHY: ICD-10-CM

## 2023-10-02 PROCEDURE — 72148 MRI LUMBAR SPINE W/O DYE: CPT

## 2023-10-02 RX ORDER — PROMETHAZINE HYDROCHLORIDE 25 MG/1
TABLET ORAL
Qty: 90 TABLET | Refills: 0 | Status: SHIPPED | OUTPATIENT
Start: 2023-10-02

## 2023-10-02 NOTE — TELEPHONE ENCOUNTER
Rx Refill Note  Requested Prescriptions     Pending Prescriptions Disp Refills    promethazine (PHENERGAN) 25 MG tablet [Pharmacy Med Name: Promethazine HCl 25 MG Oral Tablet] 90 tablet 0     Sig: TAKE 1 TABLET BY MOUTH EVERY 8 HOURS AS NEEDED FOR NAUSEA FOR VOMITING      Last office visit with prescribing clinician: 9/13/2023   Last telemedicine visit with prescribing clinician: Visit date not found   Next office visit with prescribing clinician: 1/18/2024                         Would you like a call back once the refill request has been completed: [] Yes [] No    If the office needs to give you a call back, can they leave a voicemail: [] Yes [] No    Tootie August MA  10/02/23, 15:09 EDT

## 2023-10-03 RX ORDER — ALPRAZOLAM 0.25 MG/1
TABLET ORAL
Qty: 60 TABLET | Refills: 2 | Status: SHIPPED | OUTPATIENT
Start: 2023-10-03

## 2023-10-03 NOTE — TELEPHONE ENCOUNTER
Rx Refill Note  Requested Prescriptions     Pending Prescriptions Disp Refills    ALPRAZolam (XANAX) 0.25 MG tablet [Pharmacy Med Name: ALPRAZolam 0.25 MG Oral Tablet] 30 tablet 0     Sig: TAKE 1 TO 2 TABLETS BY MOUTH ONCE DAILY AS NEEDED      Last office visit with prescribing clinician: 8/9/2022   Last telemedicine visit with prescribing clinician: Visit date not found   Next office visit with prescribing clinician: Visit date not found                         Would you like a call back once the refill request has been completed: [] Yes [] No    If the office needs to give you a call back, can they leave a voicemail: [] Yes [] No    Emily Swenson MA  10/03/23, 08:14 EDT

## 2023-11-07 ENCOUNTER — OFFICE VISIT (OUTPATIENT)
Dept: GASTROENTEROLOGY | Facility: CLINIC | Age: 78
End: 2023-11-07
Payer: MEDICARE

## 2023-11-07 DIAGNOSIS — Z87.19 HISTORY OF DIVERTICULITIS: ICD-10-CM

## 2023-11-07 DIAGNOSIS — R93.5 ABNORMAL ABDOMINAL ULTRASOUND: ICD-10-CM

## 2023-11-07 DIAGNOSIS — Z86.010 HISTORY OF COLONIC POLYPS: ICD-10-CM

## 2023-11-07 DIAGNOSIS — Z11.59 ENCOUNTER FOR SCREENING FOR OTHER VIRAL DISEASES: ICD-10-CM

## 2023-11-07 DIAGNOSIS — I10 ESSENTIAL (PRIMARY) HYPERTENSION: ICD-10-CM

## 2023-11-07 DIAGNOSIS — Z87.898 HISTORY OF VOMITING: ICD-10-CM

## 2023-11-07 DIAGNOSIS — D64.9 ANEMIA, UNSPECIFIED TYPE: ICD-10-CM

## 2023-11-07 DIAGNOSIS — D69.6 THROMBOCYTOPENIA: ICD-10-CM

## 2023-11-07 DIAGNOSIS — K57.90 DIVERTICULOSIS: ICD-10-CM

## 2023-11-07 DIAGNOSIS — E66.9 CLASS 1 OBESITY WITH SERIOUS COMORBIDITY AND BODY MASS INDEX (BMI) OF 30.0 TO 30.9 IN ADULT, UNSPECIFIED OBESITY TYPE: ICD-10-CM

## 2023-11-07 DIAGNOSIS — R79.9 ABNORMAL FINDING OF BLOOD CHEMISTRY, UNSPECIFIED: ICD-10-CM

## 2023-11-07 DIAGNOSIS — K74.60 CIRRHOSIS OF LIVER WITHOUT ASCITES, UNSPECIFIED HEPATIC CIRRHOSIS TYPE: Primary | ICD-10-CM

## 2023-11-07 DIAGNOSIS — K76.9 LIVER DISEASE, UNSPECIFIED: ICD-10-CM

## 2023-11-07 DIAGNOSIS — R11.0 CHRONIC NAUSEA: ICD-10-CM

## 2023-11-07 NOTE — PATIENT INSTRUCTIONS
Consider discussing changing trazodone to mirtazapine (Remeron) at bedtime with mental provider that prescribes trazodone.     Consider Emmanuelle root 550 mg three times daily with meals.     Have labs drawn in February prior to 3 month follow up.     Try to follow lifestyle modifications for suspected cirrhosis and gastroparesis (delayed gastric emptying)
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
## 2023-11-08 ENCOUNTER — OFFICE VISIT (OUTPATIENT)
Dept: FAMILY MEDICINE CLINIC | Facility: CLINIC | Age: 78
End: 2023-11-08
Payer: MEDICARE

## 2023-11-08 VITALS
TEMPERATURE: 98.2 F | WEIGHT: 196 LBS | SYSTOLIC BLOOD PRESSURE: 124 MMHG | BODY MASS INDEX: 30.76 KG/M2 | OXYGEN SATURATION: 96 % | DIASTOLIC BLOOD PRESSURE: 82 MMHG | HEART RATE: 67 BPM | HEIGHT: 67 IN

## 2023-11-08 VITALS
OXYGEN SATURATION: 95 % | BODY MASS INDEX: 30.86 KG/M2 | HEART RATE: 73 BPM | HEIGHT: 67 IN | RESPIRATION RATE: 16 BRPM | WEIGHT: 196.6 LBS | DIASTOLIC BLOOD PRESSURE: 80 MMHG | SYSTOLIC BLOOD PRESSURE: 130 MMHG

## 2023-11-08 DIAGNOSIS — Z11.59 SCREENING FOR VIRAL DISEASE: ICD-10-CM

## 2023-11-08 DIAGNOSIS — G47.09 OTHER INSOMNIA: ICD-10-CM

## 2023-11-08 DIAGNOSIS — G89.4 CHRONIC PAIN SYNDROME: ICD-10-CM

## 2023-11-08 DIAGNOSIS — R93.5 ABNORMAL ABDOMINAL ULTRASOUND: Primary | ICD-10-CM

## 2023-11-08 DIAGNOSIS — D69.6 THROMBOCYTOPENIA: ICD-10-CM

## 2023-11-08 RX ORDER — HYDROCODONE BITARTRATE AND ACETAMINOPHEN 7.5; 325 MG/1; MG/1
1 TABLET ORAL EVERY 8 HOURS PRN
Qty: 12 TABLET | Refills: 0 | Status: SHIPPED | OUTPATIENT
Start: 2023-11-08

## 2023-11-08 RX ORDER — MIRTAZAPINE 15 MG/1
15 TABLET, FILM COATED ORAL NIGHTLY
Qty: 90 TABLET | Refills: 0 | Status: SHIPPED | OUTPATIENT
Start: 2023-11-08

## 2023-11-08 NOTE — PROGRESS NOTES
"Subjective   Annie Mejia is a 77 y.o. female.     History of Present Illness  Wishes to discuss consultation with GI yesterday. Reports she was told she had \"cirrhosis\". Multiple labs ordered for her to have in January prior to her next apt with GI. Would like to have hepatitis immunity testing today.    GI recommended she stop trazodone and switch to Remeron for sleep/depression.    Requesting refill of Norco which she uses very intermittently for exacerbations of her chronic back pain due to L-DDD/DJD. Following with ortho routinely.    The following portions of the patient's history were reviewed and updated as appropriate: allergies, current medications, past family history, past medical history, past social history, past surgical history, and problem list.    Review of Systems   Constitutional:  Positive for fatigue. Negative for fever and unexpected weight change.   HENT:  Negative for mouth sores, nosebleeds, sore throat and trouble swallowing.    Respiratory:  Negative for cough.    Cardiovascular:  Negative for chest pain.   Gastrointestinal:  Positive for abdominal pain, constipation (intermittently), diarrhea (intermittently) and nausea. Negative for blood in stool and vomiting.   Genitourinary:  Negative for dysuria, frequency and hematuria.   Musculoskeletal:  Positive for arthralgias and back pain.   Skin:  Negative for rash and wound.   Neurological:  Negative for syncope and weakness.   Hematological:  Negative for adenopathy. Bruises/bleeds easily.   Psychiatric/Behavioral:  Negative for confusion.    Pt's previous ROS reviewed and updated as indicated.       Objective   Vitals:    11/08/23 0856   BP: 124/82   Pulse: 67   Temp: 98.2 °F (36.8 °C)   SpO2: 96%     Body mass index is 31.16 kg/m².      11/08/23  0856   Weight: 88.9 kg (196 lb)       Physical Exam  Vitals and nursing note reviewed.   Constitutional:       General: She is not in acute distress.     Appearance: She is well-developed, " well-groomed and overweight. She is not ill-appearing.   HENT:      Head: Atraumatic.      Mouth/Throat:      Mouth: Mucous membranes are moist.   Eyes:      General: No scleral icterus.     Conjunctiva/sclera: Conjunctivae normal.   Cardiovascular:      Rate and Rhythm: Normal rate and regular rhythm.      Pulses: Normal pulses.      Heart sounds: Normal heart sounds.   Pulmonary:      Effort: Pulmonary effort is normal.      Breath sounds: Normal breath sounds.   Musculoskeletal:      Right lower leg: No edema.      Left lower leg: No edema.   Skin:     General: Skin is warm and dry.      Coloration: Skin is not jaundiced or pale.      Findings: No bruising or rash.   Neurological:      Mental Status: She is alert and oriented to person, place, and time. Mental status is at baseline.      Gait: Gait is intact.   Psychiatric:         Mood and Affect: Mood is anxious (mildly). Mood is not depressed.         Behavior: Behavior normal. Behavior is cooperative.         Cognition and Memory: Cognition normal.     Pt's previous physical exam reviewed and updated as indicated.    MRI Lumbar Spine Without Contrast  Result Date: 10/2/2023  Postoperative changes from posterior fusion at L4-5 and L5-S1.  Moderate chronic L4 compression fracture.  Multilevel degenerative disc disease and spondylosis with multilevel neural foraminal narrowing as described, similar to the prior exam.      This report was signed and finalized on 10/2/2023 10:22 AM by Santo Palacios MD.      US Abdomen Complete  Result Date: 8/31/2023  Coarsened echo pattern to the liver with multiple anechoic cysts. Correlation with LFTs is recommended.      Images were reviewed, interpreted, and dictated by Dr. Rhona Ferreira MD Transcribed by Odilia Pan PA-C.  This report was signed and finalized on 8/31/2023 3:13 PM by Rhona Ferreira MD.       Lab Results   Component Value Date    WBC 5.00 08/22/2023    HGB 11.9 (L) 08/22/2023    HCT 36.3 08/22/2023    MCV  92.1 08/22/2023     (L) 08/22/2023       Lab Results   Component Value Date    GLUCOSE 126 (H) 08/22/2023    BUN 20 08/22/2023    CREATININE 1.08 (H) 08/22/2023    EGFRIFNONA 47 (L) 10/21/2021    EGFRIFAFRI 57 (L) 10/21/2021    BCR 18.5 08/22/2023    K 4.8 08/22/2023    CO2 24.9 08/22/2023    CALCIUM 9.2 08/22/2023    PROTENTOTREF 6.9 06/21/2023    ALBUMIN 4.2 08/22/2023    LABIL2 1.8 06/21/2023    AST 15 08/22/2023    ALT 14 08/22/2023       Lab Results   Component Value Date    CHOL 151 01/18/2021    CHLPL 144 06/21/2023    TRIG 101 06/21/2023    HDL 47 06/21/2023    LDL 78 06/21/2023       Lab Results   Component Value Date    HGBA1C 6.90 (H) 06/21/2023       Lab Results   Component Value Date    TSH 2.450 06/21/2023     GI consultation note reviewed in chart.    Assessment & Plan   Diagnoses and all orders for this visit:    1. Abnormal abdominal ultrasound (Primary)  -     Hepatitis A Antibody, Total  -     Hepatitis B Surface Antibody    2. Chronic pain syndrome  -     HYDROcodone-acetaminophen (NORCO) 7.5-325 MG per tablet; Take 1 tablet by mouth Every 8 (Eight) Hours As Needed for Severe Pain or Moderate Pain. Only 2 tablets per 8 hours from both this and your home loratab regimen  Dispense: 12 tablet; Refill: 0    3. Screening for viral disease  -     Hepatitis A Antibody, Total  -     Hepatitis B Surface Antibody    4. Thrombocytopenia  -     Hepatitis A Antibody, Total  -     Hepatitis B Surface Antibody    5. Other insomnia  -     mirtazapine (Remeron) 15 MG tablet; Take 1 tablet by mouth Every Night.  Dispense: 90 tablet; Refill: 0       I have reviewed risks/benefits and potential side effects of various treatment options for insomnia assoc'd with mood DO. Pt voices understanding and wishes to proceed with trial of remeron. Monitor closely for weight gain.    Nutrition and activity goals reviewed including: mainly water to drink, limit white flour/processed sugar, higher lean protein, high fiber  carbs, regular meals, working toward 150 mins cardio per week.    Stable chronic pain/failed back sx. Keeping good f/u with ortho, having procedural pain interventions. Reserving [place] for most severe pain. No aberrant behavior. No escalation in dosing. She is aware she cannot use this medication in combination with benzo.  As part of patient's treatment plan I am prescribing a controlled substance.  The patient has been made aware of the appropriate use of such medications, including potential risk of somnolence, limited ability to drive and/or work safely, and potential for dependence and/or overdose.  It has also been made clear that these medications are for use by this patient only, without concomitant use of alcohol or other substances, unless prescribed.  History and physical exam exhibit continued safe and appropriate use of controlled substance.  LILIAM reviewed.  Patient has completed a prescribing agreement detailing terms of continued prescribing of controlled substances, including monitoring LILIAM reports, urine drug screening, and pill counts if necessary.  Patient is aware that inappropriate use will result in cessation of prescribing such medications.    Routine f/u as scheduled, f/u sooner as needed/instructed.  I will contact patient regarding test results and provide instructions regarding any necessary changes in plan of care.  Patient was encouraged to keep me informed of any acute changes, lack of improvement, or any new concerning symptoms.  Pt is aware of reasons to seek emergent care.  Patient voiced understanding of all instructions and denied further questions.    Please note that portions of this note may have been completed with a voice recognition program.

## 2023-11-09 LAB
HAV AB SER QL IA: NEGATIVE
HBV SURFACE AB SER QL: NON REACTIVE

## 2023-11-10 RX ORDER — TRAZODONE HYDROCHLORIDE 50 MG/1
TABLET ORAL
Qty: 90 TABLET | Refills: 10 | OUTPATIENT
Start: 2023-11-10

## 2023-11-13 PROBLEM — Z23 NEED FOR HEPATITIS A AND B VACCINATION: Status: ACTIVE | Noted: 2023-11-13

## 2024-01-23 ENCOUNTER — TELEPHONE (OUTPATIENT)
Dept: FAMILY MEDICINE CLINIC | Facility: CLINIC | Age: 79
End: 2024-01-23
Payer: MEDICARE

## 2024-01-23 NOTE — TELEPHONE ENCOUNTER
DR. SCHWARTZ OFFICE CALLED AND IS RECOMMENDING PATIENT TRAZADONE TO BE INCREASED  MG FOR SLEEP/INSOMNIA

## 2024-02-02 ENCOUNTER — OFFICE VISIT (OUTPATIENT)
Dept: FAMILY MEDICINE CLINIC | Facility: CLINIC | Age: 79
End: 2024-02-02
Payer: MEDICARE

## 2024-02-02 VITALS
HEIGHT: 67 IN | TEMPERATURE: 97.8 F | HEART RATE: 84 BPM | DIASTOLIC BLOOD PRESSURE: 78 MMHG | WEIGHT: 207 LBS | SYSTOLIC BLOOD PRESSURE: 126 MMHG | RESPIRATION RATE: 16 BRPM | OXYGEN SATURATION: 98 % | BODY MASS INDEX: 32.49 KG/M2

## 2024-02-02 DIAGNOSIS — Z87.440 HISTORY OF RECURRENT UTIS: ICD-10-CM

## 2024-02-02 DIAGNOSIS — N30.00 ACUTE CYSTITIS WITHOUT HEMATURIA: ICD-10-CM

## 2024-02-02 DIAGNOSIS — R30.0 DYSURIA: Primary | ICD-10-CM

## 2024-02-02 DIAGNOSIS — R68.83 CHILLS: ICD-10-CM

## 2024-02-02 LAB
BILIRUB BLD-MCNC: NEGATIVE MG/DL
CLARITY, POC: ABNORMAL
COLOR UR: YELLOW
EXPIRATION DATE: ABNORMAL
GLUCOSE UR STRIP-MCNC: NEGATIVE MG/DL
KETONES UR QL: NEGATIVE
LEUKOCYTE EST, POC: ABNORMAL
Lab: ABNORMAL
NITRITE UR-MCNC: NEGATIVE MG/ML
PH UR: 6 [PH] (ref 5–8)
PROT UR STRIP-MCNC: NEGATIVE MG/DL
RBC # UR STRIP: NEGATIVE /UL
SP GR UR: 1.01 (ref 1–1.03)
UROBILINOGEN UR QL: NORMAL

## 2024-02-02 PROCEDURE — 99214 OFFICE O/P EST MOD 30 MIN: CPT | Performed by: NURSE PRACTITIONER

## 2024-02-02 PROCEDURE — 81003 URINALYSIS AUTO W/O SCOPE: CPT | Performed by: NURSE PRACTITIONER

## 2024-02-02 PROCEDURE — 3074F SYST BP LT 130 MM HG: CPT | Performed by: NURSE PRACTITIONER

## 2024-02-02 PROCEDURE — 1159F MED LIST DOCD IN RCRD: CPT | Performed by: NURSE PRACTITIONER

## 2024-02-02 PROCEDURE — 1160F RVW MEDS BY RX/DR IN RCRD: CPT | Performed by: NURSE PRACTITIONER

## 2024-02-02 PROCEDURE — 3078F DIAST BP <80 MM HG: CPT | Performed by: NURSE PRACTITIONER

## 2024-02-02 RX ORDER — CEFUROXIME AXETIL 500 MG/1
500 TABLET ORAL 2 TIMES DAILY
Qty: 14 TABLET | Refills: 0 | Status: SHIPPED | OUTPATIENT
Start: 2024-02-02 | End: 2024-02-09

## 2024-02-02 RX ORDER — PHENAZOPYRIDINE HYDROCHLORIDE 200 MG/1
200 TABLET, FILM COATED ORAL 3 TIMES DAILY PRN
Qty: 6 TABLET | Refills: 0 | Status: SHIPPED | OUTPATIENT
Start: 2024-02-02

## 2024-02-02 NOTE — PROGRESS NOTES
"                      Established Patient        Chief Complaint:   Chief Complaint   Patient presents with    Urinary Tract Infection     PT IS HERE WITH POSSIBLE UTI          History of Present Illness:    Annie Mejia is a 78 y.o. female who presents today for complaints of intermittent dysuria, frequency, decreased UOP. Onset 2 weeks ago. History of recurrent UTIs.     Subjective     The following portions of the patient's history were reviewed and updated as appropriate: allergies, current medications, past family history, past medical history, past social history, past surgical history and problem list.    ALLERGIES  Allergies   Allergen Reactions    Oxycodone Shortness Of Breath    Oxycodone-Acetaminophen Nausea And Vomiting and Shortness Of Breath    Azithromycin Nausea And Vomiting    Erythrityl Tetranitrate Nausea And Vomiting    Morphine Itching    Morphine And Related Unknown - Low Severity       ROS  Review of Systems   Constitutional:  Positive for chills. Negative for fever.   Gastrointestinal:  Positive for nausea. Negative for diarrhea and vomiting.   Genitourinary:  Positive for difficulty urinating, dysuria, frequency and urgency. Negative for flank pain.       Objective     Vital Signs:   /78   Pulse 84   Temp 97.8 °F (36.6 °C)   Resp 16   Ht 168.9 cm (66.5\")   Wt 93.9 kg (207 lb)   SpO2 98%   BMI 32.91 kg/m²     Physical Exam   Physical Exam  Vitals and nursing note reviewed.   Constitutional:       Appearance: She is obese.   Cardiovascular:      Rate and Rhythm: Normal rate.   Pulmonary:      Effort: Pulmonary effort is normal.   Abdominal:      Tenderness: There is no abdominal tenderness. There is no right CVA tenderness, left CVA tenderness or guarding.   Neurological:      Mental Status: She is alert and oriented to person, place, and time.   Psychiatric:         Mood and Affect: Mood normal.         Behavior: Behavior normal.         Assessment and Plan  "     Assessment/Plan:   Diagnoses and all orders for this visit:    1. Dysuria (Primary)  -     POCT urinalysis dipstick, automated  -     Urine Culture - Urine, Urine, Clean Catch  -     phenazopyridine (Pyridium) 200 MG tablet; Take 1 tablet by mouth 3 (Three) Times a Day As Needed for Bladder Spasms or Dysuria.  Dispense: 6 tablet; Refill: 0  -     cefuroxime (CEFTIN) 500 MG tablet; Take 1 tablet by mouth 2 (Two) Times a Day for 7 days.  Dispense: 14 tablet; Refill: 0    2. History of recurrent UTIs  -     phenazopyridine (Pyridium) 200 MG tablet; Take 1 tablet by mouth 3 (Three) Times a Day As Needed for Bladder Spasms or Dysuria.  Dispense: 6 tablet; Refill: 0  -     cefuroxime (CEFTIN) 500 MG tablet; Take 1 tablet by mouth 2 (Two) Times a Day for 7 days.  Dispense: 14 tablet; Refill: 0    3. Chills    Risks, benefits, and potential side effects of current/new medications reviewed with patient.  Patient voiced understanding and wished to proceed with treatment.    I will contact patient regarding test results and provide instructions regarding any necessary changes in plan of care.    Patient was encouraged to keep me informed of any acute changes, lack of improvement, or any new concerning symptoms.    Discussion Summary:  Discussed plan of care in detail with pt today; pt verb understanding and agrees.        I have reviewed and updated all copied forward information, as appropriate.  I attest to the accuracy and relevance of any unchanged information.      Follow up:  Return if symptoms worsen or fail to improve.     Patient Education:  There are no Patient Instructions on file for this visit.    ALXEIA Marinelli  02/19/24  19:12 EST          Please note that portions of this note may have been completed with a voice recognition program.

## 2024-02-06 ENCOUNTER — TELEPHONE (OUTPATIENT)
Dept: FAMILY MEDICINE CLINIC | Facility: CLINIC | Age: 79
End: 2024-02-06

## 2024-02-06 LAB
BACTERIA UR CULT: ABNORMAL
BACTERIA UR CULT: ABNORMAL
OTHER ANTIBIOTIC SUSC ISLT: ABNORMAL

## 2024-02-06 NOTE — TELEPHONE ENCOUNTER
Caller: Annie Mejia    Relationship: Self    Best call back number: [phone]     Caller requesting test results     What test was performed: URINE CULTURE     When was the test performed: 2.2.24    Where was the test performed: IN OFFICE     Additional notes: PLEASE CALL PATIENT BACK, IF NO ANSWER LEAVE A DETAILED MESSAGE.

## 2024-02-15 ENCOUNTER — OFFICE VISIT (OUTPATIENT)
Dept: FAMILY MEDICINE CLINIC | Facility: CLINIC | Age: 79
End: 2024-02-15
Payer: MEDICARE

## 2024-02-15 VITALS
HEIGHT: 66 IN | WEIGHT: 205.38 LBS | SYSTOLIC BLOOD PRESSURE: 130 MMHG | DIASTOLIC BLOOD PRESSURE: 86 MMHG | OXYGEN SATURATION: 99 % | BODY MASS INDEX: 33.01 KG/M2 | HEART RATE: 77 BPM

## 2024-02-15 DIAGNOSIS — Z79.899 CHRONIC PRESCRIPTION BENZODIAZEPINE USE: ICD-10-CM

## 2024-02-15 DIAGNOSIS — Z23 NEED FOR HEPATITIS B BOOSTER VACCINATION: ICD-10-CM

## 2024-02-15 DIAGNOSIS — I73.9 PERIPHERAL VASCULAR DISEASE: ICD-10-CM

## 2024-02-15 DIAGNOSIS — Z23 NEED FOR HEPATITIS A AND B VACCINATION: ICD-10-CM

## 2024-02-15 DIAGNOSIS — F41.8 MIXED ANXIETY DEPRESSIVE DISORDER: ICD-10-CM

## 2024-02-15 DIAGNOSIS — K74.60 CIRRHOSIS OF LIVER WITHOUT ASCITES, UNSPECIFIED HEPATIC CIRRHOSIS TYPE: ICD-10-CM

## 2024-02-15 DIAGNOSIS — H61.91 LESION OF RIGHT EXTERNAL EAR: ICD-10-CM

## 2024-02-15 DIAGNOSIS — Z79.4 TYPE 2 DIABETES MELLITUS WITHOUT COMPLICATION, WITH LONG-TERM CURRENT USE OF INSULIN: ICD-10-CM

## 2024-02-15 DIAGNOSIS — K31.84 GASTROPARESIS: ICD-10-CM

## 2024-02-15 DIAGNOSIS — G89.4 CHRONIC PAIN SYNDROME: ICD-10-CM

## 2024-02-15 DIAGNOSIS — Z78.0 POSTMENOPAUSAL: ICD-10-CM

## 2024-02-15 DIAGNOSIS — G31.84 MILD COGNITIVE IMPAIRMENT: ICD-10-CM

## 2024-02-15 DIAGNOSIS — Z28.21 INFLUENZA VACCINATION DECLINED BY PATIENT: ICD-10-CM

## 2024-02-15 DIAGNOSIS — Z00.00 MEDICARE ANNUAL WELLNESS VISIT, SUBSEQUENT: Primary | ICD-10-CM

## 2024-02-15 DIAGNOSIS — I10 ESSENTIAL HYPERTENSION: ICD-10-CM

## 2024-02-15 DIAGNOSIS — E78.2 MIXED HYPERLIPIDEMIA: ICD-10-CM

## 2024-02-15 DIAGNOSIS — E66.09 CLASS 1 OBESITY DUE TO EXCESS CALORIES WITH SERIOUS COMORBIDITY AND BODY MASS INDEX (BMI) OF 32.0 TO 32.9 IN ADULT: ICD-10-CM

## 2024-02-15 DIAGNOSIS — D69.6 THROMBOCYTOPENIA: ICD-10-CM

## 2024-02-15 DIAGNOSIS — M51.36 DEGENERATION OF INTERVERTEBRAL DISC OF LUMBAR REGION: ICD-10-CM

## 2024-02-15 DIAGNOSIS — M96.1 LUMBAR POST-LAMINECTOMY SYNDROME: ICD-10-CM

## 2024-02-15 DIAGNOSIS — Z86.73 HISTORY OF STROKE: ICD-10-CM

## 2024-02-15 DIAGNOSIS — M79.89 SOFT TISSUE MASS: ICD-10-CM

## 2024-02-15 DIAGNOSIS — Z23 NEED FOR HEPATITIS A IMMUNIZATION: ICD-10-CM

## 2024-02-15 DIAGNOSIS — K21.00 GASTROESOPHAGEAL REFLUX DISEASE WITH ESOPHAGITIS WITHOUT HEMORRHAGE: ICD-10-CM

## 2024-02-15 DIAGNOSIS — G47.09 OTHER INSOMNIA: ICD-10-CM

## 2024-02-15 DIAGNOSIS — G52.2: ICD-10-CM

## 2024-02-15 DIAGNOSIS — M81.0 AGE-RELATED OSTEOPOROSIS WITHOUT CURRENT PATHOLOGICAL FRACTURE: ICD-10-CM

## 2024-02-15 DIAGNOSIS — E11.9 TYPE 2 DIABETES MELLITUS WITHOUT COMPLICATION, WITH LONG-TERM CURRENT USE OF INSULIN: ICD-10-CM

## 2024-02-15 DIAGNOSIS — N18.31 STAGE 3A CHRONIC KIDNEY DISEASE: ICD-10-CM

## 2024-02-15 RX ORDER — HYDROCODONE BITARTRATE AND ACETAMINOPHEN 7.5; 325 MG/1; MG/1
1 TABLET ORAL EVERY 8 HOURS PRN
Qty: 12 TABLET | Refills: 0 | Status: SHIPPED | OUTPATIENT
Start: 2024-02-15

## 2024-02-15 RX ORDER — DICYCLOMINE HYDROCHLORIDE 10 MG/1
10 CAPSULE ORAL
Qty: 90 CAPSULE | Refills: 3 | Status: SHIPPED | OUTPATIENT
Start: 2024-02-15

## 2024-02-15 RX ORDER — FAMOTIDINE 40 MG/1
40 TABLET, FILM COATED ORAL DAILY
COMMUNITY
End: 2024-02-15 | Stop reason: SDUPTHER

## 2024-02-15 RX ORDER — FAMOTIDINE 40 MG/1
40 TABLET, FILM COATED ORAL DAILY
Qty: 90 TABLET | Refills: 3 | Status: SHIPPED | OUTPATIENT
Start: 2024-02-15

## 2024-02-15 RX ORDER — TRAZODONE HYDROCHLORIDE 50 MG/1
50 TABLET ORAL NIGHTLY
Qty: 90 TABLET | Refills: 0 | Status: SHIPPED | OUTPATIENT
Start: 2024-02-15

## 2024-02-16 PROBLEM — D69.6 THROMBOCYTOPENIA: Status: ACTIVE | Noted: 2024-02-16

## 2024-02-16 PROBLEM — I63.9 ISCHEMIC CEREBROVASCULAR ACCIDENT (CVA): Status: RESOLVED | Noted: 2021-01-17 | Resolved: 2024-02-16

## 2024-02-16 PROBLEM — K74.60 CIRRHOSIS OF LIVER WITHOUT ASCITES: Status: ACTIVE | Noted: 2024-02-16

## 2024-02-16 PROBLEM — G31.84 MILD COGNITIVE IMPAIRMENT: Status: ACTIVE | Noted: 2024-02-16

## 2024-02-16 LAB
ALBUMIN/CREAT UR: 19 MG/G CREAT (ref 0–29)
CHOLEST SERPL-MCNC: 141 MG/DL (ref 0–200)
CREAT UR-MCNC: 243 MG/DL
HBA1C MFR BLD: 7.9 % (ref 4.8–5.6)
HDLC SERPL-MCNC: 54 MG/DL (ref 40–60)
LDLC SERPL CALC-MCNC: 69 MG/DL (ref 0–100)
MICROALBUMIN UR-MCNC: 47.1 UG/ML
TRIGL SERPL-MCNC: 100 MG/DL (ref 0–150)
VLDLC SERPL CALC-MCNC: 18 MG/DL (ref 5–40)

## 2024-02-19 ENCOUNTER — TELEPHONE (OUTPATIENT)
Dept: FAMILY MEDICINE CLINIC | Facility: CLINIC | Age: 79
End: 2024-02-19
Payer: MEDICARE

## 2024-02-19 RX ORDER — NITROFURANTOIN 25; 75 MG/1; MG/1
100 CAPSULE ORAL 2 TIMES DAILY
Qty: 14 CAPSULE | Refills: 0 | Status: SHIPPED | OUTPATIENT
Start: 2024-02-19 | End: 2024-02-26

## 2024-02-19 NOTE — TELEPHONE ENCOUNTER
Caller: Annie Mejia    Relationship to patient: Self    Best call back number: [phone]     Patient is needing: PATIENT IS REQUESTING AN UPDATE ON HER REFERRAL TO DERMATOLOGY AND THE SURGEON FOR HER  BACK. SHE HAS NOT HEARD BACK FROM EITHER SPECIALTIES.     PLEASE CALL PATIENT BACK, IF NO ANSWER LEAVE A DETAILED MESSAGE.

## 2024-02-19 NOTE — TELEPHONE ENCOUNTER
Loida,     I am forwarding this message to you and Dr. Pimentel.    I see the referral  for derm, but I wanted to have you look into it further.      Dr. Pimentel,    Please advise regarding the request for a surgeon for the patients back.

## 2024-02-23 ENCOUNTER — OFFICE VISIT (OUTPATIENT)
Dept: SURGERY | Facility: CLINIC | Age: 79
End: 2024-02-23
Payer: MEDICARE

## 2024-02-23 ENCOUNTER — TELEPHONE (OUTPATIENT)
Dept: SURGERY | Facility: CLINIC | Age: 79
End: 2024-02-23

## 2024-02-23 VITALS
TEMPERATURE: 97.8 F | WEIGHT: 204 LBS | DIASTOLIC BLOOD PRESSURE: 76 MMHG | BODY MASS INDEX: 32.02 KG/M2 | OXYGEN SATURATION: 92 % | HEIGHT: 67 IN | HEART RATE: 85 BPM | SYSTOLIC BLOOD PRESSURE: 122 MMHG

## 2024-02-23 DIAGNOSIS — D17.1 LIPOMA OF BACK: Primary | ICD-10-CM

## 2024-02-23 RX ORDER — SODIUM CHLORIDE 0.9 % (FLUSH) 0.9 %
10 SYRINGE (ML) INJECTION AS NEEDED
OUTPATIENT
Start: 2024-02-23

## 2024-02-23 RX ORDER — NITROFURANTOIN MACROCRYSTALS 100 MG/1
100 CAPSULE ORAL 4 TIMES DAILY
COMMUNITY

## 2024-02-23 RX ORDER — SODIUM CHLORIDE, SODIUM LACTATE, POTASSIUM CHLORIDE, CALCIUM CHLORIDE 600; 310; 30; 20 MG/100ML; MG/100ML; MG/100ML; MG/100ML
50 INJECTION, SOLUTION INTRAVENOUS CONTINUOUS
OUTPATIENT
Start: 2024-02-23

## 2024-02-23 RX ORDER — SODIUM CHLORIDE 0.9 % (FLUSH) 0.9 %
10 SYRINGE (ML) INJECTION EVERY 12 HOURS SCHEDULED
OUTPATIENT
Start: 2024-02-23

## 2024-02-23 RX ORDER — HEPARIN SODIUM 5000 [USP'U]/ML
5000 INJECTION, SOLUTION INTRAVENOUS; SUBCUTANEOUS ONCE
OUTPATIENT
Start: 2024-02-23

## 2024-02-23 RX ORDER — SODIUM CHLORIDE 9 MG/ML
40 INJECTION, SOLUTION INTRAVENOUS AS NEEDED
OUTPATIENT
Start: 2024-02-23
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
## 2024-02-23 NOTE — TELEPHONE ENCOUNTER
Spoke with patient to move lipoma of back excision from 3/6/24 to 3/20/24 at [facility] per Dr. Reynolds. She understood.

## 2024-02-26 RX ORDER — DICYCLOMINE HYDROCHLORIDE 10 MG/1
CAPSULE ORAL
Qty: 120 CAPSULE | Refills: 0 | OUTPATIENT
Start: 2024-02-26

## 2024-02-28 ENCOUNTER — TELEPHONE (OUTPATIENT)
Dept: FAMILY MEDICINE CLINIC | Facility: CLINIC | Age: 79
End: 2024-02-28
Payer: MEDICARE

## 2024-02-28 NOTE — TELEPHONE ENCOUNTER
If she is still on only 5 units at night and not having daytime hypoglycemia, she can increase her lantus dose to 10 units.

## 2024-02-28 NOTE — TELEPHONE ENCOUNTER
PT CALLING WITH A BLOOD GLUCOSE CONCERN. HER MORNING READINGS ARE RUNNING 128-174 AND WANTING TO KNOW IF DR. LOMBARDI WANTED TO INCREASE HER MEDICATION.  SHE STATES THAT SHE IS NOT EATING OR DRINKING ANY SUGAR.

## 2024-03-01 RX ORDER — DONEPEZIL HYDROCHLORIDE 5 MG/1
TABLET, FILM COATED ORAL
Qty: 90 TABLET | Refills: 3 | Status: SHIPPED | OUTPATIENT
Start: 2024-03-01

## 2024-03-01 NOTE — TELEPHONE ENCOUNTER
Rx Refill Note  Requested Prescriptions     Pending Prescriptions Disp Refills    donepezil (ARICEPT) 5 MG tablet [Pharmacy Med Name: Donepezil HCl 5 MG Oral Tablet] 90 tablet 0     Sig: TAKE 1 TABLET BY MOUTH ONCE DAILY AT NIGHT      Last filled: 7/3/23 90 with 1 refill.  Last office visit with prescribing clinician: 4/19/2023      Next office visit with prescribing clinician: 3/11/2024     Sent in 90 with 3 refills.    Carolyn Kim MA  03/01/24, 10:33 EST

## 2024-03-15 ENCOUNTER — TELEPHONE (OUTPATIENT)
Dept: SURGERY | Facility: CLINIC | Age: 79
End: 2024-03-15

## 2024-03-18 ENCOUNTER — CLINICAL SUPPORT (OUTPATIENT)
Dept: FAMILY MEDICINE CLINIC | Facility: CLINIC | Age: 79
End: 2024-03-18
Payer: MEDICARE

## 2024-03-18 DIAGNOSIS — Z23 NEED FOR HEPATITIS B BOOSTER VACCINATION: Primary | ICD-10-CM

## 2024-03-20 ENCOUNTER — ANESTHESIA EVENT (OUTPATIENT)
Dept: PERIOP | Facility: HOSPITAL | Age: 79
End: 2024-03-20
Payer: MEDICARE

## 2024-03-20 ENCOUNTER — HOSPITAL ENCOUNTER (OUTPATIENT)
Facility: HOSPITAL | Age: 79
Setting detail: HOSPITAL OUTPATIENT SURGERY
Discharge: HOME | End: 2024-03-20
Attending: STUDENT IN AN ORGANIZED HEALTH CARE EDUCATION/TRAINING PROGRAM | Admitting: STUDENT IN AN ORGANIZED HEALTH CARE EDUCATION/TRAINING PROGRAM
Payer: MEDICARE

## 2024-03-20 ENCOUNTER — ANESTHESIA (OUTPATIENT)
Dept: PERIOP | Facility: HOSPITAL | Age: 79
End: 2024-03-20
Payer: MEDICARE

## 2024-03-20 VITALS
HEIGHT: 67 IN | TEMPERATURE: 97.7 F | HEART RATE: 74 BPM | WEIGHT: 200 LBS | BODY MASS INDEX: 31.39 KG/M2 | OXYGEN SATURATION: 98 % | DIASTOLIC BLOOD PRESSURE: 72 MMHG | RESPIRATION RATE: 15 BRPM | SYSTOLIC BLOOD PRESSURE: 129 MMHG

## 2024-03-20 DIAGNOSIS — D17.1 LIPOMA OF BACK: ICD-10-CM

## 2024-03-20 LAB — GLUCOSE BLDC GLUCOMTR-MCNC: 121 MG/DL (ref 70–130)

## 2024-03-20 PROCEDURE — 25010000002 PROPOFOL 10 MG/ML EMULSION: Performed by: NURSE ANESTHETIST, CERTIFIED REGISTERED

## 2024-03-20 PROCEDURE — 25010000002 ONDANSETRON PER 1 MG: Performed by: NURSE ANESTHETIST, CERTIFIED REGISTERED

## 2024-03-20 PROCEDURE — 25010000002 DEXAMETHASONE PER 1 MG: Performed by: NURSE ANESTHETIST, CERTIFIED REGISTERED

## 2024-03-20 PROCEDURE — 0 CEFAZOLIN SODIUM 2 G RECONSTITUTED SOLUTION: Performed by: STUDENT IN AN ORGANIZED HEALTH CARE EDUCATION/TRAINING PROGRAM

## 2024-03-20 PROCEDURE — 25810000003 LACTATED RINGERS PER 1000 ML: Performed by: STUDENT IN AN ORGANIZED HEALTH CARE EDUCATION/TRAINING PROGRAM

## 2024-03-20 PROCEDURE — 25010000002 FENTANYL CITRATE (PF) 50 MCG/ML SOLUTION PREFILLED SYRINGE: Performed by: NURSE ANESTHETIST, CERTIFIED REGISTERED

## 2024-03-20 PROCEDURE — 21931 EXC BACK LES SC 3 CM/>: CPT | Performed by: STUDENT IN AN ORGANIZED HEALTH CARE EDUCATION/TRAINING PROGRAM

## 2024-03-20 PROCEDURE — 25010000002 HEPARIN (PORCINE) PER 1000 UNITS: Performed by: STUDENT IN AN ORGANIZED HEALTH CARE EDUCATION/TRAINING PROGRAM

## 2024-03-20 PROCEDURE — 25010000002 GLYCOPYRROLATE PF 0.4 MG/2ML SOLUTION: Performed by: NURSE ANESTHETIST, CERTIFIED REGISTERED

## 2024-03-20 PROCEDURE — 25010000002 LIDOCAINE 1 % SOLUTION: Performed by: STUDENT IN AN ORGANIZED HEALTH CARE EDUCATION/TRAINING PROGRAM

## 2024-03-20 PROCEDURE — 82948 REAGENT STRIP/BLOOD GLUCOSE: CPT

## 2024-03-20 RX ORDER — ONDANSETRON 2 MG/ML
4 INJECTION INTRAMUSCULAR; INTRAVENOUS ONCE AS NEEDED
Status: DISCONTINUED | OUTPATIENT
Start: 2024-03-20 | End: 2024-03-20 | Stop reason: HOSPADM

## 2024-03-20 RX ORDER — ONDANSETRON 2 MG/ML
INJECTION INTRAMUSCULAR; INTRAVENOUS AS NEEDED
Status: DISCONTINUED | OUTPATIENT
Start: 2024-03-20 | End: 2024-03-20 | Stop reason: SURG

## 2024-03-20 RX ORDER — SODIUM CHLORIDE 9 MG/ML
40 INJECTION, SOLUTION INTRAVENOUS AS NEEDED
Status: DISCONTINUED | OUTPATIENT
Start: 2024-03-20 | End: 2024-03-20 | Stop reason: HOSPADM

## 2024-03-20 RX ORDER — SODIUM CHLORIDE 0.9 % (FLUSH) 0.9 %
10 SYRINGE (ML) INJECTION EVERY 12 HOURS SCHEDULED
Status: DISCONTINUED | OUTPATIENT
Start: 2024-03-20 | End: 2024-03-20 | Stop reason: HOSPADM

## 2024-03-20 RX ORDER — HEPARIN SODIUM 5000 [USP'U]/ML
5000 INJECTION, SOLUTION INTRAVENOUS; SUBCUTANEOUS ONCE
Status: COMPLETED | OUTPATIENT
Start: 2024-03-20 | End: 2024-03-20

## 2024-03-20 RX ORDER — SODIUM CHLORIDE, SODIUM LACTATE, POTASSIUM CHLORIDE, CALCIUM CHLORIDE 600; 310; 30; 20 MG/100ML; MG/100ML; MG/100ML; MG/100ML
50 INJECTION, SOLUTION INTRAVENOUS CONTINUOUS
Status: DISCONTINUED | OUTPATIENT
Start: 2024-03-20 | End: 2024-03-20 | Stop reason: HOSPADM

## 2024-03-20 RX ORDER — SODIUM CHLORIDE 0.9 % (FLUSH) 0.9 %
10 SYRINGE (ML) INJECTION AS NEEDED
Status: DISCONTINUED | OUTPATIENT
Start: 2024-03-20 | End: 2024-03-20 | Stop reason: HOSPADM

## 2024-03-20 RX ORDER — BUPIVACAINE HYDROCHLORIDE AND EPINEPHRINE 5; 5 MG/ML; UG/ML
INJECTION, SOLUTION EPIDURAL; INTRACAUDAL; PERINEURAL AS NEEDED
Status: DISCONTINUED | OUTPATIENT
Start: 2024-03-20 | End: 2024-03-20 | Stop reason: HOSPADM

## 2024-03-20 RX ORDER — BUPIVACAINE HCL/0.9 % NACL/PF 0.1 %
2000 PLASTIC BAG, INJECTION (ML) EPIDURAL ONCE
Status: COMPLETED | OUTPATIENT
Start: 2024-03-20 | End: 2024-03-20

## 2024-03-20 RX ORDER — LIDOCAINE HYDROCHLORIDE 10 MG/ML
INJECTION, SOLUTION INFILTRATION; PERINEURAL AS NEEDED
Status: DISCONTINUED | OUTPATIENT
Start: 2024-03-20 | End: 2024-03-20 | Stop reason: HOSPADM

## 2024-03-20 RX ORDER — HYDROCODONE BITARTRATE AND ACETAMINOPHEN 5; 325 MG/1; MG/1
1-2 TABLET ORAL EVERY 4 HOURS PRN
Qty: 5 TABLET | Refills: 0 | Status: SHIPPED | OUTPATIENT
Start: 2024-03-20

## 2024-03-20 RX ORDER — DEXAMETHASONE SODIUM PHOSPHATE 4 MG/ML
INJECTION, SOLUTION INTRA-ARTICULAR; INTRALESIONAL; INTRAMUSCULAR; INTRAVENOUS; SOFT TISSUE AS NEEDED
Status: DISCONTINUED | OUTPATIENT
Start: 2024-03-20 | End: 2024-03-20 | Stop reason: SURG

## 2024-03-20 RX ORDER — FENTANYL CITRATE 50 UG/ML
INJECTION, SOLUTION INTRAMUSCULAR; INTRAVENOUS AS NEEDED
Status: DISCONTINUED | OUTPATIENT
Start: 2024-03-20 | End: 2024-03-20 | Stop reason: SURG

## 2024-03-20 RX ORDER — PROPOFOL 10 MG/ML
VIAL (ML) INTRAVENOUS AS NEEDED
Status: DISCONTINUED | OUTPATIENT
Start: 2024-03-20 | End: 2024-03-20 | Stop reason: SURG

## 2024-03-20 RX ORDER — EPHEDRINE SULFATE 5 MG/ML
INJECTION INTRAVENOUS AS NEEDED
Status: DISCONTINUED | OUTPATIENT
Start: 2024-03-20 | End: 2024-03-20 | Stop reason: SURG

## 2024-03-20 RX ADMIN — GLYCOPYRROLATE 0.2 MCG: 0.2 INJECTION, SOLUTION INTRAMUSCULAR; INTRAVENOUS at 11:26

## 2024-03-20 RX ADMIN — EPHEDRINE SULFATE 10 MG: 5 INJECTION INTRAVENOUS at 11:23

## 2024-03-20 RX ADMIN — HEPARIN SODIUM 5000 UNITS: 5000 INJECTION, SOLUTION INTRAVENOUS; SUBCUTANEOUS at 10:45

## 2024-03-20 RX ADMIN — SODIUM CHLORIDE, POTASSIUM CHLORIDE, SODIUM LACTATE AND CALCIUM CHLORIDE 50 ML/HR: 600; 310; 30; 20 INJECTION, SOLUTION INTRAVENOUS at 09:53

## 2024-03-20 RX ADMIN — PROPOFOL 100 MCG/KG/MIN: 10 INJECTION, EMULSION INTRAVENOUS at 11:09

## 2024-03-20 RX ADMIN — LIDOCAINE HYDROCHLORIDE 60 MG: 20 INJECTION, SOLUTION INTRAVENOUS at 11:08

## 2024-03-20 RX ADMIN — DEXAMETHASONE SODIUM PHOSPHATE 4 MG: 4 INJECTION, SOLUTION INTRA-ARTICULAR; INTRALESIONAL; INTRAMUSCULAR; INTRAVENOUS; SOFT TISSUE at 11:17

## 2024-03-20 RX ADMIN — Medication 2000 MG: at 11:10

## 2024-03-20 RX ADMIN — PROPOFOL 100 MG: 10 INJECTION, EMULSION INTRAVENOUS at 11:08

## 2024-03-20 RX ADMIN — ONDANSETRON 4 MG: 2 INJECTION INTRAMUSCULAR; INTRAVENOUS at 11:05

## 2024-03-20 RX ADMIN — EPHEDRINE SULFATE 10 MG: 5 INJECTION INTRAVENOUS at 11:37

## 2024-03-20 RX ADMIN — FENTANYL CITRATE 50 MCG: 50 INJECTION, SOLUTION INTRAMUSCULAR; INTRAVENOUS at 11:08

## 2024-03-20 NOTE — ANESTHESIA PREPROCEDURE EVALUATION
Anesthesia Evaluation     Patient summary reviewed and Nursing notes reviewed   NPO Solid Status: > 8 hours  NPO Liquid Status: > 8 hours           Airway   Mallampati: II  TM distance: >3 FB  Neck ROM: full  Possible difficult intubation  Dental - normal exam     Pulmonary - normal exam   (+) a smoker Former,  Cardiovascular - normal exam    Patient on routine beta blocker  Rhythm: regular  Rate: normal    (+) hypertension, dysrhythmias, PVD, hyperlipidemia      Neuro/Psych  (+) CVA, numbness, psychiatric history Anxiety  GI/Hepatic/Renal/Endo    (+) obesity, liver disease fatty liver disease history of elevated LFT cirrhosis, renal disease- CRI, diabetes mellitus    Musculoskeletal     (+) arthralgias, back pain, chronic pain  Abdominal  - normal exam    Abdomen: soft.  Bowel sounds: normal.   Substance History      OB/GYN          Other   arthritis, blood dyscrasia thrombocytopenia,                 Anesthesia Plan    ASA 3     MAC     (Risks and benefits discussed including risk of aspiration, recall and dental damage. All patient questions answered. Will continue with POC. )  intravenous induction     Anesthetic plan, risks, benefits, and alternatives have been provided, discussed and informed consent has been obtained with: patient.  Pre-procedure education provided    CODE STATUS:

## 2024-03-20 NOTE — PROGRESS NOTES
"Patient: Annie Mejia    YOB: 1945    Date: 03/29/2024    Primary Care Provider: Olga Pimentel MD    Chief Complaint   Patient presents with    Post-op     Lipoma Excision       History of present illness:  I saw the patient in the office today as a followup from their recent right mid back lipoma excision, the pathology report did show mature fibroadipose tissue, compatible with benign lipoma.  They state that they have done well and are having no problems. Denies pain.     Vital Signs:  Vitals:    03/29/24 0840   BP: 122/74   Pulse: 77   Temp: 98 °F (36.7 °C)   TempSrc: Temporal   SpO2: 93%   Weight: 90.7 kg (200 lb)   Height: 170.2 cm (67\")       Physical Exam:   General Appearance:    Alert, cooperative, in no acute distress, wound clean dry without infection   Abdomen:     no masses, no organomegaly, soft non-tender, non-distended, no guarding, wounds are well healed   Chest:      Clear to ausculation       Assessment / Plan:    1. Status post excision of lipoma        I did discuss the situation with the patient today in the office and they have done well from their recent lesion excision, I don't think that the patient needs any further intervention and I need to see them back only if they have further problems. Pathology report was reviewed with the patient in the office.    Electronically signed by Molly Reynolds DO  03/29/24                    "

## 2024-03-20 NOTE — OP NOTE
DATE: 03/20/24    PATIENT: Annie Mejia    PREOP: Right mid back lipoma    POSTOP: Same    PROCEDURE: Right mid back lipoma excision    SURGEON: Molly Reynolds DO    SPECIMEN:    EBL: <10 cc    ANESTHESIA: Sedation    OPERATIVE NARRATIVE: Patient was seen in the preoperative area. History and physical was reviewed, consent was signed, and all questions were answered. Patient was then transferred to the operating room and placed on the operating table in left lateral position. Anesthesia was present to administer IV sedation and monitor vitals throughout the case. The area was prepped and draped in sterile fashion. Time out was performed.    The skin was infiltrated with local anesthesia. A 6.5 cm incision was made. Using a combination of blunt and sharp dissection, the soft tissue mass was isolated and excised completely. It was sent to pathology as specimen. The specimen measured approximately 6.5 x 4 cm. The wound was irrigated with sterile saline. Hemostasis was assured. The wound was closed with 3-0 dermal sutures and a running 4-0 vicryl suture. The wound was dressed with surgical glue.     Patient tolerated the procedure well and was transferred to PACU in stable condition.

## 2024-03-20 NOTE — INTERVAL H&P NOTE
H&P reviewed. The patient was examined and there are no changes to the H&P.         US Guided PIV access-   Skin was cleaned and disinfected prior to IV puncture. Ultrasound was used to find the vein which was compressible and does not have any ultrasound features of an artery or nerve bundle. Under real-time ultrasound guidance peripheral access was obtained in the right forearm using 20 G 1.75\" Peripheral IV catheter . Blood return was present and IV flushed without difficulty with no clinical signs of infiltration. IV dressing applied and there were no immediate complications noted and patient tolerated the procedure well.

## 2024-03-20 NOTE — ANESTHESIA POSTPROCEDURE EVALUATION
Patient: Annie Mejia    Procedure Summary       Date: 03/20/24 Room / Location: [facility]  /  [facility]    Anesthesia Start: 1103 Anesthesia Stop: 1154    Procedure: RIGHT MID BACK LIPOMA EXCISION (Right) Diagnosis:       Lipoma of back      (Lipoma of back [D17.1])    Surgeons: Molly Reynolds DO Provider: Lavelle Hill CRNA    Anesthesia Type: MAC ASA Status: 3            Anesthesia Type: MAC    Vitals  No vitals data found for the desired time range.          Post Anesthesia Care and Evaluation    Patient location during evaluation: bedside  Patient participation: complete - patient participated  Level of consciousness: awake and alert  Pain score: 0  Pain management: satisfactory to patient    Airway patency: patent  Anesthetic complications: No anesthetic complications  PONV Status: none  Cardiovascular status: acceptable and stable  Respiratory status: acceptable  Hydration status: acceptable    Comments: Vitals signs as noted in nursing documentation as per protocol.

## 2024-03-21 ENCOUNTER — TELEPHONE (OUTPATIENT)
Dept: FAMILY MEDICINE CLINIC | Facility: CLINIC | Age: 79
End: 2024-03-21

## 2024-03-21 ENCOUNTER — TELEPHONE (OUTPATIENT)
Dept: SURGERY | Facility: CLINIC | Age: 79
End: 2024-03-21
Payer: MEDICARE

## 2024-03-21 DIAGNOSIS — E11.65 UNCONTROLLED TYPE 2 DIABETES MELLITUS WITH HYPERGLYCEMIA: ICD-10-CM

## 2024-03-21 LAB — REF LAB TEST METHOD: NORMAL

## 2024-03-21 NOTE — TELEPHONE ENCOUNTER
Caller: Annie Mejia     Relationship: PATIENT    Best call back number: [phone]    Which medication are you concerned about: Insulin Glargine (LANTUS SOLOSTAR) 100 UNIT/ML injection pen     Who prescribed you this medication: DR RAMY LOMBARDI    When did you start taking this medication: 2/2024    What are your concerns: PATIENT ADVISED THAT HER INSURANCE DOESN'T WANT TO COVER THIS  MEDICATION ANYMORE. THEY DID IT LAST MONTH AS A ONE TIME COURTESY.  THEY ADVISED HER TO ASK  DR LOMBARDI TO SEND IN A DIFFERENT TIER OF SIMILAR MEDICATION.  PLEASE SENT TO [facility] IN [place]. PLEASE CALL PATIENT TO ADVISE.  THANK YOU.

## 2024-03-21 NOTE — TELEPHONE ENCOUNTER
Caller: KEMAR VERBAL    Relationship: CHILD    Best call back number: [phone]    What is the best time to reach you: ANY    Who are you requesting to speak with (clinical staff, provider,  specific staff member): CLINICAL    Do you know the name of the person who called: NA    What was the call regarding: PT DAUGHTER CALLED TO SEE WHEN SHE CAN WEAR A BRA. PLEASE CALL PT DAUGHTER      PT HAD RIGHT MID BACK LIPOMA EXCISION ON 3-20-24    PER GALA, SEND TE      Is it okay if the provider responds through MyChart: NO

## 2024-03-21 NOTE — SIGNIFICANT NOTE
Pt stated that her blood sugar went up to 220, I informed her that it could be due to the fluids or medications she received. I instructed her that if it remained high to notify her pcp.

## 2024-03-22 NOTE — TELEPHONE ENCOUNTER
Spoke with Essie, advised her that Annie can wear a bra whenever she is comfortable to do so. She understood.

## 2024-03-25 ENCOUNTER — APPOINTMENT (OUTPATIENT)
Dept: BONE DENSITY | Facility: HOSPITAL | Age: 79
End: 2024-03-25
Payer: MEDICARE

## 2024-03-25 DIAGNOSIS — M81.0 AGE-RELATED OSTEOPOROSIS WITHOUT CURRENT PATHOLOGICAL FRACTURE: ICD-10-CM

## 2024-03-25 DIAGNOSIS — Z78.0 POSTMENOPAUSAL: ICD-10-CM

## 2024-03-25 PROCEDURE — 77080 DXA BONE DENSITY AXIAL: CPT

## 2024-03-27 ENCOUNTER — TELEPHONE (OUTPATIENT)
Dept: FAMILY MEDICINE CLINIC | Facility: CLINIC | Age: 79
End: 2024-03-27
Payer: MEDICARE

## 2024-03-27 DIAGNOSIS — E11.65 UNCONTROLLED TYPE 2 DIABETES MELLITUS WITH HYPERGLYCEMIA: ICD-10-CM

## 2024-03-27 NOTE — TELEPHONE ENCOUNTER
Caller: Annie Mejia    Relationship: Self    Best call back number: [phone]     Requested Prescriptions:   NEEDS BRAND NAME LANTUS, INSURANCE SAID ONLY BRAND NAME IS COVERED        Pharmacy where request should be sent: [facility] [address] [place] [address] [phone] [facility] [phone] FX     Last office visit with prescribing clinician: 2/15/2024   Last telemedicine visit with prescribing clinician: Visit date not found   Next office visit with prescribing clinician: Visit date not found     Additional details provided by patient: PLEASE CALL IN BRAND NAME MEDICATION     Does the patient have less than a 3 day supply:  [x] Yes  [] No    Would you like a call back once the refill request has been completed: [] Yes [x] No    If the office needs to give you a call back, can they leave a voicemail: [] Yes [x] No    Tony rBaden Rep   03/27/24 14:59 EDT

## 2024-03-28 NOTE — TELEPHONE ENCOUNTER
Caller: Annie Mejia    Relationship to patient: Self    Best call back number: [phone]     Patient is needing: PATIENT CALLED BACK IN TO CHECK ON THE STATUS OF THIS REFILL    PLEASE ADVISE

## 2024-03-29 ENCOUNTER — OFFICE VISIT (OUTPATIENT)
Dept: SURGERY | Facility: CLINIC | Age: 79
End: 2024-03-29
Payer: MEDICARE

## 2024-03-29 VITALS
DIASTOLIC BLOOD PRESSURE: 74 MMHG | HEIGHT: 67 IN | WEIGHT: 200 LBS | HEART RATE: 77 BPM | SYSTOLIC BLOOD PRESSURE: 122 MMHG | TEMPERATURE: 98 F | OXYGEN SATURATION: 93 % | BODY MASS INDEX: 31.39 KG/M2

## 2024-03-29 DIAGNOSIS — Z86.018 STATUS POST EXCISION OF LIPOMA: Primary | ICD-10-CM

## 2024-03-29 DIAGNOSIS — Z98.890 STATUS POST EXCISION OF LIPOMA: Primary | ICD-10-CM

## 2024-03-29 PROCEDURE — 3074F SYST BP LT 130 MM HG: CPT | Performed by: STUDENT IN AN ORGANIZED HEALTH CARE EDUCATION/TRAINING PROGRAM

## 2024-03-29 PROCEDURE — 99024 POSTOP FOLLOW-UP VISIT: CPT | Performed by: STUDENT IN AN ORGANIZED HEALTH CARE EDUCATION/TRAINING PROGRAM

## 2024-03-29 PROCEDURE — 1159F MED LIST DOCD IN RCRD: CPT | Performed by: STUDENT IN AN ORGANIZED HEALTH CARE EDUCATION/TRAINING PROGRAM

## 2024-03-29 PROCEDURE — 3078F DIAST BP <80 MM HG: CPT | Performed by: STUDENT IN AN ORGANIZED HEALTH CARE EDUCATION/TRAINING PROGRAM

## 2024-03-29 PROCEDURE — 1160F RVW MEDS BY RX/DR IN RCRD: CPT | Performed by: STUDENT IN AN ORGANIZED HEALTH CARE EDUCATION/TRAINING PROGRAM

## 2024-03-29 RX ORDER — INSULIN GLARGINE 100 [IU]/ML
5 INJECTION, SOLUTION SUBCUTANEOUS NIGHTLY
Qty: 15 ML | Refills: 5 | Status: SHIPPED | OUTPATIENT
Start: 2024-03-29

## 2024-03-29 RX ORDER — NITROFURANTOIN 25; 75 MG/1; MG/1
1 CAPSULE ORAL 2 TIMES DAILY
COMMUNITY

## 2024-03-29 RX ORDER — CEFUROXIME AXETIL 500 MG/1
1 TABLET ORAL 2 TIMES DAILY
COMMUNITY

## 2024-04-05 ENCOUNTER — TELEPHONE (OUTPATIENT)
Dept: FAMILY MEDICINE CLINIC | Facility: CLINIC | Age: 79
End: 2024-04-05

## 2024-04-11 ENCOUNTER — TELEPHONE (OUTPATIENT)
Dept: FAMILY MEDICINE CLINIC | Facility: CLINIC | Age: 79
End: 2024-04-11
Payer: MEDICARE

## 2024-04-11 DIAGNOSIS — F41.8 MIXED ANXIETY DEPRESSIVE DISORDER: ICD-10-CM

## 2024-04-11 NOTE — TELEPHONE ENCOUNTER
LILIAM reviewed. CSA UTD. Pt due for UDS. Please let me know once it has been collected and I will send in refill.    She also needs to schedule routine f/u in May/June.

## 2024-04-11 NOTE — TELEPHONE ENCOUNTER
Rx Refill Note  Requested Prescriptions     Pending Prescriptions Disp Refills    ALPRAZolam (XANAX) 0.25 MG tablet [Pharmacy Med Name: ALPRAZolam 0.25 MG Oral Tablet] 60 tablet 0     Sig: TAKE 1 TO 2 TABLETS BY MOUTH ONCE DAILY AS NEEDED      Last office visit with prescribing clinician: 2/15/2024   Last telemedicine visit with prescribing clinician: Visit date not found   Next office visit with prescribing clinician: Visit date not found                         Would you like a call back once the refill request has been completed: [] Yes [] No    If the office needs to give you a call back, can they leave a voicemail: [] Yes [] No    Sonal Bee MA  04/11/24, 16:23 EDT

## 2024-04-23 ENCOUNTER — TELEPHONE (OUTPATIENT)
Dept: FAMILY MEDICINE CLINIC | Facility: CLINIC | Age: 79
End: 2024-04-23
Payer: MEDICARE

## 2024-04-24 ENCOUNTER — CLINICAL SUPPORT (OUTPATIENT)
Dept: FAMILY MEDICINE CLINIC | Facility: CLINIC | Age: 79
End: 2024-04-24
Payer: MEDICARE

## 2024-04-24 DIAGNOSIS — Z51.81 THERAPEUTIC DRUG MONITORING: Primary | ICD-10-CM

## 2024-04-24 RX ORDER — ALPRAZOLAM 0.25 MG/1
TABLET ORAL
Qty: 60 TABLET | Refills: 2 | Status: SHIPPED | OUTPATIENT
Start: 2024-04-24

## 2024-04-24 NOTE — TELEPHONE ENCOUNTER
Patient called Sophie & I relayed info from Dr. Pimentel. Patient will come in and do UDS probably today. She said PCP also prescribes her a pain medication, but was worried that it will show up in her test. I informed her that it shouldn't be a problem as long as it is prescribed medication by us.

## 2024-05-02 LAB — DRUGS UR: NORMAL

## 2024-05-07 ENCOUNTER — HOSPITAL ENCOUNTER (OUTPATIENT)
Facility: HOSPITAL | Age: 79
Discharge: HOME | End: 2024-05-07
Payer: MEDICARE

## 2024-05-07 ENCOUNTER — OFFICE VISIT (OUTPATIENT)
Dept: GASTROENTEROLOGY | Facility: CLINIC | Age: 79
End: 2024-05-07
Payer: MEDICARE

## 2024-05-07 ENCOUNTER — LAB (OUTPATIENT)
Facility: HOSPITAL | Age: 79
End: 2024-05-07
Payer: MEDICARE

## 2024-05-07 VITALS
SYSTOLIC BLOOD PRESSURE: 140 MMHG | HEIGHT: 66 IN | OXYGEN SATURATION: 98 % | BODY MASS INDEX: 33.65 KG/M2 | WEIGHT: 209.4 LBS | HEART RATE: 79 BPM | DIASTOLIC BLOOD PRESSURE: 80 MMHG

## 2024-05-07 DIAGNOSIS — K74.60 CIRRHOSIS OF LIVER WITHOUT ASCITES, UNSPECIFIED HEPATIC CIRRHOSIS TYPE: Primary | ICD-10-CM

## 2024-05-07 DIAGNOSIS — Z87.19 HISTORY OF DIVERTICULITIS: ICD-10-CM

## 2024-05-07 DIAGNOSIS — R10.30 LOWER ABDOMINAL PAIN: ICD-10-CM

## 2024-05-07 DIAGNOSIS — R93.5 ABNORMAL ABDOMINAL ULTRASOUND: ICD-10-CM

## 2024-05-07 DIAGNOSIS — K74.69 OTHER CIRRHOSIS OF LIVER: ICD-10-CM

## 2024-05-07 DIAGNOSIS — R11.0 CHRONIC NAUSEA: ICD-10-CM

## 2024-05-07 DIAGNOSIS — D69.6 THROMBOCYTOPENIA: ICD-10-CM

## 2024-05-07 DIAGNOSIS — K57.90 DIVERTICULOSIS: ICD-10-CM

## 2024-05-07 DIAGNOSIS — Z87.898 HISTORY OF VOMITING: ICD-10-CM

## 2024-05-07 DIAGNOSIS — D64.9 ANEMIA, UNSPECIFIED TYPE: ICD-10-CM

## 2024-05-07 DIAGNOSIS — K76.89 HEPATIC CYST: ICD-10-CM

## 2024-05-07 DIAGNOSIS — K74.60 CIRRHOSIS OF LIVER WITHOUT ASCITES, UNSPECIFIED HEPATIC CIRRHOSIS TYPE: ICD-10-CM

## 2024-05-07 DIAGNOSIS — Z86.010 HISTORY OF COLONIC POLYPS: ICD-10-CM

## 2024-05-07 PROBLEM — E11.51 DIABETES MELLITUS WITH PERIPHERAL ARTERY DISEASE: Status: ACTIVE | Noted: 2024-05-07

## 2024-05-07 LAB
CREAT BLDA-MCNC: 1 MG/DL (ref 0.6–1.3)
DEPRECATED RDW RBC AUTO: 46.8 FL (ref 37–54)
ERYTHROCYTE [DISTWIDTH] IN BLOOD BY AUTOMATED COUNT: 13.9 % (ref 12.3–15.4)
HCT VFR BLD AUTO: 39.4 % (ref 34–46.6)
HGB BLD-MCNC: 12.5 G/DL (ref 12–15.9)
INR PPP: 0.97 (ref 0.89–1.12)
MCH RBC QN AUTO: 29 PG (ref 26.6–33)
MCHC RBC AUTO-ENTMCNC: 31.7 G/DL (ref 31.5–35.7)
MCV RBC AUTO: 91.4 FL (ref 79–97)
PLATELET # BLD AUTO: 133 10*3/MM3 (ref 140–450)
PMV BLD AUTO: 11.9 FL (ref 6–12)
PROTHROMBIN TIME: 13 SECONDS (ref 12.2–14.5)
RBC # BLD AUTO: 4.31 10*6/MM3 (ref 3.77–5.28)
WBC NRBC COR # BLD AUTO: 6.3 10*3/MM3 (ref 3.4–10.8)

## 2024-05-07 PROCEDURE — 99214 OFFICE O/P EST MOD 30 MIN: CPT | Performed by: NURSE PRACTITIONER

## 2024-05-07 PROCEDURE — 80053 COMPREHEN METABOLIC PANEL: CPT

## 2024-05-07 PROCEDURE — 84466 ASSAY OF TRANSFERRIN: CPT

## 2024-05-07 PROCEDURE — 82746 ASSAY OF FOLIC ACID SERUM: CPT

## 2024-05-07 PROCEDURE — 85610 PROTHROMBIN TIME: CPT

## 2024-05-07 PROCEDURE — 83540 ASSAY OF IRON: CPT

## 2024-05-07 PROCEDURE — 25510000001 IOPAMIDOL 61 % SOLUTION: Performed by: NURSE PRACTITIONER

## 2024-05-07 PROCEDURE — 82607 VITAMIN B-12: CPT

## 2024-05-07 PROCEDURE — 1159F MED LIST DOCD IN RCRD: CPT | Performed by: NURSE PRACTITIONER

## 2024-05-07 PROCEDURE — 36415 COLL VENOUS BLD VENIPUNCTURE: CPT

## 2024-05-07 PROCEDURE — 1160F RVW MEDS BY RX/DR IN RCRD: CPT | Performed by: NURSE PRACTITIONER

## 2024-05-07 PROCEDURE — 3077F SYST BP >= 140 MM HG: CPT | Performed by: NURSE PRACTITIONER

## 2024-05-07 PROCEDURE — 3079F DIAST BP 80-89 MM HG: CPT | Performed by: NURSE PRACTITIONER

## 2024-05-07 PROCEDURE — 82728 ASSAY OF FERRITIN: CPT

## 2024-05-07 PROCEDURE — 85027 COMPLETE CBC AUTOMATED: CPT

## 2024-05-07 PROCEDURE — 82565 ASSAY OF CREATININE: CPT

## 2024-05-07 PROCEDURE — 74178 CT ABD&PLV WO CNTR FLWD CNTR: CPT

## 2024-05-07 PROCEDURE — 82105 ALPHA-FETOPROTEIN SERUM: CPT

## 2024-05-07 PROCEDURE — 0 DIATRIZOATE MEGLUMINE & SODIUM PER 1 ML: Performed by: NURSE PRACTITIONER

## 2024-05-07 PROCEDURE — 82306 VITAMIN D 25 HYDROXY: CPT | Performed by: NURSE PRACTITIONER

## 2024-05-07 RX ADMIN — IOPAMIDOL 85 ML: 612 INJECTION, SOLUTION INTRAVENOUS at 13:20

## 2024-05-08 LAB
25(OH)D3 SERPL-MCNC: 50.2 NG/ML (ref 30–100)
ALBUMIN SERPL-MCNC: 4.5 G/DL (ref 3.5–5.2)
ALBUMIN/GLOB SERPL: 1.6 G/DL
ALP SERPL-CCNC: 74 U/L (ref 39–117)
ALPHA-FETOPROTEIN: 3.66 NG/ML (ref 0–8.3)
ALT SERPL W P-5'-P-CCNC: 13 U/L (ref 1–33)
ANION GAP SERPL CALCULATED.3IONS-SCNC: 10 MMOL/L (ref 5–15)
AST SERPL-CCNC: 11 U/L (ref 1–32)
BILIRUB SERPL-MCNC: 0.6 MG/DL (ref 0–1.2)
BUN SERPL-MCNC: 16 MG/DL (ref 8–23)
BUN/CREAT SERPL: 17.2 (ref 7–25)
CALCIUM SPEC-SCNC: 9.5 MG/DL (ref 8.6–10.5)
CHLORIDE SERPL-SCNC: 107 MMOL/L (ref 98–107)
CO2 SERPL-SCNC: 26 MMOL/L (ref 22–29)
CREAT SERPL-MCNC: 0.93 MG/DL (ref 0.57–1)
EGFRCR SERPLBLD CKD-EPI 2021: 62.3 ML/MIN/1.73
FERRITIN SERPL-MCNC: 38 NG/ML (ref 13–150)
FOLATE SERPL-MCNC: 16.9 NG/ML (ref 4.78–24.2)
GLOBULIN UR ELPH-MCNC: 2.9 GM/DL
GLUCOSE SERPL-MCNC: 123 MG/DL (ref 65–99)
IRON 24H UR-MRATE: 142 MCG/DL (ref 37–145)
IRON SATN MFR SERPL: 33 % (ref 20–50)
POTASSIUM SERPL-SCNC: 4.1 MMOL/L (ref 3.5–5.2)
PROT SERPL-MCNC: 7.4 G/DL (ref 6–8.5)
SODIUM SERPL-SCNC: 143 MMOL/L (ref 136–145)
TIBC SERPL-MCNC: 429 MCG/DL (ref 298–536)
TRANSFERRIN SERPL-MCNC: 288 MG/DL (ref 200–360)
VIT B12 BLD-MCNC: 1430 PG/ML (ref 211–946)

## 2024-05-22 ENCOUNTER — OFFICE VISIT (OUTPATIENT)
Dept: FAMILY MEDICINE CLINIC | Facility: CLINIC | Age: 79
End: 2024-05-22
Payer: MEDICARE

## 2024-05-22 ENCOUNTER — TELEPHONE (OUTPATIENT)
Dept: FAMILY MEDICINE CLINIC | Facility: CLINIC | Age: 79
End: 2024-05-22

## 2024-05-22 VITALS
OXYGEN SATURATION: 98 % | HEART RATE: 107 BPM | DIASTOLIC BLOOD PRESSURE: 76 MMHG | WEIGHT: 205 LBS | SYSTOLIC BLOOD PRESSURE: 120 MMHG | BODY MASS INDEX: 32.95 KG/M2 | HEIGHT: 66 IN

## 2024-05-22 DIAGNOSIS — K44.9 HIATAL HERNIA: ICD-10-CM

## 2024-05-22 DIAGNOSIS — E11.65 UNCONTROLLED TYPE 2 DIABETES MELLITUS WITH HYPERGLYCEMIA: Primary | ICD-10-CM

## 2024-05-22 DIAGNOSIS — N94.89 ADNEXAL MASS: Primary | ICD-10-CM

## 2024-05-22 DIAGNOSIS — E11.9 INSULIN DEPENDENT TYPE 2 DIABETES MELLITUS: ICD-10-CM

## 2024-05-22 DIAGNOSIS — E11.65 UNCONTROLLED TYPE 2 DIABETES MELLITUS WITH HYPERGLYCEMIA: ICD-10-CM

## 2024-05-22 DIAGNOSIS — Z79.4 INSULIN DEPENDENT TYPE 2 DIABETES MELLITUS: ICD-10-CM

## 2024-05-22 LAB
EXPIRATION DATE: ABNORMAL
HBA1C MFR BLD: 7.3 % (ref 4.5–5.7)
Lab: ABNORMAL

## 2024-05-22 PROCEDURE — 1160F RVW MEDS BY RX/DR IN RCRD: CPT | Performed by: FAMILY MEDICINE

## 2024-05-22 PROCEDURE — 3078F DIAST BP <80 MM HG: CPT | Performed by: FAMILY MEDICINE

## 2024-05-22 PROCEDURE — 1126F AMNT PAIN NOTED NONE PRSNT: CPT | Performed by: FAMILY MEDICINE

## 2024-05-22 PROCEDURE — 99214 OFFICE O/P EST MOD 30 MIN: CPT | Performed by: FAMILY MEDICINE

## 2024-05-22 PROCEDURE — 83036 HEMOGLOBIN GLYCOSYLATED A1C: CPT | Performed by: FAMILY MEDICINE

## 2024-05-22 PROCEDURE — 1159F MED LIST DOCD IN RCRD: CPT | Performed by: FAMILY MEDICINE

## 2024-05-22 PROCEDURE — 3074F SYST BP LT 130 MM HG: CPT | Performed by: FAMILY MEDICINE

## 2024-05-22 PROCEDURE — 3051F HG A1C>EQUAL 7.0%<8.0%: CPT | Performed by: FAMILY MEDICINE

## 2024-05-22 PROCEDURE — G2211 COMPLEX E/M VISIT ADD ON: HCPCS | Performed by: FAMILY MEDICINE

## 2024-05-22 RX ORDER — BLOOD-GLUCOSE METER
KIT MISCELLANEOUS
Qty: 120 EACH | Refills: 12 | Status: SHIPPED | OUTPATIENT
Start: 2024-05-22

## 2024-05-22 RX ORDER — PEN NEEDLE, DIABETIC 30 GX3/16"
1 NEEDLE, DISPOSABLE MISCELLANEOUS DAILY
Qty: 100 EACH | Refills: 3 | Status: SHIPPED | OUTPATIENT
Start: 2024-05-22

## 2024-05-22 RX ORDER — LANCETS 28 GAUGE
EACH MISCELLANEOUS
Qty: 100 EACH | Refills: 5 | Status: SHIPPED | OUTPATIENT
Start: 2024-05-22

## 2024-05-22 NOTE — PROGRESS NOTES
Subjective   Annie Mejia is a 78 y.o. female.     History of Present Illness  Answers submitted by the patient for this visit:  Other (Submitted on 5/21/2024)  Please describe your symptoms.: Pain in my stomach.  Have you had these symptoms before?: Yes  How long have you been having these symptoms?: 1-2 weeks  Please list any medications you are currently taking for this condition.: GAS x.  Please describe any probable cause for these symptoms. : Don't know.  Primary Reason for Visit (Submitted on 5/21/2024)  What is the primary reason for your visit?: Other    She presents for f/u on chronic abd pain. Since her last visit she has had consultation with general surgery for removal for large sebaceous cyst. Healed well.    Also had f/u with gastroenterology. Has HH with GERD. Incidental finding of left adnexal cyst. Has persistent epigastric pain, bloating.    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 Healing well.    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 Does not plan on going back to neurology.    Due for f/u A1c. Has IDDM, previously with A1c hear goal. On ASA, statin, ace-inh, lantus.    The following portions of the patient's history were reviewed and updated as appropriate: allergies, current medications, past family history, past medical history, past social history, past surgical history, and problem list.    Review of Systems   Constitutional:  Positive for fatigue. Negative for fever and unexpected weight change.   HENT:  Negative for mouth sores, nosebleeds, sore throat and trouble swallowing.    Respiratory:  Negative for cough.    Cardiovascular:  Negative for chest pain.   Gastrointestinal:  Positive for abdominal pain, constipation (intermittently), diarrhea (intermittently) and nausea. Negative for blood in stool and vomiting.   Genitourinary:  Negative for dysuria, frequency and hematuria.   Musculoskeletal:  Positive for arthralgias and back pain.   Skin:  Negative for rash and  wound.   Neurological:  Negative for syncope and weakness.   Hematological:  Negative for adenopathy. Bruises/bleeds easily.   Psychiatric/Behavioral:  Negative for confusion.    Pt's previous ROS reviewed and updated as indicated.       Objective   Vitals:    05/22/24 0852   BP: 120/76   Pulse: 107   SpO2: 98%     Body mass index is 33.09 kg/m².      05/22/24  0852   Weight: 93 kg (205 lb)       Physical Exam  Vitals and nursing note reviewed.   Constitutional:       General: She is not in acute distress.     Appearance: She is well-groomed and overweight. She is not ill-appearing.   HENT:      Head: Atraumatic.      Mouth/Throat:      Mouth: Mucous membranes are moist. No oral lesions.   Eyes:      General: No scleral icterus.     Conjunctiva/sclera: Conjunctivae normal.   Neck:      Thyroid: No thyroid mass.   Cardiovascular:      Rate and Rhythm: Normal rate and regular rhythm.      Pulses: Normal pulses.      Heart sounds: Normal heart sounds.   Pulmonary:      Effort: Pulmonary effort is normal.      Breath sounds: Normal breath sounds.   Abdominal:      General: Bowel sounds are increased. There is no distension.      Palpations: Abdomen is soft. There is no mass.      Tenderness: There is abdominal tenderness (mild) in the epigastric area.   Musculoskeletal:      Right lower leg: No edema.      Left lower leg: No edema.   Lymphadenopathy:      Cervical: No cervical adenopathy.   Skin:     General: Skin is warm and dry.      Coloration: Skin is not jaundiced or pale.      Findings: No bruising or rash.   Neurological:      Mental Status: She is alert and oriented to person, place, and time. Mental status is at baseline.      Gait: Gait is intact.   Psychiatric:         Mood and Affect: Mood and affect normal.         Behavior: Behavior normal. Behavior is cooperative.         Cognition and Memory: Cognition normal.     Pt's previous physical exam reviewed and updated as indicated.    CT Abdomen Pelvis With &  Without Contrast  Result Date: 5/7/2024  Impression: 1. No CT scan evidence of acute diverticulitis or other acute inflammatory process in the abdomen or pelvis. 2. Hiatal hernia, nonobstructing right renal calculus, multiple nonenhancing hepatic cysts, and stable 3 cm left adnexal nonenhancing cyst. 3. Adjacent, new 12 mm water density nonenhancing left adnexal cyst. 4. Suspected changes of recent lumbar puncture, facet injection, or other similar procedure as noted above. Electronically Signed: Larry Askew MD  5/7/2024 1:43 PM EDT  Workstation ID: [id number]    Lab Results   Component Value Date    WBC 6.30 05/07/2024    HGB 12.5 05/07/2024    HCT 39.4 05/07/2024    MCV 91.4 05/07/2024     (L) 05/07/2024       Lab Results   Component Value Date    GLUCOSE 123 (H) 05/07/2024    BUN 16 05/07/2024    CREATININE 1.00 05/07/2024    EGFRIFNONA 47 (L) 10/21/2021    EGFRIFAFRI 57 (L) 10/21/2021    BCR 17.2 05/07/2024    K 4.1 05/07/2024    CO2 26.0 05/07/2024    CALCIUM 9.5 05/07/2024    PROTENTOTREF 6.9 06/21/2023    ALBUMIN 4.5 05/07/2024    LABIL2 1.8 06/21/2023    AST 11 05/07/2024    ALT 13 05/07/2024       Lab Results   Component Value Date    CHOL 151 01/18/2021    CHLPL 141 02/15/2024    TRIG 100 02/15/2024    HDL 54 02/15/2024    LDL 69 02/15/2024       Lab Results   Component Value Date    TSH 2.450 06/21/2023     Lab Results   Component Value Date    HGBA1C 7.3 (A) 05/22/2024    HGBA1C 7.90 (H) 02/15/2024    HGBA1C 6.90 (H) 06/21/2023     Lab Results   Component Value Date    MICROALBUR 47.1 02/15/2024    CREATININE 1.00 05/07/2024         Assessment & Plan   Diagnoses and all orders for this visit:    1. Adnexal mass (Primary)  -     Ambulatory Referral to Gynecology    2. Uncontrolled type 2 diabetes mellitus with hyperglycemia  -     Insulin Pen Needle (Pen Needles) 32G X 4 MM misc; Use 1 each Daily.  Dispense: 100 each; Refill: 3  -     POC Glycosylated Hemoglobin (Hb A1C)    3. Insulin dependent type 2  diabetes mellitus  -     Lancets (freestyle) lancets; CHECK GLUCOSE FASTING IN THE MORNING AND IN THE EVENING,  Dispense: 100 each; Refill: 5    4. Hiatal hernia    Other orders  -     glucose blood (FREESTYLE LITE) test strip; USE ONE STRIP TO CHECK GLUCOSE FASTING IN THE MORNING AND IN THE EVENING  Dispense: 120 each; Refill: 12       Refer to GYN for further f/u eval of new left adnexal cystic mass.  IDDM with improved control. Continue lantus. Has not been able to tolerate oral meds.  HH with gERD, chronic abd pain. F/u with GI as scheduled. Continue low dose PPI.  Routine f/u in 3 months, sooner as needed.  Patient was encouraged to keep me informed of any acute changes, lack of improvement, or any new concerning symptoms.  Pt is aware of reasons to seek emergent care.  Patient voiced understanding of all instructions and denied further questions.    Please note that portions of this note may have been completed with a voice recognition program.

## 2024-05-22 NOTE — TELEPHONE ENCOUNTER
Caller: [facility] [address] [place] [address] - [address] [facility] - [phone]  - [phone]    Relationship: Pharmacy    Best call back number: [phone]    What is the best time to reach you: ASAP    Who are you requesting to speak with (clinical staff, provider,  specific staff member): CLINICAL      What was the call regarding: PHARMACY CALLED STATING PATIENTS INSURANCE NO LONGER COVERS FREESTYLE.     PATIENT NOW HAS TO USE ACCU CHEK. PHARMACY NEEDS A PRESCRIPTION FOR ACCU CHECK GUIDE METER

## 2024-07-27 ENCOUNTER — TELEPHONE (OUTPATIENT)
Dept: OBSTETRICS AND GYNECOLOGY | Facility: CLINIC | Age: 79
End: 2024-07-27
Payer: MEDICARE

## 2024-07-27 NOTE — TELEPHONE ENCOUNTER
Called patient to review TVUS findings from 7/23/24 - Ms. Mejia was scheduled to see me the same date but I was unable to attend clinic, and thus she completed the ultrasound only. US revealed a 3.9 cm simple cyst. Ms. Mejia states she has known about this cyst for several years, and previously she saw a provider in [place] every 3 months over the course of a year for serial ultrasounds. At that time, the cyst remained stable in size, and she was advised that she no longer required ultrasound surveillance. I discussed with Ms. Mejia that I did review imaging studies from 10/2016, 4/2021, 3/2023 and 5/2024 (all CT A/P) - in each study, there is a stable ~3 cm simple left adnexal cyst, so it does appear that this cyst has been present for some time and stable in size.     I discussed the option for tumor markers, which Ms. Mejia would like to defer at this time. We also discussed that if she does have concerns regarding the cyst, the alternative to serial ultrasound surveillance would be surgical management, which she does not wish to pursue. We discussed that she could plan for a repeat US in one year to confirm stable nature of the cyst, though Ms. Mejia feels comfortable at this time discontinuing surveillance, again due to the established stable size over several years.     All questions were answered and Ms. Mejia was encouraged to call or return to clinic with any concerns.    Annie Stinson MD   Obstetrics and Gynecology  [facility]

## 2024-08-17 DIAGNOSIS — E11.65 UNCONTROLLED TYPE 2 DIABETES MELLITUS WITH HYPERGLYCEMIA: ICD-10-CM

## 2024-08-19 RX ORDER — BLOOD-GLUCOSE METER
EACH MISCELLANEOUS
Qty: 1 KIT | Refills: 0 | Status: SHIPPED | OUTPATIENT
Start: 2024-08-19

## 2024-08-22 ENCOUNTER — OFFICE VISIT (OUTPATIENT)
Dept: FAMILY MEDICINE CLINIC | Facility: CLINIC | Age: 79
End: 2024-08-22
Payer: MEDICARE

## 2024-08-22 VITALS
OXYGEN SATURATION: 98 % | SYSTOLIC BLOOD PRESSURE: 130 MMHG | HEART RATE: 72 BPM | BODY MASS INDEX: 33.27 KG/M2 | WEIGHT: 207 LBS | HEIGHT: 66 IN | DIASTOLIC BLOOD PRESSURE: 82 MMHG

## 2024-08-22 DIAGNOSIS — K76.9 CHRONIC LIVER DISEASE: ICD-10-CM

## 2024-08-22 DIAGNOSIS — E11.9 INSULIN DEPENDENT TYPE 2 DIABETES MELLITUS, CONTROLLED: Primary | ICD-10-CM

## 2024-08-22 DIAGNOSIS — Z12.31 ENCOUNTER FOR SCREENING MAMMOGRAM FOR MALIGNANT NEOPLASM OF BREAST: ICD-10-CM

## 2024-08-22 DIAGNOSIS — G89.4 CHRONIC PAIN SYNDROME: ICD-10-CM

## 2024-08-22 DIAGNOSIS — Z79.4 INSULIN DEPENDENT TYPE 2 DIABETES MELLITUS, CONTROLLED: Primary | ICD-10-CM

## 2024-08-22 DIAGNOSIS — Z23 NEED FOR HEPATITIS B BOOSTER VACCINATION: ICD-10-CM

## 2024-08-22 PROBLEM — I70.213 INTERMITTENT CLAUDICATION OF BOTH LOWER EXTREMITIES DUE TO ATHEROSCLEROSIS: Status: ACTIVE | Noted: 2024-08-22

## 2024-08-22 LAB
EXPIRATION DATE: ABNORMAL
HBA1C MFR BLD: 7 % (ref 4.5–5.7)
Lab: ABNORMAL

## 2024-08-22 PROCEDURE — 3075F SYST BP GE 130 - 139MM HG: CPT | Performed by: FAMILY MEDICINE

## 2024-08-22 PROCEDURE — 1126F AMNT PAIN NOTED NONE PRSNT: CPT | Performed by: FAMILY MEDICINE

## 2024-08-22 PROCEDURE — 1160F RVW MEDS BY RX/DR IN RCRD: CPT | Performed by: FAMILY MEDICINE

## 2024-08-22 PROCEDURE — G2211 COMPLEX E/M VISIT ADD ON: HCPCS | Performed by: FAMILY MEDICINE

## 2024-08-22 PROCEDURE — 83036 HEMOGLOBIN GLYCOSYLATED A1C: CPT | Performed by: FAMILY MEDICINE

## 2024-08-22 PROCEDURE — 3079F DIAST BP 80-89 MM HG: CPT | Performed by: FAMILY MEDICINE

## 2024-08-22 PROCEDURE — 1159F MED LIST DOCD IN RCRD: CPT | Performed by: FAMILY MEDICINE

## 2024-08-22 PROCEDURE — 3051F HG A1C>EQUAL 7.0%<8.0%: CPT | Performed by: FAMILY MEDICINE

## 2024-08-22 PROCEDURE — 99214 OFFICE O/P EST MOD 30 MIN: CPT | Performed by: FAMILY MEDICINE

## 2024-08-22 RX ORDER — HYDROCODONE BITARTRATE AND ACETAMINOPHEN 7.5; 325 MG/1; MG/1
1 TABLET ORAL EVERY 8 HOURS PRN
Qty: 90 TABLET | Refills: 0 | Status: SHIPPED | OUTPATIENT
Start: 2024-08-22

## 2024-08-22 NOTE — PROGRESS NOTES
Subjective   Annie Mejia is a 78 y.o. female.     History of Present Illness  Answers submitted by the patient for this visit:  Other (Submitted on 8/17/2024)  Please describe your symptoms.: 6 month check up  Have you had these symptoms before?: No  How long have you been having these symptoms?: 1-4 days  Please list any medications you are currently taking for this condition.: She has them on file  Please describe any probable cause for these symptoms. : NA  Primary Reason for Visit (Submitted on 8/17/2024)  What is the primary reason for your visit?: Other    Here for routine follow-up on several chronic medical problems.    Keeping regular follow-up with gastroenterology. Has HH with GERD, BERNARD with cirrhosis. Incidental finding of left adnexal cyst (monitored by GYN). Has persistent epigastric pain, bloating.  Recent good control of symptoms.  Taking meds as prescribed.     On donazepril started by neurology for MCI.  No progressive neurological symptoms reported.    Due for f/u A1c. Has IDDM, previously with A1c near goal. On ASA, statin, ace-inh, lantus.  Denies hypoglycemic episodes    Has severe lumbar DDD/DJD.  Upcoming follow-up for injections with orthopedist.  Requesting refill of Norco which he uses very intermittently for management of her most severe pain.  Denies new focal neurological symptoms.    Has had recent follow-up with vascular surgery for PAD requiring previous PCI.  On aspirin, statin    The following portions of the patient's history were reviewed and updated as appropriate: allergies, current medications, past family history, past medical history, past social history, past surgical history, and problem list.    Review of Systems   Constitutional:  Positive for fatigue. Negative for fever and unexpected weight change.   HENT:  Negative for mouth sores, nosebleeds, sore throat and trouble swallowing.    Respiratory:  Negative for cough, shortness of breath and wheezing.     Cardiovascular:  Negative for chest pain, palpitations and leg swelling.   Gastrointestinal:  Positive for abdominal pain (chronic), constipation (intermittently), diarrhea (intermittently) and nausea. Negative for blood in stool and vomiting.   Genitourinary:  Negative for dysuria, frequency and hematuria.   Musculoskeletal:  Positive for arthralgias, back pain and gait problem.   Skin:  Negative for rash and wound.   Neurological:  Negative for syncope and weakness.   Hematological:  Negative for adenopathy. Bruises/bleeds easily.   Psychiatric/Behavioral:  Negative for confusion.    Pt's previous ROS reviewed and updated as indicated.       Objective   Vitals:    08/22/24 0840   BP: 130/82   Pulse: 72   SpO2: 98%     Body mass index is 33.41 kg/m².      08/22/24  0840   Weight: 93.9 kg (207 lb)       Physical Exam  Vitals and nursing note reviewed.   Constitutional:       General: She is not in acute distress.     Appearance: She is well-groomed and overweight. She is not ill-appearing.   HENT:      Head: Atraumatic.      Mouth/Throat:      Mouth: Mucous membranes are moist. No oral lesions.   Eyes:      General: No scleral icterus.     Conjunctiva/sclera: Conjunctivae normal.   Neck:      Thyroid: No thyroid mass.   Cardiovascular:      Rate and Rhythm: Normal rate and regular rhythm.      Pulses: Normal pulses.      Heart sounds: Normal heart sounds.   Pulmonary:      Effort: Pulmonary effort is normal.      Breath sounds: Normal breath sounds.   Abdominal:      General: Bowel sounds are increased. There is no distension.      Palpations: Abdomen is soft. There is no mass.      Tenderness: There is no abdominal tenderness.   Musculoskeletal:      Lumbar back: Deformity (loss of normal lordosis), spasms and bony tenderness (L3-S1, bilateral SI joints) present. Decreased range of motion. Negative right straight leg raise test and negative left straight leg raise test.      Right lower leg: No edema.      Left  lower leg: No edema.   Lymphadenopathy:      Cervical: No cervical adenopathy.   Skin:     General: Skin is warm and dry.      Coloration: Skin is not jaundiced or pale.      Findings: No bruising or rash.   Neurological:      Mental Status: She is alert and oriented to person, place, and time. Mental status is at baseline.      Gait: Gait abnormal (antalgic).   Psychiatric:         Mood and Affect: Mood and affect normal.         Speech: Speech normal.         Behavior: Behavior normal. Behavior is cooperative.         Thought Content: Thought content normal.         Cognition and Memory: Cognition normal.         Judgment: Judgment normal.     Pt's previous physical exam reviewed and updated as indicated.      Lab Results   Component Value Date    HGBA1C 7.0 (A) 08/22/2024    HGBA1C 7.3 (A) 05/22/2024    HGBA1C 7.90 (H) 02/15/2024     Lab Results   Component Value Date    MICROALBUR 47.1 02/15/2024    CREATININE 1.00 05/07/2024     Lab Results   Component Value Date    WBC 6.30 05/07/2024    HGB 12.5 05/07/2024    HCT 39.4 05/07/2024    MCV 91.4 05/07/2024     (L) 05/07/2024       Lab Results   Component Value Date    GLUCOSE 123 (H) 05/07/2024    BUN 16 05/07/2024    CREATININE 1.00 05/07/2024    EGFRIFNONA 47 (L) 10/21/2021    EGFRIFAFRI 57 (L) 10/21/2021    BCR 17.2 05/07/2024    K 4.1 05/07/2024    CO2 26.0 05/07/2024    CALCIUM 9.5 05/07/2024    PROTENTOTREF 6.9 06/21/2023    ALBUMIN 4.5 05/07/2024    LABIL2 1.8 06/21/2023    AST 11 05/07/2024    ALT 13 05/07/2024       Lab Results   Component Value Date    CHOL 151 01/18/2021    CHLPL 141 02/15/2024    TRIG 100 02/15/2024    HDL 54 02/15/2024    LDL 69 02/15/2024         Lab Results   Component Value Date    TSH 2.450 06/21/2023         Assessment & Plan   Diagnoses and all orders for this visit:    1. Insulin dependent type 2 diabetes mellitus, controlled (Primary)  -     POC Glycosylated Hemoglobin (Hb A1C)    2. Chronic pain syndrome  -      HYDROcodone-acetaminophen (Norco) 7.5-325 MG per tablet; Take 1 tablet by mouth Every 8 (Eight) Hours As Needed for Severe Pain.  Dispense: 90 tablet; Refill: 0    3. Need for hepatitis B booster vaccination  -     hepatitis B vaccine, recombinant, (ENGERIX-B) 10 MCG/0.5ML injection; Inject 1 mL into the appropriate muscle as directed by prescriber 1 (One) Time for 1 dose.  Dispense: 1 mL; Refill: 0    4. Chronic liver disease  -     hepatitis B vaccine, recombinant, (ENGERIX-B) 10 MCG/0.5ML injection; Inject 1 mL into the appropriate muscle as directed by prescriber 1 (One) Time for 1 dose.  Dispense: 1 mL; Refill: 0    5. Encounter for screening mammogram for malignant neoplasm of breast  -     Mammo Screening Digital Tomosynthesis Bilateral With CAD; Future       IDDM, controlled.  Continue low-dose Lantus, aspirin, statin, ACE inhibitor. Patient encouraged to take meds as rx'd, follow diabetic appropriate diet, exercise daily, perform feet check daily, and have yearly diabetic eye exams.    Chronic pain due to severe lumbar DDD/DJD.  She will follow-up with orthopedics as scheduled for spinal injections.  Continue Norco as above for most severe pain.  She is being very judicious with use.  No apparent behavior.  No side effects reported.  As part of patient's treatment plan I am prescribing a controlled substance.  The patient has been made aware of the appropriate use of such medications, including potential risk of somnolence, limited ability to drive and/or work safely, and potential for dependence and/or overdose.  It has also been made clear that these medications are for use by this patient only, without concomitant use of alcohol or other substances, unless prescribed.  History and physical exam exhibit continued safe and appropriate use of controlled substance.  LILIAM reviewed.  Patient has completed a prescribing agreement detailing terms of continued prescribing of controlled substances, including  monitoring LILIAM reports, urine drug screening, and pill counts if necessary.  Patient is aware that inappropriate use will result in cessation of prescribing such medications.    She wishes to receive her third hepatitis B vaccine from medical pharmacy.  Prescription provided.    She will follow-up with multiple specialist as scheduled.    Routine follow-up in 3 months, sooner as needed.  Patient was encouraged to keep me informed of any acute changes, lack of improvement, or any new concerning symptoms.  Pt is aware of reasons to seek emergent care.  Patient voiced understanding of all instructions and denied further questions.    Please note that portions of this note may have been completed with a voice recognition program.

## 2024-08-28 ENCOUNTER — TELEPHONE (OUTPATIENT)
Dept: FAMILY MEDICINE CLINIC | Facility: CLINIC | Age: 79
End: 2024-08-28
Payer: MEDICARE

## 2024-08-28 DIAGNOSIS — G89.4 CHRONIC PAIN SYNDROME: ICD-10-CM

## 2024-08-28 DIAGNOSIS — E11.9 INSULIN DEPENDENT TYPE 2 DIABETES MELLITUS, CONTROLLED: Primary | ICD-10-CM

## 2024-08-28 DIAGNOSIS — Z79.4 INSULIN DEPENDENT TYPE 2 DIABETES MELLITUS, CONTROLLED: Primary | ICD-10-CM

## 2024-08-28 RX ORDER — BLOOD-GLUCOSE METER
KIT MISCELLANEOUS
Qty: 120 EACH | Refills: 12 | Status: CANCELLED | OUTPATIENT
Start: 2024-08-28

## 2024-08-28 NOTE — TELEPHONE ENCOUNTER
OUT OF MEDICATION, PHARMACY SAID THEY SENT REQUEST BUT THERE WAS SOMETHING WITH INSURANCE ABOUT 7 DAYS NOT SURE WHAT THAT WAS ABOUT   Caller: Nano Maicas    Relationship: Emergency Contact    Best call back number: [phone]     Requested Prescriptions:   Requested Prescriptions     Pending Prescriptions Disp Refills    glucose blood (FREESTYLE LITE) test strip 120 each 12     Sig: USE ONE STRIP TO CHECK GLUCOSE FASTING IN THE MORNING AND IN THE EVENING    HYDROcodone-acetaminophen (Norco) 7.5-325 MG per tablet 90 tablet 0     Sig: Take 1 tablet by mouth Every 8 (Eight) Hours As Needed for Severe Pain.        Pharmacy where request should be sent: [address] [phone] [facility] [phone] FX     Last office visit with prescribing clinician: 8/22/2024   Last telemedicine visit with prescribing clinician: Visit date not found   Next office visit with prescribing clinician: 11/25/2024     Additional details provided by patient: OUT OF MEDICATION, PHARMACY SAID THEY SENT REQUEST BUT THERE WAS SOMETHING WITH INSURANCE ABOUT 7 DAYS NOT SURE WHAT THAT WAS ABOUT      Does the patient have less than a 3 day supply:  [x] Yes  [] No    Would you like a call back once the refill request has been completed: [] Yes [x] No    If the office needs to give you a call back, can they leave a voicemail: [] Yes [x] No    Tony Braden Rep   08/28/24 13:52 EDT

## 2024-09-03 ENCOUNTER — TELEPHONE (OUTPATIENT)
Dept: FAMILY MEDICINE CLINIC | Facility: CLINIC | Age: 79
End: 2024-09-03
Payer: MEDICARE

## 2024-09-03 RX ORDER — HYDROCODONE BITARTRATE AND ACETAMINOPHEN 7.5; 325 MG/1; MG/1
1 TABLET ORAL EVERY 8 HOURS PRN
Qty: 21 TABLET | Refills: 0 | Status: SHIPPED | OUTPATIENT
Start: 2024-09-03

## 2024-09-03 NOTE — TELEPHONE ENCOUNTER
Silvana spoke to pharmacy this morning pt was unable to fill medication due to insurance it needing a 7 days supply sent first then they will pay for 30 day supply.

## 2024-09-03 NOTE — TELEPHONE ENCOUNTER
Rx sent in on 8/22/24. Did she not fill it then? Was for #90. Pharmacy E confirmed receipt.    Fill not appearing on LILIAM yet

## 2024-09-03 NOTE — TELEPHONE ENCOUNTER
Rx Refill Note  Requested Prescriptions     Pending Prescriptions Disp Refills    HYDROcodone-acetaminophen (Norco) 7.5-325 MG per tablet 21 tablet 0     Sig: Take 1 tablet by mouth Every 8 (Eight) Hours As Needed for Severe Pain.     Signed Prescriptions Disp Refills    glucose blood test strip 100 each 12     Sig: Use as instructed     Authorizing Provider: RAMY LOMBARDI     Ordering User: SILVANA TAY      Last office visit with prescribing clinician: 8/22/2024   Last telemedicine visit with prescribing clinician: Visit date not found   Next office visit with prescribing clinician: 9/3/2024                         Would you like a call back once the refill request has been completed: [] Yes [] No    If the office needs to give you a call back, can they leave a voicemail: [] Yes [] No    Silvana Tay MA  09/03/24, 10:33 EDT

## 2024-09-03 NOTE — TELEPHONE ENCOUNTER
Spoke with pharmacy and they advised insurance requires 1 day supply for initial Rx and then 30 day supply.   Let pt know.

## 2024-09-04 NOTE — TELEPHONE ENCOUNTER
PATIENT CALLED AND I RELAYED THAT HER INSUR REQUIRES US TO SEND 7 DAY SUPPLY, THEN WILL COVER A 30 DAY SUPPLY AFTER THAT.

## 2024-09-25 ENCOUNTER — LAB (OUTPATIENT)
Dept: LAB | Facility: HOSPITAL | Age: 79
End: 2024-09-25
Payer: MEDICARE

## 2024-09-25 ENCOUNTER — TRANSCRIBE ORDERS (OUTPATIENT)
Dept: LAB | Facility: HOSPITAL | Age: 79
End: 2024-09-25
Payer: MEDICARE

## 2024-09-25 DIAGNOSIS — E11.51 DIABETES MELLITUS WITH PERIPHERAL ARTERY DISEASE: ICD-10-CM

## 2024-09-25 DIAGNOSIS — I70.213 ATHEROSCLEROSIS OF NATIVE ARTERY OF BOTH LOWER EXTREMITIES WITH INTERMITTENT CLAUDICATION: ICD-10-CM

## 2024-09-25 DIAGNOSIS — I70.213 ATHEROSCLEROSIS OF NATIVE ARTERY OF BOTH LOWER EXTREMITIES WITH INTERMITTENT CLAUDICATION: Primary | ICD-10-CM

## 2024-09-25 DIAGNOSIS — T82.858A BYPASS GRAFT STENOSIS, INITIAL ENCOUNTER: ICD-10-CM

## 2024-09-25 LAB
ANION GAP SERPL CALCULATED.3IONS-SCNC: 8.3 MMOL/L (ref 5–15)
ANISOCYTOSIS BLD QL: ABNORMAL
BASOPHILS # BLD MANUAL: 0 10*3/MM3 (ref 0–0.2)
BASOPHILS NFR BLD MANUAL: 0 % (ref 0–1.5)
BUN SERPL-MCNC: 21 MG/DL (ref 8–23)
BUN/CREAT SERPL: 19.6 (ref 7–25)
CALCIUM SPEC-SCNC: 10.4 MG/DL (ref 8.6–10.5)
CHLORIDE SERPL-SCNC: 103 MMOL/L (ref 98–107)
CO2 SERPL-SCNC: 26.7 MMOL/L (ref 22–29)
CREAT SERPL-MCNC: 1.07 MG/DL (ref 0.57–1)
DEPRECATED RDW RBC AUTO: 47.1 FL (ref 37–54)
EGFRCR SERPLBLD CKD-EPI 2021: 52.6 ML/MIN/1.73
EOSINOPHIL # BLD MANUAL: 0 10*3/MM3 (ref 0–0.4)
EOSINOPHIL NFR BLD MANUAL: 0 % (ref 0.3–6.2)
ERYTHROCYTE [DISTWIDTH] IN BLOOD BY AUTOMATED COUNT: 13.7 % (ref 12.3–15.4)
GLUCOSE SERPL-MCNC: 145 MG/DL (ref 65–99)
HCT VFR BLD AUTO: 41.7 % (ref 34–46.6)
HGB BLD-MCNC: 12.9 G/DL (ref 12–15.9)
LYMPHOCYTES # BLD MANUAL: 3.44 10*3/MM3 (ref 0.7–3.1)
LYMPHOCYTES NFR BLD MANUAL: 7.1 % (ref 5–12)
MCH RBC QN AUTO: 29.1 PG (ref 26.6–33)
MCHC RBC AUTO-ENTMCNC: 30.9 G/DL (ref 31.5–35.7)
MCV RBC AUTO: 94.1 FL (ref 79–97)
MONOCYTES # BLD: 0.41 10*3/MM3 (ref 0.1–0.9)
NEUTROPHILS # BLD AUTO: 1.96 10*3/MM3 (ref 1.7–7)
NEUTROPHILS NFR BLD MANUAL: 33.7 % (ref 42.7–76)
PLAT MORPH BLD: NORMAL
PLATELET # BLD AUTO: 100 10*3/MM3 (ref 140–450)
PMV BLD AUTO: 12.1 FL (ref 6–12)
POTASSIUM SERPL-SCNC: 4 MMOL/L (ref 3.5–5.2)
RBC # BLD AUTO: 4.43 10*6/MM3 (ref 3.77–5.28)
SODIUM SERPL-SCNC: 138 MMOL/L (ref 136–145)
VARIANT LYMPHS NFR BLD MANUAL: 59.2 % (ref 19.6–45.3)
WBC MORPH BLD: NORMAL
WBC NRBC COR # BLD AUTO: 5.81 10*3/MM3 (ref 3.4–10.8)

## 2024-09-25 PROCEDURE — 80048 BASIC METABOLIC PNL TOTAL CA: CPT

## 2024-09-25 PROCEDURE — 36415 COLL VENOUS BLD VENIPUNCTURE: CPT

## 2024-09-25 PROCEDURE — 85007 BL SMEAR W/DIFF WBC COUNT: CPT

## 2024-09-25 PROCEDURE — 85025 COMPLETE CBC W/AUTO DIFF WBC: CPT

## 2024-09-26 RX ORDER — PROMETHAZINE HYDROCHLORIDE 25 MG/1
25 TABLET ORAL EVERY 8 HOURS PRN
Qty: 90 TABLET | Refills: 0 | Status: SHIPPED | OUTPATIENT
Start: 2024-09-26

## 2024-10-21 RX ORDER — LISINOPRIL 20 MG/1
20 TABLET ORAL DAILY
Qty: 90 TABLET | Refills: 3 | Status: SHIPPED | OUTPATIENT
Start: 2024-10-21

## 2024-10-21 NOTE — TELEPHONE ENCOUNTER
Caller: Annie Mejia    Relationship: Self    Best call back number: [phone]    Requested Prescriptions:   Requested Prescriptions     Pending Prescriptions Disp Refills    lisinopril (PRINIVIL,ZESTRIL) 20 MG tablet 90 tablet 3     Sig: Take 1 tablet by mouth Daily.        Pharmacy where request should be sent: [address] [phone] [facility] [phone]      Last office visit with prescribing clinician: 8/22/2024   Last telemedicine visit with prescribing clinician: Visit date not found   Next office visit with prescribing clinician: 11/25/2024     Additional details provided by patient:     Does the patient have less than a 3 day supply:  [] Yes  [x] No    Would you like a call back once the refill request has been completed: [] Yes [x] No    If the office needs to give you a call back, can they leave a voicemail: [] Yes [x] No    Tony Billings Rep   10/21/24 15:13 EDT

## 2024-11-04 DIAGNOSIS — F41.8 MIXED ANXIETY DEPRESSIVE DISORDER: ICD-10-CM

## 2024-11-05 RX ORDER — ALPRAZOLAM 0.25 MG/1
TABLET ORAL
Qty: 60 TABLET | Refills: 0 | Status: SHIPPED | OUTPATIENT
Start: 2024-11-05

## 2024-11-05 NOTE — TELEPHONE ENCOUNTER
Past Medical History:   Diagnosis Date    Anemia     Chronic pain     Diabetes mellitus (CMD)     Fibromyalgia     Gastroesophageal reflux disease     High cholesterol     Migraines     Paresthesia and pain of extremity 01/2019    LLE LUE with nl MRI CSpine LSpine since early 2019    Pneumonia     2019        Rx Refill Note  Requested Prescriptions     Pending Prescriptions Disp Refills    ALPRAZolam (XANAX) 0.25 MG tablet [Pharmacy Med Name: ALPRAZOLAM 0.25MG   TAB] 60 tablet 0     Sig: TAKE 1 TO 2 TABLETS BY MOUTH ONCE DAILY AS NEEDED      Last office visit with prescribing clinician: 8/22/2024   Last telemedicine visit with prescribing clinician: Visit date not found   Next office visit with prescribing clinician: 11/25/2024       UDS is up to date, however patient does need an updated CSA    Ok to fill?        Marquita Vo MA  11/05/24, 08:37 EST

## 2024-11-20 ENCOUNTER — HOSPITAL ENCOUNTER (OUTPATIENT)
Dept: ULTRASOUND IMAGING | Facility: HOSPITAL | Age: 79
Discharge: HOME | End: 2024-11-20
Admitting: FAMILY MEDICINE
Payer: MEDICARE

## 2024-11-20 ENCOUNTER — OFFICE VISIT (OUTPATIENT)
Dept: FAMILY MEDICINE CLINIC | Facility: CLINIC | Age: 79
End: 2024-11-20
Payer: MEDICARE

## 2024-11-20 VITALS
HEART RATE: 74 BPM | BODY MASS INDEX: 33.3 KG/M2 | WEIGHT: 207.2 LBS | HEIGHT: 66 IN | SYSTOLIC BLOOD PRESSURE: 120 MMHG | DIASTOLIC BLOOD PRESSURE: 74 MMHG | OXYGEN SATURATION: 98 %

## 2024-11-20 DIAGNOSIS — Z79.4 TYPE 2 DIABETES MELLITUS WITHOUT COMPLICATION, WITH LONG-TERM CURRENT USE OF INSULIN: ICD-10-CM

## 2024-11-20 DIAGNOSIS — E11.9 TYPE 2 DIABETES MELLITUS WITHOUT COMPLICATION, WITH LONG-TERM CURRENT USE OF INSULIN: ICD-10-CM

## 2024-11-20 DIAGNOSIS — M25.462 PAIN AND SWELLING OF LEFT KNEE: ICD-10-CM

## 2024-11-20 DIAGNOSIS — R60.0 LOCALIZED EDEMA: ICD-10-CM
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
## 2024-11-20 DIAGNOSIS — M25.562 PAIN AND SWELLING OF LEFT KNEE: ICD-10-CM

## 2024-11-20 DIAGNOSIS — R82.90 ABNORMAL URINALYSIS: ICD-10-CM
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
## 2024-11-20 DIAGNOSIS — F41.8 MIXED ANXIETY DEPRESSIVE DISORDER: ICD-10-CM

## 2024-11-20 DIAGNOSIS — S80.02XA HEMATOMA OF LEFT KNEE REGION: ICD-10-CM

## 2024-11-20 DIAGNOSIS — Z28.21 INFLUENZA VACCINATION DECLINED BY PATIENT: ICD-10-CM

## 2024-11-20 DIAGNOSIS — R55 SYNCOPE, UNSPECIFIED SYNCOPE TYPE: ICD-10-CM

## 2024-11-20 LAB
BILIRUB BLD-MCNC: ABNORMAL MG/DL
CLARITY, POC: ABNORMAL
COLOR UR: YELLOW
EXPIRATION DATE: ABNORMAL
GLUCOSE UR STRIP-MCNC: NEGATIVE MG/DL
KETONES UR QL: NEGATIVE
LEUKOCYTE EST, POC: NEGATIVE
Lab: ABNORMAL
NITRITE UR-MCNC: NEGATIVE MG/ML
PH UR: 6 [PH] (ref 5–8)
PROT UR STRIP-MCNC: ABNORMAL MG/DL
RBC # UR STRIP: NEGATIVE /UL
SP GR UR: 1.03 (ref 1–1.03)
UROBILINOGEN UR QL: NORMAL

## 2024-11-20 PROCEDURE — 99214 OFFICE O/P EST MOD 30 MIN: CPT | Performed by: FAMILY MEDICINE

## 2024-11-20 PROCEDURE — 3074F SYST BP LT 130 MM HG: CPT | Performed by: FAMILY MEDICINE

## 2024-11-20 PROCEDURE — 1159F MED LIST DOCD IN RCRD: CPT | Performed by: FAMILY MEDICINE

## 2024-11-20 PROCEDURE — 1126F AMNT PAIN NOTED NONE PRSNT: CPT | Performed by: FAMILY MEDICINE

## 2024-11-20 PROCEDURE — 3078F DIAST BP <80 MM HG: CPT | Performed by: FAMILY MEDICINE

## 2024-11-20 PROCEDURE — 1160F RVW MEDS BY RX/DR IN RCRD: CPT | Performed by: FAMILY MEDICINE

## 2024-11-20 PROCEDURE — 81003 URINALYSIS AUTO W/O SCOPE: CPT | Performed by: FAMILY MEDICINE

## 2024-11-20 PROCEDURE — 93971 EXTREMITY STUDY: CPT

## 2024-11-20 RX ORDER — CLOPIDOGREL BISULFATE 75 MG/1
TABLET ORAL DAILY
COMMUNITY

## 2024-11-20 RX ORDER — FAMOTIDINE 40 MG/1
TABLET, FILM COATED ORAL
COMMUNITY
Start: 2024-09-26

## 2024-11-20 NOTE — PROGRESS NOTES
"Subjective   Annie Mjeia is a 79 y.o. female.     History of Present Illness  DAnswers submitted by the patient for this visit:  Primary Reason for Visit (Submitted on 11/19/2024)  What is the primary reason for your visit?: Problem Not Listed    Here with daughter to discuss recent falls. Had a fall at home on 11/9. Became weak, no LOC, tripped as she felt her \"legs were weak\". No head injury. Hurt her left arm and knee. Has felt \"nervous, agitated\" since that time.    Was at local restaurant on 11/19. Had eaten some but felt nauseated, cold sweats, weak, felt she might pass out. Attempted to get up and go to car but had syncopal episode. Out few seconds only. No tongue biting, no fecal/urinary incontinence. Injured her left knee again. She did not seek emergent care.    Concerned about persistent swelling, pain, bruising left knee and thigh. Has h/o TKR on left.    Other chronic conditions include:  Anxiety/depression  IDDM    The following portions of the patient's history were reviewed and updated as appropriate: allergies, current medications, past family history, past medical history, past social history, past surgical history, and problem list.    Review of Systems   Constitutional:  Positive for chills, diaphoresis and fatigue. Negative for fever and unexpected weight change.   HENT:  Negative for congestion, mouth sores, nosebleeds, sore throat and trouble swallowing.    Respiratory:  Negative for cough, shortness of breath and wheezing.    Cardiovascular:  Positive for chest pain. Negative for palpitations and leg swelling.   Gastrointestinal:  Positive for abdominal pain, constipation (intermittently), diarrhea (intermittently) and nausea. Negative for blood in stool and vomiting.   Genitourinary:  Negative for dysuria, frequency and hematuria.   Musculoskeletal:  Positive for arthralgias, back pain and gait problem. Negative for myalgias and neck pain.   Skin:  Negative for rash and wound. "   Neurological:  Positive for weakness. Negative for syncope, numbness and headaches.   Hematological:  Negative for adenopathy. Bruises/bleeds easily.   Psychiatric/Behavioral:  Negative for confusion.    Pt's previous ROS reviewed and updated as indicated.       Objective   Vitals:    11/20/24 1003   BP: 120/74   Pulse: 74   SpO2: 98%     Body mass index is 33.44 kg/m².      11/20/24  1003   Weight: 94 kg (207 lb 3.2 oz)       Physical Exam  Vitals and nursing note reviewed.   Constitutional:       General: She is not in acute distress.     Appearance: She is well-groomed. She is obese. She is not ill-appearing.   HENT:      Head: Atraumatic.      Mouth/Throat:      Mouth: Mucous membranes are moist.   Eyes:      General: No scleral icterus.     Conjunctiva/sclera: Conjunctivae normal.   Neck:      Thyroid: No thyroid mass.   Cardiovascular:      Rate and Rhythm: Normal rate and regular rhythm.      Pulses: Normal pulses.      Heart sounds: Normal heart sounds.   Pulmonary:      Effort: Pulmonary effort is normal.      Breath sounds: Normal breath sounds.   Chest:      Chest wall: No tenderness.   Musculoskeletal:      Lumbar back: Deformity (loss of normal lordosis), spasms and bony tenderness (L3-S1, bilateral SI joints) present. Decreased range of motion. Negative right straight leg raise test and negative left straight leg raise test.      Left knee: Swelling and ecchymosis present. Decreased range of motion. Tenderness (diffuse) present.      Right lower leg: No edema.      Left lower leg: Tenderness present. Edema present.        Legs:       Comments: Hematoma without significant increase in warmth, no open wounds   Lymphadenopathy:      Cervical: No cervical adenopathy.   Skin:     General: Skin is warm and dry.      Coloration: Skin is not jaundiced or pale.      Findings: Bruising present. No rash.   Neurological:      Mental Status: She is alert and oriented to person, place, and time. Mental status is  at baseline.      Cranial Nerves: Cranial nerves 2-12 are intact.      Sensory: Sensation is intact.      Motor: Motor function is intact.      Coordination: Coordination is intact.      Gait: Gait abnormal (antalgic).   Psychiatric:         Mood and Affect: Affect normal. Mood is anxious.         Speech: Speech normal.         Behavior: Behavior normal. Behavior is cooperative.         Thought Content: Thought content normal.         Cognition and Memory: Cognition normal.       Brief Urine Lab Results  (Last result in the past 365 days)        Color   Clarity   Blood   Leuk Est   Nitrite   Protein   CREAT   Urine HCG        11/20/24 1043 Yellow   Cloudy   Negative   Negative   Negative   Trace                     Assessment & Plan   Diagnoses and all orders for this visit:    1. Fall, initial encounter (Primary)  -     XR Knee 1 or 2 View Left (In Office)  -     US Venous Doppler Lower Extremity Left (duplex); Future  -     CBC & Differential    2. Pain and swelling of left knee  -     XR Knee 1 or 2 View Left (In Office)  -     US Venous Doppler Lower Extremity Left (duplex); Future  -     CBC & Differential  -     Comprehensive Metabolic Panel    3. Hematoma of left knee region    4. Localized edema  -     US Venous Doppler Lower Extremity Left (duplex); Future  -     Comprehensive Metabolic Panel    5. Type 2 diabetes mellitus without complication, with long-term current use of insulin  -     Comprehensive Metabolic Panel  -     Hemoglobin A1c    6. Syncope, unspecified syncope type  -     CBC & Differential  -     Comprehensive Metabolic Panel  -     TSH Rfx On Abnormal To Free T4    7. Mixed anxiety depressive disorder  -     TSH Rfx On Abnormal To Free T4    8. Abnormal urinalysis  -     Urine Culture - Urine, Urine, Clean Catch  -     POC Urinalysis Dipstick, Automated    9. Influenza vaccination declined by patient    Other orders  -     Manual Differential       R/o fracture, r/o DVT as above.  Assess  status of chronic conditions, adjust tx as indicated  Fall precautions reviewed.  Encouraged adequate hydration, avoidance of hypoglycemia, use of walker as instructed.  Routine f/u as scheduled, f/u sooner as needed/instructed.  I will contact patient regarding test results and provide instructions regarding any necessary changes in plan of care.  Patient was encouraged to keep me informed of any acute changes, lack of improvement, or any new concerning symptoms.  Pt is aware of reasons to seek emergent care.  Patient voiced understanding of all instructions and denied further questions.        US Venous Doppler Lower Extremity Left (duplex)  Result Date: 11/20/2024  No evidence of deep venous thrombosis of the left lower extremity.   This report was signed and finalized on 11/20/2024 5:40 PM by Richard Grover MD.      XR Knee 1 or 2 View Left (In Office)  Result Date: 11/20/2024  No acute fracture.         Images were reviewed, interpreted, and dictated by Dr. Richard Grover MD Transcribed by Odilia Pan PA-C.  This report was signed and finalized on 11/20/2024 3:16 PM by Richard Grover MD.       Pt informed of results.

## 2024-11-21 LAB
ALBUMIN SERPL-MCNC: 4.2 G/DL (ref 3.5–5.2)
ALBUMIN/GLOB SERPL: 1.5 G/DL
ALP SERPL-CCNC: 82 U/L (ref 39–117)
ALT SERPL-CCNC: 10 U/L (ref 1–33)
AST SERPL-CCNC: 14 U/L (ref 1–32)
BASOPHILS # BLD AUTO: ABNORMAL 10*3/UL
BILIRUB SERPL-MCNC: 0.6 MG/DL (ref 0–1.2)
BUN SERPL-MCNC: 38 MG/DL (ref 8–23)
BUN/CREAT SERPL: 30.6 (ref 7–25)
CALCIUM SERPL-MCNC: 9.9 MG/DL (ref 8.6–10.5)
CHLORIDE SERPL-SCNC: 104 MMOL/L (ref 98–107)
CO2 SERPL-SCNC: 24.1 MMOL/L (ref 22–29)
CREAT SERPL-MCNC: 1.24 MG/DL (ref 0.57–1)
DIFFERENTIAL COMMENT: ABNORMAL
EGFRCR SERPLBLD CKD-EPI 2021: 44.1 ML/MIN/1.73
EOSINOPHIL # BLD AUTO: ABNORMAL 10*3/UL
EOSINOPHIL NFR BLD AUTO: ABNORMAL %
ERYTHROCYTE [DISTWIDTH] IN BLOOD BY AUTOMATED COUNT: 13.8 % (ref 12.3–15.4)
GLOBULIN SER CALC-MCNC: 2.8 GM/DL
GLUCOSE SERPL-MCNC: 174 MG/DL (ref 65–99)
HBA1C MFR BLD: 7.1 % (ref 4.8–5.6)
HCT VFR BLD AUTO: 38.2 % (ref 34–46.6)
HGB BLD-MCNC: 12.5 G/DL (ref 12–15.9)
LYMPHOCYTES # BLD AUTO: ABNORMAL 10*3/UL
LYMPHOCYTES # BLD MANUAL: 3.6 10*3/MM3 (ref 0.7–3.1)
LYMPHOCYTES NFR BLD AUTO: ABNORMAL %
LYMPHOCYTES NFR BLD MANUAL: 43.8 % (ref 19.6–45.3)
MCH RBC QN AUTO: 29.3 PG (ref 26.6–33)
MCHC RBC AUTO-ENTMCNC: 32.7 G/DL (ref 31.5–35.7)
MCV RBC AUTO: 89.7 FL (ref 79–97)
MONOCYTES # BLD MANUAL: 0.98 10*3/MM3 (ref 0.1–0.9)
MONOCYTES NFR BLD AUTO: ABNORMAL %
MONOCYTES NFR BLD MANUAL: 12.5 % (ref 5–12)
NEUTROPHILS # BLD MANUAL: 3.27 10*3/MM3 (ref 1.7–7)
NEUTROPHILS NFR BLD AUTO: ABNORMAL %
NEUTROPHILS NFR BLD MANUAL: 41.7 % (ref 42.7–76)
PLATELET # BLD AUTO: 115 10*3/MM3 (ref 140–450)
PLATELET BLD QL SMEAR: ABNORMAL
POTASSIUM SERPL-SCNC: 4.7 MMOL/L (ref 3.5–5.2)
PROT SERPL-MCNC: 7 G/DL (ref 6–8.5)
RBC # BLD AUTO: 4.26 10*6/MM3 (ref 3.77–5.28)
RBC MORPH BLD: ABNORMAL
SODIUM SERPL-SCNC: 139 MMOL/L (ref 136–145)
TSH SERPL DL<=0.005 MIU/L-ACNC: 2.09 UIU/ML (ref 0.27–4.2)
WBC # BLD AUTO: 7.85 10*3/MM3 (ref 3.4–10.8)

## 2024-11-22 ENCOUNTER — OFFICE VISIT (OUTPATIENT)
Dept: GASTROENTEROLOGY | Facility: CLINIC | Age: 79
End: 2024-11-22
Payer: MEDICARE

## 2024-11-22 ENCOUNTER — TELEPHONE (OUTPATIENT)
Dept: FAMILY MEDICINE CLINIC | Facility: CLINIC | Age: 79
End: 2024-11-22
Payer: MEDICARE

## 2024-11-22 VITALS
BODY MASS INDEX: 33.49 KG/M2 | SYSTOLIC BLOOD PRESSURE: 120 MMHG | HEIGHT: 66 IN | WEIGHT: 208.4 LBS | DIASTOLIC BLOOD PRESSURE: 60 MMHG

## 2024-11-22 DIAGNOSIS — K76.89 HEPATIC CYST: ICD-10-CM

## 2024-11-22 DIAGNOSIS — Z86.0100 HISTORY OF COLONIC POLYPS: ICD-10-CM

## 2024-11-22 DIAGNOSIS — K57.90 DIVERTICULOSIS: ICD-10-CM

## 2024-11-22 DIAGNOSIS — E66.811 CLASS 1 OBESITY WITH SERIOUS COMORBIDITY AND BODY MASS INDEX (BMI) OF 33.0 TO 33.9 IN ADULT, UNSPECIFIED OBESITY TYPE: ICD-10-CM

## 2024-11-22 DIAGNOSIS — R93.5 ABNORMAL ABDOMINAL ULTRASOUND: ICD-10-CM

## 2024-11-22 DIAGNOSIS — K74.60 CIRRHOSIS OF LIVER WITHOUT ASCITES, UNSPECIFIED HEPATIC CIRRHOSIS TYPE: Primary | ICD-10-CM

## 2024-11-22 DIAGNOSIS — R11.0 CHRONIC NAUSEA: ICD-10-CM

## 2024-11-22 DIAGNOSIS — Z87.19 HISTORY OF DIVERTICULITIS: ICD-10-CM

## 2024-11-22 DIAGNOSIS — R10.13 EPIGASTRIC PAIN: ICD-10-CM

## 2024-11-22 DIAGNOSIS — K74.69 OTHER CIRRHOSIS OF LIVER: ICD-10-CM

## 2024-11-22 DIAGNOSIS — D69.6 THROMBOCYTOPENIA: ICD-10-CM

## 2024-11-22 PROBLEM — T82.858A BYPASS GRAFT STENOSIS: Status: ACTIVE | Noted: 2024-11-22

## 2024-11-22 PROBLEM — M25.551 PAIN OF RIGHT HIP JOINT: Status: ACTIVE | Noted: 2021-06-01

## 2024-11-22 PROBLEM — G89.29 CHRONIC PAIN: Status: ACTIVE | Noted: 2024-08-05

## 2024-11-22 PROBLEM — R03.0 ELEVATED BLOOD PRESSURE READING: Status: ACTIVE | Noted: 2024-11-22

## 2024-11-22 PROBLEM — Z87.891 FORMER SMOKER: Status: ACTIVE | Noted: 2024-11-22

## 2024-11-22 LAB
BACTERIA UR CULT: NORMAL
BACTERIA UR CULT: NORMAL

## 2024-11-22 NOTE — TELEPHONE ENCOUNTER
Caller: ELADIO HENRY    Relationship: Emergency Contact    Best call back number: [phone]    What form or medical record are you requesting:   HARDSHIP LETTER STATING HER ADDRESS AND HER BACK AND LEG PAIN FOR PATIENT TO HAVE HER MAILBOX PUT ON HER PORCH.    Who is requesting this form or medical record from you: POST OFFICE    How would you like to receive the form or medical records (pick-up, mail, fax): PICK-UP    Timeframe paperwork needed: ASAP    Additional notes: PLEASE CALL DAUGHTER WHEN READY FOR PICK-UP
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
## 2024-11-22 NOTE — LETTER
2024    Annie Mejia  [address] [place] [id number]  : 1945    I am writing on behalf of Mrs. Mejia as her primary care physician. She suffers from severe orthopedic limitations and is unable to get to her mailbox safely. I am requesting she be provided with mailbox on her porch or residence. Thank you for your consideration in this matter. If you require further information, please feel free to contact my office.  HIPAA and ANDRÉS policies will be strictly followed.       Respectfully,          Olga Pimentel MD

## 2024-11-26 ENCOUNTER — TELEPHONE (OUTPATIENT)
Dept: FAMILY MEDICINE CLINIC | Facility: CLINIC | Age: 79
End: 2024-11-26
Payer: MEDICARE

## 2024-12-03 ENCOUNTER — TELEPHONE (OUTPATIENT)
Dept: FAMILY MEDICINE CLINIC | Facility: CLINIC | Age: 79
End: 2024-12-03

## 2024-12-03 ENCOUNTER — OFFICE VISIT (OUTPATIENT)
Dept: FAMILY MEDICINE CLINIC | Facility: CLINIC | Age: 79
End: 2024-12-03
Payer: MEDICARE

## 2024-12-03 VITALS
BODY MASS INDEX: 34.17 KG/M2 | DIASTOLIC BLOOD PRESSURE: 86 MMHG | HEIGHT: 66 IN | HEART RATE: 79 BPM | WEIGHT: 212.6 LBS | SYSTOLIC BLOOD PRESSURE: 132 MMHG | OXYGEN SATURATION: 98 %

## 2024-12-03 DIAGNOSIS — F41.8 MIXED ANXIETY DEPRESSIVE DISORDER: ICD-10-CM

## 2024-12-03 DIAGNOSIS — G89.4 CHRONIC PAIN SYNDROME: ICD-10-CM

## 2024-12-03 DIAGNOSIS — R39.9 URINARY SYMPTOM OR SIGN: Primary | ICD-10-CM

## 2024-12-03 DIAGNOSIS — R82.90 ABNORMAL URINALYSIS: ICD-10-CM

## 2024-12-03 PROCEDURE — 1159F MED LIST DOCD IN RCRD: CPT | Performed by: FAMILY MEDICINE

## 2024-12-03 PROCEDURE — 3075F SYST BP GE 130 - 139MM HG: CPT | Performed by: FAMILY MEDICINE

## 2024-12-03 PROCEDURE — 1160F RVW MEDS BY RX/DR IN RCRD: CPT | Performed by: FAMILY MEDICINE

## 2024-12-03 PROCEDURE — 1126F AMNT PAIN NOTED NONE PRSNT: CPT | Performed by: FAMILY MEDICINE

## 2024-12-03 PROCEDURE — 3079F DIAST BP 80-89 MM HG: CPT | Performed by: FAMILY MEDICINE

## 2024-12-03 PROCEDURE — 99214 OFFICE O/P EST MOD 30 MIN: CPT | Performed by: FAMILY MEDICINE

## 2024-12-03 RX ORDER — SULFAMETHOXAZOLE AND TRIMETHOPRIM 800; 160 MG/1; MG/1
1 TABLET ORAL 2 TIMES DAILY
Qty: 10 TABLET | Refills: 0 | Status: SHIPPED | OUTPATIENT
Start: 2024-12-03 | End: 2024-12-08

## 2024-12-03 RX ORDER — ALPRAZOLAM 0.25 MG/1
TABLET ORAL
Qty: 60 TABLET | Refills: 2 | Status: SHIPPED | OUTPATIENT
Start: 2024-12-03

## 2024-12-03 RX ORDER — HYDROCODONE BITARTRATE AND ACETAMINOPHEN 7.5; 325 MG/1; MG/1
1 TABLET ORAL EVERY 8 HOURS PRN
Qty: 21 TABLET | Refills: 0 | Status: SHIPPED | OUTPATIENT
Start: 2024-12-03

## 2024-12-03 NOTE — TELEPHONE ENCOUNTER
I CALLED PATIENT TO SEE IF SHE PICKED UP THE LETTER WHILE SHE WAS AT THE OFFICE TODAY. ADVISED THAT [facility] ALSO [facility] SHE WILL PROBABLY STOP IN TO GET A COPY (OR SEND HER DTR).

## 2024-12-03 NOTE — PROGRESS NOTES
Subjective   Annie Mejia is a 79 y.o. female.     History of Present Illness  She c/o painful urination  Urinary Tract Infection   This is a new problem. The current episode started in the past 7 days. The problem occurs constantly. The problem has been worse. The quality of the pain is described as burning and aching. The pain is moderate. There has been no fever. She is not sexually active. Associated symptoms include frequency, hesitancy, nausea and urgency. Pertinent negatives include no chills, discharge, flank pain, hematuria or vomiting. Treatments tried: AZO. The treatment provided mild relief. Her past medical history is significant for recurrent UTIs.   Additional comments: She also requests routine refills of anxiolytics, pain medications as they are due. Stable issues although she will be having adjustment/battery replacement to her spinal cord stimulator later this month.      The following portions of the patient's history were reviewed and updated as appropriate: allergies, current medications, past family history, past medical history, past social history, past surgical history, and problem list.    Review of Systems   Constitutional:  Positive for diaphoresis and fatigue. Negative for chills, fever and unexpected weight change.   HENT:  Negative for congestion, mouth sores, nosebleeds, sore throat and trouble swallowing.    Respiratory:  Negative for cough, shortness of breath and wheezing.    Cardiovascular:  Negative for chest pain, palpitations and leg swelling.   Gastrointestinal:  Positive for abdominal pain (chronic), constipation (intermittently), diarrhea (intermittently) and nausea. Negative for blood in stool and vomiting.   Genitourinary:  Positive for frequency, hesitancy and urgency. Negative for dysuria, flank pain and hematuria.   Musculoskeletal:  Positive for arthralgias, back pain and gait problem. Negative for myalgias and neck pain.   Skin:  Negative for rash and wound.    Neurological:  Positive for weakness (generalized). Negative for syncope, numbness and headaches.   Hematological:  Negative for adenopathy. Bruises/bleeds easily.   Psychiatric/Behavioral:  Negative for confusion.    Pt's previous ROS reviewed and updated as indicated.       Objective   Vitals:    12/03/24 0956   BP: 132/86   Pulse: 79   SpO2: 98%     Body mass index is 34.31 kg/m².      12/03/24  0956   Weight: 96.4 kg (212 lb 9.6 oz)       Physical Exam  Vitals and nursing note reviewed.   Constitutional:       General: She is not in acute distress.     Appearance: She is well-groomed and overweight. She is not ill-appearing.   HENT:      Head: Atraumatic.      Mouth/Throat:      Mouth: Mucous membranes are moist.   Eyes:      General: No scleral icterus.     Conjunctiva/sclera: Conjunctivae normal.   Cardiovascular:      Rate and Rhythm: Normal rate and regular rhythm.      Pulses: Normal pulses.      Heart sounds: Normal heart sounds.   Pulmonary:      Effort: Pulmonary effort is normal.      Breath sounds: Normal breath sounds.   Abdominal:      General: There is no distension.      Palpations: Abdomen is soft.      Tenderness: There is no abdominal tenderness. There is no right CVA tenderness or left CVA tenderness.   Musculoskeletal:      Lumbar back: Deformity (loss of normal lordosis), spasms, tenderness (diffuse soft tissue) and bony tenderness (L3-S1, bilateral SI joints) present. Decreased range of motion (mildly in all planes). Negative right straight leg raise test and negative left straight leg raise test.      Right lower leg: No edema.      Left lower leg: No edema.   Skin:     General: Skin is warm and dry.      Coloration: Skin is not pale.   Neurological:      Mental Status: She is alert and oriented to person, place, and time. Mental status is at baseline.      Gait: Gait abnormal (antalgic).   Psychiatric:         Mood and Affect: Mood and affect normal.         Behavior: Behavior normal.  Behavior is cooperative.         Cognition and Memory: Cognition normal.     Pt's previous physical exam reviewed and updated as indicated.    U/A not performed due to use of AZO. Culture pending.    Assessment & Plan   Diagnoses and all orders for this visit:    1. Urinary symptom or sign (Primary)  -     Urine Culture - Urine, Urine, Clean Catch  -     sulfamethoxazole-trimethoprim (Bactrim DS) 800-160 MG per tablet; Take 1 tablet by mouth 2 (Two) Times a Day for 5 days.  Dispense: 10 tablet; Refill: 0    2. Abnormal urinalysis  -     Urine Culture - Urine, Urine, Clean Catch  -     sulfamethoxazole-trimethoprim (Bactrim DS) 800-160 MG per tablet; Take 1 tablet by mouth 2 (Two) Times a Day for 5 days.  Dispense: 10 tablet; Refill: 0    3. Chronic pain syndrome  -     HYDROcodone-acetaminophen (Norco) 7.5-325 MG per tablet; Take 1 tablet by mouth Every 8 (Eight) Hours As Needed for Severe Pain.  Dispense: 21 tablet; Refill: 0    4. Mixed anxiety depressive disorder  -     ALPRAZolam (XANAX) 0.25 MG tablet; TAKE 1 TO 2 TABLETS BY MOUTH ONCE DAILY AS NEEDED  Dispense: 60 tablet; Refill: 2       Tx empirically based on previous culture reports.  Symptoms mgnt reviewed.  Stable multifactorial chronic pain syndrome. F/u with orthopedist as scheduled. Very judicious use of opioid analgesic. No aberrant behavior.  Stable anxiety DO. Continue current tx plan.  As part of patient's treatment plan I am prescribing a controlled substance.  The patient has been made aware of the appropriate use of such medications, including potential risk of somnolence, limited ability to drive and/or work safely, and potential for dependence and/or overdose.  It has also been made clear that these medications are for use by this patient only, without concomitant use of alcohol or other substances, unless prescribed.  History and physical exam exhibit continued safe and appropriate use of controlled substance.  LILIAM reviewed.  Patient has  completed a prescribing agreement detailing terms of continued prescribing of controlled substances, including monitoring LILIAM reports, urine drug screening, and pill counts if necessary.  Patient is aware that inappropriate use will result in cessation of prescribing such medications.    F/u 2 months for AMW, f/u sooner as needed/instructed.  I will contact patient regarding test results and provide instructions regarding any necessary changes in plan of care.  Patient was encouraged to keep me informed of any acute changes, lack of improvement, or any new concerning symptoms.  Pt is aware of reasons to seek emergent care.  Patient voiced understanding of all instructions and denied further questions.    Please note that portions of this note may have been completed with a voice recognition program.

## 2024-12-03 NOTE — TELEPHONE ENCOUNTER
Caller: Annie Mejia    Relationship to patient: Self    Best call back number: [phone]     PATIENT STATES THAT SHE SAW DR LOMBARDI TODAY AND SHE CALL IN 3 PRESCRIPTIONS, BUT THEY ONLY RECEIVED THE ANTIBIOTIC.  CAN SHE SEND THE OTHER TWO AGAIN?

## 2024-12-05 LAB
BACTERIA UR CULT: NO GROWTH
BACTERIA UR CULT: NORMAL

## 2024-12-11 ENCOUNTER — OFFICE VISIT (OUTPATIENT)
Dept: FAMILY MEDICINE CLINIC | Facility: CLINIC | Age: 79
End: 2024-12-11
Payer: MEDICARE

## 2024-12-11 VITALS
TEMPERATURE: 97.6 F | OXYGEN SATURATION: 97 % | WEIGHT: 205.6 LBS | DIASTOLIC BLOOD PRESSURE: 78 MMHG | HEIGHT: 66 IN | BODY MASS INDEX: 33.04 KG/M2 | HEART RATE: 77 BPM | RESPIRATION RATE: 16 BRPM | SYSTOLIC BLOOD PRESSURE: 122 MMHG

## 2024-12-11 DIAGNOSIS — R39.9 URINARY TRACT INFECTION SYMPTOMS: Primary | ICD-10-CM

## 2024-12-11 DIAGNOSIS — R82.90 ABNORMAL FINDING ON URINALYSIS: ICD-10-CM

## 2024-12-11 DIAGNOSIS — Z87.442 HISTORY OF RENAL CALCULI: ICD-10-CM

## 2024-12-11 DIAGNOSIS — R10.2 SUPRAPUBIC PRESSURE: ICD-10-CM

## 2024-12-11 DIAGNOSIS — R31.9 HEMATURIA, UNSPECIFIED TYPE: ICD-10-CM

## 2024-12-11 LAB
BILIRUB BLD-MCNC: NEGATIVE MG/DL
CLARITY, POC: CLEAR
COLOR UR: YELLOW
EXPIRATION DATE: ABNORMAL
GLUCOSE UR STRIP-MCNC: NEGATIVE MG/DL
KETONES UR QL: NEGATIVE
LEUKOCYTE EST, POC: NEGATIVE
Lab: ABNORMAL
NITRITE UR-MCNC: NEGATIVE MG/ML
PH UR: 6 [PH] (ref 5–8)
PROT UR STRIP-MCNC: ABNORMAL MG/DL
RBC # UR STRIP: ABNORMAL /UL
SP GR UR: 1.01 (ref 1–1.03)
UROBILINOGEN UR QL: NORMAL

## 2024-12-11 PROCEDURE — 99213 OFFICE O/P EST LOW 20 MIN: CPT | Performed by: NURSE PRACTITIONER

## 2024-12-11 PROCEDURE — 81003 URINALYSIS AUTO W/O SCOPE: CPT | Performed by: NURSE PRACTITIONER

## 2024-12-11 PROCEDURE — 3078F DIAST BP <80 MM HG: CPT | Performed by: NURSE PRACTITIONER

## 2024-12-11 PROCEDURE — 1159F MED LIST DOCD IN RCRD: CPT | Performed by: NURSE PRACTITIONER

## 2024-12-11 PROCEDURE — 1126F AMNT PAIN NOTED NONE PRSNT: CPT | Performed by: NURSE PRACTITIONER

## 2024-12-11 PROCEDURE — 3074F SYST BP LT 130 MM HG: CPT | Performed by: NURSE PRACTITIONER

## 2024-12-11 PROCEDURE — 1160F RVW MEDS BY RX/DR IN RCRD: CPT | Performed by: NURSE PRACTITIONER

## 2024-12-11 RX ORDER — NITROFURANTOIN 25; 75 MG/1; MG/1
100 CAPSULE ORAL 2 TIMES DAILY
Qty: 10 CAPSULE | Refills: 0 | Status: SHIPPED | OUTPATIENT
Start: 2024-12-11 | End: 2024-12-16

## 2024-12-11 RX ORDER — TAMSULOSIN HYDROCHLORIDE 0.4 MG/1
1 CAPSULE ORAL DAILY
Qty: 30 CAPSULE | Refills: 0 | Status: SHIPPED | OUTPATIENT
Start: 2024-12-11

## 2024-12-11 NOTE — PROGRESS NOTES
"                      Established Patient        Chief Complaint:   Chief Complaint   Patient presents with    Urinary Tract Infection     Pt is here for a follow up on her UTI she had a week ago.            History of Present Illness:    Annie Mejia is a 79 y.o. female who presents today for persistent urinary symptoms.     Bladder pressure, spasms.     Was prescribed bactrim x 5 days last week and does not feel like her symptoms are completely cleared.     Pain has improved but other symptoms persist.     Urine culture negative on 12/3.    Does not drink caffeine.     States that she has been told that she has had kidney stones in the past. Right nonobstructing stone seen on CT abd on 01/02/2024.    Subjective     The following portions of the patient's history were reviewed and updated as appropriate: allergies, current medications, past family history, past medical history, past social history, past surgical history and problem list.    ALLERGIES  Allergies   Allergen Reactions    Oxycodone Shortness Of Breath    Azithromycin Nausea And Vomiting    Erythrityl Tetranitrate Nausea And Vomiting    Morphine Itching       ROS  Review of Systems   Constitutional:  Negative for chills.   Gastrointestinal:  Positive for nausea. Negative for vomiting.   Genitourinary:  Positive for dysuria, flank pain, frequency and urgency. Negative for hematuria.         Objective     Vital Signs:   /78   Pulse 77   Temp 97.6 °F (36.4 °C) (Axillary)   Resp 16   Ht 167.6 cm (66\")   Wt 93.3 kg (205 lb 9.6 oz)   SpO2 97%   BMI 33.18 kg/m²                Physical Exam   Physical Exam  Vitals and nursing note reviewed.   HENT:      Mouth/Throat:      Lips: Pink.   Eyes:      General: Lids are normal.   Cardiovascular:      Rate and Rhythm: Normal rate.   Pulmonary:      Effort: Pulmonary effort is normal.   Abdominal:      General: Bowel sounds are normal. There is no distension.      Palpations: Abdomen is soft.      " Tenderness: There is abdominal tenderness in the suprapubic area. There is no guarding.   Neurological:      Mental Status: She is alert and oriented to person, place, and time.      Gait: Gait is intact.   Psychiatric:         Attention and Perception: Attention normal.         Mood and Affect: Mood and affect normal.         Speech: Speech normal.         Behavior: Behavior normal. Behavior is cooperative.         Assessment and Plan      Assessment/Plan:   Diagnoses and all orders for this visit:    1. Urinary tract infection symptoms (Primary)  -     nitrofurantoin, macrocrystal-monohydrate, (Macrobid) 100 MG capsule; Take 1 capsule by mouth 2 (Two) Times a Day for 5 days.  Dispense: 10 capsule; Refill: 0  -     tamsulosin (FLOMAX) 0.4 MG capsule 24 hr capsule; Take 1 capsule by mouth Daily.  Dispense: 30 capsule; Refill: 0  -     POCT urinalysis dipstick, automated    2. Suprapubic pressure  -     nitrofurantoin, macrocrystal-monohydrate, (Macrobid) 100 MG capsule; Take 1 capsule by mouth 2 (Two) Times a Day for 5 days.  Dispense: 10 capsule; Refill: 0  -     tamsulosin (FLOMAX) 0.4 MG capsule 24 hr capsule; Take 1 capsule by mouth Daily.  Dispense: 30 capsule; Refill: 0  -     POCT urinalysis dipstick, automated    3. History of renal calculi  -     nitrofurantoin, macrocrystal-monohydrate, (Macrobid) 100 MG capsule; Take 1 capsule by mouth 2 (Two) Times a Day for 5 days.  Dispense: 10 capsule; Refill: 0  -     tamsulosin (FLOMAX) 0.4 MG capsule 24 hr capsule; Take 1 capsule by mouth Daily.  Dispense: 30 capsule; Refill: 0  -     POCT urinalysis dipstick, automated    4. Abnormal finding on urinalysis  -     nitrofurantoin, macrocrystal-monohydrate, (Macrobid) 100 MG capsule; Take 1 capsule by mouth 2 (Two) Times a Day for 5 days.  Dispense: 10 capsule; Refill: 0  -     tamsulosin (FLOMAX) 0.4 MG capsule 24 hr capsule; Take 1 capsule by mouth Daily.  Dispense: 30 capsule; Refill: 0  -     POCT urinalysis  dipstick, automated    5. Hematuria, unspecified type  -     nitrofurantoin, macrocrystal-monohydrate, (Macrobid) 100 MG capsule; Take 1 capsule by mouth 2 (Two) Times a Day for 5 days.  Dispense: 10 capsule; Refill: 0  -     tamsulosin (FLOMAX) 0.4 MG capsule 24 hr capsule; Take 1 capsule by mouth Daily.  Dispense: 30 capsule; Refill: 0  -     POCT urinalysis dipstick, automated      1+ blood in urine today in office.    Due to history of recurrent UTI and renal calculi, will treat empirically with abx therapy and flomax.    Risks, benefits, and potential side effects of current/new medications reviewed with patient.  Patient voiced understanding and wished to proceed with treatment.    Increased clear fluid intake encouraged. Patient to avoid caffeine, dark sodas, tea.     Patient was encouraged to keep me informed of any acute changes, lack of improvement, or any new concerning symptoms.    Patient encouraged to present to ED for worsening symptoms, fever, N/V, confusion, decreased urinary output.    Discussion Summary:  Discussed plan of care in detail with pt today; pt verb understanding and agrees.        I have reviewed and updated all copied forward information, as appropriate.  I attest to the accuracy and relevance of any unchanged information.      Follow up:  No follow-ups on file.     Patient Education:  There are no Patient Instructions on file for this visit.    ALEXIA Marinelli  12/25/24  20:51 EST          Please note that portions of this note may have been completed with a voice recognition program.

## 2024-12-26 ENCOUNTER — OFFICE VISIT (OUTPATIENT)
Dept: FAMILY MEDICINE CLINIC | Facility: CLINIC | Age: 79
End: 2024-12-26
Payer: MEDICARE

## 2024-12-26 VITALS
DIASTOLIC BLOOD PRESSURE: 72 MMHG | WEIGHT: 205.2 LBS | BODY MASS INDEX: 32.98 KG/M2 | TEMPERATURE: 97.7 F | SYSTOLIC BLOOD PRESSURE: 114 MMHG | OXYGEN SATURATION: 97 % | HEART RATE: 73 BPM | HEIGHT: 66 IN | RESPIRATION RATE: 18 BRPM

## 2024-12-26 DIAGNOSIS — R06.02 SHORTNESS OF BREATH: ICD-10-CM

## 2024-12-26 DIAGNOSIS — R06.03 RESPIRATORY DISTRESS: Primary | ICD-10-CM

## 2024-12-26 NOTE — PROGRESS NOTES
Same Day Office Visit      Date: 2024   Patient Name: Annie Mejia  : 1945   MRN: [id number]     Chief Complaint:    Chief Complaint   Patient presents with    Shortness of Breath     Recent surgery 2024, started around time of surgery, no cough    Fatigue     Patient reports feeling weak, especially when lying down       History of Present Illness: Annie Mejia is a 79 y.o. female who is here today for shortness of breath and fatigue. Patient is present with daughter in office.    Patient appears uncomfortably with increased work of breathing upon entering room. Patient is diaphoretic. Patient was placed on O2 by clinic staff on arrival: O2 alternating between 93-97 prior to O2. Patient reportedly somewhat more comfortable on O2.    Patient daughter reports they have tried to convince patient to seek care in emergency setting but patient has refused.    Patient reports recent shortness of breath that began and has been worsening since surgery to replace spinal cord stimulator battery on 2024.    Patient daughter reports that patient has been compliant with post discharge care including wearing lower leg compression devices to prevent blood clots. Patient/daughter do report recent left leg swelling/discoloration following recent fall though US was performed by regular PCP and found to be negative.     Subjective     I have reviewed the patients family history, social history, past medical history, past surgical history and have updated it as appropriate.     Medications:     Current Outpatient Medications:     albuterol sulfate  (90 Base) MCG/ACT inhaler, Inhale 2 puffs Every 4 (Four) Hours As Needed for Wheezing or Shortness of Air., Disp: 18 g, Rfl: 1    ALPRAZolam (XANAX) 0.25 MG tablet, TAKE 1 TO 2 TABLETS BY MOUTH ONCE DAILY AS NEEDED, Disp: 60 tablet, Rfl: 2    ascorbic acid (VITAMIN C) 500 MG tablet, Take 1 tablet by mouth Daily., Disp: , Rfl:     aspirin EC 81 MG  EC tablet, Take 1 tablet by mouth Daily., Disp: 100 tablet, Rfl: 11    atorvastatin (LIPITOR) 40 MG tablet, TAKE 1 TABLET EVERY NIGHT, Disp: 90 tablet, Rfl: 3    Blood Glucose Monitoring Suppl (Accu-Chek Guide) w/Device kit, USE 1  THREE TIMES DAILY BEFORE MEAL(S), Disp: 1 kit, Rfl: 0    clopidogrel (PLAVIX) 75 MG tablet, Take  by mouth Daily., Disp: , Rfl:     dicyclomine (BENTYL) 10 MG capsule, Take 1 capsule by mouth 4 (Four) Times a Day Before Meals & at Bedtime., Disp: 90 capsule, Rfl: 3    donepezil (ARICEPT) 5 MG tablet, TAKE 1 TABLET BY MOUTH ONCE DAILY AT NIGHT, Disp: 90 tablet, Rfl: 3    EPINEPHrine (EPIPEN) 0.3 MG/0.3ML solution auto-injector injection, EpiPen 2-Lev 0.3 MG/0.3ML Injection Solution Auto-injector; Patient Sig: EpiPen 2-Lev 0.3 MG/0.3ML Injection Solution Auto-injector INJECT 0.3ML INTRAMUSCULARLY AS DIRECTED.; 1; 2; 06-May-2015; Active, Disp: , Rfl:     escitalopram (LEXAPRO) 10 MG tablet, Take 1 tablet by mouth Daily., Disp: , Rfl:     famotidine (PEPCID) 40 MG tablet, , Disp: , Rfl:     glucose blood (FREESTYLE LITE) test strip, USE ONE STRIP TO CHECK GLUCOSE FASTING IN THE MORNING AND IN THE EVENING, Disp: 120 each, Rfl: 12    glucose blood test strip, Use as instructed, Disp: 100 each, Rfl: 12    HYDROcodone-acetaminophen (Norco) 7.5-325 MG per tablet, Take 1 tablet by mouth Every 8 (Eight) Hours As Needed for Severe Pain., Disp: 21 tablet, Rfl: 0    Insulin Glargine (LANTUS SOLOSTAR) 100 UNIT/ML injection pen, Inject 5 Units under the skin into the appropriate area as directed Every Night. Increase in indicated and as instructed to maximum dose of 20 units qhs (Patient taking differently: Inject 8 Units under the skin into the appropriate area as directed Every Night. Increase in indicated and as instructed to maximum dose of 20 units qhs), Disp: 15 mL, Rfl: 5    Insulin Pen Needle (Pen Needles) 32G X 4 MM misc, Use 1 each Daily., Disp: 100 each, Rfl: 3    Lancets (freestyle) lancets,  "CHECK GLUCOSE FASTING IN THE MORNING AND IN THE EVENING,, Disp: 100 each, Rfl: 5    lisinopril (PRINIVIL,ZESTRIL) 20 MG tablet, Take 1 tablet by mouth Daily., Disp: 90 tablet, Rfl: 3    magnesium oxide (MAG-OX) 400 MG tablet, Take 1 tablet by mouth Daily., Disp: , Rfl:     meclizine (ANTIVERT) 25 MG tablet, Take 1-2 tablets by mouth every 6 (six) to 8 (eight) hours as needed for dizziness., Disp: 30 tablet, Rfl: 0    Omega-3 1000 MG capsule, Take 1 capsule by mouth Daily., Disp: , Rfl:     omeprazole (priLOSEC) 20 MG capsule, Take 1 capsule by mouth Daily., Disp: 90 capsule, Rfl: 3    promethazine (PHENERGAN) 25 MG suppository, Insert 1 suppository into the rectum Every 8 (Eight) Hours As Needed for Nausea or Vomiting., Disp: 12 suppository, Rfl: 1    promethazine (PHENERGAN) 25 MG tablet, Take 1 tablet by mouth Every 8 (Eight) Hours As Needed for Nausea or Vomiting., Disp: 90 tablet, Rfl: 0    propranolol (INDERAL) 40 MG tablet, TAKE 1 TABLET TWICE DAILY (Patient taking differently: Take 1 tablet by mouth As Needed (as needed for heart palpatations).), Disp: 180 tablet, Rfl: 3    tamsulosin (FLOMAX) 0.4 MG capsule 24 hr capsule, Take 1 capsule by mouth Daily., Disp: 30 capsule, Rfl: 0    Allergies:   Allergies   Allergen Reactions    Oxycodone Shortness Of Breath    Azithromycin Nausea And Vomiting    Erythrityl Tetranitrate Nausea And Vomiting    Morphine Itching       Objective     Physical Exam:     Vital Signs:   Vitals:    12/26/24 1559   BP: 114/72   Pulse: 73   Resp: 18   Temp: 97.7 °F (36.5 °C)   TempSrc: Oral   SpO2: 97%   Weight: 93.1 kg (205 lb 3.2 oz)   Height: 167.6 cm (66\")     Body mass index is 33.12 kg/m².        Physical Exam     General:  Ill appearing elderly female in distress. Alert and oriented. Diaphoretic. Vitals reviewed: 97% on 2LNC, non-tachypnic and non-tachycardic on 2LNC.  Head/ENT: Atraumatic.   Neck: Anatomy appears symmetrical.   Cardiac: Regular rate and rhythm to auscultation. I " detect no obvious murmurs or additional heart sounds during exam.  Pulmonary: Appears to have increased work of breathing. Intermittent tachypnea during auscultation. Lungs are clear to auscultation bilaterally.  Extremities: Left leg somewhat discolored and swollen compared to right. Not significantly painful to palpation.  Integumentary/Skin: Diaphoretic. See extremities.  Neurological: Normal speech though fatigued.    Procedures    Assessment / Plan      1. Respiratory distress    2. Shortness of breath     Respiratory Distress/Increased Work of Breathing/Concern for Pulmonary Embolism.  -Patient presents with worsening shortness of breath since surgery on 12/20/2024 to replace spinal cord stimulator.  -Patient recently has been evaluated for left leg edema/discoloration. Venous duplex on 11/20/2024 showed no evidence of DVT.  -Patient/daughter reports compliance with pneumatic compression devices of lower extremities since surgery.  -Patient was placed on 2LNC on arrival following appearance of respiratory distress. Oxygen saturation at that time prior to NC was noted to be 93-97%. Patient appeared more comfortable following provided supplemental O2.  -Vitals following 2LNC are within normal limits. Vitals other than O2 prior to supplemental O2 unknown. Physical examination shows an ill appearing elderly female that has increased work of breathing. Patient is diaphoretic. Lungs are clear to auscultation though there are intermittent runs of tachypnea during my examination. Heart sounds normal. Left lower extremity is somewhat edematous compared to right with some discoloration.  -CXR unavailable in our office today. [facility] does not have XR after 4:30.  -Well's Score for PE places patient in high risk group w/40.6% chance of PE.  -Etiology for patient current symptoms unknown with differential broad including pulmonary embolism, cardiac ischemia, pneumothorax, or other intrathoracic pathology. Largest  concern is for pulmonary embolism.  -I do discuss with patient and daughter need for evaluation in emergency setting. Patient/daughter are agreeable this.   -I do encourage/offer call for EMS to transport patient though patient does decline this.  -Have called and given report to [facility] ED: respiratory distress w/ increased work of breathing, on O2 in clinic (good saturation of 93-97% prior to O2 though does appear more comfortable on supplemental O2), recent surgery.    Follow Up:   No follow-ups on file.      MD DANIELA Gonzalez PC [facility]  [address] [place] [address] [address] [place] [address]  Fax [phone]  Phone [phone]

## 2025-01-13 DIAGNOSIS — E78.2 MIXED HYPERLIPIDEMIA: ICD-10-CM

## 2025-01-13 NOTE — TELEPHONE ENCOUNTER
Please advise.      The propranolol hasn't been filled since 2022 and the atorvastatin hasn't been filled since 2023.    Both were filled for 90 day supplies, but there have been no further request for these medications until today.

## 2025-01-13 NOTE — TELEPHONE ENCOUNTER
Caller: Annie Mejia    Relationship: Self    Best call back number: [phone]     Requested Prescriptions: AMLODIPINE 5 MG TABLET  TAKE ONE EVERY DAY - LAST FILL WAS ON 23       Requested Prescriptions     Pending Prescriptions Disp Refills    atorvastatin (LIPITOR) 40 MG tablet 90 tablet 3     Si tablet Every Night.    propranolol (INDERAL) 40 MG tablet 180 tablet 3     Sig: Take 1 tablet by mouth 2 (Two) Times a Day.        Pharmacy where request should be sent: [facility], [address] [phone] [facility] [phone]      Last office visit with prescribing clinician: 12/3/2024   Last telemedicine visit with prescribing clinician: Visit date not found   Next office visit with prescribing clinician: 2025     Additional details provided by patient: PATIENT IS REQUESTING A 90 DAY SUPPLY ON THESE MEDICATIONS.  PATIENT IS REQUESTING AMLODIPINE 5 MG TABLET ( NOT ON HER MED LIST) - PATIENT HAS 7 DAYS LEFT OF MEDICATION     Does the patient have less than a 3 day supply:  [] Yes  [x] No    Would you like a call back once the refill request has been completed: [] Yes [x] No    If the office needs to give you a call back, can they leave a voicemail: [] Yes [x] No    Roxie Yee MA   25 09:37 EST

## 2025-01-14 RX ORDER — ATORVASTATIN CALCIUM 40 MG/1
40 TABLET, FILM COATED ORAL NIGHTLY
Qty: 90 TABLET | Refills: 3 | Status: SHIPPED | OUTPATIENT
Start: 2025-01-14

## 2025-01-14 RX ORDER — PROPRANOLOL HYDROCHLORIDE 40 MG/1
40 TABLET ORAL AS NEEDED
Qty: 180 TABLET | Refills: 1 | Status: SHIPPED | OUTPATIENT
Start: 2025-01-14

## 2025-01-21 ENCOUNTER — TELEPHONE (OUTPATIENT)
Dept: FAMILY MEDICINE CLINIC | Facility: CLINIC | Age: 80
End: 2025-01-21

## 2025-01-21 RX ORDER — PROPRANOLOL HYDROCHLORIDE 40 MG/1
40 TABLET ORAL 2 TIMES DAILY PRN
Qty: 180 TABLET | Refills: 1 | Status: SHIPPED | OUTPATIENT
Start: 2025-01-21 | End: 2025-01-28 | Stop reason: SDUPTHER

## 2025-01-21 NOTE — TELEPHONE ENCOUNTER
Caller: BLAS FROM [facility] call back number: [phone]    Which medication are you concerned about: PROPANOLOL 40MG    Who prescribed you this medication: DR LOMBARDI      What are your concerns: PHARMACY STATES THEY ARE MISSING THE FREQUENCY FOR THIS MEDICATION. PLEASE CALL BLAS BACK.

## 2025-01-28 RX ORDER — PROPRANOLOL HYDROCHLORIDE 40 MG/1
40 TABLET ORAL 2 TIMES DAILY PRN
Qty: 180 TABLET | Refills: 1 | Status: SHIPPED | OUTPATIENT
Start: 2025-01-28

## 2025-02-05 ENCOUNTER — TELEPHONE (OUTPATIENT)
Dept: FAMILY MEDICINE CLINIC | Facility: CLINIC | Age: 80
End: 2025-02-05

## 2025-02-05 NOTE — TELEPHONE ENCOUNTER
Caller: Annie Mejia    Relationship: Self    Best call back number: [phone]     Requested Prescriptions: AMLODIPINE 5 MG TABLETS - ONE TABLET DAILY - 90 DAY SUPPLY (NOT ON CURRENT MED LIST TO QUEUE UP AS MED REFILL)          Pharmacy where request should be sent: [facility], [address] [phone] [facility] [phone] FX     Last office visit with prescribing clinician: 12/3/2024   Last telemedicine visit with prescribing clinician: Visit date not found   Next office visit with prescribing clinician: 2/17/2025     Additional details provided by patient: THE PATIENT REPORTS THE LAST TIME THIS WAS PRESCRIBED WAS 2023 [facility] [address] LOMBARDI AND IS REQUESTING A REFILL TO BE SENT TO [facility] (NOT ON MED LIST)    Does the patient have less than a 3 day supply:  [] Yes  [x] No    Would you like a call back once the refill request has been completed: [x] Yes [] No    If the office needs to give you a call back, can they leave a voicemail: [x] Yes [] No    Tony Toth Rep   02/05/25 11:56 EST

## 2025-02-12 NOTE — TELEPHONE ENCOUNTER
Caller: Annie Mejia    Relationship: Self    Best call back number: [phone]    Requested Prescriptions:   Requested Prescriptions     Pending Prescriptions Disp Refills    propranolol (INDERAL) 40 MG tablet 180 tablet 1     Sig: Take 1 tablet by mouth 2 (Two) Times a Day As Needed (for heart palpatations).        Pharmacy where request should be sent:  [facility], [address] [phone] [facility] [phone] FX        Last office visit with prescribing clinician: 12/3/2024   Last telemedicine visit with prescribing clinician: Visit date not found   Next office visit with prescribing clinician: 2/17/2025       Does the patient have less than a 3 day supply:  [] Yes  [x] No    02/12/25 16:08 EST

## 2025-02-13 RX ORDER — DONEPEZIL HYDROCHLORIDE 5 MG/1
TABLET, FILM COATED ORAL
Qty: 90 TABLET | Refills: 3 | Status: SHIPPED | OUTPATIENT
Start: 2025-02-13

## 2025-02-13 RX ORDER — PROPRANOLOL HYDROCHLORIDE 40 MG/1
40 TABLET ORAL 2 TIMES DAILY PRN
Qty: 180 TABLET | Refills: 1 | Status: SHIPPED | OUTPATIENT
Start: 2025-02-13

## 2025-02-13 NOTE — TELEPHONE ENCOUNTER
Rx Refill Note  Requested Prescriptions     Pending Prescriptions Disp Refills    donepezil (ARICEPT) 5 MG tablet [Pharmacy Med Name: Donepezil HCl 5 MG Oral Tablet] 90 tablet 0     Sig: TAKE 1 TABLET BY MOUTH ONCE DAILY AT NIGHT      Last filled:3/1/24 90DS 3REF  Last office visit with prescribing clinician: 4/19/2023      Next office visit with prescribing clinician: Visit date not found     Ashley Arredondo MA  02/13/25, 11:06 EST    Patient has not been seen since 4/19/2023 pending to provider.

## 2025-02-18 RX ORDER — FAMOTIDINE 40 MG/1
40 TABLET, FILM COATED ORAL DAILY
Qty: 90 TABLET | Refills: 3 | OUTPATIENT
Start: 2025-02-18

## 2025-02-24 ENCOUNTER — TELEPHONE (OUTPATIENT)
Dept: FAMILY MEDICINE CLINIC | Facility: CLINIC | Age: 80
End: 2025-02-24
Payer: MEDICARE

## 2025-02-24 NOTE — TELEPHONE ENCOUNTER
Please contact pt and check on status. Cancelled her apt 2/17. Next apt not until April. Just want to make sure she doesn't need anything prior to that time

## 2025-02-25 NOTE — TELEPHONE ENCOUNTER
Spoke to pt, she requested an earlier appt as she was told before that you was completely booked out until April. Got pt scheduled for sooner appt. Pt not any refills at this time.

## 2025-03-11 ENCOUNTER — OFFICE VISIT (OUTPATIENT)
Dept: FAMILY MEDICINE CLINIC | Facility: CLINIC | Age: 80
End: 2025-03-11
Payer: MEDICARE

## 2025-03-11 VITALS
WEIGHT: 204.6 LBS | BODY MASS INDEX: 32.88 KG/M2 | SYSTOLIC BLOOD PRESSURE: 122 MMHG | HEART RATE: 77 BPM | DIASTOLIC BLOOD PRESSURE: 84 MMHG | HEIGHT: 66 IN | OXYGEN SATURATION: 95 %

## 2025-03-11 DIAGNOSIS — Z51.81 MEDICATION MONITORING ENCOUNTER: ICD-10-CM

## 2025-03-11 DIAGNOSIS — M48.061 SPINAL STENOSIS OF LUMBAR REGION WITHOUT NEUROGENIC CLAUDICATION: ICD-10-CM

## 2025-03-11 DIAGNOSIS — E11.59 TYPE 2 DIABETES MELLITUS WITH OTHER CIRCULATORY COMPLICATION, WITH LONG-TERM CURRENT USE OF INSULIN: ICD-10-CM

## 2025-03-11 DIAGNOSIS — D69.6 THROMBOCYTOPENIA: ICD-10-CM

## 2025-03-11 DIAGNOSIS — Z00.00 ENCOUNTER FOR PREVENTIVE HEALTH EXAMINATION: ICD-10-CM

## 2025-03-11 DIAGNOSIS — G89.4 CHRONIC PAIN SYNDROME: ICD-10-CM

## 2025-03-11 DIAGNOSIS — E66.09 CLASS 1 OBESITY DUE TO EXCESS CALORIES WITH SERIOUS COMORBIDITY AND BODY MASS INDEX (BMI) OF 33.0 TO 33.9 IN ADULT: ICD-10-CM

## 2025-03-11 DIAGNOSIS — F41.8 MIXED ANXIETY DEPRESSIVE DISORDER: ICD-10-CM

## 2025-03-11 DIAGNOSIS — G52.2: ICD-10-CM

## 2025-03-11 DIAGNOSIS — Z79.4 TYPE 2 DIABETES MELLITUS WITH OTHER CIRCULATORY COMPLICATION, WITH LONG-TERM CURRENT USE OF INSULIN: ICD-10-CM

## 2025-03-11 DIAGNOSIS — E78.2 MIXED HYPERLIPIDEMIA: ICD-10-CM

## 2025-03-11 DIAGNOSIS — E11.65 UNCONTROLLED TYPE 2 DIABETES MELLITUS WITH HYPERGLYCEMIA: ICD-10-CM

## 2025-03-11 DIAGNOSIS — K21.00 GASTROESOPHAGEAL REFLUX DISEASE WITH ESOPHAGITIS WITHOUT HEMORRHAGE: ICD-10-CM

## 2025-03-11 DIAGNOSIS — I70.213 INTERMITTENT CLAUDICATION OF BOTH LOWER EXTREMITIES DUE TO ATHEROSCLEROSIS: ICD-10-CM

## 2025-03-11 DIAGNOSIS — G31.84 MILD COGNITIVE IMPAIRMENT: ICD-10-CM

## 2025-03-11 DIAGNOSIS — I73.9 PERIPHERAL VASCULAR DISEASE: ICD-10-CM

## 2025-03-11 DIAGNOSIS — Z79.899 CHRONIC PRESCRIPTION BENZODIAZEPINE USE: ICD-10-CM

## 2025-03-11 DIAGNOSIS — K31.84 GASTROPARESIS: ICD-10-CM

## 2025-03-11 DIAGNOSIS — Z00.00 MEDICARE ANNUAL WELLNESS VISIT, SUBSEQUENT: Primary | ICD-10-CM

## 2025-03-11 DIAGNOSIS — N18.31 STAGE 3A CHRONIC KIDNEY DISEASE: ICD-10-CM

## 2025-03-11 DIAGNOSIS — E66.811 CLASS 1 OBESITY DUE TO EXCESS CALORIES WITH SERIOUS COMORBIDITY AND BODY MASS INDEX (BMI) OF 33.0 TO 33.9 IN ADULT: ICD-10-CM

## 2025-03-11 DIAGNOSIS — I10 ESSENTIAL HYPERTENSION: ICD-10-CM

## 2025-03-11 DIAGNOSIS — N39.0 RECURRENT URINARY TRACT INFECTION: ICD-10-CM

## 2025-03-11 DIAGNOSIS — M81.0 AGE-RELATED OSTEOPOROSIS WITHOUT CURRENT PATHOLOGICAL FRACTURE: ICD-10-CM

## 2025-03-11 DIAGNOSIS — M96.1 LUMBAR POST-LAMINECTOMY SYNDROME: ICD-10-CM

## 2025-03-11 DIAGNOSIS — G47.09 OTHER INSOMNIA: ICD-10-CM

## 2025-03-11 DIAGNOSIS — K74.60 CIRRHOSIS OF LIVER WITHOUT ASCITES, UNSPECIFIED HEPATIC CIRRHOSIS TYPE: ICD-10-CM

## 2025-03-11 DIAGNOSIS — T82.858S BYPASS GRAFT STENOSIS, SEQUELA: ICD-10-CM

## 2025-03-11 DIAGNOSIS — E11.51 DIABETES MELLITUS WITH PERIPHERAL ARTERY DISEASE: ICD-10-CM

## 2025-03-11 RX ORDER — HYDROCODONE BITARTRATE AND ACETAMINOPHEN 7.5; 325 MG/1; MG/1
1 TABLET ORAL EVERY 8 HOURS PRN
Qty: 21 TABLET | Refills: 0 | Status: SHIPPED | OUTPATIENT
Start: 2025-03-11

## 2025-03-11 RX ORDER — PROMETHAZINE HYDROCHLORIDE 25 MG/1
25 TABLET ORAL EVERY 8 HOURS PRN
Qty: 90 TABLET | Refills: 0 | Status: SHIPPED | OUTPATIENT
Start: 2025-03-11

## 2025-03-11 RX ORDER — DICYCLOMINE HYDROCHLORIDE 10 MG/1
10 CAPSULE ORAL
Qty: 90 CAPSULE | Refills: 3 | Status: SHIPPED | OUTPATIENT
Start: 2025-03-11

## 2025-03-11 RX ORDER — ALPRAZOLAM 0.25 MG
TABLET ORAL
Qty: 60 TABLET | Refills: 2 | Status: SHIPPED | OUTPATIENT
Start: 2025-03-11

## 2025-03-11 RX ORDER — AMLODIPINE BESYLATE 5 MG/1
5 TABLET ORAL DAILY
Qty: 90 TABLET | Refills: 3 | Status: SHIPPED | OUTPATIENT
Start: 2025-03-11

## 2025-03-11 NOTE — ASSESSMENT & PLAN NOTE
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    Plan:   Continue current medication therapy     Followup in 6 months.     Orders:    ALPRAZolam (XANAX) 0.25 MG tablet; TAKE 1 TO 2 TABLETS BY MOUTH ONCE DAILY AS NEEDED

## 2025-03-11 NOTE — ASSESSMENT & PLAN NOTE
Renal condition is stable.  Continue current treatment regimen.  Renal condition will be reassessed in 6 months.    Orders:    CBC & Differential    Comprehensive Metabolic Panel    Microalbumin / Creatinine Urine Ratio - Urine, Clean Catch

## 2025-03-11 NOTE — PROGRESS NOTES
Subjective   The ABCs of the Annual Wellness Visit  Medicare Wellness Visit      Annie Mejia is a 79 y.o. patient who presents for a Medicare Wellness Visit.    Declines hep B vaccination due to cost.    The following portions of the patient's history were reviewed and   updated as appropriate: allergies, current medications, past family history, past medical history, past social history, past surgical history, and problem list.    Compared to one year ago, the patient's physical   health is worse.  Compared to one year ago, the patient's mental   health is the same.    Recent Hospitalizations:  She was not admitted to the hospital during the last year.     Current Medical Providers:  Patient Care Team:  Olga Pimentel MD as PCP - General (Family Medicine)  Tj Rowe MD as Consulting Physician (General Surgery)  Derrick Martínez MD as Consulting Physician (Gastroenterology)  Lavelle Méndez OD (Optometry)  Shaheen Ibrahim MD as Surgeon (Orthopedic Surgery)  Glendy Hubbard MD as Consulting Physician (Obstetrics and Gynecology)  Shaheen Ibrahim MD as Surgeon (Orthopedic Surgery)  Molly Reynolds DO as Surgeon (General Surgery)  Opal Bird APRN as Nurse Practitioner (Gastroenterology)    Outpatient Medications Prior to Visit   Medication Sig Dispense Refill    albuterol sulfate  (90 Base) MCG/ACT inhaler Inhale 2 puffs Every 4 (Four) Hours As Needed for Wheezing or Shortness of Air. 18 g 1    ascorbic acid (VITAMIN C) 500 MG tablet Take 1 tablet by mouth Daily.      aspirin EC 81 MG EC tablet Take 1 tablet by mouth Daily. 100 tablet 11    atorvastatin (LIPITOR) 40 MG tablet Take 1 tablet by mouth Every Night. 90 tablet 3    Blood Glucose Monitoring Suppl (Accu-Chek Guide) w/Device kit USE 1  THREE TIMES DAILY BEFORE MEAL(S) 1 kit 0    donepezil (ARICEPT) 5 MG tablet TAKE 1 TABLET BY MOUTH ONCE DAILY AT NIGHT 90 tablet 3    EPINEPHrine (EPIPEN) 0.3 MG/0.3ML  solution auto-injector injection EpiPen 2-Lev 0.3 MG/0.3ML Injection Solution Auto-injector; Patient Sig: EpiPen 2-Lev 0.3 MG/0.3ML Injection Solution Auto-injector INJECT 0.3ML INTRAMUSCULARLY AS DIRECTED.; 1; 2; 06-May-2015; Active      famotidine (PEPCID) 40 MG tablet       glucose blood (FREESTYLE LITE) test strip USE ONE STRIP TO CHECK GLUCOSE FASTING IN THE MORNING AND IN THE EVENING 120 each 12    glucose blood test strip Use as instructed 100 each 12    Insulin Pen Needle (Pen Needles) 32G X 4 MM misc Use 1 each Daily. 100 each 3    Lancets (freestyle) lancets CHECK GLUCOSE FASTING IN THE MORNING AND IN THE EVENING, 100 each 5    lisinopril (PRINIVIL,ZESTRIL) 20 MG tablet Take 1 tablet by mouth Daily. 90 tablet 3    magnesium oxide (MAG-OX) 400 MG tablet Take 1 tablet by mouth Daily.      meclizine (ANTIVERT) 25 MG tablet Take 1-2 tablets by mouth every 6 (six) to 8 (eight) hours as needed for dizziness. 30 tablet 0    Omega-3 1000 MG capsule Take 1 capsule by mouth Daily.      omeprazole (priLOSEC) 20 MG capsule Take 1 capsule by mouth Daily. 90 capsule 3    promethazine (PHENERGAN) 25 MG suppository Insert 1 suppository into the rectum Every 8 (Eight) Hours As Needed for Nausea or Vomiting. 12 suppository 1    ALPRAZolam (XANAX) 0.25 MG tablet TAKE 1 TO 2 TABLETS BY MOUTH ONCE DAILY AS NEEDED 60 tablet 2    dicyclomine (BENTYL) 10 MG capsule Take 1 capsule by mouth 4 (Four) Times a Day Before Meals & at Bedtime. 90 capsule 3    HYDROcodone-acetaminophen (Norco) 7.5-325 MG per tablet Take 1 tablet by mouth Every 8 (Eight) Hours As Needed for Severe Pain. 21 tablet 0    Insulin Glargine (LANTUS SOLOSTAR) 100 UNIT/ML injection pen Inject 5 Units under the skin into the appropriate area as directed Every Night. Increase in indicated and as instructed to maximum dose of 20 units qhs (Patient taking differently: Inject 8 Units under the skin into the appropriate area as directed Every Night. Increase in  indicated and as instructed to maximum dose of 20 units qhs) 15 mL 5    promethazine (PHENERGAN) 25 MG tablet Take 1 tablet by mouth Every 8 (Eight) Hours As Needed for Nausea or Vomiting. 90 tablet 0    tamsulosin (FLOMAX) 0.4 MG capsule 24 hr capsule Take 1 capsule by mouth Daily. 30 capsule 0    propranolol (INDERAL) 40 MG tablet Take 1 tablet by mouth 2 (Two) Times a Day As Needed (for heart palpatations). (Patient not taking: Reported on 3/11/2025) 180 tablet 1    clopidogrel (PLAVIX) 75 MG tablet Take  by mouth Daily.      escitalopram (LEXAPRO) 10 MG tablet Take 1 tablet by mouth Daily.       No facility-administered medications prior to visit.     Opioid medication/s are on active medication list.  and I have evaluated her active treatment plan and pain score trends (see table).  Vitals:    03/11/25 1051   PainSc: 10-Worst pain ever   PainLoc: Back     I have reviewed the chart for potential of high risk medication and harmful drug interactions in the elderly.        Aspirin is on active medication list. Aspirin use is indicated based on review of current medical condition/s. Pros and cons of this therapy have been discussed today. Benefits of this medication outweigh potential harm.  Patient has been encouraged to continue taking this medication.  .      Patient Active Problem List   Diagnosis    Abnormal liver enzymes    Arthritis    Type 2 diabetes mellitus without complication, with long-term current use of insulin    Mixed anxiety depressive disorder    Gastroesophageal reflux disease with esophagitis    First degree atrioventricular block    Mixed hyperlipidemia    Essential hypertension    Insomnia    Cyst of ovary    Class 1 obesity due to excess calories with serious comorbidity and body mass index (BMI) of 33.0 to 33.9 in adult    Age-related osteoporosis without current pathological fracture    Peripheral vascular disease    Hiatal hernia    Skin tags, multiple acquired    Primary localized  "osteoarthrosis of left lower leg    Chronic pain syndrome    Intervertebral disc disorder    Lumbosacral spondylosis without myelopathy    Recurrent urinary tract infection    History of stroke    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    Gastroparesis    Chronic prescription benzodiazepine use    Stage 3a chronic kidney disease    Degeneration of intervertebral disc of lumbar region    Urinary frequency    Lumbar post-laminectomy syndrome    Lumbar radiculopathy    Lumbar spondylosis    Need for hepatitis A and B vaccination    Cirrhosis of liver without ascites    Thrombocytopenia    Mild cognitive impairment    Lipoma of back    Diabetes mellitus with peripheral artery disease    Intermittent claudication of both lower extremities due to atherosclerosis    Bypass graft stenosis    Elevated blood pressure reading    Former smoker    Pain of right hip joint    Spinal stenosis of lumbar region without neurogenic claudication    Chronic pain     Advance Care Planning Advance Directive is on file.  ACP discussion was held with the patient during this visit. Patient has an advance directive in EMR which is still valid.             Objective   Vitals:    03/11/25 1051   BP: 122/84   Pulse: 77   SpO2: 95%   Weight: 92.8 kg (204 lb 9.6 oz)   Height: 167.6 cm (66\")   PainSc: 10-Worst pain ever   PainLoc: Back       Estimated body mass index is 33.02 kg/m² as calculated from the following:    Height as of this encounter: 167.6 cm (66\").    Weight as of this encounter: 92.8 kg (204 lb 9.6 oz).    BMI is >= 30 and <35. (Class 1 Obesity). The following options were offered after discussion;: exercise counseling/recommendations, nutrition counseling/recommendations, and information on healthy weight added to patient's after visit summary            Does the patient have evidence of cognitive impairment? No  Lab Results   Component Value Date    CHLPL 124 03/11/2025    TRIG 116 03/11/2025    HDL 47 03/11/2025    LDL 56 " 2025    VLDL 21 2025    HGBA1C 7.70 (H) 2025                                                                                                Health  Risk Assessment    Smoking Status:  Social History     Tobacco Use   Smoking Status Former    Current packs/day: 0.00    Average packs/day: 1 pack/day for 15.0 years (15.0 ttl pk-yrs)    Types: Cigarettes    Start date: 1999    Quit date: 2012    Years since quittin.9    Passive exposure: Past   Smokeless Tobacco Never     Alcohol Consumption:  Social History     Substance and Sexual Activity   Alcohol Use No       Fall Risk Screen  STEADI Fall Risk Assessment was completed, and patient is at MODERATE risk for falls. Assessment completed on:3/11/2025    Depression Screening   Little interest or pleasure in doing things? Not at all   Feeling down, depressed, or hopeless? Several days   PHQ-2 Total Score 1      Health Habits and Functional and Cognitive Screening:      3/11/2025    10:52 AM   Functional & Cognitive Status   Do you have difficulty preparing food and eating? No   Do you have difficulty bathing yourself, getting dressed or grooming yourself? No   Do you have difficulty using the toilet? No   Do you have difficulty moving around from place to place? No   Do you have trouble with steps or getting out of a bed or a chair? No   Current Diet Frequent Junk Food   Dental Exam Up to date   Eye Exam Up to date   Exercise (times per week) 0 times per week   Current Exercises Include No Regular Exercise   Do you need help using the phone?  No   Are you deaf or do you have serious difficulty hearing?  No   Do you need help to go to places out of walking distance? No   Do you need help shopping? Yes   Do you need help preparing meals?  Yes   Do you need help with housework?  Yes   Do you need help with laundry? Yes   Do you need help taking your medications? No   Do you need help managing money? No   Do you ever drive or ride in a car  without wearing a seat belt? No   Have you felt unusual stress, anger or loneliness in the last month? No   Who do you live with? Alone   If you need help, do you have trouble finding someone available to you? No   Have you been bothered in the last four weeks by sexual problems? No   Do you have difficulty concentrating, remembering or making decisions? No           Age-appropriate Screening Schedule:  Refer to the list below for future screening recommendations based on patient's age, sex and/or medical conditions. Orders for these recommended tests are listed in the plan section. The patient has been provided with a written plan.    Health Maintenance List  Health Maintenance   Topic Date Due    ZOSTER VACCINE (2 of 2) 02/26/2006    TDAP/TD VACCINES (2 - Td or Tdap) 11/01/2022    DIABETIC FOOT EXAM  04/26/2023    DIABETIC EYE EXAM  11/01/2024    INFLUENZA VACCINE  03/31/2025 (Originally 7/1/2024)    RSV Vaccine - Adults (1 - 1-dose 75+ series) 12/11/2025 (Originally 11/25/2018)    HEMOGLOBIN A1C  09/11/2025    ANNUAL WELLNESS VISIT  03/11/2026    LIPID PANEL  03/11/2026    URINE MICROALBUMIN-CREATININE RATIO (uACR)  03/11/2026    BMI FOLLOWUP  03/11/2026    DXA SCAN  03/25/2026    COLORECTAL CANCER SCREENING  07/19/2026    Pneumococcal Vaccine 50+  Completed    Hepatitis B  Discontinued    COVID-19 Vaccine  Discontinued    MAMMOGRAM  Discontinued                                                                                                                                                CMS Preventative Services Quick Reference  Risk Factors Identified During Encounter  Chronic Pain: Natural history and expected course discussed. Questions answered.  Depression/Dysphoria: Current medication is effective, no change recommended  Fall Risk-High or Moderate: Discussed Fall Prevention in the home and Information on Fall Prevention Shared in After Visit Summary  Hearing Problem:  pt has had previous eval  Immunizations  Discussed/Encouraged: Tdap, Hepatitis A Vaccine/Series, Hepatitis B Vaccine/Series, Influenza, Prevnar 20 (Pneumococcal 20-valent conjugate), Shingrix, COVID19, and RSV (Respiratory Syncytial Virus)  Inactivity/Sedentary: Patient was advised to exercise at least 150 minutes a week per CDC recommendations.    The above risks/problems have been discussed with the patient.  Pertinent information has been shared with the patient in the After Visit Summary.  An After Visit Summary and PPPS were made available to the patient.    Follow Up:   Next Medicare Wellness visit to be scheduled in 1 year.         Additional E&M Note during same encounter follows:  Patient has additional, significant, and separately identifiable condition(s)/problem(s) that require work above and beyond the Medicare Wellness Visit     Chief Complaint  Medicare Wellness-subsequent and Depression (Pt states she has been having increased depression. )    Subjective   HPI  Annie Holcomb is also being seen today for an annual adult preventative physical exam.  and Annie Holcomb is also being seen today for additional medical problem/s.    Review of Systems   Constitutional:  Positive for fatigue. Negative for fever.   HENT:  Positive for congestion and postnasal drip. Negative for sore throat and trouble swallowing.    Eyes:  Positive for visual disturbance (chronic).   Respiratory:  Positive for cough (dry, intermittent, stable) and shortness of breath (with anxiety). Negative for wheezing.    Cardiovascular:  Positive for chest pain (with anxiety) and palpitations (with anxiety). Negative for leg swelling.   Gastrointestinal:  Positive for abdominal distention, abdominal pain, diarrhea and nausea. Negative for blood in stool and vomiting.        Heartburn   Endocrine: Negative for polydipsia and polyuria.   Genitourinary:  Negative for dysuria, hematuria and vaginal bleeding.   Musculoskeletal:  Positive for arthralgias, back pain, gait problem, joint swelling  "and myalgias.   Skin:  Negative for rash and wound.   Neurological:  Positive for dizziness, weakness and confusion (mild, intermittent). Negative for syncope, facial asymmetry and speech difficulty.   Hematological:  Negative for adenopathy. Bruises/bleeds easily.   Psychiatric/Behavioral:  Positive for dysphoric mood and sleep disturbance. Negative for suicidal ideas. The patient is nervous/anxious.    Pt's previous ROS reviewed and updated as indicated.                Objective   Vital Signs:  /84   Pulse 77   Ht 167.6 cm (66\")   Wt 92.8 kg (204 lb 9.6 oz)   SpO2 95%   BMI 33.02 kg/m²   Physical Exam  Vitals and nursing note reviewed.   Constitutional:       General: She is not in acute distress.     Appearance: She is well-groomed. She is obese. She is not ill-appearing.   HENT:      Head: Atraumatic.      Nose: Nose normal.      Mouth/Throat:      Mouth: Mucous membranes are moist. No oral lesions.      Pharynx: Oropharynx is clear.   Eyes:      General: No scleral icterus.     Conjunctiva/sclera: Conjunctivae normal.   Neck:      Thyroid: No thyroid mass.   Cardiovascular:      Rate and Rhythm: Normal rate and regular rhythm.      Pulses: Normal pulses.      Heart sounds: Normal heart sounds.   Pulmonary:      Effort: Pulmonary effort is normal.      Breath sounds: Normal breath sounds.   Abdominal:      General: Bowel sounds are increased. There is no distension.      Palpations: Abdomen is soft. There is no mass.      Tenderness: There is no abdominal tenderness.   Musculoskeletal:      Right lower leg: No edema.      Left lower leg: No edema.   Lymphadenopathy:      Cervical: No cervical adenopathy.   Skin:     General: Skin is warm and dry.      Coloration: Skin is not jaundiced or pale.      Findings: No bruising or rash.   Neurological:      Mental Status: She is alert and oriented to person, place, and time. Mental status is at baseline.      Gait: Gait is intact.   Psychiatric:         Mood " and Affect: Mood and affect normal.         Speech: Speech normal.         Behavior: Behavior normal. Behavior is cooperative.         Thought Content: Thought content normal.         Cognition and Memory: Cognition normal.         Judgment: Judgment normal.     Pt's previous physical exam reviewed and updated as indicated.                 Assessment and Plan Additional age appropriate preventative wellness advice topics were discussed during today's preventative wellness exam(some topics already addressed during AWV portion of the note above):    Physical Activity: Advised cardiovascular activity 150 minutes per week as tolerated. (example brisk walk for 30 minutes, 5 days a week).     Nutrition: Discussed nutrition plan with patient. Information shared in after visit summary. Goal is for a well balanced diet to enhance overall health.     Healthy Weight: Discussed current and goal BMI with patient. Steps to attain this goal discussed. Information shared in after visit summary.     Injury Prevention Discussion:  Information shared in after visit summary.           Essential hypertension      Orders:    amLODIPine (NORVASC) 5 MG tablet; Take 1 tablet by mouth Daily.    CBC & Differential    Comprehensive Metabolic Panel    TSH Rfx On Abnormal To Free T4    Microalbumin / Creatinine Urine Ratio - Urine, Clean Catch    Medicare annual wellness visit, subsequent         Type 2 diabetes mellitus with other circulatory complication, with long-term current use of insulin  Diabetes is stable.   Continue current treatment regimen.  Diabetes will be reassessed in 6 months    Orders:    Comprehensive Metabolic Panel    Hemoglobin A1c    TSH Rfx On Abnormal To Free T4    Microalbumin / Creatinine Urine Ratio - Urine, Clean Catch    Ambulatory Referral for Diabetic Eye Exam-Optometry    Mixed hyperlipidemia   Lipid abnormalities are stable    Plan:  Continue same medication/s without change.      Discussed medication dosage,  use, side effects, and goals of treatment in detail.    Counseled patient on lifestyle modifications to help control hyperlipidemia.     Patient Treatment Goals:   LDL goal is less than 70    Followup in 6 months.    Orders:    Comprehensive Metabolic Panel    Lipid Panel    Stage 3a chronic kidney disease  Renal condition is stable.  Continue current treatment regimen.  Renal condition will be reassessed in 6 months.    Orders:    CBC & Differential    Comprehensive Metabolic Panel    Microalbumin / Creatinine Urine Ratio - Urine, Clean Catch    Medication monitoring encounter    Orders:    CBC & Differential    Comprehensive Metabolic Panel    Mixed anxiety depressive disorder  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    Plan:   Continue current medication therapy     Followup in 6 months.     Orders:    ALPRAZolam (XANAX) 0.25 MG tablet; TAKE 1 TO 2 TABLETS BY MOUTH ONCE DAILY AS NEEDED    Chronic pain syndrome  Multifactorial. Judicious use of opioid analgesic. No aberrant behavior.    Orders:    HYDROcodone-acetaminophen (Norco) 7.5-325 MG per tablet; Take 1 tablet by mouth Every 8 (Eight) Hours As Needed for Severe Pain.    Uncontrolled type 2 diabetes mellitus with hyperglycemia  Diabetes is stable.   Recommended an ADA diet.  Regular aerobic exercise.  Discussed ways to avoid symptomatic hypoglycemia.  Discussed sick day management.  Discussed foot care.  Reminded to get yearly retinal exam.  Diabetes will be reassessed in 6 months    Orders:    Insulin Glargine (LANTUS SOLOSTAR) 100 UNIT/ML injection pen; Inject 8 Units under the skin into the appropriate area as directed Every Night. Increase in indicated and as instructed to maximum dose of 20 units qhs    Encounter for preventive health examination  Age appropriate preventive care reviewed including cancer screenings, safety measures, mental health concerns, supplements,  prevention of CV disease and worsening DM, etc. Handout provided.         Peripheral vascular disease         Intermittent claudication of both lower extremities due to atherosclerosis         Bypass graft stenosis, sequela         Thrombocytopenia         Diabetes mellitus with peripheral artery disease  Diabetes is stable.   Continue current treatment regimen.  Diabetes will be reassessed in 6 months         Class 1 obesity due to excess calories with serious comorbidity and body mass index (BMI) of 33.0 to 33.9 in adult  Patient's (Body mass index is 33.02 kg/m².) indicates that they are obese (BMI >30) with health conditions that include diabetes mellitus, dyslipidemias, GERD, peripheral vascular disease, and osteoarthritis . Weight is improving with treatment. BMI  is above average; BMI management plan is completed. We discussed low calorie, low carb based diet program, portion control, increasing exercise, Weight Watchers or other Commercial based weight reduction program, management of depression/anxiety/stress to control compensatory eating, an gregorio-based approach such as [employer] Pal or Lose It, and Information on healthy weight added to patient's after visit summary.          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  F/u sooner as needed/instructed.  I will contact patient regarding test results and provide instructions regarding any  necessary changes in plan of care.  Patient was encouraged to keep me informed of any acute changes, lack of improvement, or any new concerning symptoms.  Pt is aware of reasons to seek emergent care.  Patient voiced understanding of all instructions and denied further questions.  As part of patient's treatment plan I am prescribing a controlled substance.  The patient has been made aware of the appropriate use of such medications, including potential risk of somnolence, limited ability to drive and/or work safely, and potential for dependence and/or overdose.  It has also been made clear that these medications are for use by this patient only, without concomitant use of alcohol or other substances, unless prescribed.  History and physical exam exhibit continued safe and appropriate use of controlled substance.  LILIAM reviewed.  Patient has completed a prescribing agreement detailing terms of continued prescribing of controlled substances, including monitoring LILIAM reports, urine drug screening, and pill counts if necessary.  Patient is aware that inappropriate use will result in cessation of prescribing such medications.    Patient was given instructions and counseling regarding her condition or for health maintenance advice. Please see specific information pulled into the AVS if appropriate.    Please note that portions of this note may have been completed with a voice recognition program.

## 2025-03-11 NOTE — ASSESSMENT & PLAN NOTE
Orders:    amLODIPine (NORVASC) 5 MG tablet; Take 1 tablet by mouth Daily.    CBC & Differential    Comprehensive Metabolic Panel    TSH Rfx On Abnormal To Free T4    Microalbumin / Creatinine Urine Ratio - Urine, Clean Catch

## 2025-03-11 NOTE — ASSESSMENT & PLAN NOTE
Multifactorial. Judicious use of opioid analgesic. No aberrant behavior.    Orders:    HYDROcodone-acetaminophen (Norco) 7.5-325 MG per tablet; Take 1 tablet by mouth Every 8 (Eight) Hours As Needed for Severe Pain.

## 2025-03-11 NOTE — PATIENT INSTRUCTIONS
Medicare Wellness  Personal Prevention Plan of Service     Date of Office Visit:    Encounter Provider:  Olga Pimentel MD  Place of Service:  [facility]  Patient Name: Annie Mejia  :  1945    As part of the Medicare Wellness portion of your visit today, we are providing you with this personalized preventive plan of services (PPPS). This plan is based upon recommendations of the [place] Preventive Services Task Force (USPSTF) and the Advisory Committee on Immunization Practices (ACIP).    This lists the preventive care services that should be considered, and provides dates of when you are due. Items listed as completed are up-to-date and do not require any further intervention.    Health Maintenance   Topic Date Due    ZOSTER VACCINE (2 of 2) 2006    TDAP/TD VACCINES (2 - Td or Tdap) 2022    DIABETIC FOOT EXAM  2023    Hepatitis B (3 of 3 - Risk 3-dose series) 08/15/2024    DIABETIC EYE EXAM  2024    LIPID PANEL  02/15/2025    URINE MICROALBUMIN-CREATININE RATIO (uACR)  02/15/2025    BMI FOLLOWUP  02/15/2025    HEMOGLOBIN A1C  2025    INFLUENZA VACCINE  2025 (Originally 2024)    RSV Vaccine - Adults (1 - 1-dose 75+ series) 2025 (Originally 2018)    ANNUAL WELLNESS VISIT  2026    DXA SCAN  2026    COLORECTAL CANCER SCREENING  2026    Pneumococcal Vaccine 50+  Completed    COVID-19 Vaccine  Discontinued    MAMMOGRAM  Discontinued       Orders Placed This Encounter   Procedures    Comprehensive Metabolic Panel     Release to patient:   Routine Release [[id number]]    Lipid Panel     Release to patient:   Routine Release [[id number]]    Hemoglobin A1c     Release to patient:   Routine Release [[id number]]    TSH Rfx On Abnormal To Free T4     Release to patient:   Routine Release [[id number]]    Microalbumin / Creatinine Urine Ratio - Urine, Clean Catch     Release to patient:   Routine Release  [[id number]]    CBC & Differential     Manual Differential:   No     Release to patient:   Routine Release [[id number]]       Return in about 3 months (around 6/11/2025).

## 2025-03-11 NOTE — ASSESSMENT & PLAN NOTE
Diabetes is stable.   Continue current treatment regimen.  Diabetes will be reassessed in 6 months    Orders:    Comprehensive Metabolic Panel    Hemoglobin A1c    TSH Rfx On Abnormal To Free T4    Microalbumin / Creatinine Urine Ratio - Urine, Clean Catch    Ambulatory Referral for Diabetic Eye Exam-Optometry

## 2025-03-12 LAB
ALBUMIN SERPL-MCNC: 4.3 G/DL (ref 3.5–5.2)
ALBUMIN/CREAT UR: 74 MG/G CREAT (ref 0–29)
ALBUMIN/GLOB SERPL: 1.3 G/DL
ALP SERPL-CCNC: 96 U/L (ref 39–117)
ALT SERPL-CCNC: 11 U/L (ref 1–33)
AST SERPL-CCNC: 14 U/L (ref 1–32)
BASOPHILS # BLD AUTO: ABNORMAL 10*3/UL
BASOPHILS # BLD MANUAL: 0.13 10*3/MM3 (ref 0–0.2)
BASOPHILS NFR BLD MANUAL: 2.1 % (ref 0–1.5)
BILIRUB SERPL-MCNC: 0.5 MG/DL (ref 0–1.2)
BUN SERPL-MCNC: 17 MG/DL (ref 8–23)
BUN/CREAT SERPL: 15 (ref 7–25)
CALCIUM SERPL-MCNC: 9.7 MG/DL (ref 8.6–10.5)
CHLORIDE SERPL-SCNC: 105 MMOL/L (ref 98–107)
CHOLEST SERPL-MCNC: 124 MG/DL (ref 0–200)
CO2 SERPL-SCNC: 25.4 MMOL/L (ref 22–29)
CREAT SERPL-MCNC: 1.13 MG/DL (ref 0.57–1)
CREAT UR-MCNC: 428.8 MG/DL
DIFFERENTIAL COMMENT: ABNORMAL
EGFRCR SERPLBLD CKD-EPI 2021: 49 ML/MIN/1.73
EOSINOPHIL # BLD AUTO: ABNORMAL 10*3/UL
EOSINOPHIL NFR BLD AUTO: ABNORMAL %
ERYTHROCYTE [DISTWIDTH] IN BLOOD BY AUTOMATED COUNT: 13.9 % (ref 12.3–15.4)
GLOBULIN SER CALC-MCNC: 3.4 GM/DL
GLUCOSE SERPL-MCNC: 137 MG/DL (ref 65–99)
HBA1C MFR BLD: 7.7 % (ref 4.8–5.6)
HCT VFR BLD AUTO: 42.5 % (ref 34–46.6)
HDLC SERPL-MCNC: 47 MG/DL (ref 40–60)
HGB BLD-MCNC: 13.1 G/DL (ref 12–15.9)
LDLC SERPL CALC-MCNC: 56 MG/DL (ref 0–100)
LYMPHOCYTES # BLD AUTO: ABNORMAL 10*3/UL
LYMPHOCYTES # BLD MANUAL: 2.8 10*3/MM3 (ref 0.7–3.1)
LYMPHOCYTES NFR BLD AUTO: ABNORMAL %
LYMPHOCYTES NFR BLD MANUAL: 43.2 % (ref 19.6–45.3)
MCH RBC QN AUTO: 27.8 PG (ref 26.6–33)
MCHC RBC AUTO-ENTMCNC: 30.8 G/DL (ref 31.5–35.7)
MCV RBC AUTO: 90.2 FL (ref 79–97)
MICROALBUMIN UR-MCNC: 316.8 UG/ML
MONOCYTES # BLD MANUAL: 0.76 10*3/MM3 (ref 0.1–0.9)
MONOCYTES NFR BLD AUTO: ABNORMAL %
MONOCYTES NFR BLD MANUAL: 12.6 % (ref 5–12)
NEUTROPHILS # BLD MANUAL: 2.35 10*3/MM3 (ref 1.7–7)
NEUTROPHILS NFR BLD AUTO: ABNORMAL %
NEUTROPHILS NFR BLD MANUAL: 38.9 % (ref 42.7–76)
PLATELET # BLD AUTO: 99 10*3/MM3 (ref 140–450)
PLATELET BLD QL SMEAR: ABNORMAL
POTASSIUM SERPL-SCNC: 4.5 MMOL/L (ref 3.5–5.2)
PROT SERPL-MCNC: 7.7 G/DL (ref 6–8.5)
RBC # BLD AUTO: 4.71 10*6/MM3 (ref 3.77–5.28)
RBC MORPH BLD: ABNORMAL
SODIUM SERPL-SCNC: 143 MMOL/L (ref 136–145)
TRIGL SERPL-MCNC: 116 MG/DL (ref 0–150)
TSH SERPL DL<=0.005 MIU/L-ACNC: 2.25 UIU/ML (ref 0.27–4.2)
VLDLC SERPL CALC-MCNC: 21 MG/DL (ref 5–40)
WBC # BLD AUTO: 6.04 10*3/MM3 (ref 3.4–10.8)

## 2025-03-16 PROBLEM — Z86.16 HISTORY OF 2019 NOVEL CORONAVIRUS DISEASE (COVID-19): Status: RESOLVED | Noted: 2020-11-16 | Resolved: 2025-03-16

## 2025-03-16 NOTE — ASSESSMENT & PLAN NOTE
Patient's (Body mass index is 33.02 kg/m².) indicates that they are obese (BMI >30) with health conditions that include diabetes mellitus, dyslipidemias, GERD, peripheral vascular disease, and osteoarthritis . Weight is improving with treatment. BMI  is above average; BMI management plan is completed. We discussed low calorie, low carb based diet program, portion control, increasing exercise, Weight Watchers or other Commercial based weight reduction program, management of depression/anxiety/stress to control compensatory eating, an gregorio-based approach such as HOLLR Pal or Lose It, and Information on healthy weight added to patient's after visit summary.

## 2025-03-31 DIAGNOSIS — R42 VERTIGO: ICD-10-CM

## 2025-03-31 RX ORDER — MECLIZINE HYDROCHLORIDE 25 MG/1
TABLET ORAL
Qty: 30 TABLET | Refills: 0 | Status: SHIPPED | OUTPATIENT
Start: 2025-03-31

## 2025-03-31 NOTE — TELEPHONE ENCOUNTER
"Caller: Annie Mejia \"Annie Holcomb\"    Relationship: Self    Best call back number: [phone]    Requested Prescriptions:   Requested Prescriptions     Pending Prescriptions Disp Refills    meclizine (ANTIVERT) 25 MG tablet 30 tablet 0     Sig: Take 1-2 tablets by mouth every 6 (six) to 8 (eight) hours as needed for dizziness.        Pharmacy where request should be sent: [facility], [address] [phone] [facility] [phone]      Last office visit with prescribing clinician: 3/11/2025   Last telemedicine visit with prescribing clinician: Visit date not found   Next office visit with prescribing clinician: 6/11/2025     Additional details provided by patient:     Does the patient have less than a 3 day supply:  [x] Yes  [] No    Would you like a call back once the refill request has been completed: [] Yes [x] No    If the office needs to give you a call back, can they leave a voicemail: [] Yes [x] No    Tony Billings Rep   03/31/25 13:25 EDT       "

## 2025-04-16 ENCOUNTER — OFFICE VISIT (OUTPATIENT)
Dept: FAMILY MEDICINE CLINIC | Facility: CLINIC | Age: 80
End: 2025-04-16
Payer: MEDICARE

## 2025-04-16 VITALS
WEIGHT: 205.4 LBS | SYSTOLIC BLOOD PRESSURE: 136 MMHG | DIASTOLIC BLOOD PRESSURE: 90 MMHG | HEIGHT: 66 IN | OXYGEN SATURATION: 97 % | BODY MASS INDEX: 33.01 KG/M2 | HEART RATE: 75 BPM

## 2025-04-16 DIAGNOSIS — R42 DIZZINESS: Primary | ICD-10-CM

## 2025-04-16 DIAGNOSIS — R11.0 NAUSEA: ICD-10-CM

## 2025-04-16 DIAGNOSIS — R10.84 GENERALIZED ABDOMINAL PAIN: ICD-10-CM

## 2025-04-16 DIAGNOSIS — R53.1 GENERAL WEAKNESS: ICD-10-CM

## 2025-04-16 DIAGNOSIS — R39.9 URINARY SYMPTOM OR SIGN: ICD-10-CM

## 2025-04-16 LAB
BILIRUB BLD-MCNC: NEGATIVE MG/DL
CLARITY, POC: CLEAR
COLOR UR: YELLOW
EXPIRATION DATE: 0
GLUCOSE UR STRIP-MCNC: NEGATIVE MG/DL
KETONES UR QL: ABNORMAL
LEUKOCYTE EST, POC: ABNORMAL
Lab: 0
NITRITE UR-MCNC: NEGATIVE MG/ML
PH UR: 6 [PH] (ref 5–8)
PROT UR STRIP-MCNC: ABNORMAL MG/DL
RBC # UR STRIP: NEGATIVE /UL
SP GR UR: 1.03 (ref 1–1.03)
UROBILINOGEN UR QL: NORMAL

## 2025-04-16 PROCEDURE — 1159F MED LIST DOCD IN RCRD: CPT | Performed by: FAMILY MEDICINE

## 2025-04-16 PROCEDURE — 3080F DIAST BP >= 90 MM HG: CPT | Performed by: FAMILY MEDICINE

## 2025-04-16 PROCEDURE — 1160F RVW MEDS BY RX/DR IN RCRD: CPT | Performed by: FAMILY MEDICINE

## 2025-04-16 PROCEDURE — 3075F SYST BP GE 130 - 139MM HG: CPT | Performed by: FAMILY MEDICINE

## 2025-04-16 PROCEDURE — 99214 OFFICE O/P EST MOD 30 MIN: CPT | Performed by: FAMILY MEDICINE

## 2025-04-16 PROCEDURE — 81003 URINALYSIS AUTO W/O SCOPE: CPT | Performed by: FAMILY MEDICINE

## 2025-04-16 PROCEDURE — 1125F AMNT PAIN NOTED PAIN PRSNT: CPT | Performed by: FAMILY MEDICINE

## 2025-04-16 NOTE — PROGRESS NOTES
Subjective   Annie Mejia is a 79 y.o. female.       Nerves and bruising  Symptoms are: recurrent.   Onset was 1 to 6 months.   Symptoms occur: daily.  Symptoms include: abdominal pain (chronic, intermittent, mild), fatigue, nausea, vertigo and weakness (generalized).   Pertinent negative symptoms include no joint pain, no change in stool, no chest pain, no chills, no congestion, no cough, no diaphoresis, no fever, no headaches, no joint swelling, no myalgias, no neck pain, no numbness, no rash, no sore throat, no swollen glands, no dysuria, no visual change and no vomiting.   Treatment and/or Medications comments include: None     Here with family. She c/o chronic but worsening anxiety, tremulousness, nausea, abd pain, dizziness. Worse x 2 weeks. Worse with increased activity. Bent over to pull a few weeds in yard, stood up, became dizzy, no fall. Some urinary frequency, change in urine odor.    The following portions of the patient's history were reviewed and updated as appropriate: allergies, current medications, past family history, past medical history, past social history, past surgical history, and problem list.    Review of Systems   Constitutional:  Positive for fatigue. Negative for chills, diaphoresis, fever and unexpected weight change.   HENT:  Negative for congestion, mouth sores, nosebleeds, sore throat and trouble swallowing.    Respiratory:  Positive for shortness of breath (when anxious). Negative for cough and wheezing.    Cardiovascular:  Positive for palpitations (when anxious). Negative for chest pain and leg swelling.   Gastrointestinal:  Positive for abdominal pain (chronic, intermittent, mild), constipation (intermittently), diarrhea (intermittently) and nausea. Negative for blood in stool and vomiting.   Genitourinary:  Positive for frequency and urgency. Negative for dysuria, flank pain and hematuria.   Musculoskeletal:  Positive for arthralgias, back pain, extremity weakness and gait  problem. Negative for joint pain, myalgias and neck pain.   Skin:  Negative for rash and wound.   Neurological:  Positive for dizziness, vertigo and weakness (generalized). Negative for syncope, numbness and headaches.   Hematological:  Negative for adenopathy. Bruises/bleeds easily.   Psychiatric/Behavioral:  Negative for confusion and dysphoric mood. The patient is nervous/anxious.    Pt's previous ROS reviewed and updated as indicated.       Objective   Vitals:    04/16/25 1450   BP: 136/90   Pulse: 75   SpO2: 97%     Body mass index is 33.15 kg/m².      04/16/25  1450   Weight: 93.2 kg (205 lb 6.4 oz)       Physical Exam  Vitals and nursing note reviewed.   Constitutional:       General: She is not in acute distress.     Appearance: She is well-groomed. She is obese. She is not ill-appearing.   HENT:      Head: Atraumatic.      Mouth/Throat:      Mouth: Mucous membranes are moist. No oral lesions.      Pharynx: Oropharynx is clear.   Eyes:      General: No scleral icterus.     Conjunctiva/sclera: Conjunctivae normal.   Neck:      Thyroid: No thyroid mass.   Cardiovascular:      Rate and Rhythm: Normal rate and regular rhythm.      Pulses: Normal pulses.      Heart sounds: Normal heart sounds.   Pulmonary:      Effort: Pulmonary effort is normal.      Breath sounds: Normal breath sounds.   Abdominal:      General: Bowel sounds are increased. There is no distension.      Palpations: Abdomen is soft. There is no mass.      Tenderness: There is no abdominal tenderness.   Musculoskeletal:      Right lower leg: No edema.      Left lower leg: No edema.   Lymphadenopathy:      Cervical: No cervical adenopathy.   Skin:     General: Skin is warm and dry.      Coloration: Skin is not jaundiced or pale.      Findings: No bruising or rash.   Neurological:      Mental Status: She is alert and oriented to person, place, and time. Mental status is at baseline.      Gait: Gait is intact.   Psychiatric:         Mood and Affect:  Affect normal. Mood is anxious.         Speech: Speech normal.         Behavior: Behavior normal. Behavior is cooperative.         Thought Content: Thought content normal.         Cognition and Memory: Cognition normal.     Pt's previous physical exam reviewed and updated as indicated.    Brief Urine Lab Results  (Last result in the past 365 days)        Color   Clarity   Blood   Leuk Est   Nitrite   Protein   CREAT   Urine HCG        04/16/25 1612 Yellow   Clear   Negative   Trace   Negative   1+                     Assessment & Plan   Diagnoses and all orders for this visit:    1. Dizziness (Primary)  -     CBC & Differential  -     Comprehensive Metabolic Panel    2. General weakness  -     CBC & Differential  -     Comprehensive Metabolic Panel    3. Nausea  -     CBC & Differential  -     Comprehensive Metabolic Panel    4. Urinary symptom or sign  -     CBC & Differential  -     POC Urinalysis Dipstick, Automated    5. Generalized abdominal pain  -     Sedimentation Rate  -     C-reactive protein  -     Lipase       Appears near baseline. Lab eval as above. Tx as indicated. Continue current medical mgtn for now.    F/u as scheduled, f/u sooner as needed/instructed.  I will contact patient regarding test results and provide instructions regarding any necessary changes in plan of care.  Patient was encouraged to keep me informed of any acute changes, lack of improvement, or any new concerning symptoms.  Pt is aware of reasons to seek emergent care.  Patient voiced understanding of all instructions and denied further questions.    Please note that portions of this note may have been completed with a voice recognition program.

## 2025-04-17 LAB
ALBUMIN SERPL-MCNC: 4.4 G/DL (ref 3.7–4.7)
ALP SERPL-CCNC: 95 IU/L (ref 44–121)
ALT SERPL-CCNC: 10 IU/L (ref 0–32)
AST SERPL-CCNC: 13 IU/L (ref 0–40)
BASOPHILS # BLD AUTO: 0 X10E3/UL (ref 0–0.2)
BASOPHILS NFR BLD AUTO: 0 %
BILIRUB SERPL-MCNC: 0.5 MG/DL (ref 0–1.2)
BUN SERPL-MCNC: 18 MG/DL (ref 8–27)
BUN/CREAT SERPL: 16 (ref 12–28)
CALCIUM SERPL-MCNC: 10.1 MG/DL (ref 8.7–10.3)
CHLORIDE SERPL-SCNC: 103 MMOL/L (ref 96–106)
CO2 SERPL-SCNC: 22 MMOL/L (ref 20–29)
CREAT SERPL-MCNC: 1.1 MG/DL (ref 0.57–1)
CRP SERPL-MCNC: <1 MG/L (ref 0–10)
EGFRCR SERPLBLD CKD-EPI 2021: 50 ML/MIN/1.73
EOSINOPHIL # BLD AUTO: 0 X10E3/UL (ref 0–0.4)
EOSINOPHIL NFR BLD AUTO: 0 %
ERYTHROCYTE [DISTWIDTH] IN BLOOD BY AUTOMATED COUNT: 14.5 % (ref 11.7–15.4)
ERYTHROCYTE [SEDIMENTATION RATE] IN BLOOD BY WESTERGREN METHOD: 25 MM/HR (ref 0–40)
GLOBULIN SER CALC-MCNC: 2.8 G/DL (ref 1.5–4.5)
GLUCOSE SERPL-MCNC: 124 MG/DL (ref 70–99)
HCT VFR BLD AUTO: 40.5 % (ref 34–46.6)
HGB BLD-MCNC: 12.6 G/DL (ref 11.1–15.9)
IMM GRANULOCYTES # BLD AUTO: 0.1 X10E3/UL (ref 0–0.1)
IMM GRANULOCYTES NFR BLD AUTO: 1 %
LIPASE SERPL-CCNC: 39 U/L (ref 14–85)
LYMPHOCYTES # BLD AUTO: 3.8 X10E3/UL (ref 0.7–3.1)
LYMPHOCYTES NFR BLD AUTO: 49 %
MCH RBC QN AUTO: 27.6 PG (ref 26.6–33)
MCHC RBC AUTO-ENTMCNC: 31.1 G/DL (ref 31.5–35.7)
MCV RBC AUTO: 89 FL (ref 79–97)
MONOCYTES # BLD AUTO: 0.7 X10E3/UL (ref 0.1–0.9)
MONOCYTES NFR BLD AUTO: 9 %
MORPHOLOGY BLD-IMP: ABNORMAL
NEUTROPHILS # BLD AUTO: 3.2 X10E3/UL (ref 1.4–7)
NEUTROPHILS NFR BLD AUTO: 41 %
PLATELET # BLD AUTO: 106 X10E3/UL (ref 150–450)
POTASSIUM SERPL-SCNC: 4.3 MMOL/L (ref 3.5–5.2)
PROT SERPL-MCNC: 7.2 G/DL (ref 6–8.5)
RBC # BLD AUTO: 4.57 X10E6/UL (ref 3.77–5.28)
SODIUM SERPL-SCNC: 142 MMOL/L (ref 134–144)
WBC # BLD AUTO: 7.8 X10E3/UL (ref 3.4–10.8)

## 2025-05-06 ENCOUNTER — LAB (OUTPATIENT)
Dept: LAB | Facility: HOSPITAL | Age: 80
End: 2025-05-06
Payer: MEDICARE

## 2025-05-06 ENCOUNTER — HOSPITAL ENCOUNTER (OUTPATIENT)
Dept: ULTRASOUND IMAGING | Facility: HOSPITAL | Age: 80
Discharge: HOME | End: 2025-05-06
Payer: MEDICARE

## 2025-05-06 DIAGNOSIS — K74.60 CIRRHOSIS OF LIVER WITHOUT ASCITES, UNSPECIFIED HEPATIC CIRRHOSIS TYPE: ICD-10-CM

## 2025-05-06 LAB
DEPRECATED RDW RBC AUTO: 44.5 FL (ref 37–54)
ERYTHROCYTE [DISTWIDTH] IN BLOOD BY AUTOMATED COUNT: 14.1 % (ref 12.3–15.4)
HCT VFR BLD AUTO: 37.4 % (ref 34–46.6)
HGB BLD-MCNC: 11.6 G/DL (ref 12–15.9)
INR PPP: 0.96 (ref 0.9–1.1)
MCH RBC QN AUTO: 27.4 PG (ref 26.6–33)
MCHC RBC AUTO-ENTMCNC: 31 G/DL (ref 31.5–35.7)
MCV RBC AUTO: 88.2 FL (ref 79–97)
PLATELET # BLD AUTO: 96 10*3/MM3 (ref 140–450)
PMV BLD AUTO: 12.6 FL (ref 6–12)
PROTHROMBIN TIME: 13.4 SECONDS (ref 12.3–15.1)
RBC # BLD AUTO: 4.24 10*6/MM3 (ref 3.77–5.28)
WBC NRBC COR # BLD AUTO: 5.94 10*3/MM3 (ref 3.4–10.8)

## 2025-05-06 PROCEDURE — 76700 US EXAM ABDOM COMPLETE: CPT

## 2025-05-06 PROCEDURE — 85027 COMPLETE CBC AUTOMATED: CPT

## 2025-05-06 PROCEDURE — 85610 PROTHROMBIN TIME: CPT

## 2025-05-22 ENCOUNTER — TELEPHONE (OUTPATIENT)
Dept: FAMILY MEDICINE CLINIC | Facility: CLINIC | Age: 80
End: 2025-05-22
Payer: MEDICARE

## 2025-05-22 ENCOUNTER — OFFICE VISIT (OUTPATIENT)
Dept: GASTROENTEROLOGY | Facility: CLINIC | Age: 80
End: 2025-05-22
Payer: MEDICARE

## 2025-05-22 ENCOUNTER — TELEPHONE (OUTPATIENT)
Dept: FAMILY MEDICINE CLINIC | Facility: CLINIC | Age: 80
End: 2025-05-22

## 2025-05-22 ENCOUNTER — OFFICE VISIT (OUTPATIENT)
Dept: FAMILY MEDICINE CLINIC | Facility: CLINIC | Age: 80
End: 2025-05-22
Payer: MEDICARE

## 2025-05-22 VITALS
WEIGHT: 208.8 LBS | HEART RATE: 142 BPM | OXYGEN SATURATION: 100 % | HEIGHT: 66 IN | SYSTOLIC BLOOD PRESSURE: 122 MMHG | RESPIRATION RATE: 16 BRPM | DIASTOLIC BLOOD PRESSURE: 80 MMHG | BODY MASS INDEX: 33.56 KG/M2

## 2025-05-22 VITALS
WEIGHT: 206.6 LBS | SYSTOLIC BLOOD PRESSURE: 135 MMHG | DIASTOLIC BLOOD PRESSURE: 78 MMHG | BODY MASS INDEX: 33.35 KG/M2 | HEART RATE: 87 BPM

## 2025-05-22 DIAGNOSIS — Z86.0100 HISTORY OF COLONIC POLYPS: ICD-10-CM

## 2025-05-22 DIAGNOSIS — K74.60 CIRRHOSIS OF LIVER WITHOUT ASCITES, UNSPECIFIED HEPATIC CIRRHOSIS TYPE: ICD-10-CM

## 2025-05-22 DIAGNOSIS — K74.69 OTHER CIRRHOSIS OF LIVER: ICD-10-CM

## 2025-05-22 DIAGNOSIS — E66.811 CLASS 1 OBESITY WITH SERIOUS COMORBIDITY AND BODY MASS INDEX (BMI) OF 33.0 TO 33.9 IN ADULT, UNSPECIFIED OBESITY TYPE: ICD-10-CM

## 2025-05-22 DIAGNOSIS — R93.5 ABNORMAL ABDOMINAL ULTRASOUND: ICD-10-CM

## 2025-05-22 DIAGNOSIS — R11.0 CHRONIC NAUSEA: ICD-10-CM

## 2025-05-22 DIAGNOSIS — R10.9 FLANK PAIN: ICD-10-CM

## 2025-05-22 DIAGNOSIS — R10.2 SUPRAPUBIC PRESSURE: ICD-10-CM

## 2025-05-22 DIAGNOSIS — K76.89 HEPATIC CYST: ICD-10-CM

## 2025-05-22 DIAGNOSIS — D64.9 ANEMIA, UNSPECIFIED TYPE: Primary | ICD-10-CM

## 2025-05-22 DIAGNOSIS — K57.90 DIVERTICULOSIS: ICD-10-CM

## 2025-05-22 DIAGNOSIS — D69.6 THROMBOCYTOPENIA: ICD-10-CM

## 2025-05-22 DIAGNOSIS — R30.0 DYSURIA: ICD-10-CM

## 2025-05-22 DIAGNOSIS — R39.9 URINARY TRACT INFECTION SYMPTOMS: Primary | ICD-10-CM

## 2025-05-22 DIAGNOSIS — Z87.19 HISTORY OF DIVERTICULITIS: ICD-10-CM

## 2025-05-22 PROCEDURE — 99213 OFFICE O/P EST LOW 20 MIN: CPT | Performed by: NURSE PRACTITIONER

## 2025-05-22 PROCEDURE — 1125F AMNT PAIN NOTED PAIN PRSNT: CPT | Performed by: NURSE PRACTITIONER

## 2025-05-22 PROCEDURE — 3074F SYST BP LT 130 MM HG: CPT | Performed by: NURSE PRACTITIONER

## 2025-05-22 PROCEDURE — 1159F MED LIST DOCD IN RCRD: CPT | Performed by: NURSE PRACTITIONER

## 2025-05-22 PROCEDURE — 1160F RVW MEDS BY RX/DR IN RCRD: CPT | Performed by: NURSE PRACTITIONER

## 2025-05-22 PROCEDURE — 3079F DIAST BP 80-89 MM HG: CPT | Performed by: NURSE PRACTITIONER

## 2025-05-22 RX ORDER — NITROFURANTOIN 25; 75 MG/1; MG/1
100 CAPSULE ORAL 2 TIMES DAILY
Qty: 14 CAPSULE | Refills: 0 | Status: SHIPPED | OUTPATIENT
Start: 2025-05-22 | End: 2025-05-29

## 2025-05-22 RX ORDER — NITROFURANTOIN 25; 75 MG/1; MG/1
100 CAPSULE ORAL 2 TIMES DAILY
Qty: 14 CAPSULE | Refills: 0 | Status: SHIPPED | OUTPATIENT
Start: 2025-05-22 | End: 2025-05-22
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
## 2025-05-22 NOTE — PROGRESS NOTES
"                      Established Patient        Chief Complaint:   Chief Complaint   Patient presents with    Urinary Tract Infection     Pt stated that she hurts up inside after she urinates        History of Present Illness  The patient presents with UTI symptoms.    She reports dysuria with post-micturition burning and pressure since 05/21/2025. Increased water intake has not alleviated her pain. She also has persistent back pain and a sensation of organ descent. No increased urinary frequency, fever, or vomiting. Several episodes of gagging today. Previous similar symptoms had a positive culture after initial negative tests.    Scheduled for blood work in 2 weeks and liver test in 11/2025. Informed of slight increase in liver cyst size. Referral to hematologist if blood work is unsatisfactory.      Subjective     The following portions of the patient's history were reviewed and updated as appropriate: allergies, current medications, past family history, past medical history, past social history, past surgical history and problem list.    ALLERGIES  Allergies   Allergen Reactions    Oxycodone Shortness Of Breath    Azithromycin Nausea And Vomiting    Erythrityl Tetranitrate Nausea And Vomiting    Morphine Itching       Review of Systems  Gen- No fevers, chills  HEENT--No runny nose, congestion, sore throat, ear pain  CV- No chest pain, palpitations  Resp- No cough, dyspnea  GI- No N/V/D, abd pain  -No dysuria, flank pain, difficulty urinating  Musc-No tenderness, swelling, decreased ROM  Neuro-No dizziness, headaches  Psych-No SI/HI, sleep disturbance    Objective     Vital Signs:   /80   Pulse (!) 142   Resp 16   Ht 167.6 cm (66\")   Wt 94.7 kg (208 lb 12.8 oz)   SpO2 100%   BMI 33.70 kg/m²                Physical Exam   Physical Exam  Vitals and nursing note reviewed.   HENT:      Mouth/Throat:      Lips: Pink.   Eyes:      General: Lids are normal.   Cardiovascular:      Rate and Rhythm: Normal " rate.   Pulmonary:      Effort: Pulmonary effort is normal.   Abdominal:      General: Bowel sounds are normal.      Palpations: Abdomen is soft.      Tenderness: There is abdominal tenderness in the suprapubic area. There is right CVA tenderness and left CVA tenderness.   Genitourinary:     Comments: deferred  Neurological:      Mental Status: She is alert and oriented to person, place, and time.      Gait: Gait is intact.   Psychiatric:         Attention and Perception: Attention normal.         Mood and Affect: Mood and affect normal.         Speech: Speech normal.         Behavior: Behavior normal. Behavior is cooperative.         Assessment and Plan      Assessment/Plan:   Diagnoses and all orders for this visit:    1. Urinary tract infection symptoms (Primary)  -     Urine Culture - Urine, Urine, Clean Catch  -     Discontinue: nitrofurantoin, macrocrystal-monohydrate, (Macrobid) 100 MG capsule; Take 1 capsule by mouth 2 (Two) Times a Day for 7 days.  Dispense: 14 capsule; Refill: 0  -     nitrofurantoin, macrocrystal-monohydrate, (Macrobid) 100 MG capsule; Take 1 capsule by mouth 2 (Two) Times a Day for 7 days.  Dispense: 14 capsule; Refill: 0    2. Flank pain  -     Urine Culture - Urine, Urine, Clean Catch  -     Discontinue: nitrofurantoin, macrocrystal-monohydrate, (Macrobid) 100 MG capsule; Take 1 capsule by mouth 2 (Two) Times a Day for 7 days.  Dispense: 14 capsule; Refill: 0  -     nitrofurantoin, macrocrystal-monohydrate, (Macrobid) 100 MG capsule; Take 1 capsule by mouth 2 (Two) Times a Day for 7 days.  Dispense: 14 capsule; Refill: 0    3. Suprapubic pressure  -     Urine Culture - Urine, Urine, Clean Catch  -     Discontinue: nitrofurantoin, macrocrystal-monohydrate, (Macrobid) 100 MG capsule; Take 1 capsule by mouth 2 (Two) Times a Day for 7 days.  Dispense: 14 capsule; Refill: 0  -     nitrofurantoin, macrocrystal-monohydrate, (Macrobid) 100 MG capsule; Take 1 capsule by mouth 2 (Two) Times a  Day for 7 days.  Dispense: 14 capsule; Refill: 0    4. Dysuria  -     Urine Culture - Urine, Urine, Clean Catch  -     Discontinue: nitrofurantoin, macrocrystal-monohydrate, (Macrobid) 100 MG capsule; Take 1 capsule by mouth 2 (Two) Times a Day for 7 days.  Dispense: 14 capsule; Refill: 0  -     nitrofurantoin, macrocrystal-monohydrate, (Macrobid) 100 MG capsule; Take 1 capsule by mouth 2 (Two) Times a Day for 7 days.  Dispense: 14 capsule; Refill: 0    Risks, benefits, and potential side effects of current/new medications reviewed with patient.  Patient voiced understanding and wished to proceed with treatment.    I will contact patient regarding test results and provide instructions regarding any necessary changes in plan of care.    Patient was encouraged to keep me informed of any acute changes, lack of improvement, or any new concerning symptoms.      Assessment & Plan  UTI  - Symptoms: dysuria, pressure, post-void burning since 05/21/2025  - Exam: no new back pain, noted flank pain, lower abdominal pressure  - Send urine culture for confirmation  - Initiate antibiotic regimen today; prescription sent to pharmacy  - Discontinue antibiotics if culture negative    Discussion Summary:  Discussed plan of care in detail with pt today; pt verb understanding and agrees.    I have reviewed and updated all copied forward information, as appropriate.  I attest to the accuracy and relevance of any unchanged information.      Follow up:  No follow-ups on file.     Patient Education:  There are no Patient Instructions on file for this visit.    Patient or patient representative verbalized consent for the use of Ambient Listening during the visit with  ALEXIA Marinelli for chart documentation. 6/9/2025  08:59 EDT    ALEXIA Marinelli  06/09/25  08:59 EDT          Please note that portions of this note may have been completed with a voice recognition program.

## 2025-05-22 NOTE — TELEPHONE ENCOUNTER
"        Hub staff attempted to follow warm transfer process and was unsuccessful     Caller: Annie Mejia \"Annie Holcomb\"    Relationship to patient: Self    Best call back number: [phone]     Patient is needing: PATIENT NEEDS A SAME DAY FOR UTI SYMPTOMS.        "

## 2025-05-22 NOTE — PATIENT INSTRUCTIONS
Take omeprazole 20 mg 30 to 60 minutes prior to last meal of the day and famotidine as needed for nausea

## 2025-05-22 NOTE — TELEPHONE ENCOUNTER
"      Hub staff attempted to follow warm transfer process and was unsuccessful     Caller: Annie Mejia \"Annie Holcomb\"    Relationship to patient: Self    Best call back number: [phone]     Patient is needing: PATIENT NEEDS A SAME DAY APPOINTMENT FOR UTI SYMPTOMS.           "

## 2025-05-28 ENCOUNTER — TELEPHONE (OUTPATIENT)
Dept: FAMILY MEDICINE CLINIC | Facility: CLINIC | Age: 80
End: 2025-05-28

## 2025-05-28 DIAGNOSIS — N30.00 ACUTE CYSTITIS WITHOUT HEMATURIA: Primary | ICD-10-CM

## 2025-05-28 LAB
BACTERIA UR CULT: ABNORMAL
BACTERIA UR CULT: ABNORMAL
OTHER ANTIBIOTIC SUSC ISLT: ABNORMAL

## 2025-05-28 NOTE — TELEPHONE ENCOUNTER
"Caller: Annie Mejia \"Annie Holcomb\"    Relationship: Self    Best call back number: [phone]    Which medication are you concerned about: nitrofurantoin, macrocrystal-monohydrate, (Macrobid) 100 MG capsule     Who prescribed you this medication: Tika Rivera APRN     What are your concerns: PATIENT ONLY TOOK THIS MEDICATION FOR 2 DAYS AND IT MADE HER SICK AND HAVE DIARRHEA        "

## 2025-05-28 NOTE — TELEPHONE ENCOUNTER
"Caller: Annie Mejia \"Aliza\"    Relationship: Self    Best call back number: [phone]    Caller requesting test results: SELF    What test was performed: URINE CULTURE     When was the test performed: 05/23/25    Additional notes: PATIENT IS WANTING TO GO OVER THE TEST RESULTS, SCARED TO GET A UTI WITHOUT IT BEING TREATED DUE TO FAMILY MEMBERS DYING FROM ONE      "

## 2025-05-29 RX ORDER — SULFAMETHOXAZOLE AND TRIMETHOPRIM 800; 160 MG/1; MG/1
1 TABLET ORAL 2 TIMES DAILY
Qty: 10 TABLET | Refills: 0 | Status: SHIPPED | OUTPATIENT
Start: 2025-05-29 | End: 2025-06-03 | Stop reason: SDUPTHER

## 2025-05-29 NOTE — TELEPHONE ENCOUNTER
"  Caller: Jackie Aliza \"Aliza\"    Relationship: Self    Best call back number: [phone]     What is the best time to reach you: today    Who are you requesting to speak with (clinical staff, provider,  specific staff member): pcp/ma    Do you know the name of the person who called: iron dooley    What was the call regarding: 3RD CALL patient stated the culture came back abnormal and the medication that was prescribed when she was in office on 5/22/25 was nitrofurantoin, macrocrystal-monohydrate, (Macrobid) 100 MG capsule . Patient stated she can't take it . It makes her sick. She wants to see if something else will be called in. Please call patient to advise     Is it okay if the provider responds through MyChart: callback today      "

## 2025-06-03 ENCOUNTER — TELEPHONE (OUTPATIENT)
Dept: FAMILY MEDICINE CLINIC | Facility: CLINIC | Age: 80
End: 2025-06-03
Payer: MEDICARE

## 2025-06-03 DIAGNOSIS — N30.00 ACUTE CYSTITIS WITHOUT HEMATURIA: ICD-10-CM

## 2025-06-03 NOTE — TELEPHONE ENCOUNTER
"Caller: Annie Mejia \"Annie Holcomb\"    Relationship: Self    Best call back number: [phone]    Requested Prescriptions:   Requested Prescriptions     Pending Prescriptions Disp Refills    sulfamethoxazole-trimethoprim (Bactrim DS) 800-160 MG per tablet 10 tablet 0     Sig: Take 1 tablet by mouth 2 (Two) Times a Day for 5 days.        Pharmacy where request should be sent:  [address] [phone] [facility] [phone]      Last office visit with prescribing clinician: 4/16/2025   Last telemedicine visit with prescribing clinician: Visit date not found   Next office visit with prescribing clinician: 6/11/2025     Additional details provided by patient: PATIENT STATES THAT SHE COMPLETED THE 5 DAYS SHE IS STILL HAVING SYMPTOMS AND IS REQUESTING A REFILL    Does the patient have less than a 3 day supply:  [] Yes  [] No    Would you like a call back once the refill request has been completed: [] Yes [x] No    If the office needs to give you a call back, can they leave a voicemail: [] Yes [x] No    Tony Billings Rep   06/03/25 15:37 EDT         "

## 2025-06-03 NOTE — TELEPHONE ENCOUNTER
Rx Refill Note  Requested Prescriptions     Pending Prescriptions Disp Refills    sulfamethoxazole-trimethoprim (Bactrim DS) 800-160 MG per tablet 10 tablet 0     Sig: Take 1 tablet by mouth 2 (Two) Times a Day for 5 days.       PATIENT STATES THAT SHE COMPLETED THE 5 DAYS SHE IS STILL HAVING SYMPTOMS AND IS REQUESTING A REFILL     Sophie Marks MA  06/03/25, 16:10 EDT

## 2025-06-04 RX ORDER — FLUCONAZOLE 150 MG/1
TABLET ORAL
Qty: 2 TABLET | Refills: 0 | Status: SHIPPED | OUTPATIENT
Start: 2025-06-04

## 2025-06-04 RX ORDER — SULFAMETHOXAZOLE AND TRIMETHOPRIM 800; 160 MG/1; MG/1
1 TABLET ORAL 2 TIMES DAILY
Qty: 10 TABLET | Refills: 0 | Status: SHIPPED | OUTPATIENT
Start: 2025-06-04 | End: 2025-06-09

## 2025-06-06 ENCOUNTER — LAB (OUTPATIENT)
Dept: LAB | Facility: HOSPITAL | Age: 80
End: 2025-06-06
Payer: MEDICARE

## 2025-06-06 DIAGNOSIS — D64.9 ANEMIA, UNSPECIFIED TYPE: ICD-10-CM

## 2025-06-06 DIAGNOSIS — K74.60 CIRRHOSIS OF LIVER WITHOUT ASCITES, UNSPECIFIED HEPATIC CIRRHOSIS TYPE: ICD-10-CM

## 2025-06-06 DIAGNOSIS — K74.69 OTHER CIRRHOSIS OF LIVER: ICD-10-CM

## 2025-06-06 LAB
25(OH)D3 SERPL-MCNC: 58.1 NG/ML (ref 30–100)
ALBUMIN SERPL-MCNC: 4.6 G/DL (ref 3.5–5.2)
ALBUMIN/GLOB SERPL: 1.3 G/DL
ALP SERPL-CCNC: 91 U/L (ref 39–117)
ALPHA-FETOPROTEIN: 5.93 NG/ML (ref 0–8.3)
ALT SERPL W P-5'-P-CCNC: 13 U/L (ref 1–33)
ANION GAP SERPL CALCULATED.3IONS-SCNC: 15 MMOL/L (ref 5–15)
AST SERPL-CCNC: 17 U/L (ref 1–32)
BILIRUB SERPL-MCNC: 0.5 MG/DL (ref 0–1.2)
BUN SERPL-MCNC: 16 MG/DL (ref 8–23)
BUN/CREAT SERPL: 10.2 (ref 7–25)
CALCIUM SPEC-SCNC: 10.3 MG/DL (ref 8.6–10.5)
CHLORIDE SERPL-SCNC: 102 MMOL/L (ref 98–107)
CO2 SERPL-SCNC: 23 MMOL/L (ref 22–29)
CREAT SERPL-MCNC: 1.57 MG/DL (ref 0.57–1)
DEPRECATED RDW RBC AUTO: 44.3 FL (ref 37–54)
EGFRCR SERPLBLD CKD-EPI 2021: 33 ML/MIN/1.73
ERYTHROCYTE [DISTWIDTH] IN BLOOD BY AUTOMATED COUNT: 14 % (ref 12.3–15.4)
FERRITIN SERPL-MCNC: 29.8 NG/ML (ref 13–150)
FOLATE SERPL-MCNC: 9.23 NG/ML (ref 4.78–24.2)
GLOBULIN UR ELPH-MCNC: 3.6 GM/DL
GLUCOSE SERPL-MCNC: 133 MG/DL (ref 65–99)
HCT VFR BLD AUTO: 40.2 % (ref 34–46.6)
HGB BLD-MCNC: 12.7 G/DL (ref 12–15.9)
INR PPP: 1.05 (ref 0.9–1.1)
IRON 24H UR-MRATE: 99 MCG/DL (ref 37–145)
IRON SATN MFR SERPL: 21 % (ref 20–50)
MCH RBC QN AUTO: 27.7 PG (ref 26.6–33)
MCHC RBC AUTO-ENTMCNC: 31.6 G/DL (ref 31.5–35.7)
MCV RBC AUTO: 87.6 FL (ref 79–97)
PLATELET # BLD AUTO: 100 10*3/MM3 (ref 140–450)
PMV BLD AUTO: 12.8 FL (ref 6–12)
POTASSIUM SERPL-SCNC: 4.3 MMOL/L (ref 3.5–5.2)
PROT SERPL-MCNC: 8.2 G/DL (ref 6–8.5)
PROTHROMBIN TIME: 14.4 SECONDS (ref 12.3–15.1)
RBC # BLD AUTO: 4.59 10*6/MM3 (ref 3.77–5.28)
SODIUM SERPL-SCNC: 140 MMOL/L (ref 136–145)
TIBC SERPL-MCNC: 475 MCG/DL (ref 298–536)
TRANSFERRIN SERPL-MCNC: 319 MG/DL (ref 200–360)
VIT B12 BLD-MCNC: >2000 PG/ML (ref 211–946)
WBC NRBC COR # BLD AUTO: 5.05 10*3/MM3 (ref 3.4–10.8)

## 2025-06-06 PROCEDURE — 36415 COLL VENOUS BLD VENIPUNCTURE: CPT

## 2025-06-06 PROCEDURE — 80053 COMPREHEN METABOLIC PANEL: CPT

## 2025-06-06 PROCEDURE — 85027 COMPLETE CBC AUTOMATED: CPT

## 2025-06-06 PROCEDURE — 85610 PROTHROMBIN TIME: CPT

## 2025-06-06 PROCEDURE — 82105 ALPHA-FETOPROTEIN SERUM: CPT

## 2025-06-06 PROCEDURE — 83540 ASSAY OF IRON: CPT

## 2025-06-06 PROCEDURE — 84466 ASSAY OF TRANSFERRIN: CPT

## 2025-06-06 PROCEDURE — 82306 VITAMIN D 25 HYDROXY: CPT

## 2025-06-06 PROCEDURE — 82746 ASSAY OF FOLIC ACID SERUM: CPT

## 2025-06-06 PROCEDURE — 82728 ASSAY OF FERRITIN: CPT

## 2025-06-06 PROCEDURE — 82607 VITAMIN B-12: CPT

## 2025-06-11 ENCOUNTER — OFFICE VISIT (OUTPATIENT)
Dept: FAMILY MEDICINE CLINIC | Facility: CLINIC | Age: 80
End: 2025-06-11
Payer: MEDICARE

## 2025-06-11 VITALS
HEIGHT: 66 IN | OXYGEN SATURATION: 98 % | DIASTOLIC BLOOD PRESSURE: 86 MMHG | SYSTOLIC BLOOD PRESSURE: 134 MMHG | BODY MASS INDEX: 32.53 KG/M2 | WEIGHT: 202.4 LBS | HEART RATE: 81 BPM

## 2025-06-11 DIAGNOSIS — N39.0 RECURRENT URINARY TRACT INFECTION: ICD-10-CM

## 2025-06-11 DIAGNOSIS — K74.60 CIRRHOSIS OF LIVER WITHOUT ASCITES, UNSPECIFIED HEPATIC CIRRHOSIS TYPE: ICD-10-CM

## 2025-06-11 DIAGNOSIS — Z79.4 CONTROLLED TYPE 2 DIABETES MELLITUS WITH MICROALBUMINURIA, WITH LONG-TERM CURRENT USE OF INSULIN: Primary | ICD-10-CM

## 2025-06-11 DIAGNOSIS — E11.51 TYPE 2 DIABETES MELLITUS WITH DIABETIC PERIPHERAL ANGIOPATHY WITHOUT GANGRENE, WITH LONG-TERM CURRENT USE OF INSULIN: ICD-10-CM

## 2025-06-11 DIAGNOSIS — I10 ESSENTIAL HYPERTENSION: ICD-10-CM

## 2025-06-11 DIAGNOSIS — K21.00 GASTROESOPHAGEAL REFLUX DISEASE WITH ESOPHAGITIS WITHOUT HEMORRHAGE: ICD-10-CM

## 2025-06-11 DIAGNOSIS — N18.31 STAGE 3A CHRONIC KIDNEY DISEASE: ICD-10-CM

## 2025-06-11 DIAGNOSIS — E11.29 CONTROLLED TYPE 2 DIABETES MELLITUS WITH MICROALBUMINURIA, WITH LONG-TERM CURRENT USE OF INSULIN: Primary | ICD-10-CM

## 2025-06-11 DIAGNOSIS — R80.9 CONTROLLED TYPE 2 DIABETES MELLITUS WITH MICROALBUMINURIA, WITH LONG-TERM CURRENT USE OF INSULIN: Primary | ICD-10-CM

## 2025-06-11 DIAGNOSIS — I73.9 PERIPHERAL VASCULAR DISEASE: ICD-10-CM

## 2025-06-11 DIAGNOSIS — R39.9 URINARY SYMPTOM OR SIGN: ICD-10-CM

## 2025-06-11 DIAGNOSIS — R80.9 ALBUMINURIA: ICD-10-CM

## 2025-06-11 DIAGNOSIS — G89.4 CHRONIC PAIN SYNDROME: ICD-10-CM

## 2025-06-11 DIAGNOSIS — F41.8 MIXED ANXIETY DEPRESSIVE DISORDER: ICD-10-CM

## 2025-06-11 DIAGNOSIS — G31.84 MILD COGNITIVE IMPAIRMENT: ICD-10-CM

## 2025-06-11 DIAGNOSIS — Z79.4 TYPE 2 DIABETES MELLITUS WITH DIABETIC PERIPHERAL ANGIOPATHY WITHOUT GANGRENE, WITH LONG-TERM CURRENT USE OF INSULIN: ICD-10-CM

## 2025-06-11 DIAGNOSIS — E78.2 MIXED HYPERLIPIDEMIA: ICD-10-CM

## 2025-06-11 DIAGNOSIS — N18.9 CHRONIC KIDNEY DISEASE, UNSPECIFIED CKD STAGE: ICD-10-CM

## 2025-06-11 PROBLEM — R03.0 ELEVATED BLOOD PRESSURE READING: Status: RESOLVED | Noted: 2024-11-22 | Resolved: 2025-06-11

## 2025-06-11 LAB
EXPIRATION DATE: ABNORMAL
HBA1C MFR BLD: 6.8 % (ref 4.5–5.7)
Lab: ABNORMAL

## 2025-06-11 PROCEDURE — 1160F RVW MEDS BY RX/DR IN RCRD: CPT | Performed by: FAMILY MEDICINE

## 2025-06-11 PROCEDURE — 83036 HEMOGLOBIN GLYCOSYLATED A1C: CPT | Performed by: FAMILY MEDICINE

## 2025-06-11 PROCEDURE — 1159F MED LIST DOCD IN RCRD: CPT | Performed by: FAMILY MEDICINE

## 2025-06-11 PROCEDURE — 3079F DIAST BP 80-89 MM HG: CPT | Performed by: FAMILY MEDICINE

## 2025-06-11 PROCEDURE — 99214 OFFICE O/P EST MOD 30 MIN: CPT | Performed by: FAMILY MEDICINE

## 2025-06-11 PROCEDURE — 1125F AMNT PAIN NOTED PAIN PRSNT: CPT | Performed by: FAMILY MEDICINE

## 2025-06-11 PROCEDURE — 3044F HG A1C LEVEL LT 7.0%: CPT | Performed by: FAMILY MEDICINE

## 2025-06-11 PROCEDURE — 3075F SYST BP GE 130 - 139MM HG: CPT | Performed by: FAMILY MEDICINE

## 2025-06-11 RX ORDER — HYDROCODONE BITARTRATE AND ACETAMINOPHEN 7.5; 325 MG/1; MG/1
1 TABLET ORAL EVERY 8 HOURS PRN
Qty: 21 TABLET | Refills: 0 | Status: CANCELLED | OUTPATIENT
Start: 2025-06-11

## 2025-06-11 RX ORDER — HYDROCODONE BITARTRATE AND ACETAMINOPHEN 7.5; 325 MG/1; MG/1
1 TABLET ORAL EVERY 8 HOURS PRN
Qty: 21 TABLET | Refills: 0 | Status: SHIPPED | OUTPATIENT
Start: 2025-06-11

## 2025-06-11 NOTE — PROGRESS NOTES
Subjective   Annie Mejia is a 79 y.o. female.     History of Present Illness  Here for f/u on several chronic med problems and routine refills. Chronic problems include:  IDDM- currently on ASA, statin, ace-inh, long acting insulin  CKD- avoiding NSAIDs, recent declined in renal function  PAD- currently on ASA, statin,   HTN- taking lisinopril, amlodipine  HLP- on statin. Tolerating well  Cirrhosis of liver- f/u with GI as scheduled  GERD- on low dose PPI, pepcid prn. Symptoms currently stable  Depression with anxiety- mood stable.   Mild cognitive impairment- on aricept  Osteoporosis- declined oral bisphosphonate    Worried about recurrent UTI. Chronic symptoms. No fever, no hematuria.    The following portions of the patient's history were reviewed and updated as appropriate: allergies, current medications, past family history, past medical history, past social history, past surgical history, and problem list.    Review of Systems   Constitutional:  Positive for fatigue. Negative for chills, diaphoresis, fever and unexpected weight change.   HENT:  Negative for congestion, mouth sores, nosebleeds, sore throat and trouble swallowing.    Respiratory:  Positive for shortness of breath (when anxious). Negative for cough and wheezing.    Cardiovascular:  Positive for palpitations (when anxious). Negative for chest pain and leg swelling.   Gastrointestinal:  Positive for abdominal pain (chronic, intermittent, mild), constipation (intermittently), diarrhea (intermittently) and nausea. Negative for blood in stool and vomiting.   Genitourinary:  Positive for frequency and urgency. Negative for dysuria, flank pain and hematuria.   Musculoskeletal:  Positive for arthralgias, back pain and gait problem. Negative for myalgias and neck pain.   Skin:  Negative for rash and wound.   Neurological:  Positive for dizziness and weakness (generalized). Negative for syncope, numbness and headaches.   Hematological:  Negative for  adenopathy. Bruises/bleeds easily.   Psychiatric/Behavioral:  Negative for confusion and dysphoric mood. The patient is nervous/anxious.    Pt's previous ROS reviewed and updated as indicated.       Objective   Vitals:    06/11/25 0925   BP: 134/86   Pulse: 81   SpO2: 98%     Body mass index is 32.67 kg/m².      06/11/25  0925   Weight: 91.8 kg (202 lb 6.4 oz)       Physical Exam  Vitals and nursing note reviewed.   Constitutional:       General: She is not in acute distress.     Appearance: She is well-groomed and overweight. She is not ill-appearing.   HENT:      Head: Atraumatic.      Mouth/Throat:      Mouth: Mucous membranes are moist.   Eyes:      General: No scleral icterus.     Conjunctiva/sclera: Conjunctivae normal.   Neck:      Thyroid: No thyroid mass.   Cardiovascular:      Rate and Rhythm: Normal rate and regular rhythm.      Pulses: Normal pulses.      Heart sounds: Normal heart sounds.   Pulmonary:      Effort: Pulmonary effort is normal.      Breath sounds: Normal breath sounds.   Abdominal:      General: Bowel sounds are increased. There is no distension.      Palpations: Abdomen is soft. There is no mass.      Tenderness: There is no abdominal tenderness.   Musculoskeletal:      Right lower leg: No edema.      Left lower leg: No edema.   Lymphadenopathy:      Cervical: No cervical adenopathy.   Skin:     General: Skin is warm and dry.      Coloration: Skin is not jaundiced or pale.      Findings: No bruising or rash.   Neurological:      Mental Status: She is alert and oriented to person, place, and time. Mental status is at baseline.      Gait: Gait is intact.   Psychiatric:         Mood and Affect: Affect normal. Mood is anxious.         Speech: Speech normal.         Behavior: Behavior normal. Behavior is cooperative.         Thought Content: Thought content normal.         Cognition and Memory: Cognition normal.     Pt's previous physical exam reviewed and updated as indicated.    Lab Results    Component Value Date    HGBA1C 6.8 (A) 06/11/2025    HGBA1C 7.70 (H) 03/11/2025    HGBA1C 7.10 (H) 11/20/2024     Lab Results   Component Value Date    MICROALBUR 503.8 06/11/2025    CREATININE 1.72 (H) 06/11/2025     Lab Results   Component Value Date    WBC 5.05 06/06/2025    HGB 12.7 06/06/2025    HCT 40.2 06/06/2025    MCV 87.6 06/06/2025     (L) 06/06/2025       Lab Results   Component Value Date    CHOL 151 01/18/2021    CHLPL 124 03/11/2025    TRIG 116 03/11/2025    HDL 47 03/11/2025    LDL 56 03/11/2025       Lab Results   Component Value Date    HGBA1C 6.8 (A) 06/11/2025       Lab Results   Component Value Date    TSH 2.250 03/11/2025     US Abdomen Complete  Result Date: 5/6/2025  Cystic lesions in the liver. Some of these are anechoic cysts which have slightly enlarged, consider 1 year follow-up exam. Larger septated lesions have not significantly changed.      Images were reviewed, interpreted, and dictated by Dr. Rhona Ferreira MD Transcribed by Odilia Pan PA-C.  This report was signed and finalized on 5/6/2025 11:11 AM by Rhona Ferreira MD.        Assessment & Plan   Diagnoses and all orders for this visit:    1. Controlled type 2 diabetes mellitus with microalbuminuria, with long-term current use of insulin (Primary)  -     POC Glycosylated Hemoglobin (Hb A1C)    2. Chronic pain syndrome  -     HYDROcodone-acetaminophen (Norco) 7.5-325 MG per tablet; Take 1 tablet by mouth Every 8 (Eight) Hours As Needed for Severe Pain.  Dispense: 21 tablet; Refill: 0    3. Chronic kidney disease, unspecified CKD stage  -     Microalbumin / Creatinine Urine Ratio - Urine, Clean Catch  -     Renal Function Panel    4. Albuminuria  -     Microalbumin / Creatinine Urine Ratio - Urine, Clean Catch  -     Renal Function Panel    5. Urinary symptom or sign  -     Urine Culture - Urine, Urine, Clean Catch    6. Essential hypertension  -     lisinopril (PRINIVIL,ZESTRIL) 20 MG tablet; Take 1 tablet by mouth Daily.   Dispense: 90 tablet; Refill: 3    7. Mixed hyperlipidemia    8. Peripheral vascular disease    9. Type 2 diabetes mellitus with diabetic peripheral angiopathy without gangrene, with long-term current use of insulin    10. Cirrhosis of liver without ascites, unspecified hepatic cirrhosis type    11. Gastroesophageal reflux disease with esophagitis without hemorrhage    12. Recurrent urinary tract infection    13. Stage 3a chronic kidney disease    14. Mixed anxiety depressive disorder  -     ALPRAZolam (XANAX) 0.25 MG tablet; TAKE 1 TO 2 TABLETS BY MOUTH ONCE DAILY AS NEEDED  Dispense: 60 tablet; Refill: 2    15. Mild cognitive impairment       IDDM controlled. Continue ASA, statin, ace-inh, insulin. Patient encouraged to take meds as rx'd, follow diabetic appropriate diet, exercise daily, perform feet check daily, and have yearly diabetic eye exams.  HTN - lisinopril  HLP - statin  PAD - statin, ASA  Anxiety/depression- stable, continue propranolol, alprazolam.   Urine culture pending, tx with abx as needed.  Continue aricept.  CKD - f/u with nephrology as scheduled  F/u with GI as scheduled. Continue PPI, H2 blocker.  Multifactorial chronic pain syndrome - generally stable. F/u with ortho as scheduled. Continue lortab, tylenol. Good clinical benefit with judicious use. No aberrant behavior.  As part of patient's treatment plan I am prescribing a controlled substance.  The patient has been made aware of the appropriate use of such medications, including potential risk of somnolence, limited ability to drive and/or work safely, and potential for dependence and/or overdose.  It has also been made clear that these medications are for use by this patient only, without concomitant use of alcohol or other substances, unless prescribed.  History and physical exam exhibit continued safe and appropriate use of controlled substance.  LILIAM reviewed.  Patient has completed a prescribing agreement detailing terms of continued  prescribing of controlled substances, including monitoring LILIAM reports, urine drug screening, and pill counts if necessary.  Patient is aware that inappropriate use will result in cessation of prescribing such medications.    Routine f/u in 3 months, sooner as needed/instructed.  I will contact patient regarding test results and provide instructions regarding any necessary changes in plan of care.  Patient was encouraged to keep me informed of any acute changes, lack of improvement, or any new concerning symptoms.  Pt is aware of reasons to seek emergent care.  Patient voiced understanding of all instructions and denied further questions.    Please note that portions of this note may have been completed with a voice recognition program.

## 2025-06-12 LAB
ALBUMIN SERPL-MCNC: 4.7 G/DL (ref 3.5–5.2)
ALBUMIN/CREAT UR: 139 MG/G CREAT (ref 0–29)
BUN SERPL-MCNC: 21 MG/DL (ref 8–23)
BUN/CREAT SERPL: 12.2 (ref 7–25)
CALCIUM SERPL-MCNC: 10.2 MG/DL (ref 8.6–10.5)
CHLORIDE SERPL-SCNC: 101 MMOL/L (ref 98–107)
CO2 SERPL-SCNC: 23.1 MMOL/L (ref 22–29)
CREAT SERPL-MCNC: 1.72 MG/DL (ref 0.57–1)
CREAT UR-MCNC: 363.7 MG/DL
EGFRCR SERPLBLD CKD-EPI 2021: 29.6 ML/MIN/1.73
GLUCOSE SERPL-MCNC: 134 MG/DL (ref 65–99)
MICROALBUMIN UR-MCNC: 503.8 UG/ML
PHOSPHATE SERPL-MCNC: 3.1 MG/DL (ref 2.5–4.5)
POTASSIUM SERPL-SCNC: 4.8 MMOL/L (ref 3.5–5.2)
SODIUM SERPL-SCNC: 136 MMOL/L (ref 136–145)

## 2025-06-13 ENCOUNTER — TELEPHONE (OUTPATIENT)
Dept: FAMILY MEDICINE CLINIC | Facility: CLINIC | Age: 80
End: 2025-06-13

## 2025-06-15 LAB
BACTERIA UR CULT: ABNORMAL
BACTERIA UR CULT: ABNORMAL
OTHER ANTIBIOTIC SUSC ISLT: ABNORMAL

## 2025-06-17 PROBLEM — G89.29 CHRONIC PAIN: Status: RESOLVED | Noted: 2024-08-05 | Resolved: 2025-06-17

## 2025-06-17 PROBLEM — R35.0 URINARY FREQUENCY: Status: RESOLVED | Noted: 2023-07-14 | Resolved: 2025-06-17

## 2025-06-17 PROBLEM — E11.51 DIABETES MELLITUS WITH PERIPHERAL ARTERY DISEASE: Status: RESOLVED | Noted: 2024-05-07 | Resolved: 2025-06-17

## 2025-06-17 RX ORDER — LISINOPRIL 20 MG/1
20 TABLET ORAL DAILY
Qty: 90 TABLET | Refills: 3 | Status: SHIPPED | OUTPATIENT
Start: 2025-06-17

## 2025-06-17 RX ORDER — ALPRAZOLAM 0.25 MG
TABLET ORAL
Qty: 60 TABLET | Refills: 2 | Status: SHIPPED | OUTPATIENT
Start: 2025-06-17

## 2025-06-17 NOTE — TELEPHONE ENCOUNTER
Caller: CARLELADIO    Relationship: Emergency Contact    Best call back number: [phone]     Caller requesting test results: FLOYD    What test was performed: BLOOD WORK    When was the test performed: 06-11-25    Where was the test performed: OFFICE    Additional notes: DAUGHTER IS WANTING TO DISCUSS THE BLOOD TEST RESULTS AND THE REFERRAL.    
Patient called that someone called her and she wasn't able to answer. Please call back  
Spoke with hubert, informed her of her lab results and answered questions regarding NSAIDS   
01-Apr-2011

## 2025-06-23 ENCOUNTER — TELEPHONE (OUTPATIENT)
Dept: FAMILY MEDICINE CLINIC | Facility: CLINIC | Age: 80
End: 2025-06-23

## 2025-06-23 DIAGNOSIS — N39.0 RECURRENT UTI: Primary | ICD-10-CM

## 2025-06-23 NOTE — TELEPHONE ENCOUNTER
"    Caller: Annie Mejia \"Annie Holcomb\"    Relationship: Self    Best call back number: [phone]     What orders are you requesting (i.e. lab or imaging): URINE CULTURE ORDER     In what timeframe would the patient need to come in: AS SOON AS POSSIBLE     Where will you receive your lab/imaging services: IN OFFICE     Additional notes: PATIENT STATED THAT SHE HAS HAS 3 ROUNDS OF ANTIBIOTIC AND STILL HAS A UTI WITH SYMPTOMS OF PRESSURE AND WOULD LIKE FOR A ORDER TO BE PLACED TO CHECK   "

## 2025-06-24 ENCOUNTER — TELEPHONE (OUTPATIENT)
Dept: FAMILY MEDICINE CLINIC | Facility: CLINIC | Age: 80
End: 2025-06-24
Payer: MEDICARE

## 2025-06-24 NOTE — TELEPHONE ENCOUNTER
PATIENT RELATIVE  IS CHECKING UP ON REFERRAL TO [facility]. SHE SAID IT HAS BEEN A COUPLE WEKS AND SHE HAS NOT HEARD ANYTHING

## 2025-06-26 LAB
APPEARANCE UR: CLEAR
BACTERIA #/AREA URNS HPF: NORMAL /[HPF]
BACTERIA UR CULT: NORMAL
BACTERIA UR CULT: NORMAL
BILIRUB UR QL STRIP: NEGATIVE
CASTS URNS QL MICRO: NORMAL /LPF
COLOR UR: YELLOW
EPI CELLS #/AREA URNS HPF: NORMAL /HPF (ref 0–10)
GLUCOSE UR QL STRIP: NEGATIVE
HGB UR QL STRIP: NEGATIVE
KETONES UR QL STRIP: NEGATIVE
LEUKOCYTE ESTERASE UR QL STRIP: ABNORMAL
MICRO URNS: ABNORMAL
NITRITE UR QL STRIP: NEGATIVE
PH UR STRIP: 6.5 [PH] (ref 5–7.5)
PROT UR QL STRIP: ABNORMAL
RBC #/AREA URNS HPF: NORMAL /HPF (ref 0–2)
SP GR UR STRIP: 1.01 (ref 1–1.03)
URINALYSIS REFLEX: ABNORMAL
UROBILINOGEN UR STRIP-MCNC: 0.2 MG/DL (ref 0.2–1)
WBC #/AREA URNS HPF: NORMAL /HPF (ref 0–5)

## 2025-06-27 NOTE — TELEPHONE ENCOUNTER
Per Care Everywhere, patient was contacted & scheduled for 6/30/2025 with Santo Bonds NP at Dr Salazar's office.

## 2025-07-01 ENCOUNTER — TRANSCRIBE ORDERS (OUTPATIENT)
Dept: ADMINISTRATIVE | Facility: HOSPITAL | Age: 80
End: 2025-07-01
Payer: MEDICARE

## 2025-07-01 DIAGNOSIS — N18.31 CHRONIC KIDNEY DISEASE (CKD) STAGE G3A/A1, MODERATELY DECREASED GLOMERULAR FILTRATION RATE (GFR) BETWEEN 45-59 ML/MIN/1.73 SQUARE METER AND ALBUMINURIA CREATININE RATIO LESS THAN 30 MG/G (CMS/H*: Primary | ICD-10-CM

## 2025-07-03 DIAGNOSIS — E11.65 UNCONTROLLED TYPE 2 DIABETES MELLITUS WITH HYPERGLYCEMIA: ICD-10-CM

## 2025-07-07 RX ORDER — PEN NEEDLE, DIABETIC 32GX 5/32"
1 NEEDLE, DISPOSABLE MISCELLANEOUS DAILY
Qty: 100 EACH | Refills: 0 | Status: SHIPPED | OUTPATIENT
Start: 2025-07-07

## 2025-07-22 DIAGNOSIS — G47.09 OTHER INSOMNIA: ICD-10-CM

## 2025-07-22 DIAGNOSIS — R42 VERTIGO: ICD-10-CM

## 2025-07-22 RX ORDER — TRAZODONE HYDROCHLORIDE 50 MG/1
50 TABLET ORAL NIGHTLY
Qty: 90 TABLET | Refills: 0 | Status: SHIPPED | OUTPATIENT
Start: 2025-07-22

## 2025-07-22 RX ORDER — DICYCLOMINE HYDROCHLORIDE 10 MG/1
10 CAPSULE ORAL
Qty: 90 CAPSULE | Refills: 3 | Status: SHIPPED | OUTPATIENT
Start: 2025-07-22

## 2025-07-22 RX ORDER — PROMETHAZINE HYDROCHLORIDE 25 MG/1
25 TABLET ORAL EVERY 8 HOURS PRN
Qty: 90 TABLET | Refills: 0 | Status: SHIPPED | OUTPATIENT
Start: 2025-07-22

## 2025-07-22 RX ORDER — MECLIZINE HYDROCHLORIDE 25 MG/1
TABLET ORAL
Qty: 30 TABLET | Refills: 0 | Status: SHIPPED | OUTPATIENT
Start: 2025-07-22

## 2025-07-30 ENCOUNTER — TELEPHONE (OUTPATIENT)
Dept: UROLOGY | Facility: CLINIC | Age: 80
End: 2025-07-30
Payer: MEDICARE

## 2025-07-30 ENCOUNTER — OFFICE VISIT (OUTPATIENT)
Dept: UROLOGY | Facility: CLINIC | Age: 80
End: 2025-07-30
Payer: MEDICARE

## 2025-07-30 VITALS
HEIGHT: 66 IN | BODY MASS INDEX: 33.17 KG/M2 | OXYGEN SATURATION: 96 % | HEART RATE: 120 BPM | DIASTOLIC BLOOD PRESSURE: 86 MMHG | WEIGHT: 206.4 LBS | TEMPERATURE: 97.6 F | SYSTOLIC BLOOD PRESSURE: 130 MMHG

## 2025-07-30 DIAGNOSIS — N39.0 RECURRENT UTI: ICD-10-CM

## 2025-07-30 DIAGNOSIS — N95.8 GENITOURINARY SYNDROME OF MENOPAUSE: ICD-10-CM

## 2025-07-30 DIAGNOSIS — R39.89 BLADDER PAIN: Primary | ICD-10-CM

## 2025-07-30 LAB
BILIRUB BLD-MCNC: NEGATIVE MG/DL
CLARITY, POC: CLEAR
COLOR UR: ABNORMAL
EXPIRATION DATE: ABNORMAL
GLUCOSE UR STRIP-MCNC: NEGATIVE MG/DL
KETONES UR QL: NEGATIVE
LEUKOCYTE EST, POC: ABNORMAL
Lab: ABNORMAL
NITRITE UR-MCNC: NEGATIVE MG/ML
PH UR: 6.5 [PH] (ref 5–8)
PROT UR STRIP-MCNC: NEGATIVE MG/DL
RBC # UR STRIP: ABNORMAL /UL
SP GR UR: 1.01 (ref 1–1.03)
UROBILINOGEN UR QL: ABNORMAL

## 2025-07-30 RX ORDER — GRANULES FOR ORAL 3 G/1
3 POWDER ORAL ONCE
Qty: 3 G | Refills: 0 | Status: SHIPPED | OUTPATIENT
Start: 2025-07-30 | End: 2025-07-30

## 2025-07-30 RX ORDER — ESTRADIOL 0.1 MG/G
CREAM VAGINAL
Qty: 42.5 G | Refills: 3 | Status: SHIPPED | OUTPATIENT
Start: 2025-07-30

## 2025-07-30 RX ORDER — ZINC GLUCONATE 50 MG
50 TABLET ORAL DAILY
COMMUNITY
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
## 2025-07-30 NOTE — TELEPHONE ENCOUNTER
"Called PT and asked her to explain her symptoms. PT stated she just finished her fifth round of ax, but new symptoms started this morning. PT feels constant urge to void, intense pressure, and feels like her \"insides are falling out.\" PT stated she is drinking lots of water which improves her pain.     Received verbal from Daisy Alba to offer PT appointment this afternoon. Informed PT she must be here at 420 at the latest in order to be seen. PT expressed understanding and had no further questions, stating she would try her best.    CONNIE Geiger MA  "

## 2025-07-30 NOTE — TELEPHONE ENCOUNTER
"  [facility] staff attempted to follow warm transfer process and was unsuccessful     Caller: LUPE TESFAYE    Relationship to patient: SELF    Best call back number:   Telephone Information:   Mobile [phone]     Patient is needing: PT CALLED TODAY IN TEARS FROM PAIN, PT HAS JUST FINISHED HER 5TH ROUND OF ANTIBIOTIC ON MONDAY 7/28/25. PT IS HAVING RETENTION, DRIBBLING URINE. STATED IT \"FEELS LIKE HER INSIDES ARE GOING TO FALL OUT\" AND SHE IS IN CONSTANT PAIN (AROUND A 9). FEELS LIKE SHE HAS TO PEE ALL THE TIME AND ONLY RELEASES ABOUT A TABLESPOON FULL OF URINE, LOTS OF PRESSURE. PT IS WANTING TO KNOW IF SHE CAN BE SEEN SOONER, PT IS SCHEDULED FOR NEW PT APPT WITH URIAH DELAROSA FOR 8/6/25 AT 9:20AM. [facility] ADVISED ED FOR PAIN AND RETENTION. PLEASE CALL PT TO DISCUSS SCHEDULING.   "

## 2025-08-11 ENCOUNTER — TELEPHONE (OUTPATIENT)
Dept: NEUROLOGY | Facility: CLINIC | Age: 80
End: 2025-08-11
Payer: MEDICARE

## 2025-08-12 ENCOUNTER — HOSPITAL ENCOUNTER (OUTPATIENT)
Dept: ULTRASOUND IMAGING | Facility: HOSPITAL | Age: 80
Discharge: HOME | End: 2025-08-12
Payer: MEDICARE

## 2025-08-12 DIAGNOSIS — N18.31 CHRONIC KIDNEY DISEASE (CKD) STAGE G3A/A1, MODERATELY DECREASED GLOMERULAR FILTRATION RATE (GFR) BETWEEN 45-59 ML/MIN/1.73 SQUARE METER AND ALBUMINURIA CREATININE RATIO LESS THAN 30 MG/G (CMS/H*: ICD-10-CM

## 2025-08-12 PROCEDURE — 76775 US EXAM ABDO BACK WALL LIM: CPT

## 2025-08-13 ENCOUNTER — OFFICE VISIT (OUTPATIENT)
Dept: UROLOGY | Facility: CLINIC | Age: 80
End: 2025-08-13
Payer: MEDICARE

## 2025-08-13 VITALS
RESPIRATION RATE: 16 BRPM | WEIGHT: 206.35 LBS | BODY MASS INDEX: 33.16 KG/M2 | SYSTOLIC BLOOD PRESSURE: 156 MMHG | TEMPERATURE: 97.4 F | HEIGHT: 66 IN | HEART RATE: 108 BPM | DIASTOLIC BLOOD PRESSURE: 80 MMHG | OXYGEN SATURATION: 99 %

## 2025-08-13 DIAGNOSIS — R39.89 BLADDER PAIN: Primary | ICD-10-CM

## 2025-08-13 LAB
BILIRUB BLD-MCNC: NEGATIVE MG/DL
CLARITY, POC: CLEAR
COLOR UR: ABNORMAL
EXPIRATION DATE: ABNORMAL
GLUCOSE UR STRIP-MCNC: NEGATIVE MG/DL
KETONES UR QL: NEGATIVE
LEUKOCYTE EST, POC: ABNORMAL
Lab: ABNORMAL
NITRITE UR-MCNC: NEGATIVE MG/ML
PH UR: 6.5 [PH] (ref 5–8)
PROT UR STRIP-MCNC: ABNORMAL MG/DL
RBC # UR STRIP: ABNORMAL /UL
SP GR UR: 1.01 (ref 1–1.03)
UROBILINOGEN UR QL: ABNORMAL

## 2025-08-13 PROCEDURE — 99213 OFFICE O/P EST LOW 20 MIN: CPT | Performed by: NURSE PRACTITIONER

## 2025-08-13 PROCEDURE — 3079F DIAST BP 80-89 MM HG: CPT | Performed by: NURSE PRACTITIONER

## 2025-08-13 PROCEDURE — G2211 COMPLEX E/M VISIT ADD ON: HCPCS | Performed by: NURSE PRACTITIONER

## 2025-08-13 PROCEDURE — 3077F SYST BP >= 140 MM HG: CPT | Performed by: NURSE PRACTITIONER

## 2025-08-13 PROCEDURE — 81003 URINALYSIS AUTO W/O SCOPE: CPT | Performed by: NURSE PRACTITIONER

## 2025-08-13 PROCEDURE — 51798 US URINE CAPACITY MEASURE: CPT | Performed by: NURSE PRACTITIONER

## 2025-08-13 RX ORDER — GRANULES FOR ORAL 3 G/1
POWDER ORAL
COMMUNITY
Start: 2025-07-30

## 2025-08-13 RX ORDER — FLUOCINONIDE TOPICAL SOLUTION USP, 0.05% 0.5 MG/ML
SOLUTION TOPICAL
COMMUNITY
Start: 2025-08-12

## 2025-08-14 LAB
APPEARANCE UR: CLEAR
BACTERIA #/AREA URNS HPF: NORMAL /HPF
BILIRUB UR QL STRIP: NEGATIVE
CASTS URNS MICRO: NORMAL
COLOR UR: YELLOW
EPI CELLS #/AREA URNS HPF: NORMAL /HPF
GLUCOSE UR QL STRIP: NEGATIVE
HGB UR QL STRIP: NEGATIVE
KETONES UR QL STRIP: ABNORMAL
LEUKOCYTE ESTERASE UR QL STRIP: ABNORMAL
NITRITE UR QL STRIP: NEGATIVE
PH UR STRIP: 7 [PH] (ref 5–8)
PROT UR QL STRIP: ABNORMAL
RBC #/AREA URNS HPF: NORMAL /HPF
SP GR UR STRIP: 1.02 (ref 1–1.03)
UROBILINOGEN UR STRIP-MCNC: ABNORMAL MG/DL
WBC #/AREA URNS HPF: NORMAL /HPF

## (undated) DEVICE — PAD,ABDOMINAL,5"X9",STERILE,LF,1/PK: Brand: MEDLINE INDUSTRIES, INC.

## (undated) DEVICE — RICH MAJOR PROCEDURE: Brand: MEDLINE INDUSTRIES, INC.

## (undated) DEVICE — STRIP,CLOSURE,WOUND,MEDI-STRIP,1/2X4: Brand: MEDLINE

## (undated) DEVICE — SUT VIC 3/0 SH 27IN J416H

## (undated) DEVICE — SYR LL TP 10ML STRL

## (undated) DEVICE — SLV SCD CALF HEMOFORCE DVT THERP REPROC MD

## (undated) DEVICE — SPNG GZ WOVN 4X4IN 12PLY 10/BX STRL

## (undated) DEVICE — DRSNG WND BORDR/ADHS NONADHR/GZ LF 4X4IN STRL

## (undated) DEVICE — GLV SURG SENSICARE W/ALOE PF LF 6.5 STRL

## (undated) DEVICE — SHEET,DRAPE,70X100,STERILE: Brand: MEDLINE

## (undated) DEVICE — GLV SURG SENSICARE POLYISPRN W/ALOE PF LF 6.5 GRN STRL

## (undated) DEVICE — NDL HYPO ECLPS SFTY 25G 1 1/2IN

## (undated) DEVICE — UNDYED BRAIDED (POLYGLACTIN 910), SYNTHETIC ABSORBABLE SUTURE: Brand: COATED VICRYL